# Patient Record
Sex: MALE | Race: BLACK OR AFRICAN AMERICAN | NOT HISPANIC OR LATINO | Employment: OTHER | URBAN - METROPOLITAN AREA
[De-identification: names, ages, dates, MRNs, and addresses within clinical notes are randomized per-mention and may not be internally consistent; named-entity substitution may affect disease eponyms.]

---

## 2017-01-07 ENCOUNTER — TELEPHONE (OUTPATIENT)
Dept: RADIOLOGY | Facility: HOSPITAL | Age: 42
End: 2017-01-07

## 2017-01-09 ENCOUNTER — HOSPITAL ENCOUNTER (OUTPATIENT)
Dept: RADIOLOGY | Facility: HOSPITAL | Age: 42
Discharge: HOME | End: 2017-01-09
Attending: INTERNAL MEDICINE
Payer: MEDICARE

## 2017-01-09 DIAGNOSIS — C34.11 MALIGNANT NEOPLASM OF UPPER LOBE OF RIGHT LUNG: ICD-10-CM

## 2017-01-09 DIAGNOSIS — C79.51 SECONDARY MALIGNANT NEOPLASM OF BONE: ICD-10-CM

## 2017-01-09 PROCEDURE — 25500020 PHARM REV CODE 255: Performed by: INTERNAL MEDICINE

## 2017-01-09 PROCEDURE — 71260 CT THORAX DX C+: CPT | Mod: TC

## 2017-01-09 PROCEDURE — 74177 CT ABD & PELVIS W/CONTRAST: CPT | Mod: 26,GC,, | Performed by: RADIOLOGY

## 2017-01-09 PROCEDURE — 74177 CT ABD & PELVIS W/CONTRAST: CPT | Mod: TC

## 2017-01-09 PROCEDURE — 71260 CT THORAX DX C+: CPT | Mod: 26,,, | Performed by: RADIOLOGY

## 2017-01-09 RX ADMIN — IOHEXOL 15 ML: 350 INJECTION, SOLUTION INTRAVENOUS at 01:01

## 2017-01-09 RX ADMIN — IOHEXOL 15 ML: 350 INJECTION, SOLUTION INTRAVENOUS at 12:01

## 2017-01-09 RX ADMIN — IOHEXOL 100 ML: 350 INJECTION, SOLUTION INTRAVENOUS at 03:01

## 2017-01-10 ENCOUNTER — OFFICE VISIT (OUTPATIENT)
Dept: HEMATOLOGY/ONCOLOGY | Facility: CLINIC | Age: 42
End: 2017-01-10
Payer: MEDICARE

## 2017-01-10 ENCOUNTER — LAB VISIT (OUTPATIENT)
Dept: LAB | Facility: HOSPITAL | Age: 42
End: 2017-01-10
Attending: INTERNAL MEDICINE
Payer: MEDICARE

## 2017-01-10 VITALS
WEIGHT: 183.19 LBS | TEMPERATURE: 98 F | HEART RATE: 92 BPM | OXYGEN SATURATION: 97 % | BODY MASS INDEX: 29.44 KG/M2 | HEIGHT: 66 IN | DIASTOLIC BLOOD PRESSURE: 102 MMHG | SYSTOLIC BLOOD PRESSURE: 140 MMHG | RESPIRATION RATE: 17 BRPM

## 2017-01-10 DIAGNOSIS — C79.51 SECONDARY MALIGNANT NEOPLASM OF BONE: ICD-10-CM

## 2017-01-10 DIAGNOSIS — Z51.11 ENCOUNTER FOR ANTINEOPLASTIC CHEMOTHERAPY: ICD-10-CM

## 2017-01-10 DIAGNOSIS — E03.9 HYPOTHYROIDISM, UNSPECIFIED TYPE: ICD-10-CM

## 2017-01-10 DIAGNOSIS — C34.11 MALIGNANT NEOPLASM OF UPPER LOBE OF RIGHT LUNG: ICD-10-CM

## 2017-01-10 DIAGNOSIS — G89.3 NEOPLASM RELATED PAIN: ICD-10-CM

## 2017-01-10 DIAGNOSIS — C34.91 LUNG CANCER, PRIMARY, WITH METASTASIS FROM LUNG TO OTHER SITE, RIGHT: Primary | ICD-10-CM

## 2017-01-10 DIAGNOSIS — I82.402 DEEP VEIN THROMBOSIS (DVT) OF LEFT LOWER EXTREMITY, UNSPECIFIED CHRONICITY, UNSPECIFIED VEIN: ICD-10-CM

## 2017-01-10 DIAGNOSIS — R19.7 DIARRHEA, UNSPECIFIED TYPE: ICD-10-CM

## 2017-01-10 DIAGNOSIS — I10 ESSENTIAL HYPERTENSION: ICD-10-CM

## 2017-01-10 LAB
ALBUMIN SERPL BCP-MCNC: 4.1 G/DL
ALP SERPL-CCNC: 80 U/L
ALT SERPL W/O P-5'-P-CCNC: 20 U/L
ANION GAP SERPL CALC-SCNC: 8 MMOL/L
AST SERPL-CCNC: 20 U/L
BILIRUB SERPL-MCNC: 0.3 MG/DL
BUN SERPL-MCNC: 15 MG/DL
CALCIUM SERPL-MCNC: 9.6 MG/DL
CHLORIDE SERPL-SCNC: 103 MMOL/L
CO2 SERPL-SCNC: 26 MMOL/L
CREAT SERPL-MCNC: 1.5 MG/DL
ERYTHROCYTE [DISTWIDTH] IN BLOOD BY AUTOMATED COUNT: 13.9 %
EST. GFR  (AFRICAN AMERICAN): >60 ML/MIN/1.73 M^2
EST. GFR  (NON AFRICAN AMERICAN): 57 ML/MIN/1.73 M^2
GLUCOSE SERPL-MCNC: 147 MG/DL
HCT VFR BLD AUTO: 46 %
HGB BLD-MCNC: 15.5 G/DL
MCH RBC QN AUTO: 27.6 PG
MCHC RBC AUTO-ENTMCNC: 33.7 %
MCV RBC AUTO: 82 FL
NEUTROPHILS # BLD AUTO: 4.1 K/UL
PLATELET # BLD AUTO: 248 K/UL
PMV BLD AUTO: 10.4 FL
POTASSIUM SERPL-SCNC: 3.9 MMOL/L
PROT SERPL-MCNC: 7.8 G/DL
RBC # BLD AUTO: 5.61 M/UL
SODIUM SERPL-SCNC: 137 MMOL/L
WBC # BLD AUTO: 8.15 K/UL

## 2017-01-10 PROCEDURE — 36415 COLL VENOUS BLD VENIPUNCTURE: CPT

## 2017-01-10 PROCEDURE — 85027 COMPLETE CBC AUTOMATED: CPT

## 2017-01-10 PROCEDURE — 99999 PR PBB SHADOW E&M-EST. PATIENT-LVL IV: CPT | Mod: PBBFAC,,, | Performed by: INTERNAL MEDICINE

## 2017-01-10 PROCEDURE — 99215 OFFICE O/P EST HI 40 MIN: CPT | Mod: S$PBB,,, | Performed by: INTERNAL MEDICINE

## 2017-01-10 PROCEDURE — 80053 COMPREHEN METABOLIC PANEL: CPT

## 2017-01-10 RX ORDER — HEPARIN 100 UNIT/ML
500 SYRINGE INTRAVENOUS
Status: CANCELLED | OUTPATIENT
Start: 2017-01-11

## 2017-01-10 RX ORDER — OXYCODONE AND ACETAMINOPHEN 10; 325 MG/1; MG/1
1 TABLET ORAL EVERY 6 HOURS PRN
Qty: 60 TABLET | Refills: 0 | Status: SHIPPED | OUTPATIENT
Start: 2017-01-10 | End: 2017-01-24 | Stop reason: SDUPTHER

## 2017-01-10 RX ORDER — SODIUM CHLORIDE 0.9 % (FLUSH) 0.9 %
10 SYRINGE (ML) INJECTION
Status: CANCELLED | OUTPATIENT
Start: 2017-01-11

## 2017-01-10 NOTE — Clinical Note
RTC to see Dr. Garay with CBC, CMP, TSH, T4  in 2 Weeks for next cycle Opdivo and Xgeva.   Thank you, PJ

## 2017-01-10 NOTE — PROGRESS NOTES
"PATIENT: Yao Alan  MRN: [id number]  DATE: 1/10/2017    Subjective:     Chief complaint:  Chief Complaint   Patient presents with    Malignant neoplasm of upper lobe of right lung    lower back pain 5/10       Oncologic History:  Mr. Yao Alan is a 41-year-old male who has a long history of smoking and was seen in the Pulmonary Clinic by Dr. Valle on 03/05/2015 with abnormal CT scan.    Apparently, he went to the [facility] in February with coughing as well as chest pain. A chest x-ray was done, which revealed a left upper lobe lung mass. Followup CT scan was done on 02/12/2015 and that revealed posterior superior mediastinal mass adjacent and prominent enlarged upper left mediastinal lymph nodes, abutting the aortic arch as well as left subclavian artery. A  CT scan of the abdomen was done on 03/03/2015 without contrast and that revealed nonobstructive nephrolithiasis, but a 2.1 cm lytic lesion involving the left iliac wing concerning for metastatic disease given the presence of emphysema and a mediastinal soft tissue mass on the CT chest from 02/12/2015. He saw Dr. Valle after that on 03/05/2015 and underwent an IR guided biopsy of the bone lesion on 03/17/2015. 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    His EGFR/EML/ALK is negative.    His PET scan on 3/25/15 revealed Left upper lobe lung lesion with an adjacent para-aortic lymph node, right anterior rib metastasis, right iliac metastasis, and pancreatic metastasis. A pancreatic cancer primary neoplasm is less likely.  MRI brain was negative for mets.  He completed 6 cycles of Carboplatin and Alimta and was on maintenance Alimta until end of March 2016.  His bone scan from 3/16 revealed stable disease. His CT scans also from end of March 2016 revealed "Interval enlargement of left upper lobe lung mass measuring 5.9 x 3.9 cm (previously 3.3 x 2.5 cm). This mass appears to invade the mediastinum and abutting the " "aortic arch and left subclavian artery"  He is now on Opdivo.  CT CAP from 6/22/16 s/p 6 cycles of Opdivo reveals "In this patient with a history of metastatic lung cancer, the previously identified paramediastinal left upper lobe lung mass has significantly decreased in size with only a trace amount of residual soft tissue opacities seen measuring 3.2 x 1.4 cm (axial series 2, image 16).Stable lytic lesion in the right iliac wing, unchanged.The right anterior sixth rib lesion and pancreatic head abnormality are not definitely appreciated on current examination."  Bone scan from 6/23/16 reveals "Stable activity in the right sixth rib anteriorly and the right iliac bone.No new metastatic lesions visualized."  10/3/16- CT scans reveal "In this patient with a history of metastatic lung cancer, the previously identified paramediastinal left upper lobe lung mass has decreased in size with only a trace amount of residual soft tissue opacity as above.  Stable lytic lesion in the right iliac wing, unchanged"  Bone scan reveals "Right superior anterior iliac bone lesion is stable and the right sixth rib has normalized. Recent dental procedure versus periodontal disease and possible right mastoiditis".      1/9/17 CT chest/abdomen/pelvis reveals "1. In this patient with history of metastatic lung cancer, the previously identified left upper paramediastinal lesion demonstrates near complete resolution with 1.0 x 0.7 cm residual disease (2.7 x 0.8 cm previously). Additionally, there is improving sclerotic lesion in the right iliac wing consistent with treated metastatic disease.  No evidence of new lesions in the chest, abdomen or pelvis. 2. Apical predominant paraseptal emphysematous changes and bullae. 3. Additional findings as above."     Interval History: Mr. Alan returns for follow up, results of scans above, and for Opdivo. He  back pain which is no worse than usual. . He takes 3 percocet a day for pain. He complains of " "neuropathy but is not taking gabapentin because it makes him sweat. Percocet helps with neuropathy pain as well. He had 2 small episodes of diarrhea yesterday and 1 today. No diarrhea after last treatment. He stopped taking his BP medication because he feels it was increasing his BP. He denies any nausea, vomiting, constipation, abdominal pain, weight loss or loss of appetite, chest pain, shortness of breath, leg swelling, fatigue, pain, headache, dizziness, or mood changes. He is alone.    ECOG Performance Status:   ECOG SCORE    0 - Fully active-able to carry on all pre-disease performance without restriction          PMFSH: all information reviewed and updated as relevant to today's visit    Review of Systems:   Review of Systems   Constitutional: Negative for activity change, appetite change, fatigue and fever.   HENT: Negative for mouth sores, nosebleeds and sore throat.    Eyes: Negative for visual disturbance.   Respiratory: Negative for cough and shortness of breath.    Cardiovascular: Negative for chest pain, palpitations and leg swelling.   Gastrointestinal: Positive for diarrhea (rare episode). Negative for abdominal pain, constipation, nausea and vomiting.   Genitourinary: Negative for difficulty urinating and frequency.   Musculoskeletal: Positive for back pain. Negative for arthralgias.   Skin: Negative for rash.   Neurological: Negative for dizziness, numbness and headaches.   Hematological: Negative for adenopathy. Does not bruise/bleed easily.   Psychiatric/Behavioral: Negative for confusion and sleep disturbance. The patient is not nervous/anxious.    All other systems reviewed and are negative.        Objective:      Vitals:   Vitals:    01/10/17 0757   BP: (!) 140/102   Pulse: 92   Resp: 17   Temp: 98.1 °F (36.7 °C)   TempSrc: Oral   SpO2: 97%   Weight: 83.1 kg (183 lb 3.2 oz)   Height: 5' 6" (1.676 m)     BMI: Body mass index is 29.57 kg/(m^2).      Physical Exam:   Physical Exam "   Constitutional: He is oriented to person, place, and time. He appears well-developed and well-nourished. No distress.   HENT:   Right Ear: External ear normal.   Left Ear: External ear normal.   Mouth/Throat: No oropharyngeal exudate.   Eyes: Conjunctivae and lids are normal. Pupils are equal, round, and reactive to light. No scleral icterus.   Neck: Trachea normal and normal range of motion. Neck supple. No thyromegaly present.   Cardiovascular: Normal rate, regular rhythm, normal heart sounds and normal pulses.    Pulmonary/Chest: Effort normal and breath sounds normal.   Abdominal: Soft. Normal appearance and bowel sounds are normal. He exhibits no distension and no mass. There is no hepatosplenomegaly or splenomegaly. There is no tenderness.   Musculoskeletal: Normal range of motion.   Lymphadenopathy:        Head (right side): No submental and no submandibular adenopathy present.        Head (left side): No submental and no submandibular adenopathy present.     He has no cervical adenopathy.     He has no axillary adenopathy.        Right: No supraclavicular adenopathy present.        Left: No supraclavicular adenopathy present.   Neurological: He is alert and oriented to person, place, and time. He has normal reflexes. No sensory deficit.   Skin: Skin is warm, dry and intact. No bruising and no rash noted. Nails show no clubbing.   Psychiatric: He has a normal mood and affect. His speech is normal and behavior is normal. Cognition and memory are normal.   Vitals reviewed.        Laboratory Data:  WBC   Date Value Ref Range Status   01/10/2017 8.15 3.90 - 12.70 K/uL Final     Hemoglobin   Date Value Ref Range Status   01/10/2017 15.5 14.0 - 18.0 g/dL Final     Hematocrit   Date Value Ref Range Status   01/10/2017 46.0 40.0 - 54.0 % Final     Platelets   Date Value Ref Range Status   01/10/2017 248 150 - 350 K/uL Final     Gran #   Date Value Ref Range Status   01/10/2017 4.1 1.8 - 7.7 K/uL Final     Comment:      The ANC is based on a white cell differential from an   automated cell counter. It has not been microscopically   reviewed for the presence of abnormal cells. Clinical   correlation is required.       Gran%   Date Value Ref Range Status   07/25/2015 60.9 38.0 - 73.0 % Final       Chemistry        Component Value Date/Time     01/10/2017 0720    K 3.9 01/10/2017 0720     01/10/2017 0720    CO2 26 01/10/2017 0720    BUN 15 01/10/2017 0720    CREATININE 1.5 (H) 01/10/2017 0720     (H) 01/10/2017 0720        Component Value Date/Time    CALCIUM 9.6 01/10/2017 0720    ALKPHOS 80 01/10/2017 0720    AST 20 01/10/2017 0720    ALT 20 01/10/2017 0720    BILITOT 0.3 01/10/2017 0720              Assessment/Plan:     1. Lung cancer, primary, with metastasis from lung to other site, right    2. Secondary malignant neoplasm of bone    3. Encounter for antineoplastic chemotherapy    4. Neoplasm related pain    5. Hypothyroidism, unspecified type    6. Deep vein thrombosis (DVT) of left lower extremity, unspecified chronicity, unspecified vein    7. Essential hypertension    8. Diarrhea, unspecified type        Plan:    1,2,3. He is doing well overall and scans have improved. Proceed with Opdivo immunotherapy tomorrow as scheduled.   RTC to see Dr. Garay with CBC, CMP, TSH, T4  in 2 Weeks for next cycle Opdivo and Xgeva.    4. Stable, Refill  percocet.  5. Stable,  Continue Synthroid. Recheck in 2 weeks.   6. Stable, Continue Xarelto   7. BP elevated today. Encouraged compliance with medication. Patient will resume Norvasc as prescribed.  8. Patient had 3 small episode of diarrhea in the past 2 days. Continue to monitor. Patient understands he is to call for increase in frequency or severity.     Med and Orders:  Orders Placed This Encounter    CBC Oncology    Comprehensive metabolic panel    TSH    T4, free    oxycodone-acetaminophen (PERCOCET)  mg per tablet       Follow Up:  Return in about 2  weeks (around 1/24/2017).    Patient instructions:   There are no Patient Instructions on file for this visit.      More than 25 mins were spent during this encounter, greater than 50% was spent in direct counseling and/or coordination of care.   Patient was also seen and examined by Dr. Garay. Patient is in agreement with the proposed treatment plan. All questions were answered to the patient's satisfaction. Pt knows to call clinic if anything is needed before the next clinic visit.    JULIOCESAR Jordan  Hematology and Medical Oncology      STAFF NOTE:  I have reviewed the notes, assessments, and/or procedures performed by the nurse practitioner, as above.  I have personally interviewed the patient at the beside, and rounded with the nurse practitioner. I formulated the plan of care.  I concur with her documentation of Yao Alan.

## 2017-01-11 ENCOUNTER — INFUSION (OUTPATIENT)
Dept: INFUSION THERAPY | Facility: HOSPITAL | Age: 42
End: 2017-01-11
Attending: INTERNAL MEDICINE
Payer: MEDICARE

## 2017-01-11 VITALS
SYSTOLIC BLOOD PRESSURE: 134 MMHG | DIASTOLIC BLOOD PRESSURE: 88 MMHG | TEMPERATURE: 98 F | HEART RATE: 89 BPM | RESPIRATION RATE: 18 BRPM

## 2017-01-11 DIAGNOSIS — D50.0 IRON DEFICIENCY ANEMIA DUE TO CHRONIC BLOOD LOSS: Primary | ICD-10-CM

## 2017-01-11 DIAGNOSIS — C79.51 SECONDARY MALIGNANT NEOPLASM OF BONE: ICD-10-CM

## 2017-01-11 DIAGNOSIS — C34.91 LUNG CANCER, PRIMARY, WITH METASTASIS FROM LUNG TO OTHER SITE, RIGHT: ICD-10-CM

## 2017-01-11 DIAGNOSIS — C34.11 MALIGNANT NEOPLASM OF UPPER LOBE OF RIGHT LUNG: ICD-10-CM

## 2017-01-11 PROCEDURE — 63600175 PHARM REV CODE 636 W HCPCS: Performed by: INTERNAL MEDICINE

## 2017-01-11 PROCEDURE — 96413 CHEMO IV INFUSION 1 HR: CPT

## 2017-01-11 PROCEDURE — 25000003 PHARM REV CODE 250: Performed by: INTERNAL MEDICINE

## 2017-01-11 RX ORDER — HEPARIN 100 UNIT/ML
500 SYRINGE INTRAVENOUS
Status: DISCONTINUED | OUTPATIENT
Start: 2017-01-11 | End: 2017-01-11 | Stop reason: HOSPADM

## 2017-01-11 RX ORDER — SODIUM CHLORIDE 0.9 % (FLUSH) 0.9 %
10 SYRINGE (ML) INJECTION
Status: DISCONTINUED | OUTPATIENT
Start: 2017-01-11 | End: 2017-01-11 | Stop reason: HOSPADM

## 2017-01-11 RX ADMIN — HEPARIN 500 UNITS: 100 SYRINGE at 10:01

## 2017-01-11 RX ADMIN — SODIUM CHLORIDE, PRESERVATIVE FREE 10 ML: 5 INJECTION INTRAVENOUS at 10:01

## 2017-01-11 RX ADMIN — SODIUM CHLORIDE 240 MG: 9 INJECTION, SOLUTION INTRAVENOUS at 08:01

## 2017-01-11 RX ADMIN — SODIUM CHLORIDE: 9 INJECTION, SOLUTION INTRAVENOUS at 08:01

## 2017-01-11 NOTE — MR AVS SNAPSHOT
Patient Information     Patient Name Sex Yao Templeton Male 1975      Visit Information        Provider Department Dept Phone Center    2017 7:30 AM [facility], [facility] [phone] Pedro Reeves      Patient Instructions     None      Your Current Medications Are     albuterol 90 mcg/actuation inhaler    amlodipine (NORVASC) 10 MG tablet    docusate sodium (COLACE) 100 MG capsule    ferrous sulfate 325 (65 FE) MG EC tablet    lactulose (CHRONULAC) 10 gram/15 mL solution    levocetirizine (XYZAL) 5 MG tablet    levothyroxine (SYNTHROID) 25 MCG tablet    metoclopramide HCl (REGLAN) 10 MG tablet    naloxegol 25 mg Tab    omeprazole (PRILOSEC) 20 MG capsule    oxycodone-acetaminophen (PERCOCET)  mg per tablet    polyethylene glycol (GLYCOLAX) 17 gram/dose powder    PROAIR HFA 90 mcg/actuation inhaler    rivaroxaban (XARELTO) 20 mg Tab      Facility-Administered Medications     heparin, porcine (PF) 100 unit/mL injection flush 500 Units    nivolumab 240 mg in sodium chloride 0.9% 100 mL chemo infusion    sodium chloride 0.9% 100 mL flush bag    sodium chloride 0.9% flush 10 mL      Appointments for Next Year     None         Default Flowsheet Data (last 24 hours)      Amb Complex Vitals Tato        17 0800                Measurements    /88        Temp 98.2 °F (36.8 °C)        Pulse 89        Resp 18        Pain Assessment    Pain Score Zero                Allergies     Nsaids (Non-steroidal Anti-inflammatory Drug)     Patient says since been diagnosed with lung mass on 2-12-15, if take any NSAIDS he spikes a fever.    Tylenol [Acetaminophen] Other (See Comments)    Sweating +      Medications You Received from 01/10/2017 0905 to 2017 0905        Date/Time Order Dose Route Action     2017 0856 nivolumab 240 mg in sodium chloride 0.9% 100 mL chemo infusion 240 mg Intravenous New Bag     2017 0856 sodium chloride 0.9% 100 mL flush bag   Intravenous  New Bag      Current Discharge Medication List     Cannot display discharge medications since this is not an admission.

## 2017-01-11 NOTE — PLAN OF CARE
Problem: Patient Care Overview (Adult)  Goal: Plan of Care Review  Outcome: Ongoing (interventions implemented as appropriate)  Patient tolerated treatment without complaints. Port flushed, heparin instilled and de-accessed. Encouraged fluid intake. AVS provided to patient, future appointments reviewed. Discharged without s/s of adverse reaction. Instructed to call provider with any questions or concerns.

## 2017-01-24 ENCOUNTER — OFFICE VISIT (OUTPATIENT)
Dept: HEMATOLOGY/ONCOLOGY | Facility: CLINIC | Age: 42
End: 2017-01-24
Payer: MEDICARE

## 2017-01-24 ENCOUNTER — LAB VISIT (OUTPATIENT)
Dept: LAB | Facility: HOSPITAL | Age: 42
End: 2017-01-24
Attending: INTERNAL MEDICINE
Payer: MEDICARE

## 2017-01-24 VITALS
SYSTOLIC BLOOD PRESSURE: 142 MMHG | WEIGHT: 185.19 LBS | HEIGHT: 66 IN | BODY MASS INDEX: 29.76 KG/M2 | RESPIRATION RATE: 17 BRPM | TEMPERATURE: 98 F | HEART RATE: 83 BPM | OXYGEN SATURATION: 99 % | DIASTOLIC BLOOD PRESSURE: 97 MMHG

## 2017-01-24 DIAGNOSIS — G89.3 NEOPLASM RELATED PAIN: ICD-10-CM

## 2017-01-24 DIAGNOSIS — E03.9 HYPOTHYROIDISM, UNSPECIFIED TYPE: ICD-10-CM

## 2017-01-24 DIAGNOSIS — C34.91 LUNG CANCER, PRIMARY, WITH METASTASIS FROM LUNG TO OTHER SITE, RIGHT: ICD-10-CM

## 2017-01-24 DIAGNOSIS — Z51.11 ENCOUNTER FOR ANTINEOPLASTIC CHEMOTHERAPY: ICD-10-CM

## 2017-01-24 DIAGNOSIS — I82.402 DEEP VEIN THROMBOSIS (DVT) OF LEFT LOWER EXTREMITY, UNSPECIFIED CHRONICITY, UNSPECIFIED VEIN: ICD-10-CM

## 2017-01-24 DIAGNOSIS — C34.91 LUNG CANCER, PRIMARY, WITH METASTASIS FROM LUNG TO OTHER SITE, RIGHT: Primary | ICD-10-CM

## 2017-01-24 DIAGNOSIS — C79.51 SECONDARY MALIGNANT NEOPLASM OF BONE: ICD-10-CM

## 2017-01-24 DIAGNOSIS — I10 ESSENTIAL HYPERTENSION: ICD-10-CM

## 2017-01-24 LAB
ALBUMIN SERPL BCP-MCNC: 3.9 G/DL
ALP SERPL-CCNC: 77 U/L
ALT SERPL W/O P-5'-P-CCNC: 13 U/L
ANION GAP SERPL CALC-SCNC: 7 MMOL/L
AST SERPL-CCNC: 17 U/L
BILIRUB SERPL-MCNC: 0.3 MG/DL
BUN SERPL-MCNC: 12 MG/DL
CALCIUM SERPL-MCNC: 9.4 MG/DL
CHLORIDE SERPL-SCNC: 105 MMOL/L
CO2 SERPL-SCNC: 28 MMOL/L
CREAT SERPL-MCNC: 1.4 MG/DL
ERYTHROCYTE [DISTWIDTH] IN BLOOD BY AUTOMATED COUNT: 14.1 %
EST. GFR  (AFRICAN AMERICAN): >60 ML/MIN/1.73 M^2
EST. GFR  (NON AFRICAN AMERICAN): >60 ML/MIN/1.73 M^2
GLUCOSE SERPL-MCNC: 108 MG/DL
HCT VFR BLD AUTO: 46.1 %
HGB BLD-MCNC: 15.5 G/DL
MCH RBC QN AUTO: 27.6 PG
MCHC RBC AUTO-ENTMCNC: 33.6 %
MCV RBC AUTO: 82 FL
NEUTROPHILS # BLD AUTO: 4.1 K/UL
PLATELET # BLD AUTO: 243 K/UL
PMV BLD AUTO: 10.5 FL
POTASSIUM SERPL-SCNC: 3.6 MMOL/L
PROT SERPL-MCNC: 7.9 G/DL
RBC # BLD AUTO: 5.61 M/UL
SODIUM SERPL-SCNC: 140 MMOL/L
T4 FREE SERPL-MCNC: 0.83 NG/DL
TSH SERPL DL<=0.005 MIU/L-ACNC: 12.41 UIU/ML
WBC # BLD AUTO: 7.86 K/UL

## 2017-01-24 PROCEDURE — 85027 COMPLETE CBC AUTOMATED: CPT

## 2017-01-24 PROCEDURE — 80053 COMPREHEN METABOLIC PANEL: CPT

## 2017-01-24 PROCEDURE — 99215 OFFICE O/P EST HI 40 MIN: CPT | Mod: S$PBB,,, | Performed by: INTERNAL MEDICINE

## 2017-01-24 PROCEDURE — 99999 PR PBB SHADOW E&M-EST. PATIENT-LVL III: CPT | Mod: PBBFAC,,, | Performed by: INTERNAL MEDICINE

## 2017-01-24 PROCEDURE — 36415 COLL VENOUS BLD VENIPUNCTURE: CPT

## 2017-01-24 PROCEDURE — 84439 ASSAY OF FREE THYROXINE: CPT

## 2017-01-24 PROCEDURE — 84443 ASSAY THYROID STIM HORMONE: CPT

## 2017-01-24 RX ORDER — OXYCODONE AND ACETAMINOPHEN 10; 325 MG/1; MG/1
1 TABLET ORAL EVERY 6 HOURS PRN
Qty: 120 TABLET | Refills: 0 | Status: SHIPPED | OUTPATIENT
Start: 2017-01-24 | End: 2017-02-21 | Stop reason: SDUPTHER

## 2017-01-24 RX ORDER — SODIUM CHLORIDE 0.9 % (FLUSH) 0.9 %
10 SYRINGE (ML) INJECTION
Status: CANCELLED | OUTPATIENT
Start: 2017-01-25

## 2017-01-24 RX ORDER — LEVOTHYROXINE SODIUM 25 UG/1
25 TABLET ORAL DAILY
Qty: 30 TABLET | Refills: 0 | Status: CANCELLED | OUTPATIENT
Start: 2017-01-24 | End: 2018-01-24

## 2017-01-24 RX ORDER — HEPARIN 100 UNIT/ML
500 SYRINGE INTRAVENOUS
Status: CANCELLED | OUTPATIENT
Start: 2017-01-25

## 2017-01-24 RX ORDER — LEVOTHYROXINE SODIUM 50 UG/1
50 TABLET ORAL DAILY
Qty: 30 TABLET | Refills: 11 | Status: SHIPPED | OUTPATIENT
Start: 2017-01-24 | End: 2017-02-21 | Stop reason: SDUPTHER

## 2017-01-24 NOTE — PROGRESS NOTES
"PATIENT: Yao Alan  MRN: [id number]  DATE: 1/24/2017    Subjective:     Chief complaint:  Chief Complaint   Patient presents with    Lung cancer, primary, with metastasis from lung to other sit       Oncologic History:  Mr. Yao Alan is a 41-year-old male who has a long history of smoking and was seen in the Pulmonary Clinic by Dr. Valle on 03/05/2015 with abnormal CT scan.    Apparently, he went to the [facility] in February with coughing as well as chest pain. A chest x-ray was done, which revealed a left upper lobe lung mass. Followup CT scan was done on 02/12/2015 and that revealed posterior superior mediastinal mass adjacent and prominent enlarged upper left mediastinal lymph nodes, abutting the aortic arch as well as left subclavian artery. A  CT scan of the abdomen was done on 03/03/2015 without contrast and that revealed nonobstructive nephrolithiasis, but a 2.1 cm lytic lesion involving the left iliac wing concerning for metastatic disease given the presence of emphysema and a mediastinal soft tissue mass on the CT chest from 02/12/2015. He saw Dr. Valle after that on 03/05/2015 and underwent an IR guided biopsy of the bone lesion on 03/17/2015. 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    His EGFR/EML/ALK is negative.    His PET scan on 3/25/15 revealed Left upper lobe lung lesion with an adjacent para-aortic lymph node, right anterior rib metastasis, right iliac metastasis, and pancreatic metastasis. A pancreatic cancer primary neoplasm is less likely.  MRI brain was negative for mets.  He completed 6 cycles of Carboplatin and Alimta and was on maintenance Alimta until end of March 2016.  His bone scan from 3/16 revealed stable disease. His CT scans also from end of March 2016 revealed "Interval enlargement of left upper lobe lung mass measuring 5.9 x 3.9 cm (previously 3.3 x 2.5 cm). This mass appears to invade the mediastinum and abutting the aortic arch and " "left subclavian artery"  He is now on Opdivo.  CT CAP from 6/22/16 s/p 6 cycles of Opdivo reveals "In this patient with a history of metastatic lung cancer, the previously identified paramediastinal left upper lobe lung mass has significantly decreased in size with only a trace amount of residual soft tissue opacities seen measuring 3.2 x 1.4 cm (axial series 2, image 16).Stable lytic lesion in the right iliac wing, unchanged.The right anterior sixth rib lesion and pancreatic head abnormality are not definitely appreciated on current examination."  Bone scan from 6/23/16 reveals "Stable activity in the right sixth rib anteriorly and the right iliac bone.No new metastatic lesions visualized."  10/3/16- CT scans reveal "In this patient with a history of metastatic lung cancer, the previously identified paramediastinal left upper lobe lung mass has decreased in size with only a trace amount of residual soft tissue opacity as above.  Stable lytic lesion in the right iliac wing, unchanged"  Bone scan reveals "Right superior anterior iliac bone lesion is stable and the right sixth rib has normalized. Recent dental procedure versus periodontal disease and possible right mastoiditis".      1/9/17 CT chest/abdomen/pelvis reveals "1. In this patient with history of metastatic lung cancer, the previously identified left upper paramediastinal lesion demonstrates near complete resolution with 1.0 x 0.7 cm residual disease (2.7 x 0.8 cm previously). Additionally, there is improving sclerotic lesion in the right iliac wing consistent with treated metastatic disease.  No evidence of new lesions in the chest, abdomen or pelvis. 2. Apical predominant paraseptal emphysematous changes and bullae. 3. Additional findings as above."      Interval History: Mr. Alan returns for follow up, results of scans above, and for Opdivo. He back pain which is no worse than usual. . He takes 3 percocet a day for pain. He feels well and denies any new " "complaints  ECOG Performance Status: zero.  ECOG SCORE           PMFSH: all information reviewed and updated as relevant to today's visit    Review of Systems:   Review of Systems   Constitutional: Negative for appetite change, fatigue and unexpected weight change.   HENT: Negative for mouth sores.    Eyes: Negative for visual disturbance.   Respiratory: Negative for cough and shortness of breath.    Cardiovascular: Negative for chest pain.   Gastrointestinal: Negative for abdominal pain and diarrhea.   Genitourinary: Negative for frequency.   Musculoskeletal: Negative for back pain.   Skin: Negative for rash.   Neurological: Negative for headaches.   Hematological: Negative for adenopathy.   Psychiatric/Behavioral: The patient is not nervous/anxious.    All other systems reviewed and are negative.        Objective:      Vitals:   Vitals:    01/24/17 0858   Weight: 84 kg (185 lb 3 oz)   Height: 5' 6" (1.676 m)     BMI: Body mass index is 29.89 kg/(m^2).      Physical Exam:   Physical Exam   Constitutional: He is oriented to person, place, and time. He appears well-developed and well-nourished.   HENT:   Mouth/Throat: No oropharyngeal exudate.   Cardiovascular: Normal rate and normal heart sounds.    Pulmonary/Chest: Effort normal and breath sounds normal. He has no wheezes.   Abdominal: Soft. Bowel sounds are normal. There is no tenderness.   Musculoskeletal: He exhibits no edema or tenderness.   Lymphadenopathy:     He has no cervical adenopathy.   Neurological: He is alert and oriented to person, place, and time. Coordination normal.   Skin: Skin is warm and dry. No rash noted.   Psychiatric: He has a normal mood and affect. Judgment and thought content normal.   Vitals reviewed.        Laboratory Data:  WBC   Date Value Ref Range Status   01/24/2017 7.86 3.90 - 12.70 K/uL Final     Hemoglobin   Date Value Ref Range Status   01/24/2017 15.5 14.0 - 18.0 g/dL Final     Hematocrit   Date Value Ref Range Status "   01/24/2017 46.1 40.0 - 54.0 % Final     Platelets   Date Value Ref Range Status   01/24/2017 243 150 - 350 K/uL Final     Gran #   Date Value Ref Range Status   01/24/2017 4.1 1.8 - 7.7 K/uL Final     Comment:     The ANC is based on a white cell differential from an   automated cell counter. It has not been microscopically   reviewed for the presence of abnormal cells. Clinical   correlation is required.       Gran%   Date Value Ref Range Status   07/25/2015 60.9 38.0 - 73.0 % Final       Chemistry        Component Value Date/Time     01/24/2017 0736    K 3.6 01/24/2017 0736     01/24/2017 0736    CO2 28 01/24/2017 0736    BUN 12 01/24/2017 0736    CREATININE 1.4 01/24/2017 0736     01/24/2017 0736        Component Value Date/Time    CALCIUM 9.4 01/24/2017 0736    ALKPHOS 77 01/24/2017 0736    AST 17 01/24/2017 0736    ALT 13 01/24/2017 0736    BILITOT 0.3 01/24/2017 0736              Assessment/Plan:     1. Lung cancer, primary, with metastasis from lung to other site, right    2. Secondary malignant neoplasm of bone    3. Encounter for antineoplastic chemotherapy    4. Neoplasm related pain    5. Deep vein thrombosis (DVT) of left lower extremity, unspecified chronicity, unspecified vein    6. Essential hypertension    7. Hypothyroidism, unspecified type        Plan:   1,2,3. He will proceed with Opdivo and Xgeva and will return in 2 weeks for next cycle.  4,5,6,7. Refilled meds. Increase synthroid to 50 mcg. REcheck TSH, T4 in 4 weeks        Med and Orders:  Orders Placed This Encounter    CBC Oncology    Comprehensive metabolic panel    oxycodone-acetaminophen (PERCOCET)  mg per tablet    rivaroxaban (XARELTO) 20 mg Tab    levothyroxine (SYNTHROID) 50 MCG tablet       Follow Up:  No Follow-up on file.    Patient instructions:   There are no Patient Instructions on file for this visit.      More than 25 mins were spent during this encounter, greater than 50% was spent in direct  counseling and/or coordination of care.   Patient was also seen and examined by Dr. Garay who formulated above plan.  Patient is in agreement with the proposed treatment plan. All questions were answered to the patient's satisfaction. Pt knows to call clinic if anything is needed before the next clinic visit.    JULIOCESAR Jordan  Hematology and Medical Oncology      STAFF NOTE:  I have reviewed the notes, assessments, and/or procedures performed by the nurse practitioner, as above.  I have personally interviewed the patient at the beside, and rounded with the nurse practitioner. I formulated the plan of care.  I concur with her documentation of Yao Alan.

## 2017-01-25 ENCOUNTER — INFUSION (OUTPATIENT)
Dept: INFUSION THERAPY | Facility: HOSPITAL | Age: 42
End: 2017-01-25
Attending: INTERNAL MEDICINE
Payer: MEDICARE

## 2017-01-25 VITALS
TEMPERATURE: 98 F | RESPIRATION RATE: 20 BRPM | HEART RATE: 101 BPM | SYSTOLIC BLOOD PRESSURE: 146 MMHG | DIASTOLIC BLOOD PRESSURE: 86 MMHG

## 2017-01-25 DIAGNOSIS — D50.0 IRON DEFICIENCY ANEMIA DUE TO CHRONIC BLOOD LOSS: Primary | ICD-10-CM

## 2017-01-25 DIAGNOSIS — C34.91 LUNG CANCER, PRIMARY, WITH METASTASIS FROM LUNG TO OTHER SITE, RIGHT: ICD-10-CM

## 2017-01-25 DIAGNOSIS — C79.51 SECONDARY MALIGNANT NEOPLASM OF BONE: ICD-10-CM

## 2017-01-25 DIAGNOSIS — C34.11 MALIGNANT NEOPLASM OF UPPER LOBE OF RIGHT LUNG: ICD-10-CM

## 2017-01-25 PROCEDURE — 25000003 PHARM REV CODE 250: Performed by: INTERNAL MEDICINE

## 2017-01-25 PROCEDURE — 96413 CHEMO IV INFUSION 1 HR: CPT

## 2017-01-25 PROCEDURE — 63600175 PHARM REV CODE 636 W HCPCS: Performed by: INTERNAL MEDICINE

## 2017-01-25 PROCEDURE — 96401 CHEMO ANTI-NEOPL SQ/IM: CPT

## 2017-01-25 RX ORDER — SODIUM CHLORIDE 0.9 % (FLUSH) 0.9 %
10 SYRINGE (ML) INJECTION
Status: DISCONTINUED | OUTPATIENT
Start: 2017-01-25 | End: 2017-01-25 | Stop reason: HOSPADM

## 2017-01-25 RX ORDER — HEPARIN 100 UNIT/ML
500 SYRINGE INTRAVENOUS
Status: DISCONTINUED | OUTPATIENT
Start: 2017-01-25 | End: 2017-01-25 | Stop reason: HOSPADM

## 2017-01-25 RX ADMIN — SODIUM CHLORIDE 240 MG: 9 INJECTION, SOLUTION INTRAVENOUS at 08:01

## 2017-01-25 RX ADMIN — HEPARIN 500 UNITS: 100 SYRINGE at 09:01

## 2017-01-25 RX ADMIN — DENOSUMAB 120 MG: 120 INJECTION SUBCUTANEOUS at 09:01

## 2017-01-25 RX ADMIN — SODIUM CHLORIDE: 9 INJECTION, SOLUTION INTRAVENOUS at 07:01

## 2017-01-25 NOTE — MR AVS SNAPSHOT
"Patient Information     Patient Name Sex Yao Templeton Male 1975      Visit Information        [facility]    2017 7:30 AM [facility] [phone] Pedro Reeves      Patient Instructions     None      Your Current Medications Are     albuterol 90 mcg/actuation inhaler    amlodipine (NORVASC) 10 MG tablet    docusate sodium (COLACE) 100 MG capsule    ferrous sulfate 325 (65 FE) MG EC tablet    lactulose (CHRONULAC) 10 gram/15 mL solution    levocetirizine (XYZAL) 5 MG tablet    levothyroxine (SYNTHROID) 25 MCG tablet    levothyroxine (SYNTHROID) 50 MCG tablet    metoclopramide HCl (REGLAN) 10 MG tablet    naloxegol 25 mg Tab    omeprazole (PRILOSEC) 20 MG capsule    oxycodone-acetaminophen (PERCOCET)  mg per tablet    polyethylene glycol (GLYCOLAX) 17 gram/dose powder    PROAIR HFA 90 mcg/actuation inhaler    rivaroxaban (XARELTO) 20 mg Tab      Facility-Administered Medications     alteplase injection 2 mg    denosumab (XGEVA) solution 120 mg    heparin, porcine (PF) 100 unit/mL injection flush 500 Units    nivolumab 240 mg in sodium chloride 0.9% 100 mL chemo infusion    sodium chloride 0.9% 100 mL flush bag    sodium chloride 0.9% flush 10 mL      Appointments for Next Year     None         Default Flowsheet Data (last 24 hours)      Amb Complex Vitals Tato        17 0731 17 0858             Measurements    Weight  84 kg (185 lb 3 oz)       Height  5' 6" (1.676 m)       BSA (Calculated - sq m)  1.98 sq meters       BMI (Calculated)  30       BP (!)  146/86 (!)  142/97       Temp 98.1 °F (36.7 °C) 98 °F (36.7 °C)       Pulse 101 83       Resp 20 17       SpO2  99 %       Pain Assessment    Pain Score Zero Four       Pain Loc  BACK               Allergies     Nsaids (Non-steroidal Anti-inflammatory Drug)     Patient says since been diagnosed with lung mass on 2-12-15, if take any NSAIDS he spikes a fever.    Tylenol " [Acetaminophen] Other (See Comments)    Sweating +      Medications You Received from 01/24/2017 0853 to 01/25/2017 0853        Date/Time Order Dose Route Action     01/25/2017 0811 nivolumab 240 mg in sodium chloride 0.9% 100 mL chemo infusion 240 mg Intravenous New Bag     01/25/2017 0745 sodium chloride 0.9% 100 mL flush bag   Intravenous New Bag      Current Discharge Medication List     Cannot display discharge medications since this is not an admission.

## 2017-01-25 NOTE — PLAN OF CARE
Problem: Patient Care Overview (Adult)  Goal: Discharge Needs Assessment  Outcome: Ongoing (interventions implemented as appropriate)  Pt tolerated treatment without adverse effects. VSS. Provided AVS & verbalized understanding of RTC date. DC  ambulating independently.

## 2017-02-07 ENCOUNTER — OFFICE VISIT (OUTPATIENT)
Dept: HEMATOLOGY/ONCOLOGY | Facility: CLINIC | Age: 42
End: 2017-02-07
Payer: MEDICARE

## 2017-02-07 VITALS
RESPIRATION RATE: 17 BRPM | SYSTOLIC BLOOD PRESSURE: 120 MMHG | OXYGEN SATURATION: 97 % | DIASTOLIC BLOOD PRESSURE: 82 MMHG | HEIGHT: 66 IN | HEART RATE: 81 BPM | BODY MASS INDEX: 29.51 KG/M2 | WEIGHT: 183.63 LBS | TEMPERATURE: 98 F

## 2017-02-07 DIAGNOSIS — E03.9 HYPOTHYROIDISM, UNSPECIFIED TYPE: ICD-10-CM

## 2017-02-07 DIAGNOSIS — I10 ESSENTIAL HYPERTENSION: ICD-10-CM

## 2017-02-07 DIAGNOSIS — I82.402 DEEP VEIN THROMBOSIS (DVT) OF LEFT LOWER EXTREMITY, UNSPECIFIED CHRONICITY, UNSPECIFIED VEIN: ICD-10-CM

## 2017-02-07 DIAGNOSIS — Z51.11 ENCOUNTER FOR ANTINEOPLASTIC CHEMOTHERAPY: ICD-10-CM

## 2017-02-07 DIAGNOSIS — G89.3 NEOPLASM RELATED PAIN: ICD-10-CM

## 2017-02-07 DIAGNOSIS — C34.11 MALIGNANT NEOPLASM OF UPPER LOBE OF RIGHT LUNG: Primary | ICD-10-CM

## 2017-02-07 DIAGNOSIS — C79.51 SECONDARY MALIGNANT NEOPLASM OF BONE: ICD-10-CM

## 2017-02-07 PROCEDURE — 99999 PR PBB SHADOW E&M-EST. PATIENT-LVL IV: CPT | Mod: PBBFAC,,, | Performed by: INTERNAL MEDICINE

## 2017-02-07 PROCEDURE — 99215 OFFICE O/P EST HI 40 MIN: CPT | Mod: S$PBB,,, | Performed by: INTERNAL MEDICINE

## 2017-02-07 RX ORDER — SODIUM CHLORIDE 0.9 % (FLUSH) 0.9 %
10 SYRINGE (ML) INJECTION
Status: CANCELLED | OUTPATIENT
Start: 2017-02-08

## 2017-02-07 RX ORDER — HEPARIN 100 UNIT/ML
500 SYRINGE INTRAVENOUS
Status: CANCELLED | OUTPATIENT
Start: 2017-02-08

## 2017-02-07 NOTE — PROGRESS NOTES
"PATIENT: Yao Alan  MRN: [id number]  DATE: 2/7/2017    Subjective:     Chief complaint:  Chief Complaint   Patient presents with    Lung Cancer       Oncologic History:  Mr. Yao Alan is a 41-year-old male who has a long history of smoking and was seen in the Pulmonary Clinic by Dr. Valle on 03/05/2015 with abnormal CT scan.    Apparently, he went to the [facility] in February with coughing as well as chest pain. A chest x-ray was done, which revealed a left upper lobe lung mass. Followup CT scan was done on 02/12/2015 and that revealed posterior superior mediastinal mass adjacent and prominent enlarged upper left mediastinal lymph nodes, abutting the aortic arch as well as left subclavian artery. A  CT scan of the abdomen was done on 03/03/2015 without contrast and that revealed nonobstructive nephrolithiasis, but a 2.1 cm lytic lesion involving the left iliac wing concerning for metastatic disease given the presence of emphysema and a mediastinal soft tissue mass on the CT chest from 02/12/2015. He saw Dr. Valle after that on 03/05/2015 and underwent an IR guided biopsy of the bone lesion on 03/17/2015. 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    His EGFR/EML/ALK is negative.    His PET scan on 3/25/15 revealed Left upper lobe lung lesion with an adjacent para-aortic lymph node, right anterior rib metastasis, right iliac metastasis, and pancreatic metastasis. A pancreatic cancer primary neoplasm is less likely.  MRI brain was negative for mets.  He completed 6 cycles of Carboplatin and Alimta and was on maintenance Alimta until end of March 2016.  His bone scan from 3/16 revealed stable disease. His CT scans also from end of March 2016 revealed "Interval enlargement of left upper lobe lung mass measuring 5.9 x 3.9 cm (previously 3.3 x 2.5 cm). This mass appears to invade the mediastinum and abutting the aortic arch and left subclavian artery"  He is now on Opdivo.  CT " "CAP from 6/22/16 s/p 6 cycles of Opdivo reveals "In this patient with a history of metastatic lung cancer, the previously identified paramediastinal left upper lobe lung mass has significantly decreased in size with only a trace amount of residual soft tissue opacities seen measuring 3.2 x 1.4 cm (axial series 2, image 16).Stable lytic lesion in the right iliac wing, unchanged.The right anterior sixth rib lesion and pancreatic head abnormality are not definitely appreciated on current examination."  Bone scan from 6/23/16 reveals "Stable activity in the right sixth rib anteriorly and the right iliac bone.No new metastatic lesions visualized."  10/3/16- CT scans reveal "In this patient with a history of metastatic lung cancer, the previously identified paramediastinal left upper lobe lung mass has decreased in size with only a trace amount of residual soft tissue opacity as above.  Stable lytic lesion in the right iliac wing, unchanged"  Bone scan reveals "Right superior anterior iliac bone lesion is stable and the right sixth rib has normalized. Recent dental procedure versus periodontal disease and possible right mastoiditis".      1/9/17 CT chest/abdomen/pelvis reveals "1. In this patient with history of metastatic lung cancer, the previously identified left upper paramediastinal lesion demonstrates near complete resolution with 1.0 x 0.7 cm residual disease (2.7 x 0.8 cm previously). Additionally, there is improving sclerotic lesion in the right iliac wing consistent with treated metastatic disease.  No evidence of new lesions in the chest, abdomen or pelvis. 2. Apical predominant paraseptal emphysematous changes and bullae. 3. Additional findings as above."       Interval History: Mr. Alan returns for follow up and for Opdivo. He continues to have back pain which is no worse than usual.  He takes 3 percocet a day for pain. He feels well and denies any new complaints. He denies any nausea, vomiting, diarrhea, " "constipation, abdominal pain, weight loss or loss of appetite, chest pain, shortness of breath, leg swelling, fatigue,  headache, dizziness, or mood changes. He is alone.    ECOG Performance Status:   ECOG SCORE    0 - Fully active-able to carry on all pre-disease performance without restriction          PMFSH: all information reviewed and updated as relevant to today's visit    Review of Systems:   Review of Systems   Constitutional: Negative for activity change, appetite change, fatigue and fever.   HENT: Negative for mouth sores, nosebleeds and sore throat.    Eyes: Negative for visual disturbance.   Respiratory: Negative for cough and shortness of breath.    Cardiovascular: Negative for chest pain, palpitations and leg swelling.   Gastrointestinal: Negative for abdominal pain, constipation, diarrhea, nausea and vomiting.   Genitourinary: Negative for difficulty urinating and frequency.   Musculoskeletal: Positive for back pain. Negative for arthralgias.   Skin: Negative for rash.   Neurological: Negative for dizziness, numbness and headaches.   Hematological: Negative for adenopathy. Does not bruise/bleed easily.   Psychiatric/Behavioral: Negative for confusion and sleep disturbance. The patient is not nervous/anxious.    All other systems reviewed and are negative.        Objective:      Vitals:   Vitals:    02/07/17 0850 02/07/17 0900   BP: (!) 135/102 120/82   Pulse: 81    Resp: 17    Temp: 98.2 °F (36.8 °C)    TempSrc: Oral    SpO2: 97%    Weight: 83.3 kg (183 lb 10.3 oz)    Height: 5' 6" (1.676 m)      BMI: Body mass index is 29.64 kg/(m^2).      Physical Exam:   Physical Exam   Constitutional: He is oriented to person, place, and time. He appears well-developed and well-nourished. No distress.   HENT:   Right Ear: External ear normal.   Left Ear: External ear normal.   Mouth/Throat: No oropharyngeal exudate.   Eyes: Conjunctivae and lids are normal. Pupils are equal, round, and reactive to light. No " scleral icterus.   Neck: Trachea normal and normal range of motion. Neck supple. No thyromegaly present.   Cardiovascular: Normal rate, regular rhythm, normal heart sounds and normal pulses.    Pulmonary/Chest: Effort normal and breath sounds normal.   Abdominal: Soft. Normal appearance and bowel sounds are normal. He exhibits no distension and no mass. There is no hepatosplenomegaly or splenomegaly. There is no tenderness.   Musculoskeletal: Normal range of motion.   Lymphadenopathy:        Head (right side): No submental and no submandibular adenopathy present.        Head (left side): No submental and no submandibular adenopathy present.     He has no cervical adenopathy.     He has no axillary adenopathy.        Right: No supraclavicular adenopathy present.        Left: No supraclavicular adenopathy present.   Neurological: He is alert and oriented to person, place, and time. He has normal reflexes. No sensory deficit.   Skin: Skin is warm, dry and intact. No bruising and no rash noted. Nails show no clubbing.   Psychiatric: He has a normal mood and affect. His speech is normal and behavior is normal. Cognition and memory are normal.   Vitals reviewed.        Laboratory Data:  WBC   Date Value Ref Range Status   02/07/2017 7.17 3.90 - 12.70 K/uL Final     Hemoglobin   Date Value Ref Range Status   02/07/2017 15.3 14.0 - 18.0 g/dL Final     Hematocrit   Date Value Ref Range Status   02/07/2017 46.1 40.0 - 54.0 % Final     Platelets   Date Value Ref Range Status   02/07/2017 241 150 - 350 K/uL Final     Gran #   Date Value Ref Range Status   02/07/2017 3.4 1.8 - 7.7 K/uL Final     Comment:     The ANC is based on a white cell differential from an   automated cell counter. It has not been microscopically   reviewed for the presence of abnormal cells. Clinical   correlation is required.       Gran%   Date Value Ref Range Status   07/25/2015 60.9 38.0 - 73.0 % Final       Chemistry        Component Value Date/Time      02/07/2017 0837    K 4.0 02/07/2017 0837     02/07/2017 0837    CO2 28 02/07/2017 0837    BUN 17 02/07/2017 0837    CREATININE 1.4 02/07/2017 0837    GLU 81 02/07/2017 0837        Component Value Date/Time    CALCIUM 9.8 02/07/2017 0837    ALKPHOS 76 02/07/2017 0837    AST 18 02/07/2017 0837    ALT 15 02/07/2017 0837    BILITOT 0.3 02/07/2017 0837              Assessment/Plan:     1. Malignant neoplasm of upper lobe of right lung    2. Secondary malignant neoplasm of bone    3. Encounter for antineoplastic chemotherapy    4. Neoplasm related pain    5. Hypothyroidism, unspecified type    6. Deep vein thrombosis (DVT) of left lower extremity, unspecified chronicity, unspecified vein    7. Essential hypertension        Plan:    1,2,3. Patient is clinically stable.  Proceed with Opdivo immunotherapy tomorrow as scheduled.   RTC to see Dr. Garay with CBC, CMP, TSH, T4  in 2 Weeks for next cycle Opdivo and Xgeva.  Plan 2 more cycles and rescan.   4. Stable, Continue percocet.  5. Stable, Continue Synthroid. Recheck in 2 weeks.   6. Stable, Continue Xarelto   7. BP stable. Encouraged to continue compliance with medication.    Med and Orders:  Orders Placed This Encounter    CBC Oncology    Comprehensive metabolic panel    TSH    T4, free       Follow Up:  Return in about 2 weeks (around 2/21/2017).        More than 25 mins were spent during this encounter, greater than 50% was spent in direct counseling and/or coordination of care.   Patient was also seen and examined by Dr. Garay who agrees with above plan. Patient is in agreement with the proposed treatment plan. All questions were answered to the patient's satisfaction. Pt knows to call clinic if anything is needed before the next clinic visit.    JULIOCESAR Jordan  Hematology and Medical Oncology    STAFF NOTE:  I have reviewed the notes, assessments, and/or procedures performed by the nurse practitioner, as above.  I have personally interviewed  the patient at the beside, and rounded with the nurse practitioner. I formulated the plan of care.  I concur with her documentation of Yao Alan.

## 2017-02-08 ENCOUNTER — INFUSION (OUTPATIENT)
Dept: INFUSION THERAPY | Facility: HOSPITAL | Age: 42
End: 2017-02-08
Attending: INTERNAL MEDICINE
Payer: MEDICARE

## 2017-02-08 VITALS — TEMPERATURE: 98 F | HEART RATE: 65 BPM | DIASTOLIC BLOOD PRESSURE: 103 MMHG | SYSTOLIC BLOOD PRESSURE: 163 MMHG

## 2017-02-08 DIAGNOSIS — C34.11 MALIGNANT NEOPLASM OF UPPER LOBE OF RIGHT LUNG: ICD-10-CM

## 2017-02-08 DIAGNOSIS — C34.91 LUNG CANCER, PRIMARY, WITH METASTASIS FROM LUNG TO OTHER SITE, RIGHT: ICD-10-CM

## 2017-02-08 DIAGNOSIS — C79.51 SECONDARY MALIGNANT NEOPLASM OF BONE: ICD-10-CM

## 2017-02-08 DIAGNOSIS — D50.0 IRON DEFICIENCY ANEMIA DUE TO CHRONIC BLOOD LOSS: Primary | ICD-10-CM

## 2017-02-08 PROCEDURE — 96413 CHEMO IV INFUSION 1 HR: CPT

## 2017-02-08 PROCEDURE — 25000003 PHARM REV CODE 250: Performed by: INTERNAL MEDICINE

## 2017-02-08 PROCEDURE — 63600175 PHARM REV CODE 636 W HCPCS: Performed by: INTERNAL MEDICINE

## 2017-02-08 RX ORDER — HEPARIN 100 UNIT/ML
500 SYRINGE INTRAVENOUS
Status: DISCONTINUED | OUTPATIENT
Start: 2017-02-08 | End: 2017-02-08 | Stop reason: HOSPADM

## 2017-02-08 RX ORDER — SODIUM CHLORIDE 0.9 % (FLUSH) 0.9 %
10 SYRINGE (ML) INJECTION
Status: DISCONTINUED | OUTPATIENT
Start: 2017-02-08 | End: 2017-02-08 | Stop reason: HOSPADM

## 2017-02-08 RX ADMIN — SODIUM CHLORIDE: 9 INJECTION, SOLUTION INTRAVENOUS at 07:02

## 2017-02-08 RX ADMIN — HEPARIN 500 UNITS: 100 SYRINGE at 10:02

## 2017-02-08 RX ADMIN — SODIUM CHLORIDE, PRESERVATIVE FREE 10 ML: 5 INJECTION INTRAVENOUS at 10:02

## 2017-02-08 RX ADMIN — SODIUM CHLORIDE 240 MG: 9 INJECTION, SOLUTION INTRAVENOUS at 09:02

## 2017-02-08 NOTE — MR AVS SNAPSHOT
Patient Information     Patient Name Sex Yao Templeton Male 1975      Visit Information        Provider Department Dept Phone Center    2017 7:30 AM [facility] [phone] Pedro Reeves      Patient Instructions     None      Your Current Medications Are     albuterol 90 mcg/actuation inhaler    amlodipine (NORVASC) 10 MG tablet    docusate sodium (COLACE) 100 MG capsule    ferrous sulfate 325 (65 FE) MG EC tablet    lactulose (CHRONULAC) 10 gram/15 mL solution    levocetirizine (XYZAL) 5 MG tablet    levothyroxine (SYNTHROID) 25 MCG tablet    levothyroxine (SYNTHROID) 50 MCG tablet    metoclopramide HCl (REGLAN) 10 MG tablet    naloxegol 25 mg Tab    omeprazole (PRILOSEC) 20 MG capsule    oxycodone-acetaminophen (PERCOCET)  mg per tablet    polyethylene glycol (GLYCOLAX) 17 gram/dose powder    PROAIR HFA 90 mcg/actuation inhaler    rivaroxaban (XARELTO) 20 mg Tab      Facility-Administered Medications     heparin, porcine (PF) 100 unit/mL injection flush 500 Units    nivolumab 240 mg in sodium chloride 0.9% 100 mL chemo infusion    sodium chloride 0.9% 100 mL flush bag    sodium chloride 0.9% flush 10 mL      Appointments for Next Year     None         Default Flowsheet Data (last 24 hours)      Amb Complex Vitals Tato        17 0747                Measurements    BP (!)  128/91        Temp 98.3 °F (36.8 °C)        Pulse 75        Pain Assessment    Pain Score Zero                Allergies     Nsaids (Non-steroidal Anti-inflammatory Drug)     Patient says since been diagnosed with lung mass on 2-12-15, if take any NSAIDS he spikes a fever.    Tylenol [Acetaminophen] Other (See Comments)    Sweating +      Medications You Received from 2017 1011 to 2017 1011        Date/Time Order Dose Route Action     2017 0925 nivolumab 240 mg in sodium chloride 0.9% 100 mL chemo infusion 240 mg Intravenous New Bag     2017 0745 sodium chloride 0.9%  100 mL flush bag   Intravenous New Bag      Current Discharge Medication List     Cannot display discharge medications since this is not an admission.

## 2017-02-08 NOTE — PLAN OF CARE
Problem: Patient Care Overview (Adult)  Goal: Discharge Needs Assessment  Outcome: Ongoing (interventions implemented as appropriate)  1026-Patient tolerated treatment well. Discharged without complaints or S/S of adverse event. AVS given.  Instructed to call provider for any questions or concerns.

## 2017-02-08 NOTE — PLAN OF CARE
Problem: Patient Care Overview (Adult)  Goal: Adult Individualization and Mutuality  1. PAC  2. Likes warm blankets   Outcome: Ongoing (interventions implemented as appropriate)  0750-Labs , hx, and medications reviewed. Assessment completed. Discussed plan of care with patient. Patient in agreement. Chair reclined and warm blanket and snack offered.

## 2017-02-21 ENCOUNTER — OFFICE VISIT (OUTPATIENT)
Dept: HEMATOLOGY/ONCOLOGY | Facility: CLINIC | Age: 42
End: 2017-02-21
Payer: MEDICARE

## 2017-02-21 VITALS
BODY MASS INDEX: 29.83 KG/M2 | DIASTOLIC BLOOD PRESSURE: 90 MMHG | WEIGHT: 185.63 LBS | OXYGEN SATURATION: 98 % | HEART RATE: 92 BPM | TEMPERATURE: 98 F | HEIGHT: 66 IN | SYSTOLIC BLOOD PRESSURE: 130 MMHG | RESPIRATION RATE: 17 BRPM

## 2017-02-21 DIAGNOSIS — Z51.11 ENCOUNTER FOR ANTINEOPLASTIC CHEMOTHERAPY: ICD-10-CM

## 2017-02-21 DIAGNOSIS — G89.3 NEOPLASM RELATED PAIN: ICD-10-CM

## 2017-02-21 DIAGNOSIS — I10 ESSENTIAL HYPERTENSION: ICD-10-CM

## 2017-02-21 DIAGNOSIS — C79.51 SECONDARY MALIGNANT NEOPLASM OF BONE: ICD-10-CM

## 2017-02-21 DIAGNOSIS — E03.9 HYPOTHYROIDISM, UNSPECIFIED TYPE: ICD-10-CM

## 2017-02-21 DIAGNOSIS — C34.11 MALIGNANT NEOPLASM OF UPPER LOBE OF RIGHT LUNG: Primary | ICD-10-CM

## 2017-02-21 DIAGNOSIS — I82.402 DEEP VEIN THROMBOSIS (DVT) OF LEFT LOWER EXTREMITY, UNSPECIFIED CHRONICITY, UNSPECIFIED VEIN: ICD-10-CM

## 2017-02-21 PROCEDURE — 99999 PR PBB SHADOW E&M-EST. PATIENT-LVL IV: CPT | Mod: PBBFAC,,, | Performed by: INTERNAL MEDICINE

## 2017-02-21 PROCEDURE — 99215 OFFICE O/P EST HI 40 MIN: CPT | Mod: S$GLB,,, | Performed by: INTERNAL MEDICINE

## 2017-02-21 PROCEDURE — 99214 OFFICE O/P EST MOD 30 MIN: CPT | Mod: PBBFAC | Performed by: INTERNAL MEDICINE

## 2017-02-21 RX ORDER — HEPARIN 100 UNIT/ML
500 SYRINGE INTRAVENOUS
Status: CANCELLED | OUTPATIENT
Start: 2017-02-22

## 2017-02-21 RX ORDER — SODIUM CHLORIDE 0.9 % (FLUSH) 0.9 %
10 SYRINGE (ML) INJECTION
Status: CANCELLED | OUTPATIENT
Start: 2017-02-22

## 2017-02-21 RX ORDER — OXYCODONE AND ACETAMINOPHEN 10; 325 MG/1; MG/1
1 TABLET ORAL EVERY 6 HOURS PRN
Qty: 120 TABLET | Refills: 0 | Status: SHIPPED | OUTPATIENT
Start: 2017-02-21 | End: 2017-03-20 | Stop reason: SDUPTHER

## 2017-02-21 RX ORDER — LEVOTHYROXINE SODIUM 50 UG/1
50 TABLET ORAL DAILY
Qty: 30 TABLET | Refills: 11 | Status: SHIPPED | OUTPATIENT
Start: 2017-02-21 | End: 2017-03-21 | Stop reason: DRUGHIGH

## 2017-02-21 NOTE — PROGRESS NOTES
"PATIENT: Yao Alan  MRN: [id number]  DATE: 2/21/2017    Subjective:     Chief complaint:  Chief Complaint   Patient presents with    Malignant neoplasm of upper lobe of right lung    middle/lower back pain 2/10       Oncologic History:  Mr. Yao Alan is a 41-year-old male who has a long history of smoking and was seen in the Pulmonary Clinic by Dr. Valle on 03/05/2015 with abnormal CT scan.    Apparently, he went to the [facility] in February with coughing as well as chest pain. A chest x-ray was done, which revealed a left upper lobe lung mass. Followup CT scan was done on 02/12/2015 and that revealed posterior superior mediastinal mass adjacent and prominent enlarged upper left mediastinal lymph nodes, abutting the aortic arch as well as left subclavian artery. A  CT scan of the abdomen was done on 03/03/2015 without contrast and that revealed nonobstructive nephrolithiasis, but a 2.1 cm lytic lesion involving the left iliac wing concerning for metastatic disease given the presence of emphysema and a mediastinal soft tissue mass on the CT chest from 02/12/2015. He saw Dr. Valle after that on 03/05/2015 and underwent an IR guided biopsy of the bone lesion on 03/17/2015. 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    His EGFR/EML/ALK is negative.    His PET scan on 3/25/15 revealed Left upper lobe lung lesion with an adjacent para-aortic lymph node, right anterior rib metastasis, right iliac metastasis, and pancreatic metastasis. A pancreatic cancer primary neoplasm is less likely.  MRI brain was negative for mets.  He completed 6 cycles of Carboplatin and Alimta and was on maintenance Alimta until end of March 2016.  His bone scan from 3/16 revealed stable disease. His CT scans also from end of March 2016 revealed "Interval enlargement of left upper lobe lung mass measuring 5.9 x 3.9 cm (previously 3.3 x 2.5 cm). This mass appears to invade the mediastinum and abutting " "the aortic arch and left subclavian artery"  He is now on Opdivo.  CT CAP from 6/22/16 s/p 6 cycles of Opdivo reveals "In this patient with a history of metastatic lung cancer, the previously identified paramediastinal left upper lobe lung mass has significantly decreased in size with only a trace amount of residual soft tissue opacities seen measuring 3.2 x 1.4 cm (axial series 2, image 16).Stable lytic lesion in the right iliac wing, unchanged.The right anterior sixth rib lesion and pancreatic head abnormality are not definitely appreciated on current examination."  Bone scan from 6/23/16 reveals "Stable activity in the right sixth rib anteriorly and the right iliac bone.No new metastatic lesions visualized."  10/3/16- CT scans reveal "In this patient with a history of metastatic lung cancer, the previously identified paramediastinal left upper lobe lung mass has decreased in size with only a trace amount of residual soft tissue opacity as above.  Stable lytic lesion in the right iliac wing, unchanged"  Bone scan reveals "Right superior anterior iliac bone lesion is stable and the right sixth rib has normalized. Recent dental procedure versus periodontal disease and possible right mastoiditis".      1/9/17 CT chest/abdomen/pelvis reveals "1. In this patient with history of metastatic lung cancer, the previously identified left upper paramediastinal lesion demonstrates near complete resolution with 1.0 x 0.7 cm residual disease (2.7 x 0.8 cm previously). Additionally, there is improving sclerotic lesion in the right iliac wing consistent with treated metastatic disease.  No evidence of new lesions in the chest, abdomen or pelvis. 2. Apical predominant paraseptal emphysematous changes and bullae. 3. Additional findings as above."       Interval History: Mr. Alan returns for follow up and for Opdivo. He continues to have back pain which is no worse than usual. He takes 3-4 percocet a day for pain. He feels well and " "denies any new complaints. He denies any nausea, vomiting, diarrhea, constipation, abdominal pain, weight loss or loss of appetite, chest pain, shortness of breath, leg swelling, fatigue, headache, dizziness, or mood changes. He is alone. He ran out of Synthroid and took a lower dose for the past 2 days.      ECOG Performance Status:   ECOG SCORE    0 - Fully active-able to carry on all pre-disease performance without restriction          PMFSH: all information reviewed and updated as relevant to today's visit    Review of Systems:   Review of Systems   Constitutional: Negative for activity change, appetite change, fatigue and fever.   HENT: Negative for mouth sores, nosebleeds and sore throat.    Eyes: Negative for visual disturbance.   Respiratory: Negative for cough and shortness of breath.    Cardiovascular: Negative for chest pain, palpitations and leg swelling.   Gastrointestinal: Negative for abdominal pain, constipation, diarrhea, nausea and vomiting.   Genitourinary: Negative for difficulty urinating and frequency.   Musculoskeletal: Positive for back pain. Negative for arthralgias.   Skin: Negative for rash.   Neurological: Negative for dizziness, numbness and headaches.   Hematological: Negative for adenopathy. Does not bruise/bleed easily.   Psychiatric/Behavioral: Negative for confusion and sleep disturbance. The patient is not nervous/anxious.    All other systems reviewed and are negative.        Objective:      Vitals:   Vitals:    02/21/17 0802   BP: (!) 130/90   Pulse: 92   Resp: 17   Temp: 97.5 °F (36.4 °C)   TempSrc: Oral   SpO2: 98%   Weight: 84.2 kg (185 lb 10 oz)   Height: 5' 6" (1.676 m)     BMI: Body mass index is 29.96 kg/(m^2).      Physical Exam:   Physical Exam   Constitutional: He is oriented to person, place, and time. He appears well-developed and well-nourished. No distress.   HENT:   Right Ear: External ear normal.   Left Ear: External ear normal.   Mouth/Throat: No oropharyngeal " exudate.   Eyes: Conjunctivae and lids are normal. Pupils are equal, round, and reactive to light. No scleral icterus.   Neck: Trachea normal and normal range of motion. Neck supple. No thyromegaly present.   Cardiovascular: Normal rate, regular rhythm, normal heart sounds and normal pulses.    Pulmonary/Chest: Effort normal and breath sounds normal.   Abdominal: Soft. Normal appearance and bowel sounds are normal. He exhibits no distension and no mass. There is no hepatosplenomegaly or splenomegaly. There is no tenderness.   Musculoskeletal: Normal range of motion.   Lymphadenopathy:        Head (right side): No submental and no submandibular adenopathy present.        Head (left side): No submental and no submandibular adenopathy present.     He has no cervical adenopathy.     He has no axillary adenopathy.        Right: No supraclavicular adenopathy present.        Left: No supraclavicular adenopathy present.   Neurological: He is alert and oriented to person, place, and time. He has normal reflexes. No sensory deficit.   Skin: Skin is warm, dry and intact. No bruising and no rash noted. Nails show no clubbing.   Psychiatric: He has a normal mood and affect. His speech is normal and behavior is normal. Cognition and memory are normal.   Vitals reviewed.        Laboratory Data:  WBC   Date Value Ref Range Status   02/21/2017 7.55 3.90 - 12.70 K/uL Final     Hemoglobin   Date Value Ref Range Status   02/21/2017 14.6 14.0 - 18.0 g/dL Final     Hematocrit   Date Value Ref Range Status   02/21/2017 46.0 40.0 - 54.0 % Final     Platelets   Date Value Ref Range Status   02/21/2017 233 150 - 350 K/uL Final     Gran #   Date Value Ref Range Status   02/21/2017 3.6 1.8 - 7.7 K/uL Final     Comment:     The ANC is based on a white cell differential from an   automated cell counter. It has not been microscopically   reviewed for the presence of abnormal cells. Clinical   correlation is required.       Gran%   Date Value Ref  Range Status   07/25/2015 60.9 38.0 - 73.0 % Final       Chemistry        Component Value Date/Time     02/21/2017 0737    K 3.7 02/21/2017 0737     02/21/2017 0737    CO2 24 02/21/2017 0737    BUN 15 02/21/2017 0737    CREATININE 1.4 02/21/2017 0737     02/21/2017 0737        Component Value Date/Time    CALCIUM 8.3 (L) 02/21/2017 0737    ALKPHOS 71 02/21/2017 0737    AST 20 02/21/2017 0737    ALT 19 02/21/2017 0737    BILITOT 0.3 02/21/2017 0737              Assessment/Plan:     1. Malignant neoplasm of upper lobe of right lung    2. Secondary malignant neoplasm of bone    3. Encounter for antineoplastic chemotherapy    4. Neoplasm related pain    5. Hypothyroidism, unspecified type    6. Deep vein thrombosis (DVT) of left lower extremity, unspecified chronicity, unspecified vein    7. Essential hypertension        Plan:    1,2,3. Patient is clinically stable.  Proceed with Opdivo immunotherapy tomorrow as scheduled. XGEVA due tomorrow as well.   RTC to see Dr. Garay with CBC, CMP, TSH, T4, CT chest/abdomen/pelvis and MRI brain in 2 Weeks and for next cycle Opdivo (XGEVA not due) .   4. Stable, Refill percocet.  5. Stable, Refill Synthroid. Recheck in 2 weeks.   6. Stable, Continue Xarelto   7. BP stable. Continue  Medication    Med and Orders:  Orders Placed This Encounter    CT Chest With Contrast    CT Abdomen Pelvis With Contrast    MRI Brain With Contrast    CBC Oncology    Comprehensive metabolic panel    TSH    T4, free    levothyroxine (SYNTHROID) 50 MCG tablet    oxycodone-acetaminophen (PERCOCET)  mg per tablet       Follow Up:  Return in about 2 weeks (around 3/7/2017).        More than 25 mins were spent during this encounter, greater than 50% was spent in direct counseling and/or coordination of care.   Patient was also seen and examined by Dr. Garay who agrees with above plan. Patient is in agreement with the proposed treatment plan. All questions were answered to  the patient's satisfaction. Pt knows to call clinic if anything is needed before the next clinic visit.    FRANCINE Jordan-ZELDA  Hematology and Medical Oncology      STAFF NOTE:  I have reviewed the notes, assessments, and/or procedures performed by the nurse practitioner, as above.  I have personally interviewed the patient at the beside, and rounded with the nurse practitioner. I formulated the plan of care.  I concur with her documentation of Yao Alan.

## 2017-02-21 NOTE — Clinical Note
RTC to see Dr. Garay with CBC, CMP, TSH, T4, CT chest/abdomen/pelvis and MRI brain in 2 Weeks and for next cycle Opdivo (XGEVA not due) .

## 2017-02-22 ENCOUNTER — INFUSION (OUTPATIENT)
Dept: INFUSION THERAPY | Facility: HOSPITAL | Age: 42
End: 2017-02-22
Attending: INTERNAL MEDICINE
Payer: MEDICARE

## 2017-02-22 VITALS
HEART RATE: 77 BPM | RESPIRATION RATE: 16 BRPM | SYSTOLIC BLOOD PRESSURE: 137 MMHG | DIASTOLIC BLOOD PRESSURE: 90 MMHG | TEMPERATURE: 99 F

## 2017-02-22 DIAGNOSIS — C79.51 SECONDARY MALIGNANT NEOPLASM OF BONE: ICD-10-CM

## 2017-02-22 DIAGNOSIS — C34.91 LUNG CANCER, PRIMARY, WITH METASTASIS FROM LUNG TO OTHER SITE, RIGHT: ICD-10-CM

## 2017-02-22 DIAGNOSIS — D50.0 IRON DEFICIENCY ANEMIA DUE TO CHRONIC BLOOD LOSS: Primary | ICD-10-CM

## 2017-02-22 DIAGNOSIS — C34.11 MALIGNANT NEOPLASM OF UPPER LOBE OF RIGHT LUNG: ICD-10-CM

## 2017-02-22 PROCEDURE — 63600175 PHARM REV CODE 636 W HCPCS: Performed by: INTERNAL MEDICINE

## 2017-02-22 PROCEDURE — 96413 CHEMO IV INFUSION 1 HR: CPT

## 2017-02-22 PROCEDURE — 25000003 PHARM REV CODE 250: Performed by: INTERNAL MEDICINE

## 2017-02-22 PROCEDURE — 96372 THER/PROPH/DIAG INJ SC/IM: CPT

## 2017-02-22 RX ORDER — SODIUM CHLORIDE 0.9 % (FLUSH) 0.9 %
10 SYRINGE (ML) INJECTION
Status: DISCONTINUED | OUTPATIENT
Start: 2017-02-22 | End: 2017-02-22 | Stop reason: HOSPADM

## 2017-02-22 RX ORDER — HEPARIN 100 UNIT/ML
500 SYRINGE INTRAVENOUS
Status: DISCONTINUED | OUTPATIENT
Start: 2017-02-22 | End: 2017-02-22 | Stop reason: HOSPADM

## 2017-02-22 RX ADMIN — DENOSUMAB 120 MG: 120 INJECTION SUBCUTANEOUS at 09:02

## 2017-02-22 RX ADMIN — HEPARIN 500 UNITS: 100 SYRINGE at 09:02

## 2017-02-22 RX ADMIN — SODIUM CHLORIDE: 9 INJECTION, SOLUTION INTRAVENOUS at 07:02

## 2017-02-22 RX ADMIN — SODIUM CHLORIDE 240 MG: 9 INJECTION, SOLUTION INTRAVENOUS at 08:02

## 2017-02-22 RX ADMIN — SODIUM CHLORIDE, PRESERVATIVE FREE 10 ML: 5 INJECTION INTRAVENOUS at 09:02

## 2017-02-22 NOTE — MR AVS SNAPSHOT
Patient Information     Patient Name Sex Yao Templeton Male 1975      Visit Information        Provider Department Dept Phone Center    2017 7:30 AM [facility], CHEMO [facility] Chemotherapy Infusion [phone] Pedro Reeves      Patient Instructions     None      Your Current Medications Are     albuterol 90 mcg/actuation inhaler    amlodipine (NORVASC) 10 MG tablet    docusate sodium (COLACE) 100 MG capsule    ferrous sulfate 325 (65 FE) MG EC tablet    lactulose (CHRONULAC) 10 gram/15 mL solution    levocetirizine (XYZAL) 5 MG tablet    levothyroxine (SYNTHROID) 50 MCG tablet    metoclopramide HCl (REGLAN) 10 MG tablet    naloxegol 25 mg Tab    omeprazole (PRILOSEC) 20 MG capsule    oxycodone-acetaminophen (PERCOCET)  mg per tablet    polyethylene glycol (GLYCOLAX) 17 gram/dose powder    PROAIR HFA 90 mcg/actuation inhaler    rivaroxaban (XARELTO) 20 mg Tab      Facility-Administered Medications     denosumab (XGEVA) solution 120 mg    heparin, porcine (PF) 100 unit/mL injection flush 500 Units    nivolumab 240 mg in sodium chloride 0.9% 100 mL chemo infusion    sodium chloride 0.9% 100 mL flush bag    sodium chloride 0.9% flush 10 mL      Appointments for Next Year     3/3/2017  9:45 AM MRI BRAIN CONT (45 min.) [facility] MRI WIDE BORE    Magnetic Resonance Imaging- You will not be allowed to have the MRI if you have a cardiac pace-maker, implantable defibrillator, neurostimulator, biostimulator, or if you have anuerysm clips in your brain (from many years ago).  WHAT YOUR DOCTOR NEEDS TO KNOW: You should tell your doctor if you have any metal in your body either from surgery or an injury. This includes metal pins, clips, plates, screws, shrapnel, ear implants, or permanent eyeliner. If female, you should tell your doctor if there is a chance you might be pregnant. Please bring with you any papers your doctor has given you to sign. Please bring with you a list of  medications you are currently taking!  PLEASE REPORT TO THE MAGNETIC RESONANCE CENTER 30 MINUTES PRIOR TO YOUR SCHEDULED APPOINTMENT TIME.  WHAT IS THE PURPOSE OF THIS TEST: An MRI is a painless test that takes pictures of the inside of your body. The pictures are taken in slices which show only a few layers of body tissue at a time. Pictures taken this way can help your doctor find and see problems in the body more easily. The MRI uses a magnetic field and radio waves instead of X-RAYS.  WHERE WILL YOUR TEST BE DONE AND BY WHOM? The test will be done in the MRI building. It will be done under the supervision of a radiologist and a radiologic technologist. The person giving you these instructions will tell you where to report for this test. It usually takes 15 minutes to one hour.  HOW TO PREPARE FOR YOUR TEST: Wear comfortable clothing with no metal buttons or zippers. Do not wear jewelry including rings, earrings, necklaces, bracelets, or watches. Take all metal objects out of your hair. You will be asked to answer yes or no on a questionaire form regarding the possibility of any metal in your body. Please bring someone with you if you are unable to answer the questions. A parent must accompany any child having an MRI. Tell your doctor if you are afraid of being in a closed or cramped space. Your doctor will decide if you need medicine to help you relax.  A small needle may be placed in a vein in your arm or hand so that you may receive a contrast solution. THIS IS NOT A DYE AND DOES NOT CONTAIN IODINE. NO special preparation for any MRI exam EXCEPT for a Magnetic Resonance Cholangiopancreatogram which the patient should fast 4 hours prior to the scheduled  appointment time. You may take your usual medication with a small sip of water.  WHAT TO EXPECT DURING YOUR TEST: The radiologic technologist will check to make sure that you have removed all metal objects. You will be asked to lie down on the table of the MRI  machine. To allow for the best quality exam, it is VERY IMPORTANT that you LIE VERY STILL during your exam. When the test starts, the table will move into the open tunnel of the machine. Once the machine starts taking pictures, you will hear loud hammering or grinding noises. You may be able to wear headphones and hear music to block out the loud noise of the machine. If your test requires the use of contrast material, a small needle will be placed in a vein of your arm or hand. The contrast material will be pushed through the IV tube that has been placed in your arm or hand. The skin around your IV may feel warm or cold. You should not have any changes in the way you feel. If you do, please tell the technologist.  WHAT TO EXPECT AFTER THE EXAM: If you were given medication to relax you, someone else will need to drive you home. DO NOT DRIVE OR USE HEAVY EQUIPMENT IF YOU TAKE MEDICATION THAT MAKES YOU DROWSY. You may resume normal daily activity if you did not receive sedation.  WHAT YOU NEED TO KNOW ABOUT YOUR APPOINTMENT: If you are unable to follow the above instructions or have questions or if you are delayed or unable to keep your scheduled appointment, please notify the following: In [place], call the Magnetic Resonance Center at [phone] In [place], call the Radiology Department at [phone]. On the [facility], call the Radiology Department at [phone]. On the [facility], call the Radiology Department at [phone].    MRI Building    3/3/2017  1:45 PM CT ABD PEL W CONTRAST (15 min.) [facility] CT1 64- LIMIT 450 LBS    You must  fast four (4) hours prior to your appointment. Arrive 2 hours early for your appointment to drink the exam prep.    2nd floor    3/7/2017  7:20 AM NON FASTING LAB (10 min.) [facility] LAB, [facility] CANCER DG    Arrive at check-in approximately 15 minutes before your scheduled appointment time. Bring all outside  medical records and imaging, along with a list of your current medications and insurance card.    [facility] 3rd Floor    3/7/2017  8:15 AM ESTABLISHED PATIENT SHORT (15 min.) [facility] - Hematology Oncology Bel Garay MD    Arrive at check-in approximately 15 minutes before your scheduled appointment time. Bring all outside medical records and imaging, along with a list of your current medications and insurance card.    [facility] - 3rd Floor    3/8/2017  7:30 AM INFUSION 120 MIN (120 min.) [facility], CHEMO    Arrive at check-in approximately 15 minutes before your scheduled appointment time. Bring all outside medical records and imaging, along with a list of your current medications and insurance card.    [facility], 5th Floor         Default Flowsheet Data (last 24 hours)      Amb Complex Vitals Tato        02/22/17 0839 02/22/17 0739             Measurements    BP -- (!)  137/90       Temp -- 98.5 °F (36.9 °C)       Pulse -- 77       Resp -- 16       Pain Assessment    Pain Score Zero Zero               Allergies     Nsaids (Non-steroidal Anti-inflammatory Drug)     Patient says since been diagnosed with lung mass on 2-12-15, if take any NSAIDS he spikes a fever.    Tylenol [Acetaminophen] Other (See Comments)    Sweating +      Medications You Received from 02/21/2017 0942 to 02/22/2017 0942        Date/Time Order Dose Route Action     02/22/2017 0939 denosumab (XGEVA) solution 120 mg 120 mg Subcutaneous Given     02/22/2017 0858 nivolumab 240 mg in sodium chloride 0.9% 100 mL chemo infusion 240 mg Intravenous New Bag     02/22/2017 0749 sodium chloride 0.9% 100 mL flush bag   Intravenous New Bag      Current Discharge Medication List     Cannot display discharge medications since this is not an admission.

## 2017-02-22 NOTE — PLAN OF CARE
Problem: Chemotherapy Effects (Adult)  Goal: Signs and Symptoms of Listed Potential Problems Will be Absent or Manageable (Chemotherapy Effects)  Signs and symptoms of listed potential problems will be absent or manageable by discharge/transition of care (reference Chemotherapy Effects (Adult) CPG).   Outcome: Ongoing (interventions implemented as appropriate)  Labs , hx, and medications reviewed. Assessment completed. Discussed plan of care with patient (xgeva & opdivo). Patient in agreement. VSS.  Chair reclined and warm blanket and snack offered. Will cont to monitor

## 2017-02-22 NOTE — PLAN OF CARE
Problem: Patient Care Overview (Adult)  Goal: Plan of Care Review  Outcome: Ongoing (interventions implemented as appropriate)  Pt tolerated opdivo & xgeva  without adverse effects. Reviewed ca supplements & Vit D supplements. States takes daily & denies any dental procedures past or future.  VSS. Provided AVS & verbalized understanding of RTC date. DC with family ambulating independently.

## 2017-03-03 ENCOUNTER — HOSPITAL ENCOUNTER (OUTPATIENT)
Dept: RADIOLOGY | Facility: HOSPITAL | Age: 42
Discharge: HOME | End: 2017-03-03
Attending: NURSE PRACTITIONER
Payer: MEDICARE

## 2017-03-03 ENCOUNTER — TELEPHONE (OUTPATIENT)
Dept: HEMATOLOGY/ONCOLOGY | Facility: CLINIC | Age: 42
End: 2017-03-03

## 2017-03-03 DIAGNOSIS — C34.11 MALIGNANT NEOPLASM OF UPPER LOBE OF RIGHT LUNG: ICD-10-CM

## 2017-03-03 PROCEDURE — 70553 MRI BRAIN STEM W/O & W/DYE: CPT | Mod: TC

## 2017-03-03 PROCEDURE — 70553 MRI BRAIN STEM W/O & W/DYE: CPT | Mod: 26,GC,, | Performed by: RADIOLOGY

## 2017-03-03 PROCEDURE — A9585 GADOBUTROL INJECTION: HCPCS | Performed by: NURSE PRACTITIONER

## 2017-03-03 PROCEDURE — 25500020 PHARM REV CODE 255: Performed by: NURSE PRACTITIONER

## 2017-03-03 RX ORDER — GADOBUTROL 604.72 MG/ML
9 INJECTION INTRAVENOUS
Status: COMPLETED | OUTPATIENT
Start: 2017-03-03 | End: 2017-03-03

## 2017-03-03 RX ADMIN — GADOBUTROL 9 ML: 604.72 INJECTION INTRAVENOUS at 09:03

## 2017-03-03 NOTE — TELEPHONE ENCOUNTER
"Returned call to patient, who is calling to speak with dr carl about "some personal things," which he doesn't want to speak with nurse about. Nurse informed patient dr carl is out of the clinic until Monday.  Patient is scheduled for appointment Tuesday.  Patient states he will talk to dr carl then.      "

## 2017-03-03 NOTE — TELEPHONE ENCOUNTER
----- Message from Willian Denson sent at 3/3/2017 11:21 AM CST -----  Contact: PT  Would like a call, regarding his condition. He did not state specifics.     Call: [phone]

## 2017-03-07 ENCOUNTER — LAB VISIT (OUTPATIENT)
Dept: LAB | Facility: HOSPITAL | Age: 42
End: 2017-03-07
Payer: COMMERCIAL

## 2017-03-07 ENCOUNTER — OFFICE VISIT (OUTPATIENT)
Dept: HEMATOLOGY/ONCOLOGY | Facility: CLINIC | Age: 42
End: 2017-03-07
Payer: COMMERCIAL

## 2017-03-07 VITALS
HEIGHT: 66 IN | RESPIRATION RATE: 16 BRPM | BODY MASS INDEX: 29.72 KG/M2 | OXYGEN SATURATION: 99 % | DIASTOLIC BLOOD PRESSURE: 100 MMHG | TEMPERATURE: 98 F | WEIGHT: 184.94 LBS | HEART RATE: 77 BPM | SYSTOLIC BLOOD PRESSURE: 133 MMHG

## 2017-03-07 DIAGNOSIS — G89.3 NEOPLASM RELATED PAIN: ICD-10-CM

## 2017-03-07 DIAGNOSIS — I82.402 DEEP VEIN THROMBOSIS (DVT) OF LEFT LOWER EXTREMITY, UNSPECIFIED CHRONICITY, UNSPECIFIED VEIN: ICD-10-CM

## 2017-03-07 DIAGNOSIS — Z51.11 ENCOUNTER FOR ANTINEOPLASTIC CHEMOTHERAPY: ICD-10-CM

## 2017-03-07 DIAGNOSIS — I10 ESSENTIAL HYPERTENSION: ICD-10-CM

## 2017-03-07 DIAGNOSIS — C79.51 SECONDARY MALIGNANT NEOPLASM OF BONE: ICD-10-CM

## 2017-03-07 DIAGNOSIS — E03.9 HYPOTHYROIDISM, UNSPECIFIED TYPE: ICD-10-CM

## 2017-03-07 DIAGNOSIS — C34.11 MALIGNANT NEOPLASM OF UPPER LOBE OF RIGHT LUNG: Primary | ICD-10-CM

## 2017-03-07 DIAGNOSIS — C34.11 MALIGNANT NEOPLASM OF UPPER LOBE OF RIGHT LUNG: ICD-10-CM

## 2017-03-07 LAB
ALBUMIN SERPL BCP-MCNC: 3.9 G/DL
ALP SERPL-CCNC: 63 U/L
ALT SERPL W/O P-5'-P-CCNC: 15 U/L
ANION GAP SERPL CALC-SCNC: 9 MMOL/L
AST SERPL-CCNC: 18 U/L
BILIRUB SERPL-MCNC: 0.4 MG/DL
BUN SERPL-MCNC: 13 MG/DL
CALCIUM SERPL-MCNC: 9.1 MG/DL
CHLORIDE SERPL-SCNC: 104 MMOL/L
CO2 SERPL-SCNC: 24 MMOL/L
CREAT SERPL-MCNC: 1.4 MG/DL
ERYTHROCYTE [DISTWIDTH] IN BLOOD BY AUTOMATED COUNT: 14.4 %
EST. GFR  (AFRICAN AMERICAN): >60 ML/MIN/1.73 M^2
EST. GFR  (NON AFRICAN AMERICAN): >60 ML/MIN/1.73 M^2
GLUCOSE SERPL-MCNC: 127 MG/DL
HCT VFR BLD AUTO: 43.7 %
HGB BLD-MCNC: 14.4 G/DL
MCH RBC QN AUTO: 26.9 PG
MCHC RBC AUTO-ENTMCNC: 33 %
MCV RBC AUTO: 82 FL
NEUTROPHILS # BLD AUTO: 3.6 K/UL
PLATELET # BLD AUTO: 238 K/UL
PMV BLD AUTO: 10.8 FL
POTASSIUM SERPL-SCNC: 3.7 MMOL/L
PROT SERPL-MCNC: 7.4 G/DL
RBC # BLD AUTO: 5.36 M/UL
SODIUM SERPL-SCNC: 137 MMOL/L
T4 FREE SERPL-MCNC: 0.8 NG/DL
TSH SERPL DL<=0.005 MIU/L-ACNC: 8.54 UIU/ML
WBC # BLD AUTO: 7.38 K/UL

## 2017-03-07 PROCEDURE — 85027 COMPLETE CBC AUTOMATED: CPT

## 2017-03-07 PROCEDURE — 99215 OFFICE O/P EST HI 40 MIN: CPT | Mod: S$GLB,,, | Performed by: INTERNAL MEDICINE

## 2017-03-07 PROCEDURE — 1160F RVW MEDS BY RX/DR IN RCRD: CPT | Mod: S$GLB,,, | Performed by: INTERNAL MEDICINE

## 2017-03-07 PROCEDURE — 99999 PR PBB SHADOW E&M-EST. PATIENT-LVL IV: CPT | Mod: PBBFAC,,, | Performed by: INTERNAL MEDICINE

## 2017-03-07 PROCEDURE — 84443 ASSAY THYROID STIM HORMONE: CPT

## 2017-03-07 PROCEDURE — 3075F SYST BP GE 130 - 139MM HG: CPT | Mod: S$GLB,,, | Performed by: INTERNAL MEDICINE

## 2017-03-07 PROCEDURE — 36415 COLL VENOUS BLD VENIPUNCTURE: CPT

## 2017-03-07 PROCEDURE — 84439 ASSAY OF FREE THYROXINE: CPT

## 2017-03-07 PROCEDURE — 80053 COMPREHEN METABOLIC PANEL: CPT

## 2017-03-07 PROCEDURE — 3080F DIAST BP >= 90 MM HG: CPT | Mod: S$GLB,,, | Performed by: INTERNAL MEDICINE

## 2017-03-07 RX ORDER — HEPARIN 100 UNIT/ML
500 SYRINGE INTRAVENOUS
Status: CANCELLED | OUTPATIENT
Start: 2017-03-08

## 2017-03-07 RX ORDER — SODIUM CHLORIDE 0.9 % (FLUSH) 0.9 %
10 SYRINGE (ML) INJECTION
Status: CANCELLED | OUTPATIENT
Start: 2017-03-08

## 2017-03-07 NOTE — Clinical Note
RTC to see Dr. Garay with CBC, CMP, TSH, T4, CT chest/abdomen/pelvis in 2 Weeks and for next cycle Opdivo and XGEVA.

## 2017-03-07 NOTE — Clinical Note
Zaida, Mr. Alan is in need of financial assistance and states that he has been trying to reach you. Can you please make sure you talk to him today

## 2017-03-07 NOTE — PROGRESS NOTES
"PATIENT: Yao Alan  MRN: [id number]  DATE: 3/7/2017    Subjective:     Chief complaint:  Chief Complaint   Patient presents with    Malignant neoplasm of upper lobe of right lung    Fatigue       Oncologic History:  Mr. Yao Alan is a 41-year-old male who has a long history of smoking and was seen in the Pulmonary Clinic by Dr. Valle on 03/05/2015 with abnormal CT scan.    Apparently, he went to the [facility] in February with coughing as well as chest pain. A chest x-ray was done, which revealed a left upper lobe lung mass. Followup CT scan was done on 02/12/2015 and that revealed posterior superior mediastinal mass adjacent and prominent enlarged upper left mediastinal lymph nodes, abutting the aortic arch as well as left subclavian artery. A  CT scan of the abdomen was done on 03/03/2015 without contrast and that revealed nonobstructive nephrolithiasis, but a 2.1 cm lytic lesion involving the left iliac wing concerning for metastatic disease given the presence of emphysema and a mediastinal soft tissue mass on the CT chest from 02/12/2015. He saw Dr. Valle after that on 03/05/2015 and underwent an IR guided biopsy of the bone lesion on 03/17/2015. 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    His EGFR/EML/ALK is negative.    His PET scan on 3/25/15 revealed Left upper lobe lung lesion with an adjacent para-aortic lymph node, right anterior rib metastasis, right iliac metastasis, and pancreatic metastasis. A pancreatic cancer primary neoplasm is less likely.  MRI brain was negative for mets.  He completed 6 cycles of Carboplatin and Alimta and was on maintenance Alimta until end of March 2016.  His bone scan from 3/16 revealed stable disease. His CT scans also from end of March 2016 revealed "Interval enlargement of left upper lobe lung mass measuring 5.9 x 3.9 cm (previously 3.3 x 2.5 cm). This mass appears to invade the mediastinum and abutting the aortic arch and " "left subclavian artery"  He is now on Opdivo.  CT CAP from 6/22/16 s/p 6 cycles of Opdivo reveals "In this patient with a history of metastatic lung cancer, the previously identified paramediastinal left upper lobe lung mass has significantly decreased in size with only a trace amount of residual soft tissue opacities seen measuring 3.2 x 1.4 cm (axial series 2, image 16).Stable lytic lesion in the right iliac wing, unchanged.The right anterior sixth rib lesion and pancreatic head abnormality are not definitely appreciated on current examination."  Bone scan from 6/23/16 reveals "Stable activity in the right sixth rib anteriorly and the right iliac bone.No new metastatic lesions visualized."  10/3/16- CT scans reveal "In this patient with a history of metastatic lung cancer, the previously identified paramediastinal left upper lobe lung mass has decreased in size with only a trace amount of residual soft tissue opacity as above.  Stable lytic lesion in the right iliac wing, unchanged"  Bone scan reveals "Right superior anterior iliac bone lesion is stable and the right sixth rib has normalized. Recent dental procedure versus periodontal disease and possible right mastoiditis".      1/9/17 CT chest/abdomen/pelvis reveals "1. In this patient with history of metastatic lung cancer, the previously identified left upper paramediastinal lesion demonstrates near complete resolution with 1.0 x 0.7 cm residual disease (2.7 x 0.8 cm previously). Additionally, there is improving sclerotic lesion in the right iliac wing consistent with treated metastatic disease.  No evidence of new lesions in the chest, abdomen or pelvis. 2. Apical predominant paraseptal emphysematous changes and bullae. 3. Additional findings as above."    3/3/17 MRI brain reveals "Stable exam. No new enhancing mass lesion to suggest intracranial metastatic disease."       Interval History: Mr. Alan returns for follow up, scan results and for Opdivo. He did " "not get CT scans done as ordered because he didn't see it scheduled on his paper. He did not take BP medications this morning because he was in a rush.  He continues to have back pain which is no worse than usual. He takes 3-4 percocet a day for pain. He feels well and denies any new complaints. He continues to have fatigue. He denies any nausea, vomiting, diarrhea, constipation, abdominal pain, weight loss or loss of appetite, chest pain, shortness of breath, leg swelling,  headache, dizziness, or mood changes. He is alone.         ECOG Performance Status:   ECOG SCORE    0 - Fully active-able to carry on all pre-disease performance without restriction          PMFSH: all information reviewed and updated as relevant to today's visit    Review of Systems:   Review of Systems   Constitutional: Positive for fatigue. Negative for activity change, appetite change and fever.   HENT: Negative for mouth sores, nosebleeds and sore throat.    Eyes: Negative for visual disturbance.   Respiratory: Negative for cough and shortness of breath.    Cardiovascular: Negative for chest pain, palpitations and leg swelling.   Gastrointestinal: Negative for abdominal pain, constipation, diarrhea, nausea and vomiting.   Genitourinary: Negative for difficulty urinating and frequency.   Musculoskeletal: Positive for back pain. Negative for arthralgias.   Skin: Negative for rash.   Neurological: Negative for dizziness, numbness and headaches.   Hematological: Negative for adenopathy. Does not bruise/bleed easily.   Psychiatric/Behavioral: Negative for confusion and sleep disturbance. The patient is not nervous/anxious.    All other systems reviewed and are negative.        Objective:      Vitals:   Vitals:    03/07/17 0820   BP: (!) 133/100   Pulse: 77   Resp: 16   Temp: 98.1 °F (36.7 °C)   TempSrc: Oral   SpO2: 99%   Weight: 83.9 kg (184 lb 15.5 oz)   Height: 5' 6" (1.676 m)     BMI: Body mass index is 29.85 kg/(m^2).      Physical Exam: "   Physical Exam   Constitutional: He is oriented to person, place, and time. He appears well-developed and well-nourished. No distress.   HENT:   Right Ear: External ear normal.   Left Ear: External ear normal.   Mouth/Throat: No oropharyngeal exudate.   Eyes: Conjunctivae and lids are normal. Pupils are equal, round, and reactive to light. No scleral icterus.   Neck: Trachea normal and normal range of motion. Neck supple. No thyromegaly present.   Cardiovascular: Normal rate, regular rhythm, normal heart sounds and normal pulses.    Pulmonary/Chest: Effort normal and breath sounds normal.   Abdominal: Soft. Normal appearance and bowel sounds are normal. He exhibits no distension and no mass. There is no hepatosplenomegaly or splenomegaly. There is no tenderness.   Musculoskeletal: Normal range of motion.   Lymphadenopathy:        Head (right side): No submental and no submandibular adenopathy present.        Head (left side): No submental and no submandibular adenopathy present.     He has no cervical adenopathy.     He has no axillary adenopathy.        Right: No supraclavicular adenopathy present.        Left: No supraclavicular adenopathy present.   Neurological: He is alert and oriented to person, place, and time. He has normal reflexes. No sensory deficit.   Skin: Skin is warm, dry and intact. No bruising and no rash noted. Nails show no clubbing.   Psychiatric: He has a normal mood and affect. His speech is normal and behavior is normal. Cognition and memory are normal.   Vitals reviewed.        Laboratory Data:  WBC   Date Value Ref Range Status   03/07/2017 7.38 3.90 - 12.70 K/uL Final     Hemoglobin   Date Value Ref Range Status   03/07/2017 14.4 14.0 - 18.0 g/dL Final     Hematocrit   Date Value Ref Range Status   03/07/2017 43.7 40.0 - 54.0 % Final     Platelets   Date Value Ref Range Status   03/07/2017 238 150 - 350 K/uL Final     Gran #   Date Value Ref Range Status   03/07/2017 3.6 1.8 - 7.7 K/uL  Final     Comment:     The ANC is based on a white cell differential from an   automated cell counter. It has not been microscopically   reviewed for the presence of abnormal cells. Clinical   correlation is required.         Chemistry        Component Value Date/Time     03/07/2017 0729    K 3.7 03/07/2017 0729     03/07/2017 0729    CO2 24 03/07/2017 0729    BUN 13 03/07/2017 0729    CREATININE 1.4 03/07/2017 0729     (H) 03/07/2017 0729        Component Value Date/Time    CALCIUM 9.1 03/07/2017 0729    ALKPHOS 63 03/07/2017 0729    AST 18 03/07/2017 0729    ALT 15 03/07/2017 0729    BILITOT 0.4 03/07/2017 0729              Assessment/Plan:     1. Malignant neoplasm of upper lobe of right lung    2. Secondary malignant neoplasm of bone    3. Encounter for antineoplastic chemotherapy    4. Neoplasm related pain    5. Hypothyroidism, unspecified type    6. Deep vein thrombosis (DVT) of left lower extremity, unspecified chronicity, unspecified vein    7. Essential hypertension        Plan:    1,2,3. Patient is clinically stable. MRI brain normal. Will reschedule CT chest/abd/pelvis. Proceed with Opdivo immunotherapy tomorrow as scheduled. XGEVA not due tomorrow.   RTC to see Dr. Garay with CBC, CMP, TSH, T4, CT chest/abdomen/pelvis in 2 Weeks and for next cycle Opdivo (XGEVA  due) .   4. Stable, continue percocet.  5. Elevated but patient just recently filled the synthroid 50mcg 2 weeks ago. Will recheck in 2 weeks and adjust if necessary.   6. Stable, Continue Xarelto   7. BP stable. Continue  Medication    Med and Orders:  Orders Placed This Encounter    CBC Oncology    Comprehensive metabolic panel    TSH    T4, free       Follow Up:  Return in about 2 weeks (around 3/21/2017).    More than 25 mins were spent during this encounter, greater than 50% was spent in direct counseling and/or coordination of care.   Patient was also seen and examined by Dr. Garay who agrees with above plan. Patient  is in agreement with the proposed treatment plan. All questions were answered to the patient's satisfaction. Pt knows to call clinic if anything is needed before the next clinic visit.    JULIOCESAR Jordan  Hematology and Medical Oncology      STAFF NOTE:  I have reviewed the notes, assessments, and/or procedures performed by the nurse practitioner, as above.  I have personally interviewed the patient at the beside, and rounded with the nurse practitioner. I formulated the plan of care.  I concur with her documentation of Yao Alan.

## 2017-03-08 ENCOUNTER — INFUSION (OUTPATIENT)
Dept: INFUSION THERAPY | Facility: HOSPITAL | Age: 42
End: 2017-03-08
Attending: INTERNAL MEDICINE
Payer: MEDICARE

## 2017-03-08 VITALS — DIASTOLIC BLOOD PRESSURE: 88 MMHG | SYSTOLIC BLOOD PRESSURE: 137 MMHG | RESPIRATION RATE: 18 BRPM | HEART RATE: 79 BPM

## 2017-03-08 DIAGNOSIS — C79.51 SECONDARY MALIGNANT NEOPLASM OF BONE: ICD-10-CM

## 2017-03-08 DIAGNOSIS — C34.91 LUNG CANCER, PRIMARY, WITH METASTASIS FROM LUNG TO OTHER SITE, RIGHT: ICD-10-CM

## 2017-03-08 DIAGNOSIS — D50.0 IRON DEFICIENCY ANEMIA DUE TO CHRONIC BLOOD LOSS: Primary | ICD-10-CM

## 2017-03-08 DIAGNOSIS — C34.11 MALIGNANT NEOPLASM OF UPPER LOBE OF RIGHT LUNG: ICD-10-CM

## 2017-03-08 PROCEDURE — 96413 CHEMO IV INFUSION 1 HR: CPT

## 2017-03-08 PROCEDURE — 25000003 PHARM REV CODE 250: Performed by: INTERNAL MEDICINE

## 2017-03-08 PROCEDURE — 63600175 PHARM REV CODE 636 W HCPCS: Performed by: INTERNAL MEDICINE

## 2017-03-08 RX ORDER — HEPARIN 100 UNIT/ML
500 SYRINGE INTRAVENOUS
Status: DISCONTINUED | OUTPATIENT
Start: 2017-03-08 | End: 2017-03-08 | Stop reason: HOSPADM

## 2017-03-08 RX ORDER — SODIUM CHLORIDE 0.9 % (FLUSH) 0.9 %
10 SYRINGE (ML) INJECTION
Status: DISCONTINUED | OUTPATIENT
Start: 2017-03-08 | End: 2017-03-08 | Stop reason: HOSPADM

## 2017-03-08 RX ADMIN — HEPARIN 500 UNITS: 100 SYRINGE at 09:03

## 2017-03-08 RX ADMIN — SODIUM CHLORIDE 240 MG: 9 INJECTION, SOLUTION INTRAVENOUS at 08:03

## 2017-03-20 ENCOUNTER — HOSPITAL ENCOUNTER (OUTPATIENT)
Dept: RADIOLOGY | Facility: HOSPITAL | Age: 42
Discharge: HOME | End: 2017-03-20
Attending: NURSE PRACTITIONER
Payer: MEDICARE

## 2017-03-20 DIAGNOSIS — G89.3 NEOPLASM RELATED PAIN: ICD-10-CM

## 2017-03-20 DIAGNOSIS — C34.11 MALIGNANT NEOPLASM OF UPPER LOBE OF RIGHT LUNG: ICD-10-CM

## 2017-03-20 DIAGNOSIS — C34.91 LUNG CANCER, PRIMARY, WITH METASTASIS FROM LUNG TO OTHER SITE, RIGHT: ICD-10-CM

## 2017-03-20 DIAGNOSIS — I82.402 DEEP VEIN THROMBOSIS (DVT) OF LEFT LOWER EXTREMITY, UNSPECIFIED CHRONICITY, UNSPECIFIED VEIN: ICD-10-CM

## 2017-03-20 PROCEDURE — 74177 CT ABD & PELVIS W/CONTRAST: CPT | Mod: 26,,, | Performed by: INTERNAL MEDICINE

## 2017-03-20 PROCEDURE — 74177 CT ABD & PELVIS W/CONTRAST: CPT | Mod: TC

## 2017-03-20 PROCEDURE — 71260 CT THORAX DX C+: CPT | Mod: TC

## 2017-03-20 PROCEDURE — 25500020 PHARM REV CODE 255: Performed by: NURSE PRACTITIONER

## 2017-03-20 PROCEDURE — 71260 CT THORAX DX C+: CPT | Mod: 26,GC,, | Performed by: INTERNAL MEDICINE

## 2017-03-20 RX ORDER — OXYCODONE AND ACETAMINOPHEN 10; 325 MG/1; MG/1
1 TABLET ORAL EVERY 6 HOURS PRN
Qty: 120 TABLET | Refills: 0 | Status: SHIPPED | OUTPATIENT
Start: 2017-03-20 | End: 2017-03-21 | Stop reason: SDUPTHER

## 2017-03-20 RX ADMIN — IOHEXOL 15 ML: 350 INJECTION, SOLUTION INTRAVENOUS at 10:03

## 2017-03-20 RX ADMIN — IOHEXOL 75 ML: 350 INJECTION, SOLUTION INTRAVENOUS at 10:03

## 2017-03-21 ENCOUNTER — OFFICE VISIT (OUTPATIENT)
Dept: HEMATOLOGY/ONCOLOGY | Facility: CLINIC | Age: 42
End: 2017-03-21
Payer: MEDICARE

## 2017-03-21 ENCOUNTER — INFUSION (OUTPATIENT)
Dept: INFUSION THERAPY | Facility: HOSPITAL | Age: 42
End: 2017-03-21
Attending: INTERNAL MEDICINE
Payer: MEDICARE

## 2017-03-21 VITALS
RESPIRATION RATE: 16 BRPM | SYSTOLIC BLOOD PRESSURE: 122 MMHG | BODY MASS INDEX: 29.3 KG/M2 | HEART RATE: 107 BPM | HEIGHT: 66 IN | TEMPERATURE: 98 F | OXYGEN SATURATION: 100 % | DIASTOLIC BLOOD PRESSURE: 101 MMHG | HEART RATE: 91 BPM | SYSTOLIC BLOOD PRESSURE: 117 MMHG | DIASTOLIC BLOOD PRESSURE: 82 MMHG | RESPIRATION RATE: 16 BRPM | TEMPERATURE: 98 F | WEIGHT: 182.31 LBS

## 2017-03-21 DIAGNOSIS — C34.91 LUNG CANCER, PRIMARY, WITH METASTASIS FROM LUNG TO OTHER SITE, RIGHT: ICD-10-CM

## 2017-03-21 DIAGNOSIS — G89.3 NEOPLASM RELATED PAIN: ICD-10-CM

## 2017-03-21 DIAGNOSIS — E03.9 HYPOTHYROIDISM, UNSPECIFIED TYPE: ICD-10-CM

## 2017-03-21 DIAGNOSIS — C79.51 SECONDARY MALIGNANT NEOPLASM OF BONE: ICD-10-CM

## 2017-03-21 DIAGNOSIS — I82.402 DEEP VEIN THROMBOSIS (DVT) OF LEFT LOWER EXTREMITY, UNSPECIFIED CHRONICITY, UNSPECIFIED VEIN: ICD-10-CM

## 2017-03-21 DIAGNOSIS — C34.11 MALIGNANT NEOPLASM OF UPPER LOBE OF RIGHT LUNG: Primary | ICD-10-CM

## 2017-03-21 DIAGNOSIS — C34.11 MALIGNANT NEOPLASM OF UPPER LOBE OF RIGHT LUNG: ICD-10-CM

## 2017-03-21 DIAGNOSIS — Z51.11 ENCOUNTER FOR ANTINEOPLASTIC CHEMOTHERAPY: ICD-10-CM

## 2017-03-21 DIAGNOSIS — D50.0 IRON DEFICIENCY ANEMIA DUE TO CHRONIC BLOOD LOSS: Primary | ICD-10-CM

## 2017-03-21 PROCEDURE — 96372 THER/PROPH/DIAG INJ SC/IM: CPT

## 2017-03-21 PROCEDURE — 63600175 PHARM REV CODE 636 W HCPCS: Performed by: INTERNAL MEDICINE

## 2017-03-21 PROCEDURE — 25000003 PHARM REV CODE 250: Performed by: INTERNAL MEDICINE

## 2017-03-21 PROCEDURE — 96413 CHEMO IV INFUSION 1 HR: CPT

## 2017-03-21 PROCEDURE — 99215 OFFICE O/P EST HI 40 MIN: CPT | Mod: S$PBB,,, | Performed by: INTERNAL MEDICINE

## 2017-03-21 PROCEDURE — 99999 PR PBB SHADOW E&M-EST. PATIENT-LVL III: CPT | Mod: PBBFAC,,, | Performed by: INTERNAL MEDICINE

## 2017-03-21 RX ORDER — HEPARIN 100 UNIT/ML
500 SYRINGE INTRAVENOUS
Status: DISCONTINUED | OUTPATIENT
Start: 2017-03-21 | End: 2017-03-21 | Stop reason: HOSPADM

## 2017-03-21 RX ORDER — HEPARIN 100 UNIT/ML
500 SYRINGE INTRAVENOUS
Status: CANCELLED | OUTPATIENT
Start: 2017-03-21

## 2017-03-21 RX ORDER — OXYCODONE AND ACETAMINOPHEN 10; 325 MG/1; MG/1
1 TABLET ORAL EVERY 6 HOURS PRN
Qty: 120 TABLET | Refills: 0 | Status: SHIPPED | OUTPATIENT
Start: 2017-03-21 | End: 2017-04-18 | Stop reason: SDUPTHER

## 2017-03-21 RX ORDER — LEVOTHYROXINE SODIUM 75 UG/1
75 TABLET ORAL DAILY
Qty: 30 TABLET | Refills: 11 | Status: SHIPPED | OUTPATIENT
Start: 2017-03-21 | End: 2017-04-04 | Stop reason: DRUGHIGH

## 2017-03-21 RX ORDER — SODIUM CHLORIDE 0.9 % (FLUSH) 0.9 %
10 SYRINGE (ML) INJECTION
Status: DISCONTINUED | OUTPATIENT
Start: 2017-03-21 | End: 2017-03-21 | Stop reason: HOSPADM

## 2017-03-21 RX ORDER — SODIUM CHLORIDE 0.9 % (FLUSH) 0.9 %
10 SYRINGE (ML) INJECTION
Status: CANCELLED | OUTPATIENT
Start: 2017-03-21

## 2017-03-21 RX ADMIN — SODIUM CHLORIDE 240 MG: 9 INJECTION, SOLUTION INTRAVENOUS at 08:03

## 2017-03-21 RX ADMIN — SODIUM CHLORIDE: 9 INJECTION, SOLUTION INTRAVENOUS at 08:03

## 2017-03-21 RX ADMIN — HEPARIN 500 UNITS: 100 SYRINGE at 09:03

## 2017-03-21 RX ADMIN — DENOSUMAB 120 MG: 120 INJECTION SUBCUTANEOUS at 09:03

## 2017-03-21 NOTE — PLAN OF CARE
Problem: Chemotherapy Effects (Adult)  Goal: Signs and Symptoms of Listed Potential Problems Will be Absent or Manageable (Chemotherapy Effects)  Signs and symptoms of listed potential problems will be absent or manageable by discharge/transition of care (reference Chemotherapy Effects (Adult) CPG).   Outcome: Ongoing (interventions implemented as appropriate)  Patient here for Opdivo/Xgeva.  Assessment complete and labs reviewed.  Patient states he is currently taking vitamin D/calcium supplement at home.  Denies jaw pain or recent dental work.  VSS.  No needs expressed at this time.  Will continue to monitor.

## 2017-03-21 NOTE — MR AVS SNAPSHOT
"Patient Information     Patient Name Sex Yao Templeton Male 1975      Visit Information        Provider Department [facility]    3/21/2017 8:00 AM [facility] [phone] Pedro Reeves      Patient Instructions     None      Your Current Medications Are     albuterol 90 mcg/actuation inhaler    amlodipine (NORVASC) 10 MG tablet    docusate sodium (COLACE) 100 MG capsule    ferrous sulfate 325 (65 FE) MG EC tablet    lactulose (CHRONULAC) 10 gram/15 mL solution    levocetirizine (XYZAL) 5 MG tablet    metoclopramide HCl (REGLAN) 10 MG tablet    naloxegol 25 mg Tab    oxycodone-acetaminophen (PERCOCET)  mg per tablet    polyethylene glycol (GLYCOLAX) 17 gram/dose powder    PROAIR HFA 90 mcg/actuation inhaler    rivaroxaban (XARELTO) 20 mg Tab    levothyroxine (SYNTHROID) 50 MCG tablet (Discontinued)    oxycodone-acetaminophen (PERCOCET)  mg per tablet (Discontinued)    rivaroxaban (XARELTO) 20 mg Tab (Discontinued)    levothyroxine (SYNTHROID) 75 MCG tablet    omeprazole (PRILOSEC) 20 MG capsule      Facility-Administered Medications     denosumab (XGEVA) solution 120 mg    heparin, porcine (PF) 100 unit/mL injection flush 500 Units    nivolumab 240 mg in sodium chloride 0.9% 100 mL chemo infusion    omnipaque 350 iohexol 15 mL    omnipaque 350 iohexol 15 mL    omnipaque 350 iohexol 75 mL    sodium chloride 0.9% 100 mL flush bag    sodium chloride 0.9% flush 10 mL      Appointments for Next Year     None         Default Flowsheet Data (last 24 hours)      Amb Complex Vitals Tato        17 0756 17 0716             Measurements    Weight  82.7 kg (182 lb 5.1 oz)       Height  5' 6" (1.676 m)       BSA (Calculated - sq m)  1.96 sq meters       BMI (Calculated)  29.5       /82 (!)  117/101       Temp 98 °F (36.7 °C) 98.2 °F (36.8 °C)       Pulse 91 107       Resp 16 16       SpO2  100 %       Pain Assessment    Pain Score Zero Zero             "   Allergies     Nsaids (Non-steroidal Anti-inflammatory Drug)     Patient says since been diagnosed with lung mass on 2-12-15, if take any NSAIDS he spikes a fever.    Tylenol [Acetaminophen] Other (See Comments)    Sweating +      Medications You Received from 03/20/2017 0946 to 03/21/2017 0946        Date/Time Order Dose Route Action     03/21/2017 0907 denosumab (XGEVA) solution 120 mg 120 mg Subcutaneous Given     03/21/2017 0949 heparin, porcine (PF) 100 unit/mL injection flush 500 Units 500 Units Intravenous Given     03/21/2017 0848 nivolumab 240 mg in sodium chloride 0.9% 100 mL chemo infusion 240 mg Intravenous New Bag     03/21/2017 0847 sodium chloride 0.9% 100 mL flush bag   Intravenous New Bag      Current Discharge Medication List     Cannot display discharge medications since this is not an admission.

## 2017-03-21 NOTE — Clinical Note
Zaida has not called him back. He is very frustrated. Can you make sure Zaida calls him today  Also call him back tomorrow to make sure Zaida has called him.

## 2017-03-21 NOTE — PROGRESS NOTES
"Subjective:       Patient ID: Yao Alan is a 42 y.o. male.    Chief Complaint: Lung Cancer  Oncologic History:  Mr. Yao Alan is a 41-year-old male who has a long history of smoking and was seen in the Pulmonary Clinic by Dr. Valle on 03/05/2015 with abnormal CT scan.    Apparently, he went to the [facility] in February with coughing as well as chest pain. A chest x-ray was done, which revealed a left upper lobe lung mass. Followup CT scan was done on 02/12/2015 and that revealed posterior superior mediastinal mass adjacent and prominent enlarged upper left mediastinal lymph nodes, abutting the aortic arch as well as left subclavian artery. A  CT scan of the abdomen was done on 03/03/2015 without contrast and that revealed nonobstructive nephrolithiasis, but a 2.1 cm lytic lesion involving the left iliac wing concerning for metastatic disease given the presence of emphysema and a mediastinal soft tissue mass on the CT chest from 02/12/2015. He saw Dr. Valle after that on 03/05/2015 and underwent an IR guided biopsy of the bone lesion on 03/17/2015. 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    His EGFR/EML/ALK is negative.    His PET scan on 3/25/15 revealed Left upper lobe lung lesion with an adjacent para-aortic lymph node, right anterior rib metastasis, right iliac metastasis, and pancreatic metastasis. A pancreatic cancer primary neoplasm is less likely.  MRI brain was negative for mets.  He completed 6 cycles of Carboplatin and Alimta and was on maintenance Alimta until end of March 2016.  His bone scan from 3/16 revealed stable disease. His CT scans also from end of March 2016 revealed "Interval enlargement of left upper lobe lung mass measuring 5.9 x 3.9 cm (previously 3.3 x 2.5 cm). This mass appears to invade the mediastinum and abutting the aortic arch and left subclavian artery"  He is now on Opdivo.  CT CAP from 6/22/16 s/p 6 cycles of Opdivo reveals "In " "this patient with a history of metastatic lung cancer, the previously identified paramediastinal left upper lobe lung mass has significantly decreased in size with only a trace amount of residual soft tissue opacities seen measuring 3.2 x 1.4 cm (axial series 2, image 16).Stable lytic lesion in the right iliac wing, unchanged.The right anterior sixth rib lesion and pancreatic head abnormality are not definitely appreciated on current examination."  Bone scan from 6/23/16 reveals "Stable activity in the right sixth rib anteriorly and the right iliac bone.No new metastatic lesions visualized."  10/3/16- CT scans reveal "In this patient with a history of metastatic lung cancer, the previously identified paramediastinal left upper lobe lung mass has decreased in size with only a trace amount of residual soft tissue opacity as above.  Stable lytic lesion in the right iliac wing, unchanged"  Bone scan reveals "Right superior anterior iliac bone lesion is stable and the right sixth rib has normalized. Recent dental procedure versus periodontal disease and possible right mastoiditis".      1/9/17 CT chest/abdomen/pelvis reveals "1. In this patient with history of metastatic lung cancer, the previously identified left upper paramediastinal lesion demonstrates near complete resolution with 1.0 x 0.7 cm residual disease (2.7 x 0.8 cm previously). Additionally, there is improving sclerotic lesion in the right iliac wing consistent with treated metastatic disease.  No evidence of new lesions in the chest, abdomen or pelvis. 2. Apical predominant paraseptal emphysematous changes and bullae. 3. Additional findings as above."     3/3/17 MRI brain revealed "Stable exam. No new enhancing mass lesion to suggest intracranial metastatic disease."       Grupo Alan comes in today for Opdivo with restaging CT scans from 3/20/17 which reveal "No measurable lesion identified within the left upper paramediastinal region, the location of this " "patient's lung cancer, suggesting favorable response to treatment . No new lung lesions identified. Sclerotic lesion within the right iliac wing appears unchanged. Stable appearing centrilobular and paraseptal emphysema".  He feels well and denies any new complaints.    Review of Systems   Constitutional: Negative for appetite change, fatigue and unexpected weight change.   HENT: Negative for mouth sores.    Eyes: Negative for visual disturbance.   Respiratory: Negative for cough and shortness of breath.    Cardiovascular: Negative for chest pain.   Gastrointestinal: Negative for abdominal pain and diarrhea.   Genitourinary: Negative for frequency.   Musculoskeletal: Negative for back pain.   Skin: Negative for rash.   Neurological: Negative for headaches.   Hematological: Negative for adenopathy.   Psychiatric/Behavioral: The patient is not nervous/anxious.    All other systems reviewed and are negative.      Objective:      Physical Exam   Constitutional: He is oriented to person, place, and time. He appears well-developed and well-nourished.   HENT:   Mouth/Throat: No oropharyngeal exudate.   Cardiovascular: Normal rate and normal heart sounds.    Pulmonary/Chest: Effort normal and breath sounds normal. He has no wheezes.   Abdominal: Soft. Bowel sounds are normal. There is no tenderness.   Musculoskeletal: He exhibits no edema or tenderness.   Lymphadenopathy:     He has no cervical adenopathy.   Neurological: He is alert and oriented to person, place, and time. Coordination normal.   Skin: Skin is warm and dry. No rash noted.   Psychiatric: He has a normal mood and affect. Judgment and thought content normal.   Vitals reviewed.        LABS:  WBC   Date Value Ref Range Status   03/20/2017 8.42 3.90 - 12.70 K/uL Final     Hemoglobin   Date Value Ref Range Status   03/20/2017 14.8 14.0 - 18.0 g/dL Final     Hematocrit   Date Value Ref Range Status   03/20/2017 43.2 40.0 - 54.0 % Final     Platelets   Date Value " Ref Range Status   03/20/2017 250 150 - 350 K/uL Final     Gran #   Date Value Ref Range Status   03/20/2017 4.5 1.8 - 7.7 K/uL Final     Comment:     The ANC is based on a white cell differential from an   automated cell counter. It has not been microscopically   reviewed for the presence of abnormal cells. Clinical   correlation is required.         Chemistry        Component Value Date/Time     03/20/2017 0745    K 4.3 03/20/2017 0745     03/20/2017 0745    CO2 26 03/20/2017 0745    BUN 19 03/20/2017 0745    CREATININE 1.5 (H) 03/20/2017 0745    GLU 95 03/20/2017 0745        Component Value Date/Time    CALCIUM 9.6 03/20/2017 0745    ALKPHOS 67 03/20/2017 0745    AST 20 03/20/2017 0745    ALT 18 03/20/2017 0745    BILITOT 0.5 03/20/2017 0745        TSH   Date Value Ref Range Status   03/20/2017 12.319 (H) 0.400 - 4.000 uIU/mL Final     Free T4   Date Value Ref Range Status   03/20/2017 0.80 0.71 - 1.51 ng/dL Final       Assessment:       1. Malignant neoplasm of upper lobe of right lung    2. Secondary malignant neoplasm of bone    3. Encounter for antineoplastic chemotherapy    4. Neoplasm related pain    5. Deep vein thrombosis (DVT) of left lower extremity, unspecified chronicity, unspecified vein    6. Hypothyroidism, unspecified type    7. Lung cancer, primary, with metastasis from lung to other site, right        Plan:        1,2,3. He is doing well with very favorable response on CT scans. He will proceed with Xgeva and Opdivo today and will return in 2 weeks for next cycle  4. Refilled percocet.  5. Refilled Xarelto  6. Increase dose of Synthroid to 75 mcg    Above care plan was discussed with patient and all questions were addressed to his satisfaction

## 2017-03-21 NOTE — PLAN OF CARE
Problem: Patient Care Overview (Adult)  Goal: Plan of Care Review  Outcome: Ongoing (interventions implemented as appropriate)  Patient tolerated infusion well.  No reaction suspected.  AVS given to patient.  No questions or concerns.  Patient ambulated off unit unassisted.

## 2017-03-22 ENCOUNTER — TELEPHONE (OUTPATIENT)
Dept: HEMATOLOGY/ONCOLOGY | Facility: CLINIC | Age: 42
End: 2017-03-22

## 2017-03-22 NOTE — TELEPHONE ENCOUNTER
----- Message from Rinku Perrin sent at 3/22/2017  1:04 PM CDT -----  Contact: pt mother   Pt mother would like to discuss pt visit on yesterday, mother states pt has inform her he can go back to work but mother would like to confirm with .  Contact number [phone]

## 2017-03-22 NOTE — TELEPHONE ENCOUNTER
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 Mother voiced understanding.

## 2017-03-22 NOTE — TELEPHONE ENCOUNTER
"Spoke with patient's mother, palmira, who is calling to make sure patient can indeed return to work. 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  Patient voiced understanding.    Message routed to dr carl  "

## 2017-03-30 ENCOUNTER — HOSPITAL ENCOUNTER (EMERGENCY)
Facility: HOSPITAL | Age: 42
Discharge: HOME | End: 2017-03-30
Attending: FAMILY MEDICINE
Payer: MEDICARE

## 2017-03-30 VITALS
DIASTOLIC BLOOD PRESSURE: 88 MMHG | WEIGHT: 181 LBS | OXYGEN SATURATION: 98 % | HEIGHT: 66 IN | SYSTOLIC BLOOD PRESSURE: 133 MMHG | BODY MASS INDEX: 29.09 KG/M2 | TEMPERATURE: 99 F | RESPIRATION RATE: 18 BRPM | HEART RATE: 89 BPM

## 2017-03-30 DIAGNOSIS — R51.9 SINUS HEADACHE: Primary | ICD-10-CM

## 2017-03-30 DIAGNOSIS — J01.10 ACUTE NON-RECURRENT FRONTAL SINUSITIS: ICD-10-CM

## 2017-03-30 PROCEDURE — 96372 THER/PROPH/DIAG INJ SC/IM: CPT

## 2017-03-30 PROCEDURE — 99284 EMERGENCY DEPT VISIT MOD MDM: CPT | Mod: ,,, | Performed by: FAMILY MEDICINE

## 2017-03-30 PROCEDURE — 99283 EMERGENCY DEPT VISIT LOW MDM: CPT | Mod: 25

## 2017-03-30 PROCEDURE — 63600175 PHARM REV CODE 636 W HCPCS: Performed by: FAMILY MEDICINE

## 2017-03-30 RX ORDER — DEXAMETHASONE SODIUM PHOSPHATE 4 MG/ML
8 INJECTION, SOLUTION INTRA-ARTICULAR; INTRALESIONAL; INTRAMUSCULAR; INTRAVENOUS; SOFT TISSUE
Status: COMPLETED | OUTPATIENT
Start: 2017-03-30 | End: 2017-03-30

## 2017-03-30 RX ORDER — AMOXICILLIN AND CLAVULANATE POTASSIUM 875; 125 MG/1; MG/1
1 TABLET, FILM COATED ORAL 2 TIMES DAILY
Qty: 20 TABLET | Refills: 0 | Status: SHIPPED | OUTPATIENT
Start: 2017-03-30 | End: 2017-04-09

## 2017-03-30 RX ADMIN — DEXAMETHASONE SODIUM PHOSPHATE 8 MG: 4 INJECTION, SOLUTION INTRAMUSCULAR; INTRAVENOUS at 10:03

## 2017-03-30 NOTE — ED PROVIDER NOTES
"Encounter Date: 3/30/2017    SCRIBE #1 NOTE: I, Zakia Gomez, am scribing for, and in the presence of, Dr. Galo.       History     Chief Complaint   Patient presents with    Headache     Review of patient's allergies indicates:   Allergen Reactions    Nsaids (non-steroidal anti-inflammatory drug)      Patient says since been diagnosed with lung mass on 2-12-15, if take any NSAIDS he spikes a fever.    Tylenol [acetaminophen] Other (See Comments)     Sweating +     HPI Comments: Time seen by provider: 10:33 AM    This is a 42 y.o. male with a history of HTN, lung cancer, COPD, and thyroid disease who presents with complaint of a right-sided HA x 2 days. Pt claims that the right side of his head is "pouding," and he also endorses that the glands in the right side of his neck are swollen. He endorses a mild sore throat and a productive cough with green sputum.     Pt denies vomiting or head trauma.       The history is provided by the patient.     Past Medical History:   Diagnosis Date    Asthma     COPD (chronic obstructive pulmonary disease)     HTN (hypertension)     Hypertension     Lung cancer     Lung mass     Thyroid disease      Past Surgical History:   Procedure Laterality Date    FRACTURE SURGERY      finger    LUNG CANCER SURGERY Right March 2015    lung biopsy      Family History   Problem Relation Age of Onset    Hypertension Father     No Known Problems Mother     No Known Problems Sister     No Known Problems Brother     No Known Problems Maternal Aunt     No Known Problems Maternal Uncle     No Known Problems Paternal Aunt     No Known Problems Paternal Uncle     No Known Problems Maternal Grandmother     No Known Problems Maternal Grandfather     No Known Problems Paternal Grandmother     No Known Problems Paternal Grandfather     Amblyopia Neg Hx     Blindness Neg Hx     Cancer Neg Hx     Cataracts Neg Hx     Diabetes Neg Hx     Glaucoma Neg Hx     Macular " degeneration Neg Hx     Retinal detachment Neg Hx     Strabismus Neg Hx     Stroke Neg Hx     Thyroid disease Neg Hx      Social History   Substance Use Topics    Smoking status: Former Smoker     Packs/day: 0.75     Years: 25.00     Types: Cigarettes     Quit date: 12/2/2014    Smokeless tobacco: Never Used      Comment: Patient is no longer smoking    Alcohol use No     Review of Systems   Constitutional: Negative for fever.   HENT: Positive for sore throat.         Positive for swollen right glands in neck. Negative head trauma.   Eyes: Negative for visual disturbance.   Respiratory: Positive for cough (productive with green sputum).    Cardiovascular: Negative for leg swelling.   Gastrointestinal: Negative for vomiting.   Genitourinary: Negative for flank pain.   Musculoskeletal: Negative for back pain.   Skin: Negative for rash.   Neurological: Positive for headaches (right-sided).       Physical Exam   Initial Vitals   BP Pulse Resp Temp SpO2   03/30/17 0947 03/30/17 0947 03/30/17 0947 03/30/17 0947 03/30/17 0947   133/88 89 18 98.5 °F (36.9 °C) 98 %     Physical Exam    Nursing note and vitals reviewed.  Constitutional: He appears well-developed and well-nourished. He is not diaphoretic. No distress.   HENT:   Head: Normocephalic and atraumatic.   TM clear. Positive for mild tenderness in the left frontal sinus to percussion. Pt has some postnasal drip without erythema.    Eyes:   Pt's fundi are benign.   Neck: Normal range of motion. Neck supple.   Positive for mild tenderness ot the anterior cervical nodes.    Cardiovascular: Normal rate, regular rhythm and normal heart sounds.   No murmur heard.  Pulmonary/Chest: Breath sounds normal. No respiratory distress. He has no wheezes. He has no rhonchi. He has no rales.   Abdominal: Soft. He exhibits no distension. There is no tenderness. There is no rebound and no guarding.   Neurological: He is alert and oriented to person, place, and time. He has normal  strength. No cranial nerve deficit or sensory deficit.   Skin: Skin is warm and dry.         ED Course   Procedures  Labs Reviewed - No data to display          Medical Decision Making:   History:   Old Medical Records: I decided to obtain old medical records.  ED Management:  History and exam consistent with maxillary on frontal sinusitis.  No evidence of malignant source of the patient's headache.  We'll treat symptomatically with Decadron and Augmentin for infection management            Scribe Attestation:   Scribe #1: I performed the above scribed service and the documentation accurately describes the services I performed. I attest to the accuracy of the note.    Attending Attestation:           Physician Attestation for Scribe:  Physician Attestation Statement for Scribe #1: I, Dr. Galo, reviewed documentation, as scribed by Zakia Gomez in my presence, and it is both accurate and complete.                 ED Course     Clinical Impression:   The primary encounter diagnosis was Sinus headache. A diagnosis of Acute non-recurrent frontal sinusitis was also pertinent to this visit.          William Galo MD  03/30/17 7360

## 2017-03-30 NOTE — DISCHARGE INSTRUCTIONS
Since you are ALLERGIC to aspirin and Advil and Tylenol, there are not too many pain medicine options.  Therefore, we will give you a steroid injection that hopefully reduce the congestion and inflammation in your head and help with your headache while the antibiotics are working     Sinus Headaches     When using nasal spray, keep your chin down and angle the spray away from center.     Sinus headaches can cause a gnawing pain behind the nose and eyes. The pain most often gets worse in the afternoon and evening. You may also run a fever. Sinus headaches are caused by colds or allergies that make the nasal passages inflamed or infected.  To help prevent sinus headaches:  · Treat colds promptly to keep mucus from backing up.  · Avoid things that trigger sinus problems, such as pollens, dust, smoke, fumes, and strong odors.  · Take allergy medicines as directed by your healthcare provider.  To relieve the pain:  · Keep your sinuses open and free of mucus. Try over-the-counter sinus rinse products.  · Use a nasal decongestant as directed to reduce the inflammation.  · Drink fluids to keep the mucus thinner. This helps it drain more easily. You can also use a humidifier.  · Apply hot packs to the area around your sinuses. Use a hot water bottle.  · See your healthcare provider if your sinus headache lasts more than 2 weeks. You may need medicine for a sinus infection or an exam to check for other headache conditions, like migraines.  Date Last Reviewed: 10/1/2016  © 2726-3640 [employer]. [address] [place] [address], [place] [address]. All rights reserved. This information is not intended as a substitute for professional medical care. Always follow your healthcare professional's instructions.

## 2017-03-30 NOTE — ED NOTES
LOC: The patient is awake, alert, and aware of environment. The patient is oriented x 3 and speaking appropriately.   APPEARANCE: No acute distress noted.   PSYCHOSOCIAL: Patient is calm and cooperative. Patients insight and judgement are appropriate to situation.   SKIN: The skin is warm, dry.  RESPIRATORY: Airway is open and patent. Bilateral chest rise and fall. Respirations are spontaneous, even and unlabored. Normal effort and rate noted. No accessory muscle use noted. +cough green sputum  H/o lung CA  Last chemo 3/21/17  CARDIAC: Patient has a normal rate.  URINARY: Patient reports routine urination without pain, frequency, or urgency. Voids independently. Reports urine appears guy/yellow in color.  EXTREMITIES: MAEW.   NEUROLOGIC: Eyes open spontaneously. Speech clear. Tolerating saliva secretions well. Able to follow commands, demonstrating ability to actively and appropriately communicate within context of current conversation. Symmetrical facial muscles. Moving all extremities well with no noted weakness. Movement is purposeful.  MUSCULOSKELETAL: No obvious deformities noted    TWO IDENTIFIERS HAVE BEEN VERIFIED FOR THIS PATIENT

## 2017-03-30 NOTE — ED AVS SNAPSHOT
[facility]  [address]  [facility] [address]               Yao Alan   3/30/2017 10:18 AM   ED    Description:  Male : 1975   Department:  [facility]           Your Care was Coordinated By:     Provider Role From To    William Galo MD Attending Provider 17 1029 --      Reason for Visit     Headache           Diagnoses this Visit        Comments    Sinus headache    -  Primary     Acute non-recurrent frontal sinusitis           ED Disposition     None           To Do List           Follow-up Information     Follow up with Miguel Browning MD In 1 week.    Specialty:  Family Medicine    Why:  As needed, If symptoms worsen    Contact information:    [address]  [place] [address]  [phone]         These Medications        Disp Refills Start End    amoxicillin-clavulanate 875-125mg (AUGMENTIN) 875-125 mg per tablet 20 tablet 0 3/30/2017 2017    Take 1 tablet by mouth 2 (two) times daily. - Oral    Pharmacy: [address] Ph #: [phone]         [facility]     [facility] On Call Nurse Care Line -  Assistance  Unless otherwise directed by your provider, please contact [facility], our nurse care line that is available for  assistance.     Registered nurses in the [facility] provide: appointment scheduling, clinical advisement, health education, and other advisory services.  Call: [phone] (toll free)               Medications           START taking these NEW medications        Refills    amoxicillin-clavulanate 875-125mg (AUGMENTIN) 875-125 mg per tablet 0    Sig: Take 1 tablet by mouth 2 (two) times daily.    Class: Print    Route: Oral      These medications were administered today        Dose Freq    dexamethasone injection 8 mg 8 mg ED 1 Time    Sig: Inject 2 mLs (8 mg total) into the muscle ED 1 Time.    Class: Normal     Route: Intramuscular           Verify that the below list of medications is an accurate representation of the medications you are currently taking.  If none reported, the list may be blank. If incorrect, please contact your healthcare provider. Carry this list with you in case of emergency.           Current Medications     amlodipine (NORVASC) 10 MG tablet Take 1 tablet (10 mg total) by mouth once daily.    levothyroxine (SYNTHROID) 75 MCG tablet Take 1 tablet (75 mcg total) by mouth once daily.    oxycodone-acetaminophen (PERCOCET)  mg per tablet Take 1 tablet by mouth every 6 (six) hours as needed for Pain.    rivaroxaban (XARELTO) 20 mg Tab Take 1 tablet (20 mg total) by mouth once daily.    albuterol 90 mcg/actuation inhaler Inhale 2 puffs into the lungs every 6 (six) hours as needed for Wheezing.    amoxicillin-clavulanate 875-125mg (AUGMENTIN) 875-125 mg per tablet Take 1 tablet by mouth 2 (two) times daily.    dexamethasone injection 8 mg Inject 2 mLs (8 mg total) into the muscle ED 1 Time.    docusate sodium (COLACE) 100 MG capsule Take 1 capsule (100 mg total) by mouth 2 (two) times daily.    ferrous sulfate 325 (65 FE) MG EC tablet Take 1 tablet (325 mg total) by mouth 3 (three) times daily with meals.    lactulose (CHRONULAC) 10 gram/15 mL solution Take 30 mLs (20 g total) by mouth 3 (three) times daily.    levocetirizine (XYZAL) 5 MG tablet Take 1 tablet (5 mg total) by mouth every evening.    metoclopramide HCl (REGLAN) 10 MG tablet Take 1 tablet (10 mg total) by mouth every 6 (six) hours as needed (nausea/vomiting and/or abdominal cramps).    naloxegol 25 mg Tab Take 25 mg by mouth once daily.    omeprazole (PRILOSEC) 20 MG capsule Take 1 capsule (20 mg total) by mouth once daily.    polyethylene glycol (GLYCOLAX) 17 gram/dose powder Take 17 g by mouth once daily.    PROAIR HFA 90 mcg/actuation inhaler INHALE TWO PUFFS BY MOUTH EVERY 6 HOURS AS NEEDED FOR WHEEZING           Clinical Reference  "Information           Your Vitals Were     BP Pulse Temp Resp Height Weight    133/88 (BP Location: Right arm, Patient Position: Sitting) 89 98.5 °F (36.9 °C) (Oral) 18 5' 6" (1.676 m) 82.1 kg (181 lb)    SpO2 BMI             98% 29.21 kg/m2         Allergies as of 3/30/2017        Reactions    Nsaids (Non-steroidal Anti-inflammatory Drug)     Patient says since been diagnosed with lung mass on 2-12-15, if take any NSAIDS he spikes a fever.    Tylenol [Acetaminophen] Other (See Comments)    Sweating +      Immunizations Administered on Date of Encounter - 3/30/2017     None      ED Micro, Lab, POCT     None      ED Imaging Orders     None        Discharge Instructions         Since you are ALLERGIC to aspirin and Advil and Tylenol, there are not too many pain medicine options.  Therefore, we will give you a steroid injection that hopefully reduce the congestion and inflammation in your head and help with your headache while the antibiotics are working     Sinus Headaches     When using nasal spray, keep your chin down and angle the spray away from center.     Sinus headaches can cause a gnawing pain behind the nose and eyes. The pain most often gets worse in the afternoon and evening. You may also run a fever. Sinus headaches are caused by colds or allergies that make the nasal passages inflamed or infected.  To help prevent sinus headaches:  · Treat colds promptly to keep mucus from backing up.  · Avoid things that trigger sinus problems, such as pollens, dust, smoke, fumes, and strong odors.  · Take allergy medicines as directed by your healthcare provider.  To relieve the pain:  · Keep your sinuses open and free of mucus. Try over-the-counter sinus rinse products.  · Use a nasal decongestant as directed to reduce the inflammation.  · Drink fluids to keep the mucus thinner. This helps it drain more easily. You can also use a humidifier.  · Apply hot packs to the area around your sinuses. Use a hot water " bottle.  · See your healthcare provider if your sinus headache lasts more than 2 weeks. You may need medicine for a sinus infection or an exam to check for other headache conditions, like migraines.  Date Last Reviewed: 10/1/2016  © 8846-2797 [employer]. [address] [place] [address]. All rights reserved. This information is not intended as a substitute for professional medical care. Always follow your healthcare professional's instructions.          Your Scheduled Appointments     Apr 04, 2017  7:30 AM CDT   Non-Fasting Lab with LAB, HEMONC CANCER BLDG   [facility] ([facility])    [address]  [place] [address]   [phone]            Apr 04, 2017  8:30 AM CDT   Established Patient Visit with MD Pedro Torres - Hematology Oncology ([facility])    [address]  [place] [address]   [phone]            Apr 05, 2017  7:30 AM CDT   Infusion 120 Min with [facility], CHEMO   [facility] ([facility])    [address] [facility] [address]   [phone]              [facility] Sign-Up     Activating your [facility] account is as easy as 1-2-3!     1) Visit [email or web address], select Sign Up Now, enter this activation code and your date of birth, then select Next.  [email or web address]  Expires: 5/14/2017 10:39 AM      2) Create a username and password to use when you visit [facility] in the future and select a security question in case you lose your password and select Next.    3) Enter your e-mail address and click Sign Up!    Additional Information  If you have questions, please e-mail [email or web address] or call [phone] to talk to our [facility] staff. Remember, [facility] is NOT to be used for urgent needs. For medical emergencies, dial 911.         Smoking Cessation     If you would like to quit smoking:   You may be eligible for free services if you are a  [place] resident and started smoking cigarettes before September 1, 1988.  Call the Smoking Cessation Trust (SCT) toll free at (619) 468-5183 or (070) 716-3016.   Call 1-800-QUIT-NOW if you do not meet the above criteria.   Contact us via email: [email or web address]   View our website for more information: [email or web address]         [facility] complies with applicable Federal civil rights laws and does not discriminate on the basis of race, color, national origin, age, disability, or sex.        Language Assistance Services     ATTENTION: Language assistance services are available, free of charge. Please call [phone].      ATENCIÓN: Si habla suzy, tiene a jerome disposición servicios gratuitos de asistencia lingüística. Llame al [phone].     CHÚ Ý: N?u b?n nói Ti?ng Vi?t, có các d?ch v? h? tr? ngôn ng? mi?n phí dành cho b?n. G?i s? [phone].

## 2017-04-04 ENCOUNTER — OFFICE VISIT (OUTPATIENT)
Dept: HEMATOLOGY/ONCOLOGY | Facility: CLINIC | Age: 42
End: 2017-04-04
Payer: MEDICARE

## 2017-04-04 ENCOUNTER — LAB VISIT (OUTPATIENT)
Dept: LAB | Facility: HOSPITAL | Age: 42
End: 2017-04-04
Attending: INTERNAL MEDICINE
Payer: MEDICARE

## 2017-04-04 VITALS
HEIGHT: 66 IN | OXYGEN SATURATION: 96 % | WEIGHT: 185.19 LBS | DIASTOLIC BLOOD PRESSURE: 81 MMHG | BODY MASS INDEX: 29.76 KG/M2 | HEART RATE: 76 BPM | SYSTOLIC BLOOD PRESSURE: 124 MMHG | TEMPERATURE: 98 F | RESPIRATION RATE: 16 BRPM

## 2017-04-04 DIAGNOSIS — C34.11 MALIGNANT NEOPLASM OF UPPER LOBE OF RIGHT LUNG: Primary | ICD-10-CM

## 2017-04-04 DIAGNOSIS — C79.51 SECONDARY MALIGNANT NEOPLASM OF BONE: ICD-10-CM

## 2017-04-04 DIAGNOSIS — I82.402 DEEP VEIN THROMBOSIS (DVT) OF LEFT LOWER EXTREMITY, UNSPECIFIED CHRONICITY, UNSPECIFIED VEIN: ICD-10-CM

## 2017-04-04 DIAGNOSIS — G89.3 NEOPLASM RELATED PAIN: ICD-10-CM

## 2017-04-04 DIAGNOSIS — E03.9 HYPOTHYROIDISM, UNSPECIFIED TYPE: ICD-10-CM

## 2017-04-04 DIAGNOSIS — Z51.11 ENCOUNTER FOR ANTINEOPLASTIC CHEMOTHERAPY AND IMMUNOTHERAPY: ICD-10-CM

## 2017-04-04 DIAGNOSIS — Z51.12 ENCOUNTER FOR ANTINEOPLASTIC CHEMOTHERAPY AND IMMUNOTHERAPY: ICD-10-CM

## 2017-04-04 DIAGNOSIS — C34.11 MALIGNANT NEOPLASM OF UPPER LOBE OF RIGHT LUNG: ICD-10-CM

## 2017-04-04 DIAGNOSIS — J32.9 SINUSITIS, UNSPECIFIED CHRONICITY, UNSPECIFIED LOCATION: ICD-10-CM

## 2017-04-04 DIAGNOSIS — R51.9 HEADACHE, UNSPECIFIED HEADACHE TYPE: ICD-10-CM

## 2017-04-04 DIAGNOSIS — B37.0 ORAL CANDIDIASIS: ICD-10-CM

## 2017-04-04 PROBLEM — R51 HEADACHE(784.0): Status: ACTIVE | Noted: 2017-04-04

## 2017-04-04 LAB
ALBUMIN SERPL BCP-MCNC: 3.7 G/DL
ALP SERPL-CCNC: 70 U/L
ALT SERPL W/O P-5'-P-CCNC: 38 U/L
ANION GAP SERPL CALC-SCNC: 12 MMOL/L
AST SERPL-CCNC: 27 U/L
BILIRUB SERPL-MCNC: 0.2 MG/DL
BUN SERPL-MCNC: 19 MG/DL
CALCIUM SERPL-MCNC: 9.6 MG/DL
CHLORIDE SERPL-SCNC: 105 MMOL/L
CO2 SERPL-SCNC: 24 MMOL/L
CREAT SERPL-MCNC: 1.4 MG/DL
ERYTHROCYTE [DISTWIDTH] IN BLOOD BY AUTOMATED COUNT: 14.5 %
EST. GFR  (AFRICAN AMERICAN): >60 ML/MIN/1.73 M^2
EST. GFR  (NON AFRICAN AMERICAN): >60 ML/MIN/1.73 M^2
GLUCOSE SERPL-MCNC: 93 MG/DL
HCT VFR BLD AUTO: 43.2 %
HGB BLD-MCNC: 14.4 G/DL
MCH RBC QN AUTO: 26.8 PG
MCHC RBC AUTO-ENTMCNC: 33.3 %
MCV RBC AUTO: 80 FL
NEUTROPHILS # BLD AUTO: 3.6 K/UL
PLATELET # BLD AUTO: 326 K/UL
PMV BLD AUTO: 9.7 FL
POTASSIUM SERPL-SCNC: 3.9 MMOL/L
PROT SERPL-MCNC: 7.9 G/DL
RBC # BLD AUTO: 5.37 M/UL
SODIUM SERPL-SCNC: 141 MMOL/L
WBC # BLD AUTO: 7.72 K/UL

## 2017-04-04 PROCEDURE — 99999 PR PBB SHADOW E&M-EST. PATIENT-LVL IV: CPT | Mod: PBBFAC,,, | Performed by: NURSE PRACTITIONER

## 2017-04-04 PROCEDURE — 99215 OFFICE O/P EST HI 40 MIN: CPT | Mod: S$PBB,,, | Performed by: NURSE PRACTITIONER

## 2017-04-04 PROCEDURE — 80053 COMPREHEN METABOLIC PANEL: CPT

## 2017-04-04 PROCEDURE — 85027 COMPLETE CBC AUTOMATED: CPT

## 2017-04-04 PROCEDURE — 36415 COLL VENOUS BLD VENIPUNCTURE: CPT

## 2017-04-04 RX ORDER — LEVOTHYROXINE SODIUM 50 UG/1
50 TABLET ORAL
COMMUNITY
End: 2017-04-18

## 2017-04-04 RX ORDER — SODIUM CHLORIDE 0.9 % (FLUSH) 0.9 %
10 SYRINGE (ML) INJECTION
Status: CANCELLED | OUTPATIENT
Start: 2017-04-04

## 2017-04-04 RX ORDER — FLUCONAZOLE 100 MG/1
TABLET ORAL
Qty: 11 TABLET | Refills: 0 | Status: SHIPPED | OUTPATIENT
Start: 2017-04-04 | End: 2017-04-18

## 2017-04-04 RX ORDER — HEPARIN 100 UNIT/ML
500 SYRINGE INTRAVENOUS
Status: CANCELLED | OUTPATIENT
Start: 2017-04-04

## 2017-04-04 NOTE — PROGRESS NOTES
"PATIENT: Yao Alan  MRN: [id number]  DATE: 4/4/2017    Subjective:     Chief complaint:  Chief Complaint   Patient presents with    Lung Cancer    HA 3/10    Sinusitis       Oncologic History:  Mr. Yao Alan is a 41-year-old male who has a long history of smoking and was seen in the Pulmonary Clinic by Dr. Valle on 03/05/2015 with abnormal CT scan.    Apparently, he went to the [facility] in February with coughing as well as chest pain. A chest x-ray was done, which revealed a left upper lobe lung mass. Followup CT scan was done on 02/12/2015 and that revealed posterior superior mediastinal mass adjacent and prominent enlarged upper left mediastinal lymph nodes, abutting the aortic arch as well as left subclavian artery. A  CT scan of the abdomen was done on 03/03/2015 without contrast and that revealed nonobstructive nephrolithiasis, but a 2.1 cm lytic lesion involving the left iliac wing concerning for metastatic disease given the presence of emphysema and a mediastinal soft tissue mass on the CT chest from 02/12/2015. He saw Dr. Valle after that on 03/05/2015 and underwent an IR guided biopsy of the bone lesion on 03/17/2015. 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    His EGFR/EML/ALK is negative.    His PET scan on 3/25/15 revealed Left upper lobe lung lesion with an adjacent para-aortic lymph node, right anterior rib metastasis, right iliac metastasis, and pancreatic metastasis. A pancreatic cancer primary neoplasm is less likely.  MRI brain was negative for mets.  He completed 6 cycles of Carboplatin and Alimta and was on maintenance Alimta until end of March 2016.  His bone scan from 3/16 revealed stable disease. His CT scans also from end of March 2016 revealed "Interval enlargement of left upper lobe lung mass measuring 5.9 x 3.9 cm (previously 3.3 x 2.5 cm). This mass appears to invade the mediastinum and abutting the aortic arch and left subclavian " "artery"  He is now on Opdivo.  CT CAP from 6/22/16 s/p 6 cycles of Opdivo reveals "In this patient with a history of metastatic lung cancer, the previously identified paramediastinal left upper lobe lung mass has significantly decreased in size with only a trace amount of residual soft tissue opacities seen measuring 3.2 x 1.4 cm (axial series 2, image 16).Stable lytic lesion in the right iliac wing, unchanged.The right anterior sixth rib lesion and pancreatic head abnormality are not definitely appreciated on current examination."  Bone scan from 6/23/16 reveals "Stable activity in the right sixth rib anteriorly and the right iliac bone.No new metastatic lesions visualized."  10/3/16- CT scans reveal "In this patient with a history of metastatic lung cancer, the previously identified paramediastinal left upper lobe lung mass has decreased in size with only a trace amount of residual soft tissue opacity as above.  Stable lytic lesion in the right iliac wing, unchanged"  Bone scan reveals "Right superior anterior iliac bone lesion is stable and the right sixth rib has normalized. Recent dental procedure versus periodontal disease and possible right mastoiditis".      1/9/17 CT chest/abdomen/pelvis reveals "1. In this patient with history of metastatic lung cancer, the previously identified left upper paramediastinal lesion demonstrates near complete resolution with 1.0 x 0.7 cm residual disease (2.7 x 0.8 cm previously). Additionally, there is improving sclerotic lesion in the right iliac wing consistent with treated metastatic disease.  No evidence of new lesions in the chest, abdomen or pelvis. 2. Apical predominant paraseptal emphysematous changes and bullae. 3. Additional findings as above."      3/3/17 MRI brain revealed "Stable exam. No new enhancing mass lesion to suggest intracranial metastatic disease."      CT scans from 3/20/17 which reveal "No measurable lesion identified within the left upper " "paramediastinal region, the location of this patient's lung cancer, suggesting favorable response to treatment . No new lung lesions identified. Sclerotic lesion within the right iliac wing appears unchanged. Stable appearing centrilobular and paraseptal emphysema".    Interval History: Mr. Alan returns for follow up and for Opdivo. We increased his synthroid on 3/21/17. Patient reports intense headaches that started with the increased dose.  He continued to have headaches and went to ED on 3/30/17. He was diagnosed with Sinusitis and is taking Augmentin currently. He started taking the 50 mcg tablet of Synthroid today to see if his headaches improve. He continues to have a slight headache this morning. He denies any nausea, vomiting, diarrhea, constipation, abdominal pain, weight loss or loss of appetite, chest pain, shortness of breath, leg swelling, fatigue, pain, dizziness, or mood changes. He is alone.     ECOG Performance Status:   ECOG SCORE    0 - Fully active-able to carry on all pre-disease performance without restriction          PMFSH: all information reviewed and updated as relevant to today's visit    Review of Systems:   Review of Systems   Constitutional: Negative for activity change, appetite change, fatigue and fever.   HENT: Negative for mouth sores, nosebleeds and sore throat.    Eyes: Negative for visual disturbance.   Respiratory: Negative for cough and shortness of breath.    Cardiovascular: Negative for chest pain, palpitations and leg swelling.   Gastrointestinal: Negative for abdominal pain, constipation, diarrhea, nausea and vomiting.   Genitourinary: Negative for difficulty urinating and frequency.   Musculoskeletal: Negative for arthralgias and back pain.   Skin: Negative for rash.   Neurological: Positive for headaches. Negative for dizziness and numbness.   Hematological: Negative for adenopathy. Does not bruise/bleed easily.   Psychiatric/Behavioral: Negative for confusion and sleep " "disturbance. The patient is not nervous/anxious.    All other systems reviewed and are negative.        Objective:      Vitals:   Vitals:    04/04/17 0814   BP: 124/81   Pulse: 76   Resp: 16   Temp: 98.3 °F (36.8 °C)   TempSrc: Oral   SpO2: 96%   Weight: 84 kg (185 lb 3 oz)   Height: 5' 6" (1.676 m)     BMI: Body mass index is 29.89 kg/(m^2).      Physical Exam:   Physical Exam   Constitutional: He is oriented to person, place, and time. He appears well-developed and well-nourished. No distress.   HENT:   Right Ear: External ear normal.   Left Ear: External ear normal.   Mouth/Throat: No oropharyngeal exudate.   White patches to tongue   Eyes: Conjunctivae and lids are normal. Pupils are equal, round, and reactive to light. No scleral icterus.   Neck: Trachea normal and normal range of motion. Neck supple. No thyromegaly present.   Cardiovascular: Normal rate, regular rhythm, normal heart sounds and normal pulses.    Pulmonary/Chest: Effort normal and breath sounds normal.   Abdominal: Soft. Normal appearance and bowel sounds are normal. He exhibits no distension and no mass. There is no hepatosplenomegaly or splenomegaly. There is no tenderness.   Musculoskeletal: Normal range of motion.   Lymphadenopathy:        Head (right side): No submental and no submandibular adenopathy present.        Head (left side): No submental and no submandibular adenopathy present.     He has no cervical adenopathy.     He has no axillary adenopathy.        Right: No supraclavicular adenopathy present.        Left: No supraclavicular adenopathy present.   Neurological: He is alert and oriented to person, place, and time. He has normal reflexes. No sensory deficit.   Skin: Skin is warm, dry and intact. No bruising and no rash noted. Nails show no clubbing.   Psychiatric: He has a normal mood and affect. His speech is normal and behavior is normal. Cognition and memory are normal.   Vitals reviewed.        Laboratory Data:  WBC   Date " Value Ref Range Status   04/04/2017 7.72 3.90 - 12.70 K/uL Final     Hemoglobin   Date Value Ref Range Status   04/04/2017 14.4 14.0 - 18.0 g/dL Final     Hematocrit   Date Value Ref Range Status   04/04/2017 43.2 40.0 - 54.0 % Final     Platelets   Date Value Ref Range Status   04/04/2017 326 150 - 350 K/uL Final     Gran #   Date Value Ref Range Status   04/04/2017 3.6 1.8 - 7.7 K/uL Final     Comment:     The ANC is based on a white cell differential from an   automated cell counter. It has not been microscopically   reviewed for the presence of abnormal cells. Clinical   correlation is required.         Chemistry        Component Value Date/Time     04/04/2017 0740    K 3.9 04/04/2017 0740     04/04/2017 0740    CO2 24 04/04/2017 0740    BUN 19 04/04/2017 0740    CREATININE 1.4 04/04/2017 0740    GLU 93 04/04/2017 0740        Component Value Date/Time    CALCIUM 9.6 04/04/2017 0740    ALKPHOS 70 04/04/2017 0740    AST 27 04/04/2017 0740    ALT 38 04/04/2017 0740    BILITOT 0.2 04/04/2017 0740              Assessment/Plan:     1. Malignant neoplasm of upper lobe of right lung    2. Secondary malignant neoplasm of bone    3. Encounter for antineoplastic chemotherapy and immunotherapy    4. Hypothyroidism, unspecified type    5. Headache, unspecified headache type    6. Neoplasm related pain    7. Deep vein thrombosis (DVT) of left lower extremity, unspecified chronicity, unspecified vein    8. Sinusitis, unspecified chronicity, unspecified location    9. Oral candidiasis        Plan:  1,2,3. He is doing well and is clinically stable.  He will proceed with Opdivo today. XGEVA not due- last dose was 3/21/17. . RTC 2 weeks to see Dr. Garay with CBC, CMP, TSH, T4 and for Opdivo and XGEVA.   4. Patient resumed Synthroid 50 mcg today to see if headaches improve. OK to Continue to take the 50 mcg tab daily. Plan to recheck in 2 weeks. Discussed that we may need to increase the synthroid at that time if TSH  remains elevated.   5. Improving. Recent MRI brain on 3/3/17 with no evidence of metastatic disease. Plan to Monitor Headaches. Headaches likely due to recent diagnosis of Sinusitis.   6. Stable, continue Percocet.  7. Stable, continue  Xarelto  8. Continue Augmentin as prescribed.   9. RX Diflucan sent to pharmacy.      Med and Orders:  Orders Placed This Encounter    CBC Oncology    Comprehensive metabolic panel    TSH    T4, free    fluconazole (DIFLUCAN) 100 MG tablet       Follow Up:  Return in about 2 weeks (around 4/18/2017).        More than 25 mins were spent during this encounter, greater than 50% was spent in direct counseling and/or coordination of care.   Discussed case with Dr. Garay who agrees with above plan. Patient is in agreement with the proposed treatment plan. All questions were answered to the patient's satisfaction. Pt knows to call clinic if anything is needed before the next clinic visit.      FRANCINE Calderon-ZELDA  Hematology and Medical Oncology

## 2017-04-05 ENCOUNTER — INFUSION (OUTPATIENT)
Dept: INFUSION THERAPY | Facility: HOSPITAL | Age: 42
End: 2017-04-05
Attending: INTERNAL MEDICINE
Payer: MEDICARE

## 2017-04-05 VITALS
HEART RATE: 81 BPM | DIASTOLIC BLOOD PRESSURE: 81 MMHG | RESPIRATION RATE: 20 BRPM | SYSTOLIC BLOOD PRESSURE: 119 MMHG | TEMPERATURE: 99 F

## 2017-04-05 DIAGNOSIS — C34.11 MALIGNANT NEOPLASM OF UPPER LOBE OF RIGHT LUNG: ICD-10-CM

## 2017-04-05 DIAGNOSIS — C34.91 LUNG CANCER, PRIMARY, WITH METASTASIS FROM LUNG TO OTHER SITE, RIGHT: ICD-10-CM

## 2017-04-05 DIAGNOSIS — C79.51 SECONDARY MALIGNANT NEOPLASM OF BONE: ICD-10-CM

## 2017-04-05 DIAGNOSIS — D50.0 IRON DEFICIENCY ANEMIA DUE TO CHRONIC BLOOD LOSS: Primary | ICD-10-CM

## 2017-04-05 PROCEDURE — 96413 CHEMO IV INFUSION 1 HR: CPT

## 2017-04-05 PROCEDURE — 63600175 PHARM REV CODE 636 W HCPCS: Performed by: INTERNAL MEDICINE

## 2017-04-05 PROCEDURE — 25000003 PHARM REV CODE 250: Performed by: INTERNAL MEDICINE

## 2017-04-05 RX ORDER — SODIUM CHLORIDE 0.9 % (FLUSH) 0.9 %
10 SYRINGE (ML) INJECTION
Status: DISCONTINUED | OUTPATIENT
Start: 2017-04-05 | End: 2017-04-05 | Stop reason: HOSPADM

## 2017-04-05 RX ORDER — HEPARIN 100 UNIT/ML
500 SYRINGE INTRAVENOUS
Status: DISCONTINUED | OUTPATIENT
Start: 2017-04-05 | End: 2017-04-05 | Stop reason: HOSPADM

## 2017-04-05 RX ADMIN — SODIUM CHLORIDE, PRESERVATIVE FREE 500 UNITS: 5 INJECTION INTRAVENOUS at 09:04

## 2017-04-05 RX ADMIN — SODIUM CHLORIDE, PRESERVATIVE FREE 10 ML: 5 INJECTION INTRAVENOUS at 09:04

## 2017-04-05 RX ADMIN — SODIUM CHLORIDE 240 MG: 9 INJECTION, SOLUTION INTRAVENOUS at 08:04

## 2017-04-05 RX ADMIN — SODIUM CHLORIDE: 9 INJECTION, SOLUTION INTRAVENOUS at 07:04

## 2017-04-05 NOTE — MR AVS SNAPSHOT
Patient Information     Patient Name Sex Yao Templeton Male 1975      Visit Information        Provider Department Dep Phone Center    2017 7:30 AM [facility], [facility] [phone] Pedro Reeves      Patient Instructions      Discharge Instructions for Chemotherapy  Your healthcare provider prescribed a type of medicine therapy for you called chemotherapy. Healthcare providers prescribe chemotherapy for many different types of illnesses, including cancer. There are many types of chemotherapy. This sheet provides general guidelines on how you can take care of yourself after your chemotherapy.  Mouth care  Dont be discouraged if you get mouth sores, even if you are following all your healthcare providers instructions. Many people get mouth sores as a side effect of chemotherapy. Heres what you can do to prevent mouth sores:  · Keep your mouth clean. Brush your teeth with a soft-bristle toothbrush after every meal.  · Ask if you should use a toothpaste with fluoride, or a mixture of 1 teaspoon of salt in 8-ounces of water to brush your teeth.   · Use an oral swab or special soft toothbrush if your gums bleed during regular brushing.  · Don't use dental floss if it causes your gums to bleed.  · Use any mouthwashes given to you as directed.  · If you cant tolerate regular methods, use salt and baking soda to clean your mouth. Mix 1 teaspoon of salt and 1 teaspoon of baking soda into an 8-ounce glass of warm water. Swish and spit.  · If you wear dentures, you may be told to wear them only when you eat, ask your healthcare provider. Clean dentures twice a day and soak in antimicrobial solution when you aren't wearing them. Rinse your mouth after each meal.   · Watch your mouth and tongue for white patches. This may be a sign of a type of yeast infection (thrush), a common side effect of chemotherapy. Be sure to tell your healthcare provider about these patches. Medicine can be  prescribed to treat it.  Other home care  Here's what else you can do:  · Try to exercise. Exercise keeps you strong and keeps your heart and lungs active. Walking and yoga are good types of exercise.   · Keep clean. During chemotherapy, your body cant fight infection very well. Take short baths or showers.  ¨ Wash your hands before you eat and after going to the bathroom.  ¨ Use moisturizing soap. Chemotherapy can make your skin dry.  ¨ Apply moisturizing lotion several times a day to help relieve dry skin.  ¨ Dont take very hot or very cold showers or baths.  · Dont be surprised if your chemotherapy causes slight burns to your skin--usually on the hands and feet. Some medicines used in high doses cause this to happen. Ask for a special cream to help relieve the burn and protect your skin.  · Avoid people who are sick with illnesses and diseases you could catch, such as colds, flu, measles, or chicken pox as well as people who have recently had vaccinations for these illnesses.   · Let your healthcare provider know if your throat is sore. You may have an infection that needs treatment.  · Remember, many patients feel sick and lose their appetites during treatment. Eat small meals several times a day to keep your strength up:  ¨ Choose bland foods with little taste or smell if you are reacting strongly to food.  ¨ Be sure to cook all food thoroughly. This kills bacteria and helps you avoid infection.  ¨ Eat foods that are soft. Soft foods are less likely to cause stomach irritation.  ¨ Try to eat a variety of foods for a well-balanced diet. Drink plenty of fluids and eat foods with fiber to avoid constipation.      When to call your healthcare provider  Call your healthcare provider right away if you have any of the following:  · Unexplained bleeding  · Trouble concentrating  · Ongoing fatigue  · Shortness of breath, wheezing, trouble breathing, or bad cough  · Rapid, irregular heartbeat, or chest  pain  · Dizziness, lightheadedness  · Constant feeling of being cold  · Hives or a cut or rash that swells, turns red, feels hot or painful, or begins to ooze  · Burning when you urinate  · Fever of 100.4°F (38°C) or higher, or chills   Date Last Reviewed: 5/1/2016 © 2000-2016 [employer]. [address] [place] [address], [place] [address]. All rights reserved. This information is not intended as a substitute for professional medical care. Always follow your healthcare professional's instructions.             Your Current Medications Are     albuterol 90 mcg/actuation inhaler    amlodipine (NORVASC) 10 MG tablet    amoxicillin-clavulanate 875-125mg (AUGMENTIN) 875-125 mg per tablet    docusate sodium (COLACE) 100 MG capsule    ferrous sulfate 325 (65 FE) MG EC tablet    fluconazole (DIFLUCAN) 100 MG tablet    lactulose (CHRONULAC) 10 gram/15 mL solution    levocetirizine (XYZAL) 5 MG tablet    levothyroxine (SYNTHROID) 50 MCG tablet    metoclopramide HCl (REGLAN) 10 MG tablet    naloxegol 25 mg Tab    omeprazole (PRILOSEC) 20 MG capsule    oxycodone-acetaminophen (PERCOCET)  mg per tablet    polyethylene glycol (GLYCOLAX) 17 gram/dose powder    PROAIR HFA 90 mcg/actuation inhaler    rivaroxaban (XARELTO) 20 mg Tab      Facility-Administered Medications     heparin, porcine (PF) 100 unit/mL injection flush 500 Units    nivolumab 240 mg in sodium chloride 0.9% 100 mL chemo infusion    sodium chloride 0.9% 100 mL flush bag    sodium chloride 0.9% flush 10 mL      Appointments for Next Year     4/18/2017  7:30 AM NON FASTING LAB (10 min.) [facility], HEMON CANCER BLD[facility]    Arrive at check-in approximately 15 minutes before your scheduled appointment time. Bring all outside medical records and imaging, along with a list of your current medications and insurance card.    [facility] 3rd Floor    4/18/2017  8:30 AM ESTABLISHED PATIENT (30 min.) [place] - Hematology Oncology [facility]  DAE Garay MD    Arrive at check-in approximately 15 minutes before your scheduled appointment time. Bring all outside medical records and imaging, along with a list of your current medications and insurance card.    [facility] - 3rd Floor    4/19/2017  7:30 AM INFUSION 120 MIN (120 min.) [facility], CHEMO    Arrive at check-in approximately 15 minutes before your scheduled appointment time. Bring all outside medical records and imaging, along with a list of your current medications and insurance card.    [facility], 5th Floor         Default Flowsheet Data (last 24 hours)      Amb Complex Vitals Tato        04/05/17 0757                Measurements    /81        Temp 98.5 °F (36.9 °C)        Pulse 81        Resp 20        Pain Assessment    Pain Score Three        Pain Frequency 2 Continuous        Pain Loc HEAD                Allergies     Nsaids (Non-steroidal Anti-inflammatory Drug)     Patient says since been diagnosed with lung mass on 2-12-15, if take any NSAIDS he spikes a fever.    Tylenol [Acetaminophen] Other (See Comments)    Sweating +      Medications You Received from 04/04/2017 0913 to 04/05/2017 0913        Date/Time Order Dose Route Action     04/05/2017 0912 heparin, porcine (PF) 100 unit/mL injection flush 500 Units 500 Units Intravenous Given     04/05/2017 0811 nivolumab 240 mg in sodium chloride 0.9% 100 mL chemo infusion 240 mg Intravenous New Bag     04/05/2017 0745 sodium chloride 0.9% 100 mL flush bag   Intravenous New Bag     04/05/2017 0912 sodium chloride 0.9% flush 10 mL 10 mL Intravenous Given      Current Discharge Medication List     Cannot display discharge medications since this is not an admission.

## 2017-04-05 NOTE — PATIENT INSTRUCTIONS
Discharge Instructions for Chemotherapy  Your healthcare provider prescribed a type of medicine therapy for you called chemotherapy. Healthcare providers prescribe chemotherapy for many different types of illnesses, including cancer. There are many types of chemotherapy. This sheet provides general guidelines on how you can take care of yourself after your chemotherapy.  Mouth care  Dont be discouraged if you get mouth sores, even if you are following all your healthcare providers instructions. Many people get mouth sores as a side effect of chemotherapy. Heres what you can do to prevent mouth sores:  · Keep your mouth clean. Brush your teeth with a soft-bristle toothbrush after every meal.  · Ask if you should use a toothpaste with fluoride, or a mixture of 1 teaspoon of salt in 8-ounces of water to brush your teeth.   · Use an oral swab or special soft toothbrush if your gums bleed during regular brushing.  · Don't use dental floss if it causes your gums to bleed.  · Use any mouthwashes given to you as directed.  · If you cant tolerate regular methods, use salt and baking soda to clean your mouth. Mix 1 teaspoon of salt and 1 teaspoon of baking soda into an 8-ounce glass of warm water. Swish and spit.  · If you wear dentures, you may be told to wear them only when you eat, ask your healthcare provider. Clean dentures twice a day and soak in antimicrobial solution when you aren't wearing them. Rinse your mouth after each meal.   · Watch your mouth and tongue for white patches. This may be a sign of a type of yeast infection (thrush), a common side effect of chemotherapy. Be sure to tell your healthcare provider about these patches. Medicine can be prescribed to treat it.  Other home care  Here's what else you can do:  · Try to exercise. Exercise keeps you strong and keeps your heart and lungs active. Walking and yoga are good types of exercise.   · Keep clean. During chemotherapy, your body cant fight  infection very well. Take short baths or showers.  ¨ Wash your hands before you eat and after going to the bathroom.  ¨ Use moisturizing soap. Chemotherapy can make your skin dry.  ¨ Apply moisturizing lotion several times a day to help relieve dry skin.  ¨ Dont take very hot or very cold showers or baths.  · Dont be surprised if your chemotherapy causes slight burns to your skin--usually on the hands and feet. Some medicines used in high doses cause this to happen. Ask for a special cream to help relieve the burn and protect your skin.  · Avoid people who are sick with illnesses and diseases you could catch, such as colds, flu, measles, or chicken pox as well as people who have recently had vaccinations for these illnesses.   · Let your healthcare provider know if your throat is sore. You may have an infection that needs treatment.  · Remember, many patients feel sick and lose their appetites during treatment. Eat small meals several times a day to keep your strength up:  ¨ Choose bland foods with little taste or smell if you are reacting strongly to food.  ¨ Be sure to cook all food thoroughly. This kills bacteria and helps you avoid infection.  ¨ Eat foods that are soft. Soft foods are less likely to cause stomach irritation.  ¨ Try to eat a variety of foods for a well-balanced diet. Drink plenty of fluids and eat foods with fiber to avoid constipation.      When to call your healthcare provider  Call your healthcare provider right away if you have any of the following:  · Unexplained bleeding  · Trouble concentrating  · Ongoing fatigue  · Shortness of breath, wheezing, trouble breathing, or bad cough  · Rapid, irregular heartbeat, or chest pain  · Dizziness, lightheadedness  · Constant feeling of being cold  · Hives or a cut or rash that swells, turns red, feels hot or painful, or begins to ooze  · Burning when you urinate  · Fever of 100.4°F (38°C) or higher, or chills   Date Last Reviewed: 5/1/2016  ©  8097-2854 [employer]. [address] [place] [address], [place] [address]. All rights reserved. This information is not intended as a substitute for professional medical care. Always follow your healthcare professional's instructions.

## 2017-04-05 NOTE — PLAN OF CARE
Problem: Patient Care Overview (Adult)  Goal: Plan of Care Review  Outcome: Ongoing (interventions implemented as appropriate)  Pt. Tolerated treatment well. Discharged without complaints or signs of adverse effects. .Future appointments reviewed with patient.

## 2017-04-18 ENCOUNTER — OFFICE VISIT (OUTPATIENT)
Dept: HEMATOLOGY/ONCOLOGY | Facility: CLINIC | Age: 42
End: 2017-04-18
Payer: MEDICARE

## 2017-04-18 VITALS
SYSTOLIC BLOOD PRESSURE: 128 MMHG | OXYGEN SATURATION: 97 % | RESPIRATION RATE: 20 BRPM | HEART RATE: 83 BPM | HEIGHT: 66 IN | DIASTOLIC BLOOD PRESSURE: 93 MMHG | BODY MASS INDEX: 29.37 KG/M2 | TEMPERATURE: 98 F | WEIGHT: 182.75 LBS

## 2017-04-18 DIAGNOSIS — I10 ESSENTIAL HYPERTENSION: ICD-10-CM

## 2017-04-18 DIAGNOSIS — Z51.11 ENCOUNTER FOR ANTINEOPLASTIC CHEMOTHERAPY: ICD-10-CM

## 2017-04-18 DIAGNOSIS — I82.402 DEEP VEIN THROMBOSIS (DVT) OF LEFT LOWER EXTREMITY, UNSPECIFIED CHRONICITY, UNSPECIFIED VEIN: ICD-10-CM

## 2017-04-18 DIAGNOSIS — E03.9 HYPOTHYROIDISM, UNSPECIFIED TYPE: ICD-10-CM

## 2017-04-18 DIAGNOSIS — C34.91 LUNG CANCER, PRIMARY, WITH METASTASIS FROM LUNG TO OTHER SITE, RIGHT: Primary | ICD-10-CM

## 2017-04-18 DIAGNOSIS — C79.51 SECONDARY MALIGNANT NEOPLASM OF BONE: ICD-10-CM

## 2017-04-18 DIAGNOSIS — G89.3 NEOPLASM RELATED PAIN: ICD-10-CM

## 2017-04-18 PROCEDURE — 99999 PR PBB SHADOW E&M-EST. PATIENT-LVL IV: CPT | Mod: PBBFAC,,, | Performed by: INTERNAL MEDICINE

## 2017-04-18 PROCEDURE — 99215 OFFICE O/P EST HI 40 MIN: CPT | Mod: S$PBB,,, | Performed by: INTERNAL MEDICINE

## 2017-04-18 RX ORDER — HEPARIN 100 UNIT/ML
500 SYRINGE INTRAVENOUS
Status: CANCELLED | OUTPATIENT
Start: 2017-04-18

## 2017-04-18 RX ORDER — LEVOTHYROXINE SODIUM 75 UG/1
75 TABLET ORAL DAILY
Qty: 30 TABLET | Refills: 11 | Status: SHIPPED | OUTPATIENT
Start: 2017-04-18 | End: 2017-05-02 | Stop reason: SDUPTHER

## 2017-04-18 RX ORDER — AMLODIPINE BESYLATE 10 MG/1
10 TABLET ORAL DAILY
Qty: 30 TABLET | Refills: 3 | Status: SHIPPED | OUTPATIENT
Start: 2017-04-18 | End: 2019-06-12

## 2017-04-18 RX ORDER — SODIUM CHLORIDE 0.9 % (FLUSH) 0.9 %
10 SYRINGE (ML) INJECTION
Status: CANCELLED | OUTPATIENT
Start: 2017-04-18

## 2017-04-18 RX ORDER — OXYCODONE AND ACETAMINOPHEN 10; 325 MG/1; MG/1
1 TABLET ORAL EVERY 6 HOURS PRN
Qty: 120 TABLET | Refills: 0 | Status: SHIPPED | OUTPATIENT
Start: 2017-04-18 | End: 2017-05-02 | Stop reason: SDUPTHER

## 2017-04-18 NOTE — PROGRESS NOTES
"PATIENT: Yao Alan  MRN: [id number]  DATE: 4/18/2017    Subjective:     Chief complaint:  Chief Complaint   Patient presents with    Lung Cancer       Oncologic History:  Mr. Yao Alan is a 41-year-old male who has a long history of smoking and was seen in the Pulmonary Clinic by Dr. Valle on 03/05/2015 with abnormal CT scan.    Apparently, he went to the [facility] in February with coughing as well as chest pain. A chest x-ray was done, which revealed a left upper lobe lung mass. Followup CT scan was done on 02/12/2015 and that revealed posterior superior mediastinal mass adjacent and prominent enlarged upper left mediastinal lymph nodes, abutting the aortic arch as well as left subclavian artery. A  CT scan of the abdomen was done on 03/03/2015 without contrast and that revealed nonobstructive nephrolithiasis, but a 2.1 cm lytic lesion involving the left iliac wing concerning for metastatic disease given the presence of emphysema and a mediastinal soft tissue mass on the CT chest from 02/12/2015. He saw Dr. Valle after that on 03/05/2015 and underwent an IR guided biopsy of the bone lesion on 03/17/2015. 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    His EGFR/EML/ALK is negative.    His PET scan on 3/25/15 revealed Left upper lobe lung lesion with an adjacent para-aortic lymph node, right anterior rib metastasis, right iliac metastasis, and pancreatic metastasis. A pancreatic cancer primary neoplasm is less likely.  MRI brain was negative for mets.  He completed 6 cycles of Carboplatin and Alimta and was on maintenance Alimta until end of March 2016.  His bone scan from 3/16 revealed stable disease. His CT scans also from end of March 2016 revealed "Interval enlargement of left upper lobe lung mass measuring 5.9 x 3.9 cm (previously 3.3 x 2.5 cm). This mass appears to invade the mediastinum and abutting the aortic arch and left subclavian artery"  He is now on " "Opdivo.  CT CAP from 6/22/16 s/p 6 cycles of Opdivo reveals "In this patient with a history of metastatic lung cancer, the previously identified paramediastinal left upper lobe lung mass has significantly decreased in size with only a trace amount of residual soft tissue opacities seen measuring 3.2 x 1.4 cm (axial series 2, image 16).Stable lytic lesion in the right iliac wing, unchanged.The right anterior sixth rib lesion and pancreatic head abnormality are not definitely appreciated on current examination."  Bone scan from 6/23/16 reveals "Stable activity in the right sixth rib anteriorly and the right iliac bone.No new metastatic lesions visualized."  10/3/16- CT scans reveal "In this patient with a history of metastatic lung cancer, the previously identified paramediastinal left upper lobe lung mass has decreased in size with only a trace amount of residual soft tissue opacity as above.  Stable lytic lesion in the right iliac wing, unchanged"  Bone scan reveals "Right superior anterior iliac bone lesion is stable and the right sixth rib has normalized. Recent dental procedure versus periodontal disease and possible right mastoiditis".      1/9/17 CT chest/abdomen/pelvis reveals "1. In this patient with history of metastatic lung cancer, the previously identified left upper paramediastinal lesion demonstrates near complete resolution with 1.0 x 0.7 cm residual disease (2.7 x 0.8 cm previously). Additionally, there is improving sclerotic lesion in the right iliac wing consistent with treated metastatic disease.  No evidence of new lesions in the chest, abdomen or pelvis. 2. Apical predominant paraseptal emphysematous changes and bullae. 3. Additional findings as above."      3/3/17 MRI brain revealed "Stable exam. No new enhancing mass lesion to suggest intracranial metastatic disease."      CT scans from 3/20/17 which reveal "No measurable lesion identified within the left upper paramediastinal region, the " "location of this patient's lung cancer, suggesting favorable response to treatment . No new lung lesions identified. Sclerotic lesion within the right iliac wing appears unchanged. Stable appearing centrilobular and paraseptal emphysema".     Interval History: Mr. Alan returns for follow up and for Opdivo. Patient ran out of Synthroid 50 mcg last week and is now taking 75mcg tablet daily.  Patient reports that headaches are less frequent.  Today, He denies any nausea, vomiting, diarrhea, constipation, abdominal pain, weight loss or loss of appetite, chest pain, shortness of breath, leg swelling, fatigue, pain, dizziness, headaches or mood changes. He is alone.        ECOG Performance Status:   ECOG SCORE    0 - Fully active-able to carry on all pre-disease performance without restriction          PMFSH: all information reviewed and updated as relevant to today's visit    Review of Systems:   Review of Systems   Constitutional: Negative for activity change, appetite change, fatigue and fever.   HENT: Negative for mouth sores, nosebleeds and sore throat.    Eyes: Negative for visual disturbance.   Respiratory: Negative for cough and shortness of breath.    Cardiovascular: Negative for chest pain, palpitations and leg swelling.   Gastrointestinal: Negative for abdominal pain, constipation, diarrhea, nausea and vomiting.   Genitourinary: Negative for difficulty urinating and frequency.   Musculoskeletal: Negative for arthralgias and back pain.   Skin: Negative for rash.   Neurological: Negative for dizziness, numbness and headaches.   Hematological: Negative for adenopathy. Does not bruise/bleed easily.   Psychiatric/Behavioral: Negative for confusion and sleep disturbance. The patient is not nervous/anxious.    All other systems reviewed and are negative.        Objective:      Vitals:   Vitals:    04/18/17 0809   BP: (!) 128/93   Pulse: 83   Resp: 20   Temp: 98.2 °F (36.8 °C)   TempSrc: Oral   SpO2: 97%   Weight: 82.9 " "kg (182 lb 12.2 oz)   Height: 5' 6" (1.676 m)     BMI: Body mass index is 29.5 kg/(m^2).      Physical Exam:   Physical Exam   Constitutional: He is oriented to person, place, and time. He appears well-developed and well-nourished. No distress.   HENT:   Right Ear: External ear normal.   Left Ear: External ear normal.   Mouth/Throat: No oropharyngeal exudate.   Eyes: Conjunctivae and lids are normal. Pupils are equal, round, and reactive to light. No scleral icterus.   Neck: Trachea normal and normal range of motion. Neck supple. No thyromegaly present.   Cardiovascular: Normal rate, regular rhythm, normal heart sounds and normal pulses.    Pulmonary/Chest: Effort normal and breath sounds normal.   Abdominal: Soft. Normal appearance and bowel sounds are normal. He exhibits no distension and no mass. There is no hepatosplenomegaly or splenomegaly. There is no tenderness.   Musculoskeletal: Normal range of motion.   Lymphadenopathy:        Head (right side): No submental and no submandibular adenopathy present.        Head (left side): No submental and no submandibular adenopathy present.     He has no cervical adenopathy.     He has no axillary adenopathy.        Right: No supraclavicular adenopathy present.        Left: No supraclavicular adenopathy present.   Neurological: He is alert and oriented to person, place, and time. He has normal reflexes. No sensory deficit.   Skin: Skin is warm, dry and intact. No bruising and no rash noted. Nails show no clubbing.   Psychiatric: He has a normal mood and affect. His speech is normal and behavior is normal. Cognition and memory are normal.   Vitals reviewed.        Laboratory Data:  WBC   Date Value Ref Range Status   04/18/2017 8.51 3.90 - 12.70 K/uL Final     Hemoglobin   Date Value Ref Range Status   04/18/2017 14.6 14.0 - 18.0 g/dL Final     Hematocrit   Date Value Ref Range Status   04/18/2017 42.8 40.0 - 54.0 % Final     Platelets   Date Value Ref Range Status "   04/18/2017 269 150 - 350 K/uL Final     Gran #   Date Value Ref Range Status   04/18/2017 4.4 1.8 - 7.7 K/uL Final     Comment:     The ANC is based on a white cell differential from an   automated cell counter. It has not been microscopically   reviewed for the presence of abnormal cells. Clinical   correlation is required.         Chemistry        Component Value Date/Time     04/18/2017 0715    K 4.0 04/18/2017 0715     04/18/2017 0715    CO2 26 04/18/2017 0715    BUN 16 04/18/2017 0715    CREATININE 1.4 04/18/2017 0715     (H) 04/18/2017 0715        Component Value Date/Time    CALCIUM 9.6 04/18/2017 0715    ALKPHOS 74 04/18/2017 0715    AST 21 04/18/2017 0715    ALT 23 04/18/2017 0715    BILITOT 0.3 04/18/2017 0715              Assessment/Plan:     1. Lung cancer, primary, with metastasis from lung to other site, right    2. Secondary malignant neoplasm of bone    3. Encounter for antineoplastic chemotherapy    4. Hypothyroidism, unspecified type    5. Neoplasm related pain    6. Deep vein thrombosis (DVT) of left lower extremity, unspecified chronicity, unspecified vein    7. Essential hypertension        Plan:    1,2,3. He is doing well and is clinically stable.  He will proceed with Opdivo today. XGEVA is due today as well.  RTC 2 weeks to see Dr. Garay with CBC, CMP, TSH, T4 and for Opdivo.    4. TSH results noted. Continue Synthroid 75 mcg daily. Patient has been on this dose for 1 week.  Plan to recheck in 2 weeks. Rx sent to pharmacy  5. Stable, refilled  Percocet.  6. Stable, refilled Xarelto  7. Controlled, refilled Norvasc    Med and Orders:  Orders Placed This Encounter    CBC Oncology    Comprehensive metabolic panel    TSH    T4, free    amlodipine (NORVASC) 10 MG tablet    rivaroxaban (XARELTO) 20 mg Tab    oxycodone-acetaminophen (PERCOCET)  mg per tablet    levothyroxine (SYNTHROID) 75 MCG tablet       Follow Up:  Return in about 2 weeks (around  5/2/2017).      More than 30 mins were spent during this encounter, greater than 50% was spent in direct counseling and/or coordination of care.   Patient was also seen and examined by Dr. Garay who agrees with above plan. Patient is in agreement with the proposed treatment plan. All questions were answered to the patient's satisfaction. Pt knows to call clinic if anything is needed before the next clinic visit.    CARYL CalderonP-C  Hematology and Medical Oncology      STAFF NOTE:  I have reviewed the notes, assessments, and/or procedures performed by the nurse practitioner, as above.  I have personally interviewed the patient at the beside, and rounded with the nurse practitioner. I formulated the plan of care.  I concur with her documentation of Yao Alan.

## 2017-04-19 ENCOUNTER — INFUSION (OUTPATIENT)
Dept: INFUSION THERAPY | Facility: HOSPITAL | Age: 42
End: 2017-04-19
Attending: INTERNAL MEDICINE
Payer: MEDICARE

## 2017-04-19 ENCOUNTER — TELEPHONE (OUTPATIENT)
Dept: HEMATOLOGY/ONCOLOGY | Facility: CLINIC | Age: 42
End: 2017-04-19

## 2017-04-19 VITALS
TEMPERATURE: 98 F | SYSTOLIC BLOOD PRESSURE: 108 MMHG | RESPIRATION RATE: 16 BRPM | DIASTOLIC BLOOD PRESSURE: 78 MMHG | HEART RATE: 71 BPM

## 2017-04-19 DIAGNOSIS — C79.51 SECONDARY MALIGNANT NEOPLASM OF BONE: ICD-10-CM

## 2017-04-19 DIAGNOSIS — C34.11 MALIGNANT NEOPLASM OF UPPER LOBE OF RIGHT LUNG: ICD-10-CM

## 2017-04-19 DIAGNOSIS — C34.91 LUNG CANCER, PRIMARY, WITH METASTASIS FROM LUNG TO OTHER SITE, RIGHT: ICD-10-CM

## 2017-04-19 DIAGNOSIS — D50.0 IRON DEFICIENCY ANEMIA DUE TO CHRONIC BLOOD LOSS: Primary | ICD-10-CM

## 2017-04-19 PROCEDURE — 63600175 PHARM REV CODE 636 W HCPCS: Performed by: INTERNAL MEDICINE

## 2017-04-19 PROCEDURE — 25000003 PHARM REV CODE 250: Performed by: INTERNAL MEDICINE

## 2017-04-19 PROCEDURE — 96413 CHEMO IV INFUSION 1 HR: CPT

## 2017-04-19 PROCEDURE — 96401 CHEMO ANTI-NEOPL SQ/IM: CPT

## 2017-04-19 RX ORDER — SODIUM CHLORIDE 0.9 % (FLUSH) 0.9 %
10 SYRINGE (ML) INJECTION
Status: DISCONTINUED | OUTPATIENT
Start: 2017-04-19 | End: 2017-04-19 | Stop reason: HOSPADM

## 2017-04-19 RX ORDER — HEPARIN 100 UNIT/ML
500 SYRINGE INTRAVENOUS
Status: DISCONTINUED | OUTPATIENT
Start: 2017-04-19 | End: 2017-04-19 | Stop reason: HOSPADM

## 2017-04-19 RX ADMIN — DENOSUMAB 120 MG: 120 INJECTION SUBCUTANEOUS at 08:04

## 2017-04-19 RX ADMIN — HEPARIN 500 UNITS: 100 SYRINGE at 09:04

## 2017-04-19 RX ADMIN — SODIUM CHLORIDE: 9 INJECTION, SOLUTION INTRAVENOUS at 08:04

## 2017-04-19 RX ADMIN — SODIUM CHLORIDE 240 MG: 9 INJECTION, SOLUTION INTRAVENOUS at 08:04

## 2017-04-19 NOTE — PLAN OF CARE
Problem: Chemotherapy Effects (Adult)  Goal: Signs and Symptoms of Listed Potential Problems Will be Absent or Manageable (Chemotherapy Effects)  Signs and symptoms of listed potential problems will be absent or manageable by discharge/transition of care (reference Chemotherapy Effects (Adult) CPG).   Outcome: Ongoing (interventions implemented as appropriate)  Patient here for Opdivo and Xgeva.  Assessment complete and labs reviewed.  Patient denies jaw pain or recent dental work.  States he is taking calcium and vitamin D.  VSS.  No needs expressed at this time.  Will continue to monitor.

## 2017-04-19 NOTE — MR AVS SNAPSHOT
Patient Information     Patient Name Sex Yao Templeton Male 1975      Visit Information        Provider Department [facility]    2017 7:30 AM [facility], CHEMO [facility] [phone] Pedro Reeves      Patient Instructions     None      Your Current Medications Are     albuterol 90 mcg/actuation inhaler    amlodipine (NORVASC) 10 MG tablet    docusate sodium (COLACE) 100 MG capsule    ferrous sulfate 325 (65 FE) MG EC tablet    lactulose (CHRONULAC) 10 gram/15 mL solution    levocetirizine (XYZAL) 5 MG tablet    levothyroxine (SYNTHROID) 75 MCG tablet    metoclopramide HCl (REGLAN) 10 MG tablet    naloxegol 25 mg Tab    omeprazole (PRILOSEC) 20 MG capsule    oxycodone-acetaminophen (PERCOCET)  mg per tablet    polyethylene glycol (GLYCOLAX) 17 gram/dose powder    PROAIR HFA 90 mcg/actuation inhaler    rivaroxaban (XARELTO) 20 mg Tab      Facility-Administered Medications     denosumab (XGEVA) solution 120 mg    heparin, porcine (PF) 100 unit/mL injection flush 500 Units    nivolumab 240 mg in sodium chloride 0.9% 100 mL chemo infusion    sodium chloride 0.9% 100 mL flush bag    sodium chloride 0.9% flush 10 mL      Appointments for Next Year     2017  7:30 AM NON FASTING LAB (10 min.) [facility]    Arrive at check-in approximately 15 minutes before your scheduled appointment time. Bring all outside medical records and imaging, along with a list of your current medications and insurance card.    [facility] 3rd Floor    2017  8:30 AM ESTABLISHED PATIENT (30 min.) [facility] Hematology Oncology Bel Garay MD    Arrive at check-in approximately 15 minutes before your scheduled appointment time. Bring all outside medical records and imaging, along with a list of your current medications and insurance card.    [facility] - 3rd Floor    5/3/2017  7:30 AM INFUSION 120 MIN (120 min.) [facility], CHEMO    Arrive at check-in approximately 15 minutes before your scheduled appointment time. Bring all outside medical records and imaging, along with a list of your current medications and insurance card.    [facility], 5th Floor         Default Flowsheet Data (last 24 hours)      Amb Complex Vitals Tato        04/19/17 0747                Measurements    /78        Temp 98.1 °F (36.7 °C)        Pulse 71        Resp 16        Pain Assessment    Pain Score Zero                Allergies     Nsaids (Non-steroidal Anti-inflammatory Drug)     Patient says since been diagnosed with lung mass on 2-12-15, if take any NSAIDS he spikes a fever.    Tylenol [Acetaminophen] Other (See Comments)    Sweating +      Medications You Received from 04/18/2017 0907 to 04/19/2017 0907        Date/Time Order Dose Route Action     04/19/2017 0806 denosumab (XGEVA) solution 120 mg 120 mg Subcutaneous Given     04/19/2017 0806 nivolumab 240 mg in sodium chloride 0.9% 100 mL chemo infusion 240 mg Intravenous New Bag     04/19/2017 0806 sodium chloride 0.9% 100 mL flush bag   Intravenous New Bag      Current Discharge Medication List     Cannot display discharge medications since this is not an admission.

## 2017-04-19 NOTE — TELEPHONE ENCOUNTER
Spoke with patient via phone today re: assistance in progress.      ----- Message from Sonali Cain RN sent at 4/18/2017  8:45 AM CDT -----  Patient is requesting you to contact him.  ~sonali

## 2017-04-19 NOTE — PLAN OF CARE
Problem: Patient Care Overview (Adult)  Goal: Plan of Care Review  Outcome: Ongoing (interventions implemented as appropriate)  Patient tolerated treatment well.  NO reaction suspected.  AVS given to patient.  No questions or concerns.  Patient ambulated off unit unassisted.

## 2017-05-02 ENCOUNTER — TELEPHONE (OUTPATIENT)
Dept: HEMATOLOGY/ONCOLOGY | Facility: CLINIC | Age: 42
End: 2017-05-02

## 2017-05-02 ENCOUNTER — OFFICE VISIT (OUTPATIENT)
Dept: HEMATOLOGY/ONCOLOGY | Facility: CLINIC | Age: 42
End: 2017-05-02
Payer: MEDICARE

## 2017-05-02 ENCOUNTER — LAB VISIT (OUTPATIENT)
Dept: LAB | Facility: HOSPITAL | Age: 42
End: 2017-05-02
Attending: INTERNAL MEDICINE
Payer: MEDICARE

## 2017-05-02 VITALS
HEART RATE: 75 BPM | SYSTOLIC BLOOD PRESSURE: 131 MMHG | DIASTOLIC BLOOD PRESSURE: 96 MMHG | HEIGHT: 66 IN | RESPIRATION RATE: 17 BRPM | BODY MASS INDEX: 29.65 KG/M2 | TEMPERATURE: 98 F | OXYGEN SATURATION: 100 % | WEIGHT: 184.5 LBS

## 2017-05-02 DIAGNOSIS — I82.402 DEEP VEIN THROMBOSIS (DVT) OF LEFT LOWER EXTREMITY, UNSPECIFIED CHRONICITY, UNSPECIFIED VEIN: ICD-10-CM

## 2017-05-02 DIAGNOSIS — E03.9 HYPOTHYROIDISM, UNSPECIFIED TYPE: ICD-10-CM

## 2017-05-02 DIAGNOSIS — Z51.11 ENCOUNTER FOR ANTINEOPLASTIC CHEMOTHERAPY: ICD-10-CM

## 2017-05-02 DIAGNOSIS — G89.3 NEOPLASM RELATED PAIN: ICD-10-CM

## 2017-05-02 DIAGNOSIS — C34.91 LUNG CANCER, PRIMARY, WITH METASTASIS FROM LUNG TO OTHER SITE, RIGHT: Primary | ICD-10-CM

## 2017-05-02 DIAGNOSIS — C34.91 LUNG CANCER, PRIMARY, WITH METASTASIS FROM LUNG TO OTHER SITE, RIGHT: ICD-10-CM

## 2017-05-02 LAB
ALBUMIN SERPL BCP-MCNC: 3.8 G/DL
ALP SERPL-CCNC: 64 U/L
ALT SERPL W/O P-5'-P-CCNC: 21 U/L
ANION GAP SERPL CALC-SCNC: 12 MMOL/L
AST SERPL-CCNC: 38 U/L
BILIRUB SERPL-MCNC: 0.3 MG/DL
BUN SERPL-MCNC: 17 MG/DL
CALCIUM SERPL-MCNC: 9.4 MG/DL
CHLORIDE SERPL-SCNC: 107 MMOL/L
CO2 SERPL-SCNC: 22 MMOL/L
CREAT SERPL-MCNC: 1.5 MG/DL
ERYTHROCYTE [DISTWIDTH] IN BLOOD BY AUTOMATED COUNT: 14.8 %
EST. GFR  (AFRICAN AMERICAN): >60 ML/MIN/1.73 M^2
EST. GFR  (NON AFRICAN AMERICAN): 56.6 ML/MIN/1.73 M^2
GLUCOSE SERPL-MCNC: 153 MG/DL
HCT VFR BLD AUTO: 41.9 %
HGB BLD-MCNC: 13.8 G/DL
MCH RBC QN AUTO: 27.1 PG
MCHC RBC AUTO-ENTMCNC: 32.9 %
MCV RBC AUTO: 82 FL
NEUTROPHILS # BLD AUTO: 4.1 K/UL
PLATELET # BLD AUTO: 228 K/UL
PMV BLD AUTO: 10.6 FL
POTASSIUM SERPL-SCNC: 3.7 MMOL/L
PROT SERPL-MCNC: 7.2 G/DL
RBC # BLD AUTO: 5.09 M/UL
SODIUM SERPL-SCNC: 141 MMOL/L
T4 FREE SERPL-MCNC: 0.62 NG/DL
TSH SERPL DL<=0.005 MIU/L-ACNC: 12.13 UIU/ML
WBC # BLD AUTO: 7.87 K/UL

## 2017-05-02 PROCEDURE — 84439 ASSAY OF FREE THYROXINE: CPT

## 2017-05-02 PROCEDURE — 84443 ASSAY THYROID STIM HORMONE: CPT

## 2017-05-02 PROCEDURE — 99215 OFFICE O/P EST HI 40 MIN: CPT | Mod: S$PBB,,, | Performed by: INTERNAL MEDICINE

## 2017-05-02 PROCEDURE — 36415 COLL VENOUS BLD VENIPUNCTURE: CPT

## 2017-05-02 PROCEDURE — 99999 PR PBB SHADOW E&M-EST. PATIENT-LVL IV: CPT | Mod: PBBFAC,,, | Performed by: INTERNAL MEDICINE

## 2017-05-02 PROCEDURE — 85027 COMPLETE CBC AUTOMATED: CPT

## 2017-05-02 PROCEDURE — 80053 COMPREHEN METABOLIC PANEL: CPT

## 2017-05-02 RX ORDER — LEVOTHYROXINE SODIUM 75 UG/1
75 TABLET ORAL DAILY
Qty: 30 TABLET | Refills: 11 | Status: SHIPPED | OUTPATIENT
Start: 2017-05-02 | End: 2017-05-30 | Stop reason: SDUPTHER

## 2017-05-02 RX ORDER — SODIUM CHLORIDE 0.9 % (FLUSH) 0.9 %
10 SYRINGE (ML) INJECTION
Status: CANCELLED | OUTPATIENT
Start: 2017-05-03

## 2017-05-02 RX ORDER — OXYCODONE AND ACETAMINOPHEN 10; 325 MG/1; MG/1
1 TABLET ORAL EVERY 6 HOURS PRN
Qty: 120 TABLET | Refills: 0 | Status: SHIPPED | OUTPATIENT
Start: 2017-05-02 | End: 2017-05-16 | Stop reason: SDUPTHER

## 2017-05-02 RX ORDER — HEPARIN 100 UNIT/ML
500 SYRINGE INTRAVENOUS
Status: CANCELLED | OUTPATIENT
Start: 2017-05-03

## 2017-05-02 NOTE — Clinical Note
Can you help with financial assistance. Zaida is supposed to help him but he is having a difficult time getting to her. Can you please help. He is very frustrated.

## 2017-05-02 NOTE — PROGRESS NOTES
"Subjective:       Patient ID: Yao Alna is a 42 y.o. male.    Chief Complaint: Lung Cancer; L hand slightly swollen; and 4/10 lower back pain  Oncologic History:  Mr. Yao Alan is a 41-year-old male who has a long history of smoking and was seen in the Pulmonary Clinic by Dr. Valle on 03/05/2015 with abnormal CT scan.    Apparently, he went to the [facility] in February with coughing as well as chest pain. A chest x-ray was done, which revealed a left upper lobe lung mass. Followup CT scan was done on 02/12/2015 and that revealed posterior superior mediastinal mass adjacent and prominent enlarged upper left mediastinal lymph nodes, abutting the aortic arch as well as left subclavian artery. A  CT scan of the abdomen was done on 03/03/2015 without contrast and that revealed nonobstructive nephrolithiasis, but a 2.1 cm lytic lesion involving the left iliac wing concerning for metastatic disease given the presence of emphysema and a mediastinal soft tissue mass on the CT chest from 02/12/2015. He saw Dr. Valle after that on 03/05/2015 and underwent an IR guided biopsy of the bone lesion on 03/17/2015. 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    His EGFR/EML/ALK is negative.    His PET scan on 3/25/15 revealed Left upper lobe lung lesion with an adjacent para-aortic lymph node, right anterior rib metastasis, right iliac metastasis, and pancreatic metastasis. A pancreatic cancer primary neoplasm is less likely.  MRI brain was negative for mets.  He completed 6 cycles of Carboplatin and Alimta and was on maintenance Alimta until end of March 2016.  His bone scan from 3/16 revealed stable disease. His CT scans also from end of March 2016 revealed "Interval enlargement of left upper lobe lung mass measuring 5.9 x 3.9 cm (previously 3.3 x 2.5 cm). This mass appears to invade the mediastinum and abutting the aortic arch and left subclavian artery"  He is now on Opdivo.  CT CAP " "from 6/22/16 s/p 6 cycles of Opdivo reveals "In this patient with a history of metastatic lung cancer, the previously identified paramediastinal left upper lobe lung mass has significantly decreased in size with only a trace amount of residual soft tissue opacities seen measuring 3.2 x 1.4 cm (axial series 2, image 16).Stable lytic lesion in the right iliac wing, unchanged.The right anterior sixth rib lesion and pancreatic head abnormality are not definitely appreciated on current examination."  Bone scan from 6/23/16 reveals "Stable activity in the right sixth rib anteriorly and the right iliac bone.No new metastatic lesions visualized."  10/3/16- CT scans reveal "In this patient with a history of metastatic lung cancer, the previously identified paramediastinal left upper lobe lung mass has decreased in size with only a trace amount of residual soft tissue opacity as above.  Stable lytic lesion in the right iliac wing, unchanged"  Bone scan reveals "Right superior anterior iliac bone lesion is stable and the right sixth rib has normalized. Recent dental procedure versus periodontal disease and possible right mastoiditis".      1/9/17 CT chest/abdomen/pelvis reveals "1. In this patient with history of metastatic lung cancer, the previously identified left upper paramediastinal lesion demonstrates near complete resolution with 1.0 x 0.7 cm residual disease (2.7 x 0.8 cm previously). Additionally, there is improving sclerotic lesion in the right iliac wing consistent with treated metastatic disease.  No evidence of new lesions in the chest, abdomen or pelvis. 2. Apical predominant paraseptal emphysematous changes and bullae. 3. Additional findings as above."      3/3/17 MRI brain revealed "Stable exam. No new enhancing mass lesion to suggest intracranial metastatic disease."      CT scans from 3/20/17 which reveal "No measurable lesion identified within the left upper paramediastinal region, the location of this " "patient's lung cancer, suggesting favorable response to treatment . No new lung lesions identified. Sclerotic lesion within the right iliac wing appears unchanged. Stable appearing centrilobular and paraseptal emphysema".       HPI Mr. Alan returns for follow up and for Opdivo.  He notes left wrist swelling, pain X 2 days. He has not used any anti inflammatories or heat packs.      He  denies any nausea, vomiting, diarrhea, constipation, abdominal pain, weight loss or loss of appetite, chest pain, shortness of breath, leg swelling, fatigue, pain, headache, dizziness, or mood changes. Her ECOG PS is zero. He is by himself.  He has also been off of synthroid X 2 weeks.            Review of Systems   Constitutional: Negative for appetite change, fatigue and unexpected weight change.   HENT: Negative for mouth sores.    Eyes: Negative for visual disturbance.   Respiratory: Negative for cough and shortness of breath.    Cardiovascular: Negative for chest pain.   Gastrointestinal: Negative for abdominal pain and diarrhea.   Genitourinary: Negative for frequency.   Musculoskeletal: Negative for back pain.   Skin: Negative for rash.   Neurological: Negative for headaches.   Hematological: Negative for adenopathy.   Psychiatric/Behavioral: The patient is not nervous/anxious.    All other systems reviewed and are negative.      PMFSH: all information reviewed and updated as relevant to today's visit  Objective:      Physical Exam   Constitutional: He is oriented to person, place, and time. He appears well-developed and well-nourished.   HENT:   Mouth/Throat: No oropharyngeal exudate.   Cardiovascular: Normal rate and normal heart sounds.    Pulmonary/Chest: Effort normal and breath sounds normal. He has no wheezes.   Abdominal: Soft. Bowel sounds are normal. There is no tenderness.   Musculoskeletal: He exhibits no edema or tenderness.   Lymphadenopathy:     He has no cervical adenopathy.   Neurological: He is alert and " oriented to person, place, and time. Coordination normal.   Skin: Skin is warm and dry. No rash noted.   Psychiatric: He has a normal mood and affect. Judgment and thought content normal.   Vitals reviewed.        LABS:  WBC   Date Value Ref Range Status   05/02/2017 7.87 3.90 - 12.70 K/uL Final     Hemoglobin   Date Value Ref Range Status   05/02/2017 13.8 (L) 14.0 - 18.0 g/dL Final     Hematocrit   Date Value Ref Range Status   05/02/2017 41.9 40.0 - 54.0 % Final     Platelets   Date Value Ref Range Status   05/02/2017 228 150 - 350 K/uL Final     Gran #   Date Value Ref Range Status   05/02/2017 4.1 1.8 - 7.7 K/uL Final     Comment:     The ANC is based on a white cell differential from an   automated cell counter. It has not been microscopically   reviewed for the presence of abnormal cells. Clinical   correlation is required.         Chemistry        Component Value Date/Time     05/02/2017 0730    K 3.7 05/02/2017 0730     05/02/2017 0730    CO2 22 (L) 05/02/2017 0730    BUN 17 05/02/2017 0730    CREATININE 1.5 (H) 05/02/2017 0730     (H) 05/02/2017 0730        Component Value Date/Time    CALCIUM 9.4 05/02/2017 0730    ALKPHOS 64 05/02/2017 0730    AST 38 05/02/2017 0730    ALT 21 05/02/2017 0730    BILITOT 0.3 05/02/2017 0730        TSH   Date Value Ref Range Status   05/02/2017 12.135 (H) 0.400 - 4.000 uIU/mL Final     Free T4   Date Value Ref Range Status   05/02/2017 0.62 (L) 0.71 - 1.51 ng/dL Final       Assessment:       1. Lung cancer, primary, with metastasis from lung to other site, right    2. Encounter for antineoplastic chemotherapy    3. Hypothyroidism, unspecified type    4. Neoplasm related pain    5. Deep vein thrombosis (DVT) of left lower extremity, unspecified chronicity, unspecified vein        Plan:        1,2. He is doing well clinically and will proceed with Opdivo and will return in 2 weeks for next dose and also for Xgeva.  3. He stopped synthroid and did not call  when he ran out. Restart Synthroid 75 mcg.  4. Stable on meds, refill sent.  5. Refilled Xarelto.    Above care plan was discussed with patient and all questions were addressed to his satisfaction

## 2017-05-02 NOTE — TELEPHONE ENCOUNTER
----- Message from Ann Granger sent at 5/2/2017  9:11 AM CDT -----  Contact: Self  Missing oxycodone-acetaminophen (PERCOCET)  mg per tablet and rivaroxaban (XARELTO) 20 mg Tab please send to pharmacy and call pt [phone]

## 2017-05-03 ENCOUNTER — INFUSION (OUTPATIENT)
Dept: INFUSION THERAPY | Facility: HOSPITAL | Age: 42
End: 2017-05-03
Attending: INTERNAL MEDICINE
Payer: MEDICARE

## 2017-05-03 VITALS
HEART RATE: 83 BPM | SYSTOLIC BLOOD PRESSURE: 119 MMHG | DIASTOLIC BLOOD PRESSURE: 86 MMHG | RESPIRATION RATE: 20 BRPM | TEMPERATURE: 98 F

## 2017-05-03 DIAGNOSIS — D50.0 IRON DEFICIENCY ANEMIA DUE TO CHRONIC BLOOD LOSS: Primary | ICD-10-CM

## 2017-05-03 DIAGNOSIS — C34.91 LUNG CANCER, PRIMARY, WITH METASTASIS FROM LUNG TO OTHER SITE, RIGHT: ICD-10-CM

## 2017-05-03 DIAGNOSIS — C79.51 SECONDARY MALIGNANT NEOPLASM OF BONE: ICD-10-CM

## 2017-05-03 DIAGNOSIS — C34.11 MALIGNANT NEOPLASM OF UPPER LOBE OF RIGHT LUNG: ICD-10-CM

## 2017-05-03 PROCEDURE — 63600175 PHARM REV CODE 636 W HCPCS: Performed by: INTERNAL MEDICINE

## 2017-05-03 PROCEDURE — 25000003 PHARM REV CODE 250: Performed by: INTERNAL MEDICINE

## 2017-05-03 PROCEDURE — 96413 CHEMO IV INFUSION 1 HR: CPT

## 2017-05-03 RX ORDER — HEPARIN 100 UNIT/ML
500 SYRINGE INTRAVENOUS
Status: DISCONTINUED | OUTPATIENT
Start: 2017-05-03 | End: 2017-05-03 | Stop reason: HOSPADM

## 2017-05-03 RX ORDER — SODIUM CHLORIDE 0.9 % (FLUSH) 0.9 %
10 SYRINGE (ML) INJECTION
Status: DISCONTINUED | OUTPATIENT
Start: 2017-05-03 | End: 2017-05-03 | Stop reason: HOSPADM

## 2017-05-03 RX ADMIN — SODIUM CHLORIDE 240 MG: 9 INJECTION, SOLUTION INTRAVENOUS at 08:05

## 2017-05-03 RX ADMIN — SODIUM CHLORIDE: 9 INJECTION, SOLUTION INTRAVENOUS at 07:05

## 2017-05-03 RX ADMIN — HEPARIN 500 UNITS: 100 SYRINGE at 09:05

## 2017-05-03 RX ADMIN — SODIUM CHLORIDE, PRESERVATIVE FREE 10 ML: 5 INJECTION INTRAVENOUS at 09:05

## 2017-05-03 NOTE — PLAN OF CARE
Problem: Patient Care Overview (Adult)  Goal: Plan of Care Review  Outcome: Ongoing (interventions implemented as appropriate)  Labs , hx, and medications reviewed. Assessment completed. Discussed plan of care with patient. Patient in agreement. VSS.  Chair reclined and warm blanket and snack offered. Will cont to monitor

## 2017-05-03 NOTE — PLAN OF CARE
Problem: Patient Care Overview (Adult)  Goal: Plan of Care Review  Outcome: Ongoing (interventions implemented as appropriate)  Pt tolerated treatment without adverse effects. VSS. Provided AVS & verbalized understanding of RTC date. DC with family ambulating independently.

## 2017-05-03 NOTE — MR AVS SNAPSHOT
Patient Information     Patient Name Sex Yao Templeton Male 1975      Visit Information        Provider Department [facility]    5/3/2017 7:30 AM [facility], CHEMO [facility] [phone] Pedro Reeves      Patient Instructions     None      Your Current Medications Are     albuterol 90 mcg/actuation inhaler    amlodipine (NORVASC) 10 MG tablet    docusate sodium (COLACE) 100 MG capsule    ferrous sulfate 325 (65 FE) MG EC tablet    lactulose (CHRONULAC) 10 gram/15 mL solution    levocetirizine (XYZAL) 5 MG tablet    levothyroxine (SYNTHROID) 75 MCG tablet    metoclopramide HCl (REGLAN) 10 MG tablet    naloxegol 25 mg Tab    omeprazole (PRILOSEC) 20 MG capsule    oxycodone-acetaminophen (PERCOCET)  mg per tablet    polyethylene glycol (GLYCOLAX) 17 gram/dose powder    PROAIR HFA 90 mcg/actuation inhaler    rivaroxaban (XARELTO) 20 mg Tab      Facility-Administered Medications     heparin, porcine (PF) 100 unit/mL injection flush 500 Units    nivolumab 240 mg in sodium chloride 0.9% 100 mL chemo infusion    sodium chloride 0.9% 100 mL flush bag    sodium chloride 0.9% flush 10 mL      Appointments for Next Year     2017  7:10 AM NON FASTING LAB (10 min.) [facility] LAB, HEMON CANCER BLDG    Arrive at check-in approximately 15 minutes before your scheduled appointment time. Bring all outside medical records and imaging, along with a list of your current medications and insurance card.    [facility] 3rd Floor    2017  8:15 AM ESTABLISHED PATIENT SHORT (15 min.) [facility] Hematology Oncology Bel Garay MD    Arrive at check-in approximately 15 minutes before your scheduled appointment time. Bring all outside medical records and imaging, along with a list of your current medications and insurance card.    [facility] - 3rd Floor    2017  7:30 AM INFUSION 120 MIN (120 min.) [facility], CHEMO    Arrive at  check-in approximately 15 minutes before your scheduled appointment time. Bring all outside medical records and imaging, along with a list of your current medications and insurance card.    [facility], 5th Floor         Default Flowsheet Data (last 24 hours)      Amb Complex Vitals Tato        05/03/17 0739                Measurements    /86        Temp 97.8 °F (36.6 °C)        Pulse 83        Resp 20        Pain Assessment    Pain Score Zero                Allergies     Nsaids (Non-steroidal Anti-inflammatory Drug)     Patient says since been diagnosed with lung mass on 2-12-15, if take any NSAIDS he spikes a fever.    Tylenol [Acetaminophen] Other (See Comments)    Sweating +      Medications You Received from 05/02/2017 0921 to 05/03/2017 0921        Date/Time Order Dose Route Action     05/03/2017 0923 heparin, porcine (PF) 100 unit/mL injection flush 500 Units 500 Units Intravenous Given     05/03/2017 0823 nivolumab 240 mg in sodium chloride 0.9% 100 mL chemo infusion 240 mg Intravenous New Bag     05/03/2017 0749 sodium chloride 0.9% 100 mL flush bag   Intravenous New Bag     05/03/2017 0923 sodium chloride 0.9% flush 10 mL 10 mL Intravenous Given      Current Discharge Medication List     Cannot display discharge medications since this is not an admission.

## 2017-05-16 ENCOUNTER — LAB VISIT (OUTPATIENT)
Dept: LAB | Facility: HOSPITAL | Age: 42
End: 2017-05-16
Attending: INTERNAL MEDICINE
Payer: MEDICARE

## 2017-05-16 ENCOUNTER — OFFICE VISIT (OUTPATIENT)
Dept: HEMATOLOGY/ONCOLOGY | Facility: CLINIC | Age: 42
End: 2017-05-16
Payer: MEDICARE

## 2017-05-16 VITALS
RESPIRATION RATE: 16 BRPM | DIASTOLIC BLOOD PRESSURE: 93 MMHG | TEMPERATURE: 98 F | HEIGHT: 66 IN | HEART RATE: 93 BPM | WEIGHT: 184.06 LBS | SYSTOLIC BLOOD PRESSURE: 142 MMHG | OXYGEN SATURATION: 97 % | BODY MASS INDEX: 29.58 KG/M2

## 2017-05-16 DIAGNOSIS — E03.9 HYPOTHYROIDISM, UNSPECIFIED TYPE: ICD-10-CM

## 2017-05-16 DIAGNOSIS — C79.51 SECONDARY MALIGNANT NEOPLASM OF BONE: ICD-10-CM

## 2017-05-16 DIAGNOSIS — C34.91 LUNG CANCER, PRIMARY, WITH METASTASIS FROM LUNG TO OTHER SITE, RIGHT: ICD-10-CM

## 2017-05-16 DIAGNOSIS — I82.402 DEEP VEIN THROMBOSIS (DVT) OF LEFT LOWER EXTREMITY, UNSPECIFIED CHRONICITY, UNSPECIFIED VEIN: ICD-10-CM

## 2017-05-16 DIAGNOSIS — G89.3 NEOPLASM RELATED PAIN: ICD-10-CM

## 2017-05-16 DIAGNOSIS — C34.91 LUNG CANCER, PRIMARY, WITH METASTASIS FROM LUNG TO OTHER SITE, RIGHT: Primary | ICD-10-CM

## 2017-05-16 DIAGNOSIS — Z51.11 ENCOUNTER FOR ANTINEOPLASTIC CHEMOTHERAPY: ICD-10-CM

## 2017-05-16 LAB
ALBUMIN SERPL BCP-MCNC: 3.9 G/DL
ALP SERPL-CCNC: 58 U/L
ALT SERPL W/O P-5'-P-CCNC: 11 U/L
ANION GAP SERPL CALC-SCNC: 13 MMOL/L
AST SERPL-CCNC: 17 U/L
BILIRUB SERPL-MCNC: 0.3 MG/DL
BUN SERPL-MCNC: 17 MG/DL
CALCIUM SERPL-MCNC: 9.4 MG/DL
CHLORIDE SERPL-SCNC: 105 MMOL/L
CO2 SERPL-SCNC: 24 MMOL/L
CREAT SERPL-MCNC: 1.5 MG/DL
ERYTHROCYTE [DISTWIDTH] IN BLOOD BY AUTOMATED COUNT: 15 %
EST. GFR  (AFRICAN AMERICAN): >60 ML/MIN/1.73 M^2
EST. GFR  (NON AFRICAN AMERICAN): 56.6 ML/MIN/1.73 M^2
GLUCOSE SERPL-MCNC: 151 MG/DL
HCT VFR BLD AUTO: 43.1 %
HGB BLD-MCNC: 14.1 G/DL
MCH RBC QN AUTO: 27.4 PG
MCHC RBC AUTO-ENTMCNC: 32.7 %
MCV RBC AUTO: 84 FL
NEUTROPHILS # BLD AUTO: 4.4 K/UL
PLATELET # BLD AUTO: 244 K/UL
PMV BLD AUTO: 10.2 FL
POTASSIUM SERPL-SCNC: 3.7 MMOL/L
PROT SERPL-MCNC: 7.5 G/DL
RBC # BLD AUTO: 5.15 M/UL
SODIUM SERPL-SCNC: 142 MMOL/L
T4 FREE SERPL-MCNC: 0.85 NG/DL
TSH SERPL DL<=0.005 MIU/L-ACNC: 7.42 UIU/ML
WBC # BLD AUTO: 8.5 K/UL

## 2017-05-16 PROCEDURE — 85027 COMPLETE CBC AUTOMATED: CPT

## 2017-05-16 PROCEDURE — 36415 COLL VENOUS BLD VENIPUNCTURE: CPT

## 2017-05-16 PROCEDURE — 84439 ASSAY OF FREE THYROXINE: CPT

## 2017-05-16 PROCEDURE — 99215 OFFICE O/P EST HI 40 MIN: CPT | Mod: S$PBB,,, | Performed by: INTERNAL MEDICINE

## 2017-05-16 PROCEDURE — 99999 PR PBB SHADOW E&M-EST. PATIENT-LVL IV: CPT | Mod: PBBFAC,,, | Performed by: INTERNAL MEDICINE

## 2017-05-16 PROCEDURE — 80053 COMPREHEN METABOLIC PANEL: CPT

## 2017-05-16 PROCEDURE — 84443 ASSAY THYROID STIM HORMONE: CPT

## 2017-05-16 RX ORDER — SODIUM CHLORIDE 0.9 % (FLUSH) 0.9 %
10 SYRINGE (ML) INJECTION
Status: CANCELLED | OUTPATIENT
Start: 2017-05-18

## 2017-05-16 RX ORDER — HEPARIN 100 UNIT/ML
500 SYRINGE INTRAVENOUS
Status: CANCELLED | OUTPATIENT
Start: 2017-05-18

## 2017-05-16 RX ORDER — OXYCODONE AND ACETAMINOPHEN 10; 325 MG/1; MG/1
1 TABLET ORAL EVERY 6 HOURS PRN
Qty: 120 TABLET | Refills: 0 | Status: SHIPPED | OUTPATIENT
Start: 2017-05-16 | End: 2017-05-30 | Stop reason: SDUPTHER

## 2017-05-16 NOTE — PROGRESS NOTES
"Subjective:       Patient ID: Yao Alan is a 42 y.o. male.    Chief Complaint: Lung Cancer and 3/10 lower back pain  Oncologic History:  Mr. Yao Alan is a 41-year-old male who has a long history of smoking and was seen in the Pulmonary Clinic by Dr. Valle on 03/05/2015 with abnormal CT scan.    Apparently, he went to the [facility] in February with coughing as well as chest pain. A chest x-ray was done, which revealed a left upper lobe lung mass. Followup CT scan was done on 02/12/2015 and that revealed posterior superior mediastinal mass adjacent and prominent enlarged upper left mediastinal lymph nodes, abutting the aortic arch as well as left subclavian artery. A  CT scan of the abdomen was done on 03/03/2015 without contrast and that revealed nonobstructive nephrolithiasis, but a 2.1 cm lytic lesion involving the left iliac wing concerning for metastatic disease given the presence of emphysema and a mediastinal soft tissue mass on the CT chest from 02/12/2015. He saw Dr. Valle after that on 03/05/2015 and underwent an IR guided biopsy of the bone lesion on 03/17/2015. 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    His EGFR/EML/ALK is negative.    His PET scan on 3/25/15 revealed Left upper lobe lung lesion with an adjacent para-aortic lymph node, right anterior rib metastasis, right iliac metastasis, and pancreatic metastasis. A pancreatic cancer primary neoplasm is less likely.  MRI brain was negative for mets.  He completed 6 cycles of Carboplatin and Alimta and was on maintenance Alimta until end of March 2016.  His bone scan from 3/16 revealed stable disease. His CT scans also from end of March 2016 revealed "Interval enlargement of left upper lobe lung mass measuring 5.9 x 3.9 cm (previously 3.3 x 2.5 cm). This mass appears to invade the mediastinum and abutting the aortic arch and left subclavian artery"  He is now on Opdivo.  CT CAP from 6/22/16 s/p 6 cycles " "of Opdivo reveals "In this patient with a history of metastatic lung cancer, the previously identified paramediastinal left upper lobe lung mass has significantly decreased in size with only a trace amount of residual soft tissue opacities seen measuring 3.2 x 1.4 cm (axial series 2, image 16).Stable lytic lesion in the right iliac wing, unchanged.The right anterior sixth rib lesion and pancreatic head abnormality are not definitely appreciated on current examination."  Bone scan from 6/23/16 reveals "Stable activity in the right sixth rib anteriorly and the right iliac bone.No new metastatic lesions visualized."  10/3/16- CT scans reveal "In this patient with a history of metastatic lung cancer, the previously identified paramediastinal left upper lobe lung mass has decreased in size with only a trace amount of residual soft tissue opacity as above.  Stable lytic lesion in the right iliac wing, unchanged"  Bone scan reveals "Right superior anterior iliac bone lesion is stable and the right sixth rib has normalized. Recent dental procedure versus periodontal disease and possible right mastoiditis".      1/9/17 CT chest/abdomen/pelvis reveals "1. In this patient with history of metastatic lung cancer, the previously identified left upper paramediastinal lesion demonstrates near complete resolution with 1.0 x 0.7 cm residual disease (2.7 x 0.8 cm previously). Additionally, there is improving sclerotic lesion in the right iliac wing consistent with treated metastatic disease.  No evidence of new lesions in the chest, abdomen or pelvis. 2. Apical predominant paraseptal emphysematous changes and bullae. 3. Additional findings as above."      3/3/17 MRI brain revealed "Stable exam. No new enhancing mass lesion to suggest intracranial metastatic disease."      CT scans from 3/20/17 which reveal "No measurable lesion identified within the left upper paramediastinal region, the location of this patient's lung cancer, " "suggesting favorable response to treatment . No new lung lesions identified. Sclerotic lesion within the right iliac wing appears unchanged. Stable appearing centrilobular and paraseptal emphysema".        HPI Mr. Alan returns for follow up and for Opdivo  He denies any nausea, vomiting, diarrhea, constipation, abdominal pain, weight loss or loss of appetite, chest pain, shortness of breath, leg swelling, fatigue, pain, headache, dizziness, or mood changes. His ECOG PS is zero. He is by himself today.          Review of Systems   Constitutional: Negative for appetite change, fatigue and unexpected weight change.   HENT: Negative for mouth sores.    Eyes: Negative for visual disturbance.   Respiratory: Negative for cough and shortness of breath.    Cardiovascular: Negative for chest pain.   Gastrointestinal: Negative for abdominal pain and diarrhea.   Genitourinary: Negative for frequency.   Musculoskeletal: Negative for back pain.   Skin: Negative for rash.   Neurological: Negative for headaches.   Hematological: Negative for adenopathy.   Psychiatric/Behavioral: The patient is not nervous/anxious.    All other systems reviewed and are negative.      PMFSH: all information reviewed and updated as relevant to today's visit  Objective:      Physical Exam   Constitutional: He is oriented to person, place, and time. He appears well-developed and well-nourished.   HENT:   Mouth/Throat: No oropharyngeal exudate.   Cardiovascular: Normal rate and normal heart sounds.    Pulmonary/Chest: Effort normal and breath sounds normal. He has no wheezes.   Abdominal: Soft. Bowel sounds are normal. There is no tenderness.   Musculoskeletal: He exhibits no edema or tenderness.   Lymphadenopathy:     He has no cervical adenopathy.   Neurological: He is alert and oriented to person, place, and time. Coordination normal.   Skin: Skin is warm and dry. No rash noted.   Psychiatric: He has a normal mood and affect. Judgment and thought " content normal.   Vitals reviewed.      LABS:  WBC   Date Value Ref Range Status   05/16/2017 8.50 3.90 - 12.70 K/uL Final     Hemoglobin   Date Value Ref Range Status   05/16/2017 14.1 14.0 - 18.0 g/dL Final     Hematocrit   Date Value Ref Range Status   05/16/2017 43.1 40.0 - 54.0 % Final     Platelets   Date Value Ref Range Status   05/16/2017 244 150 - 350 K/uL Final     Gran #   Date Value Ref Range Status   05/16/2017 4.4 1.8 - 7.7 K/uL Final     Comment:     The ANC is based on a white cell differential from an   automated cell counter. It has not been microscopically   reviewed for the presence of abnormal cells. Clinical   correlation is required.         Chemistry        Component Value Date/Time     05/16/2017 0712    K 3.7 05/16/2017 0712     05/16/2017 0712    CO2 24 05/16/2017 0712    BUN 17 05/16/2017 0712    CREATININE 1.5 (H) 05/16/2017 0712     (H) 05/16/2017 0712        Component Value Date/Time    CALCIUM 9.4 05/16/2017 0712    ALKPHOS 58 05/16/2017 0712    AST 17 05/16/2017 0712    ALT 11 05/16/2017 0712    BILITOT 0.3 05/16/2017 0712        TSH   Date Value Ref Range Status   05/16/2017 7.425 (H) 0.400 - 4.000 uIU/mL Final     Free T4   Date Value Ref Range Status   05/16/2017 0.85 0.71 - 1.51 ng/dL Final       Assessment:       1. Lung cancer, primary, with metastasis from lung to other site, right    2. Secondary malignant neoplasm of bone    3. Encounter for antineoplastic chemotherapy    4. Neoplasm related pain    5. Deep vein thrombosis (DVT) of left lower extremity, unspecified chronicity, unspecified vein    6. Hypothyroidism, unspecified type        Plan:        1,2,3. He is doing well clinically and will continue on Opdivo. He will also receive Xgeva tomorrow and will return in 2 weeks for next cycle with restaging CT scans and bone scan..  4. Stable on meds.  5. He will continue with Xarelto  6. He will continue with Synthroid.    Above care plan was discussed with  patient and all questions were addressed to her satisfaction

## 2017-05-17 ENCOUNTER — INFUSION (OUTPATIENT)
Dept: INFUSION THERAPY | Facility: HOSPITAL | Age: 42
End: 2017-05-17
Attending: INTERNAL MEDICINE
Payer: MEDICARE

## 2017-05-17 VITALS
RESPIRATION RATE: 16 BRPM | TEMPERATURE: 97 F | SYSTOLIC BLOOD PRESSURE: 123 MMHG | HEART RATE: 65 BPM | DIASTOLIC BLOOD PRESSURE: 82 MMHG

## 2017-05-17 DIAGNOSIS — C34.11 MALIGNANT NEOPLASM OF UPPER LOBE OF RIGHT LUNG: ICD-10-CM

## 2017-05-17 DIAGNOSIS — C79.51 SECONDARY MALIGNANT NEOPLASM OF BONE: ICD-10-CM

## 2017-05-17 DIAGNOSIS — D50.0 IRON DEFICIENCY ANEMIA DUE TO CHRONIC BLOOD LOSS: Primary | ICD-10-CM

## 2017-05-17 DIAGNOSIS — C34.91 LUNG CANCER, PRIMARY, WITH METASTASIS FROM LUNG TO OTHER SITE, RIGHT: ICD-10-CM

## 2017-05-17 PROCEDURE — 96372 THER/PROPH/DIAG INJ SC/IM: CPT

## 2017-05-17 PROCEDURE — 96411 CHEMO IV PUSH ADDL DRUG: CPT

## 2017-05-17 PROCEDURE — 63600175 PHARM REV CODE 636 W HCPCS: Performed by: INTERNAL MEDICINE

## 2017-05-17 PROCEDURE — 96413 CHEMO IV INFUSION 1 HR: CPT

## 2017-05-17 PROCEDURE — 25000003 PHARM REV CODE 250: Performed by: INTERNAL MEDICINE

## 2017-05-17 RX ORDER — SODIUM CHLORIDE 0.9 % (FLUSH) 0.9 %
10 SYRINGE (ML) INJECTION
Status: DISCONTINUED | OUTPATIENT
Start: 2017-05-17 | End: 2017-05-17 | Stop reason: HOSPADM

## 2017-05-17 RX ORDER — HEPARIN 100 UNIT/ML
500 SYRINGE INTRAVENOUS
Status: DISCONTINUED | OUTPATIENT
Start: 2017-05-17 | End: 2017-05-17 | Stop reason: HOSPADM

## 2017-05-17 RX ADMIN — SODIUM CHLORIDE, PRESERVATIVE FREE 10 ML: 5 INJECTION INTRAVENOUS at 09:05

## 2017-05-17 RX ADMIN — HEPARIN 500 UNITS: 100 SYRINGE at 09:05

## 2017-05-17 RX ADMIN — DENOSUMAB 120 MG: 120 INJECTION SUBCUTANEOUS at 09:05

## 2017-05-17 RX ADMIN — SODIUM CHLORIDE 240 MG: 9 INJECTION, SOLUTION INTRAVENOUS at 08:05

## 2017-05-17 NOTE — MR AVS SNAPSHOT
Patient Information     Patient Name Sex Yao Templeton Male 1975      Visit Information        Provider Department [facility]    2017 7:30 AM [facility], CHEMO [facility] Chemotherapy Infusion [phone] Pedro Reeves      Patient Instructions     None      Your Current Medications Are     albuterol 90 mcg/actuation inhaler    amlodipine (NORVASC) 10 MG tablet    docusate sodium (COLACE) 100 MG capsule    ferrous sulfate 325 (65 FE) MG EC tablet    lactulose (CHRONULAC) 10 gram/15 mL solution    levocetirizine (XYZAL) 5 MG tablet    levothyroxine (SYNTHROID) 75 MCG tablet    metoclopramide HCl (REGLAN) 10 MG tablet    naloxegol 25 mg Tab    omeprazole (PRILOSEC) 20 MG capsule    oxycodone-acetaminophen (PERCOCET)  mg per tablet    polyethylene glycol (GLYCOLAX) 17 gram/dose powder    PROAIR HFA 90 mcg/actuation inhaler    rivaroxaban (XARELTO) 20 mg Tab      Facility-Administered Medications     alteplase injection 2 mg    denosumab (XGEVA) solution 120 mg    heparin, porcine (PF) 100 unit/mL injection flush 500 Units    nivolumab 240 mg in sodium chloride 0.9% 100 mL chemo infusion    sodium chloride 0.9% 100 mL flush bag    sodium chloride 0.9% flush 10 mL      Appointments for Next Year     2017  7:30 AM NM BONE IMAGING INJECTION (15 min.) [facility] NM5 PREP ROOM    Arrive at check-in approximately 15 minutes before your scheduled appointment time. Bring all outside medical records and imaging, along with a list of your current medications and insurance card.    Report to Radiology/Laboratory  - 2nd Floor    2017 10:00 AM NM DELAYED BONE SCAN (45 min.) [facility] NM2 INFINIA 400LB LIMIT    Arrive at check-in approximately 15 minutes before your scheduled appointment time. Bring all outside medical records and imaging, along with a list of your current medications and insurance card.    Report to Radiology/Laboratory front  desk - 2nd Floor    5/29/2017  1:45 PM CT ABD PEL W CONTRAST (15 min.) [facility] CT2 64- LIMIT 600 LBS    You must  fast four (4) hours prior to your appointment. Arrive 2 hours early for your appointment to drink the exam prep.    2nd floor    5/30/2017  8:15 AM ESTABLISHED PATIENT SHORT (15 min.) [facility] Hematology Oncology Bel Garay MD    Arrive at check-in approximately 15 minutes before your scheduled appointment time. Bring all outside medical records and imaging, along with a list of your current medications and insurance card.    [facility] - 3rd Floor    5/31/2017  7:30 AM INFUSION 120 MIN (120 min.) [facility], CHEMO    Arrive at check-in approximately 15 minutes before your scheduled appointment time. Bring all outside medical records and imaging, along with a list of your current medications and insurance card.    [facility], 5th Floor         Default Flowsheet Data (last 24 hours)      Amb Complex Vitals Tato        05/17/17 0830 05/17/17 0806             Measurements    /77 117/73       Temp  97.4 °F (36.3 °C)       Pulse 65 68       Resp  16       Pain Assessment    Pain Score  Zero               Allergies     Nsaids (Non-steroidal Anti-inflammatory Drug)     Patient says since been diagnosed with lung mass on 2-12-15, if take any NSAIDS he spikes a fever.    Tylenol [Acetaminophen] Other (See Comments)    Sweating +      Medications You Received from 05/16/2017 0939 to 05/17/2017 0939        Date/Time Order Dose Route Action     05/17/2017 0933 denosumab (XGEVA) solution 120 mg 120 mg Subcutaneous Given     05/17/2017 0935 heparin, porcine (PF) 100 unit/mL injection flush 500 Units 500 Units Intravenous Given     05/17/2017 0827 nivolumab 240 mg in sodium chloride 0.9% 100 mL chemo infusion 240 mg Intravenous New Bag     05/17/2017 0935 sodium chloride 0.9% flush 10 mL 10 mL Intravenous Given      Current  Discharge Medication List     Cannot display discharge medications since this is not an admission.

## 2017-05-17 NOTE — PLAN OF CARE
Problem: Patient Care Overview (Adult)  Goal: Plan of Care Review  Outcome: Ongoing (interventions implemented as appropriate)  Patient received Opdivo and xgeva without any issues. Notified to call MD with any further concerns . Vss. No apparent distress noted.

## 2017-05-26 ENCOUNTER — TELEPHONE (OUTPATIENT)
Dept: RADIOLOGY | Facility: HOSPITAL | Age: 42
End: 2017-05-26

## 2017-05-29 ENCOUNTER — HOSPITAL ENCOUNTER (OUTPATIENT)
Dept: RADIOLOGY | Facility: HOSPITAL | Age: 42
Discharge: HOME | End: 2017-05-29
Attending: INTERNAL MEDICINE
Payer: MEDICARE

## 2017-05-29 DIAGNOSIS — C79.51 SECONDARY MALIGNANT NEOPLASM OF BONE: ICD-10-CM

## 2017-05-29 DIAGNOSIS — C34.91 LUNG CANCER, PRIMARY, WITH METASTASIS FROM LUNG TO OTHER SITE, RIGHT: ICD-10-CM

## 2017-05-29 PROCEDURE — 25500020 PHARM REV CODE 255: Performed by: INTERNAL MEDICINE

## 2017-05-29 PROCEDURE — 71260 CT THORAX DX C+: CPT | Mod: TC

## 2017-05-29 PROCEDURE — A9503 TC99M MEDRONATE: HCPCS

## 2017-05-29 PROCEDURE — 74177 CT ABD & PELVIS W/CONTRAST: CPT | Mod: TC

## 2017-05-29 PROCEDURE — 74177 CT ABD & PELVIS W/CONTRAST: CPT | Mod: 26,GC,, | Performed by: RADIOLOGY

## 2017-05-29 PROCEDURE — 71260 CT THORAX DX C+: CPT | Mod: 26,,, | Performed by: RADIOLOGY

## 2017-05-29 PROCEDURE — 78306 BONE IMAGING WHOLE BODY: CPT | Mod: 26,,, | Performed by: RADIOLOGY

## 2017-05-29 RX ADMIN — IOHEXOL 75 ML: 350 INJECTION, SOLUTION INTRAVENOUS at 12:05

## 2017-05-29 RX ADMIN — IOHEXOL 15 ML: 350 INJECTION, SOLUTION INTRAVENOUS at 11:05

## 2017-05-29 RX ADMIN — IOHEXOL 15 ML: 350 INJECTION, SOLUTION INTRAVENOUS at 12:05

## 2017-05-30 ENCOUNTER — OFFICE VISIT (OUTPATIENT)
Dept: HEMATOLOGY/ONCOLOGY | Facility: CLINIC | Age: 42
End: 2017-05-30
Payer: MEDICARE

## 2017-05-30 VITALS
SYSTOLIC BLOOD PRESSURE: 110 MMHG | HEART RATE: 77 BPM | RESPIRATION RATE: 16 BRPM | TEMPERATURE: 98 F | WEIGHT: 185.19 LBS | OXYGEN SATURATION: 99 % | BODY MASS INDEX: 29.76 KG/M2 | DIASTOLIC BLOOD PRESSURE: 80 MMHG | HEIGHT: 66 IN

## 2017-05-30 DIAGNOSIS — C79.51 SECONDARY MALIGNANT NEOPLASM OF BONE: ICD-10-CM

## 2017-05-30 DIAGNOSIS — I82.402 DEEP VEIN THROMBOSIS (DVT) OF LEFT LOWER EXTREMITY, UNSPECIFIED CHRONICITY, UNSPECIFIED VEIN: ICD-10-CM

## 2017-05-30 DIAGNOSIS — E03.9 HYPOTHYROIDISM, UNSPECIFIED TYPE: ICD-10-CM

## 2017-05-30 DIAGNOSIS — Z51.11 ENCOUNTER FOR ANTINEOPLASTIC CHEMOTHERAPY: ICD-10-CM

## 2017-05-30 DIAGNOSIS — G89.3 NEOPLASM RELATED PAIN: ICD-10-CM

## 2017-05-30 DIAGNOSIS — C34.91 LUNG CANCER, PRIMARY, WITH METASTASIS FROM LUNG TO OTHER SITE, RIGHT: Primary | ICD-10-CM

## 2017-05-30 PROCEDURE — 99215 OFFICE O/P EST HI 40 MIN: CPT | Mod: S$PBB,,, | Performed by: INTERNAL MEDICINE

## 2017-05-30 PROCEDURE — 99999 PR PBB SHADOW E&M-EST. PATIENT-LVL III: CPT | Mod: PBBFAC,,, | Performed by: INTERNAL MEDICINE

## 2017-05-30 RX ORDER — LEVOTHYROXINE SODIUM 75 UG/1
75 TABLET ORAL DAILY
Qty: 30 TABLET | Refills: 11 | Status: SHIPPED | OUTPATIENT
Start: 2017-05-30 | End: 2017-06-27 | Stop reason: SDUPTHER

## 2017-05-30 RX ORDER — SODIUM CHLORIDE 0.9 % (FLUSH) 0.9 %
10 SYRINGE (ML) INJECTION
Status: CANCELLED | OUTPATIENT
Start: 2017-06-01

## 2017-05-30 RX ORDER — OXYCODONE AND ACETAMINOPHEN 10; 325 MG/1; MG/1
1 TABLET ORAL EVERY 6 HOURS PRN
Qty: 120 TABLET | Refills: 0 | Status: SHIPPED | OUTPATIENT
Start: 2017-05-30 | End: 2017-06-13 | Stop reason: SDUPTHER

## 2017-05-30 RX ORDER — HEPARIN 100 UNIT/ML
500 SYRINGE INTRAVENOUS
Status: CANCELLED | OUTPATIENT
Start: 2017-06-01

## 2017-05-30 NOTE — Clinical Note
Schedule CBC,CMP, TSH, free T4 and see me in 2 weeks and for Opdivo Schedule labs and me the day before chemo

## 2017-05-30 NOTE — PROGRESS NOTES
"Subjective:       Patient ID: Yao Alan is a 42 y.o. male.    Chief Complaint: Lung Cancer and 3/10 lower back pain  Oncologic History:  Mr. Yao Alan is a 41-year-old male who has a long history of smoking and was seen in the Pulmonary Clinic by Dr. Valle on 03/05/2015 with abnormal CT scan.    Apparently, he went to the [facility] in February with coughing as well as chest pain. A chest x-ray was done, which revealed a left upper lobe lung mass. Followup CT scan was done on 02/12/2015 and that revealed posterior superior mediastinal mass adjacent and prominent enlarged upper left mediastinal lymph nodes, abutting the aortic arch as well as left subclavian artery. A  CT scan of the abdomen was done on 03/03/2015 without contrast and that revealed nonobstructive nephrolithiasis, but a 2.1 cm lytic lesion involving the left iliac wing concerning for metastatic disease given the presence of emphysema and a mediastinal soft tissue mass on the CT chest from 02/12/2015. He saw Dr. Valle after that on 03/05/2015 and underwent an IR guided biopsy of the bone lesion on 03/17/2015. 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    His EGFR/EML/ALK is negative.    His PET scan on 3/25/15 revealed Left upper lobe lung lesion with an adjacent para-aortic lymph node, right anterior rib metastasis, right iliac metastasis, and pancreatic metastasis. A pancreatic cancer primary neoplasm is less likely.  MRI brain was negative for mets.  He completed 6 cycles of Carboplatin and Alimta and was on maintenance Alimta until end of March 2016.  His bone scan from 3/16 revealed stable disease. His CT scans also from end of March 2016 revealed "Interval enlargement of left upper lobe lung mass measuring 5.9 x 3.9 cm (previously 3.3 x 2.5 cm). This mass appears to invade the mediastinum and abutting the aortic arch and left subclavian artery"  He is now on Opdivo.  CT CAP from 6/22/16 s/p 6 cycles " "of Opdivo reveals "In this patient with a history of metastatic lung cancer, the previously identified paramediastinal left upper lobe lung mass has significantly decreased in size with only a trace amount of residual soft tissue opacities seen measuring 3.2 x 1.4 cm (axial series 2, image 16).Stable lytic lesion in the right iliac wing, unchanged.The right anterior sixth rib lesion and pancreatic head abnormality are not definitely appreciated on current examination."  Bone scan from 6/23/16 reveals "Stable activity in the right sixth rib anteriorly and the right iliac bone.No new metastatic lesions visualized."  10/3/16- CT scans reveal "In this patient with a history of metastatic lung cancer, the previously identified paramediastinal left upper lobe lung mass has decreased in size with only a trace amount of residual soft tissue opacity as above.  Stable lytic lesion in the right iliac wing, unchanged"  Bone scan reveals "Right superior anterior iliac bone lesion is stable and the right sixth rib has normalized. Recent dental procedure versus periodontal disease and possible right mastoiditis".     1/9/17 CT chest/abdomen/pelvis reveals "1. In this patient with history of metastatic lung cancer, the previously identified left upper paramediastinal lesion demonstrates near complete resolution with 1.0 x 0.7 cm residual disease (2.7 x 0.8 cm previously). Additionally, there is improving sclerotic lesion in the right iliac wing consistent with treated metastatic disease.  No evidence of new lesions in the chest, abdomen or pelvis. 2. Apical predominant paraseptal emphysematous changes and bullae. 3. Additional findings as above."     3/3/17 MRI brain revealed "Stable exam. No new enhancing mass lesion to suggest intracranial metastatic disease."     CT scans from 3/20/17 which reveal "No measurable lesion identified within the left upper paramediastinal region, the location of this patient's lung cancer, suggesting " "favorable response to treatment . No new lung lesions identified. Sclerotic lesion within the right iliac wing appears unchanged. Stable appearing centrilobular and paraseptal emphysema".        HPI Mr. Alan returns for follow up and for Opdivo  His CT scans from 5/29/17 reveal "In this patient with history of lung cancer, there is no evidence of new focal masses or new metastases. Stable right iliac wing sclerotic lesion is unchanged. Stable centrilobular and paraseptal emphysema.  Bone scan from 5/29/17 reveal "Right anterior iliac region has normalized.  There is no evidence of active metastases"  He feels well and denies any new complaints/      Review of Systems   Constitutional: Negative for appetite change, fatigue and unexpected weight change.   HENT: Negative for mouth sores.    Eyes: Negative for visual disturbance.   Respiratory: Negative for cough and shortness of breath.    Cardiovascular: Negative for chest pain.   Gastrointestinal: Negative for abdominal pain and diarrhea.   Genitourinary: Negative for frequency.   Musculoskeletal: Negative for back pain.   Skin: Negative for rash.   Neurological: Negative for headaches.   Hematological: Negative for adenopathy.   Psychiatric/Behavioral: The patient is not nervous/anxious.    All other systems reviewed and are negative.      Objective:      Physical Exam   Constitutional: He is oriented to person, place, and time. He appears well-developed and well-nourished.   HENT:   Mouth/Throat: No oropharyngeal exudate.   Cardiovascular: Normal rate and normal heart sounds.    Pulmonary/Chest: Effort normal and breath sounds normal. He has no wheezes.   Abdominal: Soft. Bowel sounds are normal. There is no tenderness.   Musculoskeletal: He exhibits no edema or tenderness.   Lymphadenopathy:     He has no cervical adenopathy.   Neurological: He is alert and oriented to person, place, and time. Coordination normal.   Skin: Skin is warm and dry. No rash noted. "   Psychiatric: He has a normal mood and affect. Judgment and thought content normal.   Vitals reviewed.      LABS:  WBC   Date Value Ref Range Status   05/30/2017 7.19 3.90 - 12.70 K/uL Final     Hemoglobin   Date Value Ref Range Status   05/30/2017 14.2 14.0 - 18.0 g/dL Final     Hematocrit   Date Value Ref Range Status   05/30/2017 43.1 40.0 - 54.0 % Final     Platelets   Date Value Ref Range Status   05/30/2017 234 150 - 350 K/uL Final     Gran #   Date Value Ref Range Status   05/30/2017 3.5 1.8 - 7.7 K/uL Final     Comment:     The ANC is based on a white cell differential from an   automated cell counter. It has not been microscopically   reviewed for the presence of abnormal cells. Clinical   correlation is required.         Chemistry        Component Value Date/Time     05/30/2017 0811    K 4.1 05/30/2017 0811     05/30/2017 0811    CO2 24 05/30/2017 0811    BUN 22 (H) 05/30/2017 0811    CREATININE 1.4 05/30/2017 0811     (H) 05/30/2017 0811        Component Value Date/Time    CALCIUM 9.3 05/30/2017 0811    ALKPHOS 64 05/30/2017 0811    AST 21 05/30/2017 0811    ALT 18 05/30/2017 0811    BILITOT 0.3 05/30/2017 0811          Assessment:       1. Lung cancer, primary, with metastasis from lung to other site, right    2. Secondary malignant neoplasm of bone    3. Encounter for antineoplastic chemotherapy    4. Hypothyroidism, unspecified type    5. Neoplasm related pain    6. Deep vein thrombosis (DVT) of left lower extremity, unspecified chronicity, unspecified vein        Plan:        1,2,3. He is doing well with excellent response on scans. He will continue with Opdivo and will return in 2 weeks for next cycle and also Xgeva.   4. Schedule TSH, free T4.  5. Stable refilled meds.  6. Refilled Xarelto    Above care plan was discussed with patient and all questions were addressed to his satisfaction

## 2017-05-31 ENCOUNTER — INFUSION (OUTPATIENT)
Dept: INFUSION THERAPY | Facility: HOSPITAL | Age: 42
End: 2017-05-31
Attending: INTERNAL MEDICINE
Payer: MEDICARE

## 2017-05-31 VITALS
OXYGEN SATURATION: 98 % | DIASTOLIC BLOOD PRESSURE: 83 MMHG | RESPIRATION RATE: 18 BRPM | HEART RATE: 84 BPM | SYSTOLIC BLOOD PRESSURE: 126 MMHG | TEMPERATURE: 98 F

## 2017-05-31 DIAGNOSIS — D50.0 IRON DEFICIENCY ANEMIA DUE TO CHRONIC BLOOD LOSS: Primary | ICD-10-CM

## 2017-05-31 DIAGNOSIS — C34.91 LUNG CANCER, PRIMARY, WITH METASTASIS FROM LUNG TO OTHER SITE, RIGHT: ICD-10-CM

## 2017-05-31 DIAGNOSIS — C79.51 SECONDARY MALIGNANT NEOPLASM OF BONE: ICD-10-CM

## 2017-05-31 DIAGNOSIS — C34.11 MALIGNANT NEOPLASM OF UPPER LOBE OF RIGHT LUNG: ICD-10-CM

## 2017-05-31 PROCEDURE — 96413 CHEMO IV INFUSION 1 HR: CPT

## 2017-05-31 PROCEDURE — 25000003 PHARM REV CODE 250: Performed by: INTERNAL MEDICINE

## 2017-05-31 PROCEDURE — 63600175 PHARM REV CODE 636 W HCPCS: Performed by: INTERNAL MEDICINE

## 2017-05-31 RX ORDER — SODIUM CHLORIDE 0.9 % (FLUSH) 0.9 %
10 SYRINGE (ML) INJECTION
Status: DISCONTINUED | OUTPATIENT
Start: 2017-05-31 | End: 2017-05-31 | Stop reason: HOSPADM

## 2017-05-31 RX ORDER — HEPARIN 100 UNIT/ML
500 SYRINGE INTRAVENOUS
Status: DISCONTINUED | OUTPATIENT
Start: 2017-05-31 | End: 2017-05-31 | Stop reason: HOSPADM

## 2017-05-31 RX ADMIN — SODIUM CHLORIDE, PRESERVATIVE FREE 10 ML: 5 INJECTION INTRAVENOUS at 09:05

## 2017-05-31 RX ADMIN — SODIUM CHLORIDE 240 MG: 9 INJECTION, SOLUTION INTRAVENOUS at 08:05

## 2017-05-31 RX ADMIN — HEPARIN 500 UNITS: 100 SYRINGE at 09:05

## 2017-05-31 RX ADMIN — SODIUM CHLORIDE: 0.9 INJECTION, SOLUTION INTRAVENOUS at 08:05

## 2017-05-31 NOTE — PLAN OF CARE
Problem: Patient Care Overview (Adult)  Goal: Plan of Care Review  Outcome: Ongoing (interventions implemented as appropriate)  Patient tolerated Opdivo infusion well,no s/s reaction. Patient given AVS. Instructed to call MD with any problems or concerns.

## 2017-05-31 NOTE — PLAN OF CARE
Problem: Patient Care Overview (Adult)  Goal: Plan of Care Review  Outcome: Ongoing (interventions implemented as appropriate)  Patient tolerated Opdivo infusion well;patient given AVS;instructed to call MD with any problems.      Comments:n/a

## 2017-05-31 NOTE — PLAN OF CARE
Problem: Patient Care Overview (Adult)  Goal: Adult Individualization and Mutuality  1. Chest Port  2. Likes warm blankets      Outcome: Ongoing (interventions implemented as appropriate)  Patient present no s/s distress and no c/o pain. Pending Opdivo infusion. Will continue to monitor.

## 2017-06-13 ENCOUNTER — OFFICE VISIT (OUTPATIENT)
Dept: HEMATOLOGY/ONCOLOGY | Facility: CLINIC | Age: 42
End: 2017-06-13
Payer: MEDICARE

## 2017-06-13 ENCOUNTER — LAB VISIT (OUTPATIENT)
Dept: LAB | Facility: HOSPITAL | Age: 42
End: 2017-06-13
Attending: INTERNAL MEDICINE
Payer: MEDICARE

## 2017-06-13 VITALS
TEMPERATURE: 98 F | DIASTOLIC BLOOD PRESSURE: 85 MMHG | HEART RATE: 83 BPM | BODY MASS INDEX: 29.34 KG/M2 | SYSTOLIC BLOOD PRESSURE: 118 MMHG | HEIGHT: 66 IN | OXYGEN SATURATION: 98 % | WEIGHT: 182.56 LBS | RESPIRATION RATE: 16 BRPM

## 2017-06-13 DIAGNOSIS — Z51.11 ENCOUNTER FOR ANTINEOPLASTIC CHEMOTHERAPY: ICD-10-CM

## 2017-06-13 DIAGNOSIS — C34.91 LUNG CANCER, PRIMARY, WITH METASTASIS FROM LUNG TO OTHER SITE, RIGHT: ICD-10-CM

## 2017-06-13 DIAGNOSIS — G89.3 NEOPLASM RELATED PAIN: ICD-10-CM

## 2017-06-13 DIAGNOSIS — C34.91 LUNG CANCER, PRIMARY, WITH METASTASIS FROM LUNG TO OTHER SITE, RIGHT: Primary | ICD-10-CM

## 2017-06-13 DIAGNOSIS — E03.9 HYPOTHYROIDISM, UNSPECIFIED TYPE: ICD-10-CM

## 2017-06-13 DIAGNOSIS — C79.51 SECONDARY MALIGNANT NEOPLASM OF BONE: ICD-10-CM

## 2017-06-13 DIAGNOSIS — I82.402 DEEP VEIN THROMBOSIS (DVT) OF LEFT LOWER EXTREMITY, UNSPECIFIED CHRONICITY, UNSPECIFIED VEIN: ICD-10-CM

## 2017-06-13 LAB
ALBUMIN SERPL BCP-MCNC: 3.9 G/DL
ALP SERPL-CCNC: 65 U/L
ALT SERPL W/O P-5'-P-CCNC: 16 U/L
ANION GAP SERPL CALC-SCNC: 10 MMOL/L
AST SERPL-CCNC: 19 U/L
BILIRUB SERPL-MCNC: 0.3 MG/DL
BUN SERPL-MCNC: 14 MG/DL
CALCIUM SERPL-MCNC: 9.2 MG/DL
CHLORIDE SERPL-SCNC: 106 MMOL/L
CO2 SERPL-SCNC: 25 MMOL/L
CREAT SERPL-MCNC: 1.4 MG/DL
ERYTHROCYTE [DISTWIDTH] IN BLOOD BY AUTOMATED COUNT: 14.6 %
EST. GFR  (AFRICAN AMERICAN): >60 ML/MIN/1.73 M^2
EST. GFR  (NON AFRICAN AMERICAN): >60 ML/MIN/1.73 M^2
GLUCOSE SERPL-MCNC: 117 MG/DL
HCT VFR BLD AUTO: 45 %
HGB BLD-MCNC: 14.6 G/DL
MCH RBC QN AUTO: 26.9 PG
MCHC RBC AUTO-ENTMCNC: 32.4 %
MCV RBC AUTO: 83 FL
NEUTROPHILS # BLD AUTO: 4.5 K/UL
PLATELET # BLD AUTO: 240 K/UL
PMV BLD AUTO: 10.2 FL
POTASSIUM SERPL-SCNC: 3.9 MMOL/L
PROT SERPL-MCNC: 7.6 G/DL
RBC # BLD AUTO: 5.42 M/UL
SODIUM SERPL-SCNC: 141 MMOL/L
T4 FREE SERPL-MCNC: 0.92 NG/DL
TSH SERPL DL<=0.005 MIU/L-ACNC: 4.58 UIU/ML
WBC # BLD AUTO: 8.6 K/UL

## 2017-06-13 PROCEDURE — 36415 COLL VENOUS BLD VENIPUNCTURE: CPT

## 2017-06-13 PROCEDURE — 84439 ASSAY OF FREE THYROXINE: CPT

## 2017-06-13 PROCEDURE — 85027 COMPLETE CBC AUTOMATED: CPT

## 2017-06-13 PROCEDURE — 84443 ASSAY THYROID STIM HORMONE: CPT

## 2017-06-13 PROCEDURE — 80053 COMPREHEN METABOLIC PANEL: CPT

## 2017-06-13 PROCEDURE — 99999 PR PBB SHADOW E&M-EST. PATIENT-LVL III: CPT | Mod: PBBFAC,,, | Performed by: INTERNAL MEDICINE

## 2017-06-13 PROCEDURE — 99215 OFFICE O/P EST HI 40 MIN: CPT | Mod: S$PBB,,, | Performed by: INTERNAL MEDICINE

## 2017-06-13 RX ORDER — HEPARIN 100 UNIT/ML
500 SYRINGE INTRAVENOUS
Status: CANCELLED | OUTPATIENT
Start: 2017-06-15

## 2017-06-13 RX ORDER — OXYCODONE AND ACETAMINOPHEN 10; 325 MG/1; MG/1
1 TABLET ORAL EVERY 6 HOURS PRN
Qty: 120 TABLET | Refills: 0 | Status: SHIPPED | OUTPATIENT
Start: 2017-06-13 | End: 2017-07-11 | Stop reason: SDUPTHER

## 2017-06-13 RX ORDER — SODIUM CHLORIDE 0.9 % (FLUSH) 0.9 %
10 SYRINGE (ML) INJECTION
Status: CANCELLED | OUTPATIENT
Start: 2017-06-15

## 2017-06-13 NOTE — Clinical Note
Schedule CBC,CMp and see another MD in 2 weeks from now and for Opdivo  Schedule CBC,CMp, TSH, free T4 and see me in 4 weeks from now and for Opdivo

## 2017-06-13 NOTE — PROGRESS NOTES
"Subjective:       Patient ID: Yao Alan is a 42 y.o. male.    Chief Complaint: Lung Cancer; L wrist pain 3/10; and Nausea  Oncologic History:  Mr. Yao Alan is a 41-year-old male who has a long history of smoking and was seen in the Pulmonary Clinic by Dr. Valle on 03/05/2015 with abnormal CT scan.    Apparently, he went to the [facility] in February with coughing as well as chest pain. A chest x-ray was done, which revealed a left upper lobe lung mass. Followup CT scan was done on 02/12/2015 and that revealed posterior superior mediastinal mass adjacent and prominent enlarged upper left mediastinal lymph nodes, abutting the aortic arch as well as left subclavian artery. A  CT scan of the abdomen was done on 03/03/2015 without contrast and that revealed nonobstructive nephrolithiasis, but a 2.1 cm lytic lesion involving the left iliac wing concerning for metastatic disease given the presence of emphysema and a mediastinal soft tissue mass on the CT chest from 02/12/2015. He saw Dr. Valle after that on 03/05/2015 and underwent an IR guided biopsy of the bone lesion on 03/17/2015. 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    His EGFR/EML/ALK is negative.    His PET scan on 3/25/15 revealed Left upper lobe lung lesion with an adjacent para-aortic lymph node, right anterior rib metastasis, right iliac metastasis, and pancreatic metastasis. A pancreatic cancer primary neoplasm is less likely.  MRI brain was negative for mets.  He completed 6 cycles of Carboplatin and Alimta and was on maintenance Alimta until end of March 2016.  His bone scan from 3/16 revealed stable disease. His CT scans also from end of March 2016 revealed "Interval enlargement of left upper lobe lung mass measuring 5.9 x 3.9 cm (previously 3.3 x 2.5 cm). This mass appears to invade the mediastinum and abutting the aortic arch and left subclavian artery"  He is now on Opdivo.  CT CAP from 6/22/16 s/p 6 " "cycles of Opdivo reveals "In this patient with a history of metastatic lung cancer, the previously identified paramediastinal left upper lobe lung mass has significantly decreased in size with only a trace amount of residual soft tissue opacities seen measuring 3.2 x 1.4 cm (axial series 2, image 16).Stable lytic lesion in the right iliac wing, unchanged.The right anterior sixth rib lesion and pancreatic head abnormality are not definitely appreciated on current examination."  Bone scan from 6/23/16 reveals "Stable activity in the right sixth rib anteriorly and the right iliac bone.No new metastatic lesions visualized."  10/3/16- CT scans reveal "In this patient with a history of metastatic lung cancer, the previously identified paramediastinal left upper lobe lung mass has decreased in size with only a trace amount of residual soft tissue opacity as above.  Stable lytic lesion in the right iliac wing, unchanged"  Bone scan reveals "Right superior anterior iliac bone lesion is stable and the right sixth rib has normalized. Recent dental procedure versus periodontal disease and possible right mastoiditis".     1/9/17 CT chest/abdomen/pelvis reveals "1. In this patient with history of metastatic lung cancer, the previously identified left upper paramediastinal lesion demonstrates near complete resolution with 1.0 x 0.7 cm residual disease (2.7 x 0.8 cm previously). Additionally, there is improving sclerotic lesion in the right iliac wing consistent with treated metastatic disease.  No evidence of new lesions in the chest, abdomen or pelvis. 2. Apical predominant paraseptal emphysematous changes and bullae. 3. Additional findings as above."     3/3/17 MRI brain revealed "Stable exam. No new enhancing mass lesion to suggest intracranial metastatic disease."     CT scans from 3/20/17 which reveal "No measurable lesion identified within the left upper paramediastinal region, the location of this patient's lung cancer, " "suggesting favorable response to treatment . No new lung lesions identified. Sclerotic lesion within the right iliac wing appears unchanged. Stable appearing centrilobular and paraseptal emphysema".        HPI Mr. Alan returns for follow up and for Opdivo. He feels well and denies any new complaints.      Review of Systems   Constitutional: Negative for appetite change, fatigue and unexpected weight change.   HENT: Negative for mouth sores.    Eyes: Negative for visual disturbance.   Respiratory: Negative for cough and shortness of breath.    Cardiovascular: Negative for chest pain.   Gastrointestinal: Negative for abdominal pain and diarrhea.   Genitourinary: Negative for frequency.   Musculoskeletal: Negative for back pain.   Skin: Negative for rash.   Neurological: Negative for headaches.   Hematological: Negative for adenopathy.   Psychiatric/Behavioral: The patient is not nervous/anxious.    All other systems reviewed and are negative.      Objective:      Physical Exam   Constitutional: He is oriented to person, place, and time. He appears well-developed and well-nourished.   HENT:   Mouth/Throat: No oropharyngeal exudate.   Cardiovascular: Normal rate and normal heart sounds.    Pulmonary/Chest: Effort normal and breath sounds normal. He has no wheezes.   Abdominal: Soft. Bowel sounds are normal. There is no tenderness.   Musculoskeletal: He exhibits no edema or tenderness.   Lymphadenopathy:     He has no cervical adenopathy.   Neurological: He is alert and oriented to person, place, and time. Coordination normal.   Skin: Skin is warm and dry. No rash noted.   Psychiatric: He has a normal mood and affect. Judgment and thought content normal.   Vitals reviewed.        LABS:  WBC   Date Value Ref Range Status   06/13/2017 8.60 3.90 - 12.70 K/uL Final     Hemoglobin   Date Value Ref Range Status   06/13/2017 14.6 14.0 - 18.0 g/dL Final     Hematocrit   Date Value Ref Range Status   06/13/2017 45.0 40.0 - 54.0 % " Final     Platelets   Date Value Ref Range Status   06/13/2017 240 150 - 350 K/uL Final     Gran #   Date Value Ref Range Status   06/13/2017 4.5 1.8 - 7.7 K/uL Final     Comment:     The ANC is based on a white cell differential from an   automated cell counter. It has not been microscopically   reviewed for the presence of abnormal cells. Clinical   correlation is required.         Chemistry        Component Value Date/Time     05/30/2017 0811    K 4.1 05/30/2017 0811     05/30/2017 0811    CO2 24 05/30/2017 0811    BUN 22 (H) 05/30/2017 0811    CREATININE 1.4 05/30/2017 0811     (H) 05/30/2017 0811        Component Value Date/Time    CALCIUM 9.3 05/30/2017 0811    ALKPHOS 64 05/30/2017 0811    AST 21 05/30/2017 0811    ALT 18 05/30/2017 0811    BILITOT 0.3 05/30/2017 0811        TSH   Date Value Ref Range Status   06/13/2017 4.585 (H) 0.400 - 4.000 uIU/mL Final     Free T4   Date Value Ref Range Status   06/13/2017 0.92 0.71 - 1.51 ng/dL Final       Assessment:       1. Lung cancer, primary, with metastasis from lung to other site, right    2. Secondary malignant neoplasm of bone    3. Encounter for antineoplastic chemotherapy    4. Neoplasm related pain    5. Deep vein thrombosis (DVT) of left lower extremity, unspecified chronicity, unspecified vein        Plan:        1,2,3. He will proceed with Opdivo and he will return in 2 weeks for next cycle.  4. Stable on current dose.  5. Continue Xarelto.    Above care plan was discussed with patient and all questions were addressed to her satisfaction

## 2017-06-14 ENCOUNTER — INFUSION (OUTPATIENT)
Dept: INFUSION THERAPY | Facility: HOSPITAL | Age: 42
End: 2017-06-14
Attending: INTERNAL MEDICINE
Payer: MEDICARE

## 2017-06-14 VITALS
HEART RATE: 86 BPM | TEMPERATURE: 98 F | SYSTOLIC BLOOD PRESSURE: 121 MMHG | DIASTOLIC BLOOD PRESSURE: 84 MMHG | RESPIRATION RATE: 18 BRPM

## 2017-06-14 DIAGNOSIS — C79.51 SECONDARY MALIGNANT NEOPLASM OF BONE: ICD-10-CM

## 2017-06-14 DIAGNOSIS — C34.91 LUNG CANCER, PRIMARY, WITH METASTASIS FROM LUNG TO OTHER SITE, RIGHT: ICD-10-CM

## 2017-06-14 DIAGNOSIS — D50.0 IRON DEFICIENCY ANEMIA DUE TO CHRONIC BLOOD LOSS: Primary | ICD-10-CM

## 2017-06-14 DIAGNOSIS — C34.11 MALIGNANT NEOPLASM OF UPPER LOBE OF RIGHT LUNG: ICD-10-CM

## 2017-06-14 PROCEDURE — 63600175 PHARM REV CODE 636 W HCPCS: Performed by: INTERNAL MEDICINE

## 2017-06-14 PROCEDURE — 25000003 PHARM REV CODE 250: Performed by: INTERNAL MEDICINE

## 2017-06-14 PROCEDURE — 96413 CHEMO IV INFUSION 1 HR: CPT

## 2017-06-14 RX ORDER — HEPARIN 100 UNIT/ML
500 SYRINGE INTRAVENOUS
Status: DISCONTINUED | OUTPATIENT
Start: 2017-06-14 | End: 2017-06-14 | Stop reason: HOSPADM

## 2017-06-14 RX ORDER — SODIUM CHLORIDE 0.9 % (FLUSH) 0.9 %
10 SYRINGE (ML) INJECTION
Status: DISCONTINUED | OUTPATIENT
Start: 2017-06-14 | End: 2017-06-14 | Stop reason: HOSPADM

## 2017-06-14 RX ADMIN — SODIUM CHLORIDE 240 MG: 9 INJECTION, SOLUTION INTRAVENOUS at 08:06

## 2017-06-14 RX ADMIN — SODIUM CHLORIDE, PRESERVATIVE FREE 10 ML: 5 INJECTION INTRAVENOUS at 09:06

## 2017-06-14 RX ADMIN — SODIUM CHLORIDE: 9 INJECTION, SOLUTION INTRAVENOUS at 07:06

## 2017-06-14 RX ADMIN — HEPARIN 500 UNITS: 100 SYRINGE at 09:06

## 2017-06-26 RX ORDER — HEPARIN 100 UNIT/ML
500 SYRINGE INTRAVENOUS
Status: CANCELLED | OUTPATIENT
Start: 2017-06-30

## 2017-06-26 RX ORDER — SODIUM CHLORIDE 0.9 % (FLUSH) 0.9 %
10 SYRINGE (ML) INJECTION
Status: CANCELLED | OUTPATIENT
Start: 2017-06-30

## 2017-06-26 NOTE — PROGRESS NOTES
"Chief Complaint: Lung Cancer; L wrist pain 3/10; and nausea    Oncologic History:  Mr. Yao Alan is a 42-year-old male who has a long history of smoking and was seen in the Pulmonary Clinic by Dr. Valle on 03/05/2015 with abnormal CT scan.    Apparently, he went to the [facility] in February with coughing as well as chest pain. A chest x-ray was done, which revealed a left upper lobe lung mass. Followup CT scan was done on 02/12/2015 and that revealed posterior superior mediastinal mass adjacent and prominent enlarged upper left mediastinal lymph nodes, abutting the aortic arch as well as left subclavian artery. A  CT scan of the abdomen was done on 03/03/2015 without contrast and that revealed nonobstructive nephrolithiasis, but a 2.1 cm lytic lesion involving the left iliac wing concerning for metastatic disease given the presence of emphysema and a mediastinal soft tissue mass on the CT chest from 02/12/2015. He saw Dr. Valle after that on 03/05/2015 and underwent an IR guided biopsy of the bone lesion on 03/17/2015. 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    His EGFR/EML/ALK is negative.    His PET scan on 3/25/15 revealed Left upper lobe lung lesion with an adjacent para-aortic lymph node, right anterior rib metastasis, right iliac metastasis, and pancreatic metastasis. A pancreatic cancer primary neoplasm is less likely.  MRI brain was negative for mets.  He completed 6 cycles of Carboplatin and Alimta and was on maintenance Alimta until end of March 2016.  His bone scan from 3/16 revealed stable disease. His CT scans also from end of March 2016 revealed "Interval enlargement of left upper lobe lung mass measuring 5.9 x 3.9 cm (previously 3.3 x 2.5 cm). This mass appears to invade the mediastinum and abutting the aortic arch and left subclavian artery"  He is now on Opdivo.  CT CAP from 6/22/16 s/p 6 cycles of Opdivo reveals "In this patient with a history of " "metastatic lung cancer, the previously identified paramediastinal left upper lobe lung mass has significantly decreased in size with only a trace amount of residual soft tissue opacities seen measuring 3.2 x 1.4 cm (axial series 2, image 16).Stable lytic lesion in the right iliac wing, unchanged.The right anterior sixth rib lesion and pancreatic head abnormality are not definitely appreciated on current examination."  Bone scan from 6/23/16 reveals "Stable activity in the right sixth rib anteriorly and the right iliac bone.No new metastatic lesions visualized."  10/3/16- CT scans reveal "In this patient with a history of metastatic lung cancer, the previously identified paramediastinal left upper lobe lung mass has decreased in size with only a trace amount of residual soft tissue opacity as above.  Stable lytic lesion in the right iliac wing, unchanged"  Bone scan reveals "Right superior anterior iliac bone lesion is stable and the right sixth rib has normalized. Recent dental procedure versus periodontal disease and possible right mastoiditis".     1/9/17 CT chest/abdomen/pelvis reveals "1. In this patient with history of metastatic lung cancer, the previously identified left upper paramediastinal lesion demonstrates near complete resolution with 1.0 x 0.7 cm residual disease (2.7 x 0.8 cm previously). Additionally, there is improving sclerotic lesion in the right iliac wing consistent with treated metastatic disease.  No evidence of new lesions in the chest, abdomen or pelvis. 2. Apical predominant paraseptal emphysematous changes and bullae. 3. Additional findings as above."     3/3/17 MRI brain revealed "Stable exam. No new enhancing mass lesion to suggest intracranial metastatic disease."     CT scans from 3/20/17 which reveal "No measurable lesion identified within the left upper paramediastinal region, the location of this patient's lung cancer, suggesting favorable response to treatment . No new lung " "lesions identified. Sclerotic lesion within the right iliac wing appears unchanged. Stable appearing centrilobular and paraseptal emphysema".        HPI Mr. Alan returns for follow up and for Opdivo. He feels well .    Review of Systems   Constitutional: Positive for malaise/fatigue. Negative for chills, fever and weight loss.   HENT: Negative for congestion, hearing loss and nosebleeds.    Eyes: Negative for blurred vision and double vision.   Respiratory: Negative for cough, hemoptysis and sputum production.    Cardiovascular: Negative for chest pain, palpitations, orthopnea and leg swelling.   Gastrointestinal: Negative for abdominal pain, heartburn, nausea and vomiting.   Genitourinary: Negative for dysuria, hematuria and urgency.   Musculoskeletal: Negative for back pain, joint pain and neck pain.   Skin: Negative for rash.   Neurological: Negative for dizziness, tingling, tremors, focal weakness, seizures and headaches.   Endo/Heme/Allergies: Does not bruise/bleed easily.   Psychiatric/Behavioral: Negative for depression.   All other systems reviewed and are negative.      Review of patient's allergies indicates:   Allergen Reactions    Nsaids (non-steroidal anti-inflammatory drug)      Patient says since been diagnosed with lung mass on 2-12-15, if take any NSAIDS he spikes a fever.    Tylenol [acetaminophen] Other (See Comments)     Sweating +       Current Outpatient Prescriptions   Medication Sig    albuterol 90 mcg/actuation inhaler Inhale 2 puffs into the lungs every 6 (six) hours as needed for Wheezing.    amlodipine (NORVASC) 10 MG tablet Take 1 tablet (10 mg total) by mouth once daily.    docusate sodium (COLACE) 100 MG capsule Take 1 capsule (100 mg total) by mouth 2 (two) times daily.    ferrous sulfate 325 (65 FE) MG EC tablet Take 1 tablet (325 mg total) by mouth 3 (three) times daily with meals.    lactulose (CHRONULAC) 10 gram/15 mL solution Take 30 mLs (20 g total) by mouth 3 (three) " times daily.    levocetirizine (XYZAL) 5 MG tablet Take 1 tablet (5 mg total) by mouth every evening.    levothyroxine (SYNTHROID) 75 MCG tablet Take 1 tablet (75 mcg total) by mouth once daily.    metoclopramide HCl (REGLAN) 10 MG tablet Take 1 tablet (10 mg total) by mouth every 6 (six) hours as needed (nausea/vomiting and/or abdominal cramps).    naloxegol 25 mg Tab Take 25 mg by mouth once daily.    omeprazole (PRILOSEC) 20 MG capsule Take 1 capsule (20 mg total) by mouth once daily.    oxycodone-acetaminophen (PERCOCET)  mg per tablet Take 1 tablet by mouth every 6 (six) hours as needed for Pain.    polyethylene glycol (GLYCOLAX) 17 gram/dose powder Take 17 g by mouth once daily.    PROAIR HFA 90 mcg/actuation inhaler INHALE TWO PUFFS BY MOUTH EVERY 6 HOURS AS NEEDED FOR WHEEZING    rivaroxaban (XARELTO) 20 mg Tab Take 1 tablet (20 mg total) by mouth once daily.     No current facility-administered medications for this visit.          Vitals:    06/27/17 1347   BP: 101/77   Pulse: 77   Resp: 16   Temp: 97.6 °F (36.4 °C)     Physical Exam   Constitutional: He is oriented to person, place, and time. He appears well-developed and well-nourished.   HENT:   Head: Normocephalic and atraumatic.   Mouth/Throat: No oropharyngeal exudate.   Eyes: Pupils are equal, round, and reactive to light. No scleral icterus.   Neck: Normal range of motion.   Cardiovascular: Normal rate, regular rhythm and normal heart sounds.    No murmur heard.  Abdominal: Soft. He exhibits no distension. There is no tenderness. There is no guarding.   Musculoskeletal:   He has some pain in his right mid shin. Pain is constant, sharp. No trauma. No radiation.     Lymphadenopathy:     He has no cervical adenopathy.   Neurological: He is alert and oriented to person, place, and time. No cranial nerve deficit.   Skin: Skin is warm. No erythema.   Psychiatric: He has a normal mood and affect.       Component      Latest Ref Rng & Units  6/27/2017 6/13/2017   Sodium      136 - 145 mmol/L 141    Potassium      3.5 - 5.1 mmol/L 4.0    Chloride      95 - 110 mmol/L 108    CO2      23 - 29 mmol/L 27    Glucose      70 - 110 mg/dL 87    BUN, Bld      6 - 20 mg/dL 11    Creatinine      0.5 - 1.4 mg/dL 1.5 (H)    Calcium      8.7 - 10.5 mg/dL 9.1    Total Protein      6.0 - 8.4 g/dL 7.2    Albumin      3.5 - 5.2 g/dL 3.7    Total Bilirubin      0.1 - 1.0 mg/dL 0.3    Alkaline Phosphatase      55 - 135 U/L 66    AST      10 - 40 U/L 29    ALT      10 - 44 U/L 18    Anion Gap      8 - 16 mmol/L 6 (L)    eGFR if African American      >60 mL/min/1.73 m:2 >60.0    eGFR if non African American      >60 mL/min/1.73 m:2 56.6 (A)    WBC      3.90 - 12.70 K/uL 7.67    RBC      4.60 - 6.20 M/uL 5.08    Hemoglobin      14.0 - 18.0 g/dL 13.8 (L)    Hematocrit      40.0 - 54.0 % 42.1    MCV      82 - 98 fL 83    MCH      27.0 - 31.0 pg 27.2    MCHC      32.0 - 36.0 % 32.8    RDW      11.5 - 14.5 % 14.7 (H)    Platelets      150 - 350 K/uL 219    MPV      9.2 - 12.9 fL 10.6    Gran #      1.8 - 7.7 K/uL 3.2    TSH      0.400 - 4.000 uIU/mL  4.585 (H)   Free T4      0.71 - 1.51 ng/dL  0.92       Assessment:         1. Lung cancer, primary, with metastasis from lung to other site, right    2. Secondary malignant neoplasm of bone    3. Encounter for antineoplastic chemotherapy    4. Neoplasm related pain    5. Deep vein thrombosis (DVT) of left lower extremity, unspecified chronicity, unspecified vein          Plan:       1,2,3. He will proceed with Opdivo and he will return in 2 weeks for next cycle.  4. Stable on current dose.  5. Continue Xarelto.  6. Supportive care: TSH slightly elevated, T4 normal on 6/13/17.     Above care plan was discussed with patient and all questions were addressed to her satisfaction

## 2017-06-27 ENCOUNTER — OFFICE VISIT (OUTPATIENT)
Dept: HEMATOLOGY/ONCOLOGY | Facility: CLINIC | Age: 42
End: 2017-06-27
Payer: MEDICARE

## 2017-06-27 VITALS
HEART RATE: 77 BPM | SYSTOLIC BLOOD PRESSURE: 101 MMHG | DIASTOLIC BLOOD PRESSURE: 77 MMHG | TEMPERATURE: 98 F | HEIGHT: 66 IN | WEIGHT: 185.19 LBS | OXYGEN SATURATION: 96 % | RESPIRATION RATE: 16 BRPM | BODY MASS INDEX: 29.76 KG/M2

## 2017-06-27 DIAGNOSIS — E03.9 HYPOTHYROIDISM, UNSPECIFIED TYPE: ICD-10-CM

## 2017-06-27 DIAGNOSIS — C34.90 MALIGNANT NEOPLASM OF LUNG, UNSPECIFIED LATERALITY, UNSPECIFIED PART OF LUNG: Primary | ICD-10-CM

## 2017-06-27 PROCEDURE — 99213 OFFICE O/P EST LOW 20 MIN: CPT | Mod: S$PBB,,, | Performed by: INTERNAL MEDICINE

## 2017-06-27 PROCEDURE — 99999 PR PBB SHADOW E&M-EST. PATIENT-LVL III: CPT | Mod: PBBFAC,,, | Performed by: INTERNAL MEDICINE

## 2017-06-27 RX ORDER — LEVOTHYROXINE SODIUM 75 UG/1
75 TABLET ORAL DAILY
Qty: 30 TABLET | Refills: 11 | Status: SHIPPED | OUTPATIENT
Start: 2017-06-27 | End: 2017-08-03 | Stop reason: SDUPTHER

## 2017-06-27 NOTE — LETTER
June 27, 2017      Bel Garay MD  [address]  [facility] [address]           [facility]  [address]  [facility] [address]  Phone: [phone]          Patient: Yao Alan   MR Number: [id number]   YOB: 1975   Date of Visit: 6/27/2017       Dear Dr. Bel Garay:    Thank you for referring Yao Alan to me for evaluation. Attached you will find relevant portions of my assessment and plan of care.    If you have questions, please do not hesitate to call me. I look forward to following Yao Alan along with you.    Sincerely,    Thania Thomas MD    Enclosure  CC:  No Recipients    If you would like to receive this communication electronically, please contact [email or web address] or [phone] to request more information on [employer] access.    For providers and/or their staff who would like to refer a patient to [facility], please contact us through our one-stop-shop provider referral line, Aime Domínguez, at [phone].    If you feel you have received this communication in error or would no longer like to receive these types of communications, please e-mail [email or web address]

## 2017-06-28 ENCOUNTER — INFUSION (OUTPATIENT)
Dept: INFUSION THERAPY | Facility: HOSPITAL | Age: 42
End: 2017-06-28
Attending: INTERNAL MEDICINE
Payer: MEDICARE

## 2017-06-28 VITALS
DIASTOLIC BLOOD PRESSURE: 86 MMHG | SYSTOLIC BLOOD PRESSURE: 117 MMHG | TEMPERATURE: 98 F | HEART RATE: 63 BPM | RESPIRATION RATE: 16 BRPM

## 2017-06-28 DIAGNOSIS — C34.91 LUNG CANCER, PRIMARY, WITH METASTASIS FROM LUNG TO OTHER SITE, RIGHT: ICD-10-CM

## 2017-06-28 DIAGNOSIS — D50.0 IRON DEFICIENCY ANEMIA DUE TO CHRONIC BLOOD LOSS: Primary | ICD-10-CM

## 2017-06-28 DIAGNOSIS — C79.51 SECONDARY MALIGNANT NEOPLASM OF BONE: ICD-10-CM

## 2017-06-28 DIAGNOSIS — C34.11 MALIGNANT NEOPLASM OF UPPER LOBE OF RIGHT LUNG: ICD-10-CM

## 2017-06-28 PROCEDURE — 25000003 PHARM REV CODE 250: Performed by: INTERNAL MEDICINE

## 2017-06-28 PROCEDURE — 63600175 PHARM REV CODE 636 W HCPCS: Performed by: INTERNAL MEDICINE

## 2017-06-28 PROCEDURE — 96413 CHEMO IV INFUSION 1 HR: CPT

## 2017-06-28 RX ORDER — SODIUM CHLORIDE 0.9 % (FLUSH) 0.9 %
10 SYRINGE (ML) INJECTION
Status: DISCONTINUED | OUTPATIENT
Start: 2017-06-28 | End: 2017-06-28 | Stop reason: HOSPADM

## 2017-06-28 RX ORDER — HEPARIN 100 UNIT/ML
500 SYRINGE INTRAVENOUS
Status: DISCONTINUED | OUTPATIENT
Start: 2017-06-28 | End: 2017-06-28 | Stop reason: HOSPADM

## 2017-06-28 RX ADMIN — SODIUM CHLORIDE: 9 INJECTION, SOLUTION INTRAVENOUS at 08:06

## 2017-06-28 RX ADMIN — HEPARIN 500 UNITS: 100 SYRINGE at 09:06

## 2017-06-28 RX ADMIN — SODIUM CHLORIDE 240 MG: 9 INJECTION, SOLUTION INTRAVENOUS at 08:06

## 2017-06-28 NOTE — PLAN OF CARE
Problem: Chemotherapy Effects (Adult)  Goal: Signs and Symptoms of Listed Potential Problems Will be Absent or Manageable (Chemotherapy Effects)  Signs and symptoms of listed potential problems will be absent or manageable by discharge/transition of care (reference Chemotherapy Effects (Adult) CPG).   Outcome: Ongoing (interventions implemented as appropriate)  Patient here for Opdivo.  Assessment complete and labs reviewed.  VSS.  No needs expressed at this time.  Will continue to monitor.

## 2017-07-11 ENCOUNTER — LAB VISIT (OUTPATIENT)
Dept: LAB | Facility: HOSPITAL | Age: 42
End: 2017-07-11
Attending: INTERNAL MEDICINE
Payer: MEDICARE

## 2017-07-11 ENCOUNTER — OFFICE VISIT (OUTPATIENT)
Dept: HEMATOLOGY/ONCOLOGY | Facility: CLINIC | Age: 42
End: 2017-07-11
Payer: MEDICARE

## 2017-07-11 VITALS
WEIGHT: 184.31 LBS | DIASTOLIC BLOOD PRESSURE: 85 MMHG | BODY MASS INDEX: 29.62 KG/M2 | SYSTOLIC BLOOD PRESSURE: 137 MMHG | RESPIRATION RATE: 16 BRPM | OXYGEN SATURATION: 97 % | HEIGHT: 66 IN | TEMPERATURE: 98 F | HEART RATE: 76 BPM

## 2017-07-11 DIAGNOSIS — G89.3 NEOPLASM RELATED PAIN: ICD-10-CM

## 2017-07-11 DIAGNOSIS — C34.11 MALIGNANT NEOPLASM OF UPPER LOBE OF RIGHT LUNG: Primary | ICD-10-CM

## 2017-07-11 DIAGNOSIS — C34.11 MALIGNANT NEOPLASM OF UPPER LOBE OF RIGHT LUNG: ICD-10-CM

## 2017-07-11 DIAGNOSIS — C79.51 SECONDARY MALIGNANT NEOPLASM OF BONE: ICD-10-CM

## 2017-07-11 DIAGNOSIS — Z51.11 ENCOUNTER FOR ANTINEOPLASTIC CHEMOTHERAPY: ICD-10-CM

## 2017-07-11 DIAGNOSIS — E03.9 HYPOTHYROIDISM, UNSPECIFIED TYPE: ICD-10-CM

## 2017-07-11 LAB
ALBUMIN SERPL BCP-MCNC: 3.7 G/DL
ALP SERPL-CCNC: 63 U/L
ALT SERPL W/O P-5'-P-CCNC: 17 U/L
ANION GAP SERPL CALC-SCNC: 11 MMOL/L
AST SERPL-CCNC: 20 U/L
BILIRUB SERPL-MCNC: 0.2 MG/DL
BUN SERPL-MCNC: 15 MG/DL
CALCIUM SERPL-MCNC: 9.3 MG/DL
CHLORIDE SERPL-SCNC: 106 MMOL/L
CO2 SERPL-SCNC: 24 MMOL/L
CREAT SERPL-MCNC: 1.4 MG/DL
ERYTHROCYTE [DISTWIDTH] IN BLOOD BY AUTOMATED COUNT: 14.6 %
EST. GFR  (AFRICAN AMERICAN): >60 ML/MIN/1.73 M^2
EST. GFR  (NON AFRICAN AMERICAN): >60 ML/MIN/1.73 M^2
GLUCOSE SERPL-MCNC: 102 MG/DL
HCT VFR BLD AUTO: 43.5 %
HGB BLD-MCNC: 14.5 G/DL
MCH RBC QN AUTO: 27.5 PG
MCHC RBC AUTO-ENTMCNC: 33.3 %
MCV RBC AUTO: 83 FL
NEUTROPHILS # BLD AUTO: 4 K/UL
PLATELET # BLD AUTO: 237 K/UL
PMV BLD AUTO: 9.9 FL
POTASSIUM SERPL-SCNC: 3.6 MMOL/L
PROT SERPL-MCNC: 7.3 G/DL
RBC # BLD AUTO: 5.27 M/UL
SODIUM SERPL-SCNC: 141 MMOL/L
T4 FREE SERPL-MCNC: 0.9 NG/DL
TSH SERPL DL<=0.005 MIU/L-ACNC: 6.69 UIU/ML
WBC # BLD AUTO: 8.35 K/UL

## 2017-07-11 PROCEDURE — 99214 OFFICE O/P EST MOD 30 MIN: CPT | Mod: PBBFAC | Performed by: INTERNAL MEDICINE

## 2017-07-11 PROCEDURE — 84443 ASSAY THYROID STIM HORMONE: CPT

## 2017-07-11 PROCEDURE — 99215 OFFICE O/P EST HI 40 MIN: CPT | Mod: S$PBB,,, | Performed by: INTERNAL MEDICINE

## 2017-07-11 PROCEDURE — 84439 ASSAY OF FREE THYROXINE: CPT

## 2017-07-11 PROCEDURE — 85027 COMPLETE CBC AUTOMATED: CPT

## 2017-07-11 PROCEDURE — 80053 COMPREHEN METABOLIC PANEL: CPT

## 2017-07-11 PROCEDURE — 99999 PR PBB SHADOW E&M-EST. PATIENT-LVL IV: CPT | Mod: PBBFAC,,, | Performed by: INTERNAL MEDICINE

## 2017-07-11 PROCEDURE — 36415 COLL VENOUS BLD VENIPUNCTURE: CPT

## 2017-07-11 RX ORDER — SODIUM CHLORIDE 0.9 % (FLUSH) 0.9 %
10 SYRINGE (ML) INJECTION
Status: CANCELLED | OUTPATIENT
Start: 2017-07-12

## 2017-07-11 RX ORDER — HEPARIN 100 UNIT/ML
500 SYRINGE INTRAVENOUS
Status: CANCELLED | OUTPATIENT
Start: 2017-07-12

## 2017-07-11 RX ORDER — OXYCODONE AND ACETAMINOPHEN 10; 325 MG/1; MG/1
1 TABLET ORAL EVERY 6 HOURS PRN
Qty: 120 TABLET | Refills: 0 | Status: SHIPPED | OUTPATIENT
Start: 2017-07-11 | End: 2017-08-03 | Stop reason: SDUPTHER

## 2017-07-11 NOTE — PROGRESS NOTES
"Subjective:       Patient ID: Yao Alan is a 42 y.o. male.    Chief Complaint: Lung Cancer and L wrist pain/swelling and lower back pain 3/10  Oncologic History:  Mr. Yao Alan is a 42-year-old male who has a long history of smoking and was seen in the Pulmonary Clinic by Dr. Valle on 03/05/2015 with abnormal CT scan.    Apparently, he went to the [facility] in February with coughing as well as chest pain. A chest x-ray was done, which revealed a left upper lobe lung mass. Followup CT scan was done on 02/12/2015 and that revealed posterior superior mediastinal mass adjacent and prominent enlarged upper left mediastinal lymph nodes, abutting the aortic arch as well as left subclavian artery. A  CT scan of the abdomen was done on 03/03/2015 without contrast and that revealed nonobstructive nephrolithiasis, but a 2.1 cm lytic lesion involving the left iliac wing concerning for metastatic disease given the presence of emphysema and a mediastinal soft tissue mass on the CT chest from 02/12/2015. He saw Dr. Valle after that on 03/05/2015 and underwent an IR guided biopsy of the bone lesion on 03/17/2015. 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    His EGFR/EML/ALK is negative.    His PET scan on 3/25/15 revealed Left upper lobe lung lesion with an adjacent para-aortic lymph node, right anterior rib metastasis, right iliac metastasis, and pancreatic metastasis. A pancreatic cancer primary neoplasm is less likely.  MRI brain was negative for mets.  He completed 6 cycles of Carboplatin and Alimta and was on maintenance Alimta until end of March 2016.  His bone scan from 3/16 revealed stable disease. His CT scans also from end of March 2016 revealed "Interval enlargement of left upper lobe lung mass measuring 5.9 x 3.9 cm (previously 3.3 x 2.5 cm). This mass appears to invade the mediastinum and abutting the aortic arch and left subclavian artery"  He is now on Opdivo.  CT CAP " "from 6/22/16 s/p 6 cycles of Opdivo reveals "In this patient with a history of metastatic lung cancer, the previously identified paramediastinal left upper lobe lung mass has significantly decreased in size with only a trace amount of residual soft tissue opacities seen measuring 3.2 x 1.4 cm (axial series 2, image 16).Stable lytic lesion in the right iliac wing, unchanged.The right anterior sixth rib lesion and pancreatic head abnormality are not definitely appreciated on current examination."  Bone scan from 6/23/16 reveals "Stable activity in the right sixth rib anteriorly and the right iliac bone.No new metastatic lesions visualized."  10/3/16- CT scans reveal "In this patient with a history of metastatic lung cancer, the previously identified paramediastinal left upper lobe lung mass has decreased in size with only a trace amount of residual soft tissue opacity as above.  Stable lytic lesion in the right iliac wing, unchanged"  Bone scan reveals "Right superior anterior iliac bone lesion is stable and the right sixth rib has normalized. Recent dental procedure versus periodontal disease and possible right mastoiditis".     1/9/17 CT chest/abdomen/pelvis reveals "1. In this patient with history of metastatic lung cancer, the previously identified left upper paramediastinal lesion demonstrates near complete resolution with 1.0 x 0.7 cm residual disease (2.7 x 0.8 cm previously). Additionally, there is improving sclerotic lesion in the right iliac wing consistent with treated metastatic disease.  No evidence of new lesions in the chest, abdomen or pelvis. 2. Apical predominant paraseptal emphysematous changes and bullae. 3. Additional findings as above."     3/3/17 MRI brain revealed "Stable exam. No new enhancing mass lesion to suggest intracranial metastatic disease."     CT scans from 3/20/17 which reveal "No measurable lesion identified within the left upper paramediastinal region, the location of this " "patient's lung cancer, suggesting favorable response to treatment . No new lung lesions identified. Sclerotic lesion within the right iliac wing appears unchanged. Stable appearing centrilobular and paraseptal emphysema".        HPI Mr. Alan returns for follow up and for Opdivo. He feels well and denies any new complaints  He denies any nausea, vomiting, diarrhea, constipation, abdominal pain, weight loss or loss of appetite, chest pain, shortness of breath, leg swelling, fatigue, pain, headache, dizziness, or mood changes. His ECOG PS is zero. He is by himself today           Review of Systems   Constitutional: Negative for appetite change, fatigue and unexpected weight change.   HENT: Negative for mouth sores.    Eyes: Negative for visual disturbance.   Respiratory: Negative for cough and shortness of breath.    Cardiovascular: Negative for chest pain.   Gastrointestinal: Negative for abdominal pain and diarrhea.   Genitourinary: Negative for frequency.   Musculoskeletal: Negative for back pain.   Skin: Negative for rash.   Neurological: Negative for headaches.   Hematological: Negative for adenopathy.   Psychiatric/Behavioral: The patient is not nervous/anxious.    All other systems reviewed and are negative.      Objective:      Physical Exam   Constitutional: He is oriented to person, place, and time. He appears well-developed and well-nourished.   HENT:   Mouth/Throat: No oropharyngeal exudate.   Cardiovascular: Normal rate and normal heart sounds.    Pulmonary/Chest: Effort normal and breath sounds normal. He has no wheezes.   Abdominal: Soft. Bowel sounds are normal. There is no tenderness.   Musculoskeletal: He exhibits no edema or tenderness.   Lymphadenopathy:     He has no cervical adenopathy.   Neurological: He is alert and oriented to person, place, and time. Coordination normal.   Skin: Skin is warm and dry. No rash noted.   Psychiatric: He has a normal mood and affect. Judgment and thought content " normal.   Vitals reviewed.        LABS:  WBC   Date Value Ref Range Status   07/11/2017 8.35 3.90 - 12.70 K/uL Final     Hemoglobin   Date Value Ref Range Status   07/11/2017 14.5 14.0 - 18.0 g/dL Final     Hematocrit   Date Value Ref Range Status   07/11/2017 43.5 40.0 - 54.0 % Final     Platelets   Date Value Ref Range Status   07/11/2017 237 150 - 350 K/uL Final     Gran #   Date Value Ref Range Status   07/11/2017 4.0 1.8 - 7.7 K/uL Final     Comment:     The ANC is based on a white cell differential from an   automated cell counter. It has not been microscopically   reviewed for the presence of abnormal cells. Clinical   correlation is required.         Chemistry        Component Value Date/Time     07/11/2017 0719    K 3.6 07/11/2017 0719     07/11/2017 0719    CO2 24 07/11/2017 0719    BUN 15 07/11/2017 0719    CREATININE 1.4 07/11/2017 0719     07/11/2017 0719        Component Value Date/Time    CALCIUM 9.3 07/11/2017 0719    ALKPHOS 63 07/11/2017 0719    AST 20 07/11/2017 0719    ALT 17 07/11/2017 0719    BILITOT 0.2 07/11/2017 0719    ESTGFRAFRICA >60.0 07/11/2017 0719    EGFRNONAA >60.0 07/11/2017 0719        TSH   Date Value Ref Range Status   07/11/2017 6.687 (H) 0.400 - 4.000 uIU/mL Final     Free T4   Date Value Ref Range Status   07/11/2017 0.90 0.71 - 1.51 ng/dL Final     Assessment:       1. Malignant neoplasm of upper lobe of right lung    2. Secondary malignant neoplasm of bone    3. Encounter for antineoplastic chemotherapy    4. Neoplasm related pain        Plan:        1,2,3. She will proceed with Opdivo and Xgeva and will return in 2 weeks.  4. Stable on meds.    Above care plan was discussed with patient and all questions were addressed to his satisfaction

## 2017-07-12 ENCOUNTER — INFUSION (OUTPATIENT)
Dept: INFUSION THERAPY | Facility: HOSPITAL | Age: 42
End: 2017-07-12
Attending: INTERNAL MEDICINE
Payer: MEDICARE

## 2017-07-12 VITALS
HEART RATE: 68 BPM | DIASTOLIC BLOOD PRESSURE: 80 MMHG | TEMPERATURE: 98 F | RESPIRATION RATE: 16 BRPM | SYSTOLIC BLOOD PRESSURE: 114 MMHG

## 2017-07-12 DIAGNOSIS — C34.11 MALIGNANT NEOPLASM OF UPPER LOBE OF RIGHT LUNG: ICD-10-CM

## 2017-07-12 DIAGNOSIS — D50.0 IRON DEFICIENCY ANEMIA DUE TO CHRONIC BLOOD LOSS: Primary | ICD-10-CM

## 2017-07-12 DIAGNOSIS — C34.91 LUNG CANCER, PRIMARY, WITH METASTASIS FROM LUNG TO OTHER SITE, RIGHT: ICD-10-CM

## 2017-07-12 DIAGNOSIS — C79.51 SECONDARY MALIGNANT NEOPLASM OF BONE: ICD-10-CM

## 2017-07-12 PROCEDURE — 96372 THER/PROPH/DIAG INJ SC/IM: CPT

## 2017-07-12 PROCEDURE — 63600175 PHARM REV CODE 636 W HCPCS: Performed by: INTERNAL MEDICINE

## 2017-07-12 PROCEDURE — 25000003 PHARM REV CODE 250: Performed by: INTERNAL MEDICINE

## 2017-07-12 PROCEDURE — 96413 CHEMO IV INFUSION 1 HR: CPT

## 2017-07-12 RX ORDER — HEPARIN 100 UNIT/ML
500 SYRINGE INTRAVENOUS
Status: DISCONTINUED | OUTPATIENT
Start: 2017-07-12 | End: 2017-07-12 | Stop reason: HOSPADM

## 2017-07-12 RX ORDER — SODIUM CHLORIDE 0.9 % (FLUSH) 0.9 %
10 SYRINGE (ML) INJECTION
Status: DISCONTINUED | OUTPATIENT
Start: 2017-07-12 | End: 2017-07-12 | Stop reason: HOSPADM

## 2017-07-12 RX ADMIN — SODIUM CHLORIDE: 9 INJECTION, SOLUTION INTRAVENOUS at 08:07

## 2017-07-12 RX ADMIN — HEPARIN 500 UNITS: 100 SYRINGE at 09:07

## 2017-07-12 RX ADMIN — DENOSUMAB 120 MG: 120 INJECTION SUBCUTANEOUS at 08:07

## 2017-07-12 RX ADMIN — SODIUM CHLORIDE 240 MG: 9 INJECTION, SOLUTION INTRAVENOUS at 08:07

## 2017-07-12 NOTE — PLAN OF CARE
Problem: Chemotherapy Effects (Adult)  Goal: Signs and Symptoms of Listed Potential Problems Will be Absent or Manageable (Chemotherapy Effects)  Signs and symptoms of listed potential problems will be absent or manageable by discharge/transition of care (reference Chemotherapy Effects (Adult) CPG).   Outcome: Ongoing (interventions implemented as appropriate)  Patient here for Opdivo and Xgeva.  Assessment complete and labs reviewed.  VSS.  Patient endorses taking home vitamin D and calcium; denies jaw pain.  No needs expressed at this time.  Will continue to monitor.

## 2017-07-12 NOTE — PLAN OF CARE
Problem: Patient Care Overview (Adult)  Goal: Plan of Care Review  Outcome: Ongoing (interventions implemented as appropriate)  Patient tolerated infusion well.  No reaction suspected.  AVS given to patient.  Patient ambulated off unit unassisted.

## 2017-07-25 ENCOUNTER — OFFICE VISIT (OUTPATIENT)
Dept: HEMATOLOGY/ONCOLOGY | Facility: CLINIC | Age: 42
End: 2017-07-25
Payer: MEDICARE

## 2017-07-25 VITALS
HEART RATE: 74 BPM | WEIGHT: 184.94 LBS | DIASTOLIC BLOOD PRESSURE: 81 MMHG | OXYGEN SATURATION: 99 % | SYSTOLIC BLOOD PRESSURE: 121 MMHG | TEMPERATURE: 98 F | BODY MASS INDEX: 29.72 KG/M2 | HEIGHT: 66 IN | RESPIRATION RATE: 17 BRPM

## 2017-07-25 DIAGNOSIS — E03.9 HYPOTHYROIDISM, UNSPECIFIED TYPE: ICD-10-CM

## 2017-07-25 DIAGNOSIS — Z51.11 ENCOUNTER FOR ANTINEOPLASTIC CHEMOTHERAPY: ICD-10-CM

## 2017-07-25 DIAGNOSIS — C79.51 SECONDARY MALIGNANT NEOPLASM OF BONE: ICD-10-CM

## 2017-07-25 DIAGNOSIS — C34.11 MALIGNANT NEOPLASM OF UPPER LOBE OF RIGHT LUNG: Primary | ICD-10-CM

## 2017-07-25 PROCEDURE — 99999 PR PBB SHADOW E&M-EST. PATIENT-LVL IV: CPT | Mod: PBBFAC,,, | Performed by: INTERNAL MEDICINE

## 2017-07-25 PROCEDURE — 99215 OFFICE O/P EST HI 40 MIN: CPT | Mod: S$PBB,,, | Performed by: INTERNAL MEDICINE

## 2017-07-25 RX ORDER — HEPARIN 100 UNIT/ML
500 SYRINGE INTRAVENOUS
Status: CANCELLED | OUTPATIENT
Start: 2017-07-26

## 2017-07-25 RX ORDER — SODIUM CHLORIDE 0.9 % (FLUSH) 0.9 %
10 SYRINGE (ML) INJECTION
Status: CANCELLED | OUTPATIENT
Start: 2017-07-26

## 2017-07-25 NOTE — PROGRESS NOTES
"Subjective:       Patient ID: Yao Alan is a 42 y.o. male.    Chief Complaint: Lung Cancer  Oncologic History:  Mr. Yao Alan is a 42-year-old male who has a long history of smoking and was seen in the Pulmonary Clinic by Dr. Valle on 03/05/2015 with abnormal CT scan.    Apparently, he went to the [facility] in February with coughing as well as chest pain. A chest x-ray was done, which revealed a left upper lobe lung mass. Followup CT scan was done on 02/12/2015 and that revealed posterior superior mediastinal mass adjacent and prominent enlarged upper left mediastinal lymph nodes, abutting the aortic arch as well as left subclavian artery. A  CT scan of the abdomen was done on 03/03/2015 without contrast and that revealed nonobstructive nephrolithiasis, but a 2.1 cm lytic lesion involving the left iliac wing concerning for metastatic disease given the presence of emphysema and a mediastinal soft tissue mass on the CT chest from 02/12/2015. He saw Dr. Valle after that on 03/05/2015 and underwent an IR guided biopsy of the bone lesion on 03/17/2015. 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    His EGFR/EML/ALK is negative.    His PET scan on 3/25/15 revealed Left upper lobe lung lesion with an adjacent para-aortic lymph node, right anterior rib metastasis, right iliac metastasis, and pancreatic metastasis. A pancreatic cancer primary neoplasm is less likely.  MRI brain was negative for mets.  He completed 6 cycles of Carboplatin and Alimta and was on maintenance Alimta until end of March 2016.  His bone scan from 3/16 revealed stable disease. His CT scans also from end of March 2016 revealed "Interval enlargement of left upper lobe lung mass measuring 5.9 x 3.9 cm (previously 3.3 x 2.5 cm). This mass appears to invade the mediastinum and abutting the aortic arch and left subclavian artery"  He is now on Opdivo.  CT CAP from 6/22/16 s/p 6 cycles of Opdivo reveals "In " "this patient with a history of metastatic lung cancer, the previously identified paramediastinal left upper lobe lung mass has significantly decreased in size with only a trace amount of residual soft tissue opacities seen measuring 3.2 x 1.4 cm (axial series 2, image 16).Stable lytic lesion in the right iliac wing, unchanged.The right anterior sixth rib lesion and pancreatic head abnormality are not definitely appreciated on current examination."  Bone scan from 6/23/16 reveals "Stable activity in the right sixth rib anteriorly and the right iliac bone.No new metastatic lesions visualized."  10/3/16- CT scans reveal "In this patient with a history of metastatic lung cancer, the previously identified paramediastinal left upper lobe lung mass has decreased in size with only a trace amount of residual soft tissue opacity as above.  Stable lytic lesion in the right iliac wing, unchanged"  Bone scan reveals "Right superior anterior iliac bone lesion is stable and the right sixth rib has normalized. Recent dental procedure versus periodontal disease and possible right mastoiditis".     1/9/17 CT chest/abdomen/pelvis reveals "1. In this patient with history of metastatic lung cancer, the previously identified left upper paramediastinal lesion demonstrates near complete resolution with 1.0 x 0.7 cm residual disease (2.7 x 0.8 cm previously). Additionally, there is improving sclerotic lesion in the right iliac wing consistent with treated metastatic disease.  No evidence of new lesions in the chest, abdomen or pelvis. 2. Apical predominant paraseptal emphysematous changes and bullae. 3. Additional findings as above."     3/3/17 MRI brain revealed "Stable exam. No new enhancing mass lesion to suggest intracranial metastatic disease."     CT scans from 3/20/17 which reveal "No measurable lesion identified within the left upper paramediastinal region, the location of this patient's lung cancer, suggesting favorable response to " "treatment . No new lung lesions identified. Sclerotic lesion within the right iliac wing appears unchanged. Stable appearing centrilobular and paraseptal emphysema".         HPI Mr. Alan returns for follow up and for Opdivo. He feels well and denies any new issues.  He denies any nausea, vomiting, diarrhea, constipation, abdominal pain, weight loss or loss of appetite, chest pain, shortness of breath, leg swelling, fatigue, pain, headache, dizziness, or mood changes. His ECOG PS is zero. He is accompanied by his son.                Review of Systems   Constitutional: Negative for appetite change, fatigue and unexpected weight change.   HENT: Negative for mouth sores.    Eyes: Negative for visual disturbance.   Respiratory: Negative for cough and shortness of breath.    Cardiovascular: Negative for chest pain.   Gastrointestinal: Negative for abdominal pain and diarrhea.   Genitourinary: Negative for frequency.   Musculoskeletal: Negative for back pain.   Skin: Negative for rash.   Neurological: Negative for headaches.   Hematological: Negative for adenopathy.   Psychiatric/Behavioral: The patient is not nervous/anxious.    All other systems reviewed and are negative.      Objective:      Physical Exam   Constitutional: He is oriented to person, place, and time. He appears well-developed and well-nourished.   HENT:   Mouth/Throat: No oropharyngeal exudate.   Cardiovascular: Normal rate and normal heart sounds.    Pulmonary/Chest: Effort normal and breath sounds normal. He has no wheezes.   Abdominal: Soft. Bowel sounds are normal. There is no tenderness.   Musculoskeletal: He exhibits no edema or tenderness.   Lymphadenopathy:     He has no cervical adenopathy.   Neurological: He is alert and oriented to person, place, and time. Coordination normal.   Skin: Skin is warm and dry. No rash noted.   Psychiatric: He has a normal mood and affect. Judgment and thought content normal.   Vitals reviewed.        LABS:  WBC "   Date Value Ref Range Status   07/25/2017 7.59 3.90 - 12.70 K/uL Final     Hemoglobin   Date Value Ref Range Status   07/25/2017 14.3 14.0 - 18.0 g/dL Final     Hematocrit   Date Value Ref Range Status   07/25/2017 43.5 40.0 - 54.0 % Final     Platelets   Date Value Ref Range Status   07/25/2017 222 150 - 350 K/uL Final     Gran #   Date Value Ref Range Status   07/25/2017 3.7 1.8 - 7.7 K/uL Final     Comment:     The ANC is based on a white cell differential from an   automated cell counter. It has not been microscopically   reviewed for the presence of abnormal cells. Clinical   correlation is required.         Chemistry        Component Value Date/Time     07/25/2017 0715    K 3.9 07/25/2017 0715     07/25/2017 0715    CO2 25 07/25/2017 0715    BUN 15 07/25/2017 0715    CREATININE 1.5 (H) 07/25/2017 0715    GLU 85 07/25/2017 0715        Component Value Date/Time    CALCIUM 9.4 07/25/2017 0715    ALKPHOS 66 07/25/2017 0715    AST 24 07/25/2017 0715    ALT 28 07/25/2017 0715    BILITOT 0.3 07/25/2017 0715    ESTGFRAFRICA >60.0 07/25/2017 0715    EGFRNONAA 56.6 (A) 07/25/2017 0715        TSH   Date Value Ref Range Status   07/11/2017 6.687 (H) 0.400 - 4.000 uIU/mL Final     Free T4   Date Value Ref Range Status   07/11/2017 0.90 0.71 - 1.51 ng/dL Final     Assessment:       1. Malignant neoplasm of upper lobe of right lung    2. Secondary malignant neoplasm of bone    3. Encounter for antineoplastic chemotherapy    4. Hypothyroidism, unspecified type        Plan:        1,2,3. He is doing well clinically and will proceed with Opdivo and will return in 2 weeks for next cycle of Opdivo and Xgeva. Will plan on restaging CT scans at the end of August 2017  4. Recheck next time.    Above care plan was discussed with patient and accompanying son and all questions were addressed to their satisfaction

## 2017-07-26 ENCOUNTER — INFUSION (OUTPATIENT)
Dept: INFUSION THERAPY | Facility: HOSPITAL | Age: 42
End: 2017-07-26
Attending: INTERNAL MEDICINE
Payer: MEDICARE

## 2017-07-26 VITALS
HEART RATE: 65 BPM | BODY MASS INDEX: 29.72 KG/M2 | TEMPERATURE: 98 F | OXYGEN SATURATION: 99 % | HEIGHT: 66 IN | DIASTOLIC BLOOD PRESSURE: 82 MMHG | WEIGHT: 184.94 LBS | SYSTOLIC BLOOD PRESSURE: 120 MMHG | RESPIRATION RATE: 16 BRPM

## 2017-07-26 DIAGNOSIS — C34.11 MALIGNANT NEOPLASM OF UPPER LOBE OF RIGHT LUNG: ICD-10-CM

## 2017-07-26 DIAGNOSIS — D50.0 IRON DEFICIENCY ANEMIA DUE TO CHRONIC BLOOD LOSS: Primary | ICD-10-CM

## 2017-07-26 DIAGNOSIS — C79.51 SECONDARY MALIGNANT NEOPLASM OF BONE: ICD-10-CM

## 2017-07-26 DIAGNOSIS — C34.91 LUNG CANCER, PRIMARY, WITH METASTASIS FROM LUNG TO OTHER SITE, RIGHT: ICD-10-CM

## 2017-07-26 PROCEDURE — 25000003 PHARM REV CODE 250: Performed by: INTERNAL MEDICINE

## 2017-07-26 PROCEDURE — 96413 CHEMO IV INFUSION 1 HR: CPT

## 2017-07-26 PROCEDURE — A4216 STERILE WATER/SALINE, 10 ML: HCPCS | Performed by: INTERNAL MEDICINE

## 2017-07-26 PROCEDURE — 63600175 PHARM REV CODE 636 W HCPCS: Performed by: INTERNAL MEDICINE

## 2017-07-26 RX ORDER — HEPARIN 100 UNIT/ML
500 SYRINGE INTRAVENOUS
Status: DISCONTINUED | OUTPATIENT
Start: 2017-07-26 | End: 2017-07-26 | Stop reason: HOSPADM

## 2017-07-26 RX ORDER — SODIUM CHLORIDE 0.9 % (FLUSH) 0.9 %
10 SYRINGE (ML) INJECTION
Status: DISCONTINUED | OUTPATIENT
Start: 2017-07-26 | End: 2017-07-26 | Stop reason: HOSPADM

## 2017-07-26 RX ADMIN — SODIUM CHLORIDE, PRESERVATIVE FREE 10 ML: 5 INJECTION INTRAVENOUS at 09:07

## 2017-07-26 RX ADMIN — SODIUM CHLORIDE: 9 INJECTION, SOLUTION INTRAVENOUS at 07:07

## 2017-07-26 RX ADMIN — SODIUM CHLORIDE 240 MG: 9 INJECTION, SOLUTION INTRAVENOUS at 08:07

## 2017-07-26 RX ADMIN — HEPARIN 500 UNITS: 100 SYRINGE at 09:07

## 2017-07-26 NOTE — PLAN OF CARE
Problem: Patient Care Overview (Adult)  Goal: Plan of Care Review  Outcome: Outcome(s) achieved Date Met: 07/26/17  Pt arrived for Opdivo chemo infusion. PAC to RCW accessed X 1 attempt, + blood return noted, flushes easily. Pt tolerated treatment very well. PAC hep flushed & de accessed w/o difficulty. Pt D/C'd home with instructions

## 2017-08-03 DIAGNOSIS — E03.9 HYPOTHYROIDISM, UNSPECIFIED TYPE: ICD-10-CM

## 2017-08-03 DIAGNOSIS — G89.3 NEOPLASM RELATED PAIN: ICD-10-CM

## 2017-08-03 RX ORDER — OXYCODONE AND ACETAMINOPHEN 10; 325 MG/1; MG/1
1 TABLET ORAL EVERY 6 HOURS PRN
Qty: 120 TABLET | Refills: 0 | Status: SHIPPED | OUTPATIENT
Start: 2017-08-03 | End: 2017-08-18 | Stop reason: SDUPTHER

## 2017-08-03 RX ORDER — LEVOTHYROXINE SODIUM 75 UG/1
75 TABLET ORAL DAILY
Qty: 30 TABLET | Refills: 11 | Status: SHIPPED | OUTPATIENT
Start: 2017-08-03 | End: 2017-08-18 | Stop reason: SDUPTHER

## 2017-08-03 NOTE — TELEPHONE ENCOUNTER
----- Message from Wilfrid Garcia sent at 8/3/2017 10:45 AM CDT -----  Contact: Pt   Pt would like a call back from Dr. Garay    Pt did not state reason for call back    Can be reached at [phone]

## 2017-08-03 NOTE — TELEPHONE ENCOUNTER
Spoke with patient.   he is out of percocet and synthroid.  Requesting scripts to be sent to [facility].  Patient thanked nurse.    Message routed to sonal dejesus

## 2017-08-04 ENCOUNTER — HOSPITAL ENCOUNTER (EMERGENCY)
Facility: HOSPITAL | Age: 42
Discharge: HOME | End: 2017-08-04
Attending: EMERGENCY MEDICINE
Payer: MEDICARE

## 2017-08-04 VITALS
OXYGEN SATURATION: 100 % | RESPIRATION RATE: 15 BRPM | SYSTOLIC BLOOD PRESSURE: 123 MMHG | BODY MASS INDEX: 29.73 KG/M2 | WEIGHT: 185 LBS | HEIGHT: 66 IN | TEMPERATURE: 98 F | HEART RATE: 73 BPM | DIASTOLIC BLOOD PRESSURE: 76 MMHG

## 2017-08-04 DIAGNOSIS — R21 RASH: Primary | ICD-10-CM

## 2017-08-04 DIAGNOSIS — Z92.21 PERSONAL HISTORY OF CHEMOTHERAPY: ICD-10-CM

## 2017-08-04 PROCEDURE — 99283 EMERGENCY DEPT VISIT LOW MDM: CPT

## 2017-08-04 PROCEDURE — 25000003 PHARM REV CODE 250: Performed by: PHYSICIAN ASSISTANT

## 2017-08-04 RX ORDER — DIPHENHYDRAMINE HCL 25 MG
25 CAPSULE ORAL
Status: COMPLETED | OUTPATIENT
Start: 2017-08-04 | End: 2017-08-04

## 2017-08-04 RX ORDER — DIPHENHYDRAMINE HCL 25 MG
25 CAPSULE ORAL EVERY 6 HOURS PRN
Qty: 12 CAPSULE | Refills: 0 | Status: SHIPPED | OUTPATIENT
Start: 2017-08-04 | End: 2019-09-24

## 2017-08-04 RX ADMIN — DIPHENHYDRAMINE HYDROCHLORIDE 25 MG: 25 CAPSULE ORAL at 05:08

## 2017-08-04 NOTE — ED TRIAGE NOTES
C/o insite bite to upper right thigh since today pt denies fever and drainage pt reports taken PCN and appyling blue star cream pt reports wound gradually getting worst.

## 2017-08-05 NOTE — ED PROVIDER NOTES
"Encounter Date: 8/4/2017    SCRIBE #1 NOTE: I, Miguel Gamble PA-C, am scribing for, and in the presence of,  Monica Morel. I have scribed the following portions of the note - Other sections scribed: HPI and ROS.       History     Chief Complaint   Patient presents with    Abscess     abscess started today.  to right upper thigh area.     CC: "Bump"    HPI: This 42 y.o. male with medical history of asthma, hypertension, lung mass, lung cancer, hypertension, COPD and thyroid disease presents to the ED c/o a "bump" to the medial right thigh that began today. Pt reports associated redness to the area, but does experiencing pain. He notes that he is currently receiving chemotherapy treatment and has an appointment scheduled with his oncologist for Tuesday. He adds that he is also currently taking Xarelto. Pt denies drainage, fever, abdominal pain, emesis, chest pain, shortness of breath and recent use of antibiotics. No other associated symptoms.               Review of patient's allergies indicates:   Allergen Reactions    Nsaids (non-steroidal anti-inflammatory drug)      Patient says since been diagnosed with lung mass on 2-12-15, if take any NSAIDS he spikes a fever.    Tylenol [acetaminophen] Other (See Comments)     Sweating +     Past Medical History:   Diagnosis Date    Asthma     COPD (chronic obstructive pulmonary disease)     HTN (hypertension)     Hypertension     Lung cancer     Lung mass     Thyroid disease      Past Surgical History:   Procedure Laterality Date    FRACTURE SURGERY      finger    LUNG CANCER SURGERY Right March 2015    lung biopsy      Family History   Problem Relation Age of Onset    Hypertension Father     No Known Problems Mother     No Known Problems Sister     No Known Problems Brother     No Known Problems Maternal Aunt     No Known Problems Maternal Uncle     No Known Problems Paternal Aunt     No Known Problems Paternal Uncle     No Known Problems Maternal " "Grandmother     No Known Problems Maternal Grandfather     No Known Problems Paternal Grandmother     No Known Problems Paternal Grandfather     Amblyopia Neg Hx     Blindness Neg Hx     Cancer Neg Hx     Cataracts Neg Hx     Diabetes Neg Hx     Glaucoma Neg Hx     Macular degeneration Neg Hx     Retinal detachment Neg Hx     Strabismus Neg Hx     Stroke Neg Hx     Thyroid disease Neg Hx      Social History   Substance Use Topics    Smoking status: Former Smoker     Packs/day: 0.75     Years: 25.00     Types: Cigarettes     Quit date: 12/2/2014    Smokeless tobacco: Never Used      Comment: Patient is no longer smoking    Alcohol use No     Review of Systems   Constitutional: Negative for fever.   HENT: Negative for sore throat.    Respiratory: Negative for shortness of breath.    Cardiovascular: Negative for chest pain.   Gastrointestinal: Negative for nausea.   Genitourinary: Negative for dysuria.   Musculoskeletal: Negative for back pain.   Skin: Positive for wound ("bump" to the right medial thigh with redness). Negative for rash.   Neurological: Negative for weakness.       Physical Exam     Initial Vitals [08/04/17 1652]   BP Pulse Resp Temp SpO2   123/76 73 15 98.2 °F (36.8 °C) 100 %      MAP       91.67         Physical Exam    Nursing note and vitals reviewed.  Constitutional: He appears well-developed and well-nourished. He is not diaphoretic. No distress.   HENT:   Head: Normocephalic and atraumatic.   Nose: Nose normal.   Eyes: Conjunctivae and EOM are normal. Right eye exhibits no discharge. Left eye exhibits no discharge.   Neck: Normal range of motion. No tracheal deviation present. No JVD present.   Cardiovascular: Normal rate, regular rhythm and normal heart sounds. Exam reveals no friction rub.    No murmur heard.  Pulmonary/Chest: Breath sounds normal. No stridor. No respiratory distress. He has no wheezes. He has no rhonchi. He has no rales. He exhibits no tenderness. "   Musculoskeletal: Normal range of motion.   Neurological: He is alert and oriented to person, place, and time.   Skin: Skin is warm and dry. No pallor.        Very well demarcated 3cm x 3cm circular flat area of mild erythema to the L proximal anterior thigh. No TTP. No induration or fluctuance.          ED Course   Procedures  Labs Reviewed - No data to display          Medical Decision Making:   History:   Old Medical Records: I decided to obtain old medical records.    This is an emergent evaluation of a 42 y.o. male with a PMHx of metastatic lung cancer (actively in chemotherapy) presenting to the ED for rash associated with itching. Denies fever, pain, and SOB. Vitals WNL, afebrile. Patient is non-toxic appearing and in no acute distress. No abscess. Not cellulitic in nature. Etiology not entirely clear. Dr. Dowling evaluates patient at this time given medical Hx and unclear etiology; advises it is likely allergic in nature. No angioedema, SJS, or anaphylaxis.     Benadryl in ED. Discharged home with Benadryl. Instructed to follow up with oncologist for reevaluation and management of symptoms.     I discussed with the patient the diagnosis, treatment plan, indications for return to the emergency department, and for expected follow-up. The patient verbalized an understanding. The patient is asked if there are any questions or concerns. We discuss the case, until all issues are addressed to the patients satisfaction. Patient understands and is agreeable to the plan.     I discussed this patient with Dr. Dowling who also evaluated the patient and is in agreement with my assessment and plan.           Scribe Attestation:   Scribe #1: I performed the above scribed service and the documentation accurately describes the services I performed. I attest to the accuracy of the note.    Attending Attestation:           Physician Attestation for Scribe:  Physician Attestation Statement for Scribe #1: I, Miguel Gamble PA-C, reviewed  documentation, as scribed by Monica Morel in my presence, and it is both accurate and complete.                 ED Course     Clinical Impression:   The primary encounter diagnosis was Rash. A diagnosis of Personal history of chemotherapy was also pertinent to this visit.    Disposition:   Disposition: Discharged  Condition: Stable                        Navin Bourne PA-C  08/04/17 3246

## 2017-08-08 ENCOUNTER — LAB VISIT (OUTPATIENT)
Dept: LAB | Facility: HOSPITAL | Age: 42
End: 2017-08-08
Attending: INTERNAL MEDICINE
Payer: MEDICARE

## 2017-08-08 ENCOUNTER — OFFICE VISIT (OUTPATIENT)
Dept: HEMATOLOGY/ONCOLOGY | Facility: CLINIC | Age: 42
End: 2017-08-08
Payer: MEDICARE

## 2017-08-08 VITALS
HEART RATE: 70 BPM | DIASTOLIC BLOOD PRESSURE: 80 MMHG | BODY MASS INDEX: 29.3 KG/M2 | OXYGEN SATURATION: 98 % | HEIGHT: 66 IN | RESPIRATION RATE: 18 BRPM | TEMPERATURE: 98 F | SYSTOLIC BLOOD PRESSURE: 130 MMHG | WEIGHT: 182.31 LBS

## 2017-08-08 DIAGNOSIS — C34.11 MALIGNANT NEOPLASM OF UPPER LOBE OF RIGHT LUNG: ICD-10-CM

## 2017-08-08 DIAGNOSIS — C79.51 SECONDARY MALIGNANT NEOPLASM OF BONE: ICD-10-CM

## 2017-08-08 DIAGNOSIS — B35.4 TINEA CORPORIS: ICD-10-CM

## 2017-08-08 DIAGNOSIS — Z51.11 ENCOUNTER FOR ANTINEOPLASTIC CHEMOTHERAPY: ICD-10-CM

## 2017-08-08 DIAGNOSIS — E03.2 HYPOTHYROIDISM DUE TO MEDICATION: ICD-10-CM

## 2017-08-08 DIAGNOSIS — C34.11 MALIGNANT NEOPLASM OF UPPER LOBE OF RIGHT LUNG: Primary | ICD-10-CM

## 2017-08-08 DIAGNOSIS — E03.9 HYPOTHYROIDISM, UNSPECIFIED TYPE: ICD-10-CM

## 2017-08-08 DIAGNOSIS — G89.3 NEOPLASM RELATED PAIN: ICD-10-CM

## 2017-08-08 LAB
ALBUMIN SERPL BCP-MCNC: 3.9 G/DL
ALP SERPL-CCNC: 66 U/L
ALT SERPL W/O P-5'-P-CCNC: 22 U/L
ANION GAP SERPL CALC-SCNC: 13 MMOL/L
AST SERPL-CCNC: 24 U/L
BILIRUB SERPL-MCNC: 0.2 MG/DL
BUN SERPL-MCNC: 14 MG/DL
CALCIUM SERPL-MCNC: 9.1 MG/DL
CHLORIDE SERPL-SCNC: 108 MMOL/L
CO2 SERPL-SCNC: 21 MMOL/L
CREAT SERPL-MCNC: 1.5 MG/DL
ERYTHROCYTE [DISTWIDTH] IN BLOOD BY AUTOMATED COUNT: 14.7 %
EST. GFR  (AFRICAN AMERICAN): >60 ML/MIN/1.73 M^2
EST. GFR  (NON AFRICAN AMERICAN): 56.6 ML/MIN/1.73 M^2
GLUCOSE SERPL-MCNC: 99 MG/DL
HCT VFR BLD AUTO: 42.7 %
HGB BLD-MCNC: 14.4 G/DL
MCH RBC QN AUTO: 27 PG
MCHC RBC AUTO-ENTMCNC: 33.7 G/DL
MCV RBC AUTO: 80 FL
NEUTROPHILS # BLD AUTO: 3.5 K/UL
PLATELET # BLD AUTO: 237 K/UL
PMV BLD AUTO: 10.9 FL
POTASSIUM SERPL-SCNC: 3.7 MMOL/L
PROT SERPL-MCNC: 7.5 G/DL
RBC # BLD AUTO: 5.34 M/UL
SODIUM SERPL-SCNC: 142 MMOL/L
T4 FREE SERPL-MCNC: 0.87 NG/DL
TSH SERPL DL<=0.005 MIU/L-ACNC: 5.98 UIU/ML
WBC # BLD AUTO: 7.57 K/UL

## 2017-08-08 PROCEDURE — 36415 COLL VENOUS BLD VENIPUNCTURE: CPT

## 2017-08-08 PROCEDURE — 84439 ASSAY OF FREE THYROXINE: CPT

## 2017-08-08 PROCEDURE — 99999 PR PBB SHADOW E&M-EST. PATIENT-LVL IV: CPT | Mod: PBBFAC,,, | Performed by: INTERNAL MEDICINE

## 2017-08-08 PROCEDURE — 99214 OFFICE O/P EST MOD 30 MIN: CPT | Mod: PBBFAC | Performed by: INTERNAL MEDICINE

## 2017-08-08 PROCEDURE — 85027 COMPLETE CBC AUTOMATED: CPT

## 2017-08-08 PROCEDURE — 80053 COMPREHEN METABOLIC PANEL: CPT

## 2017-08-08 PROCEDURE — 3079F DIAST BP 80-89 MM HG: CPT | Mod: ,,, | Performed by: INTERNAL MEDICINE

## 2017-08-08 PROCEDURE — 99215 OFFICE O/P EST HI 40 MIN: CPT | Mod: S$PBB,,, | Performed by: INTERNAL MEDICINE

## 2017-08-08 PROCEDURE — 84443 ASSAY THYROID STIM HORMONE: CPT

## 2017-08-08 PROCEDURE — 3075F SYST BP GE 130 - 139MM HG: CPT | Mod: ,,, | Performed by: INTERNAL MEDICINE

## 2017-08-08 RX ORDER — HEPARIN 100 UNIT/ML
500 SYRINGE INTRAVENOUS
Status: CANCELLED | OUTPATIENT
Start: 2017-08-09

## 2017-08-08 RX ORDER — SODIUM CHLORIDE 0.9 % (FLUSH) 0.9 %
10 SYRINGE (ML) INJECTION
Status: CANCELLED | OUTPATIENT
Start: 2017-08-09

## 2017-08-08 RX ORDER — CLOTRIMAZOLE 1 %
CREAM (GRAM) TOPICAL
Qty: 30 G | Refills: 1 | Status: SHIPPED | OUTPATIENT
Start: 2017-08-08 | End: 2019-09-24

## 2017-08-08 NOTE — PROGRESS NOTES
"PATIENT: Yao Alan  MRN: [id number]  DATE: 8/8/2017    Subjective:     Chief complaint:  Chief Complaint   Patient presents with    Lung Cancer       Oncologic History:  Mr. Yao Alan is a 42-year-old male who has a long history of smoking and was seen in the Pulmonary Clinic by Dr. Valle on 03/05/2015 with abnormal CT scan.    Apparently, he went to the [facility] in February with coughing as well as chest pain. A chest x-ray was done, which revealed a left upper lobe lung mass. Followup CT scan was done on 02/12/2015 and that revealed posterior superior mediastinal mass adjacent and prominent enlarged upper left mediastinal lymph nodes, abutting the aortic arch as well as left subclavian artery. A  CT scan of the abdomen was done on 03/03/2015 without contrast and that revealed nonobstructive nephrolithiasis, but a 2.1 cm lytic lesion involving the left iliac wing concerning for metastatic disease given the presence of emphysema and a mediastinal soft tissue mass on the CT chest from 02/12/2015. He saw Dr. Valle after that on 03/05/2015 and underwent an IR guided biopsy of the bone lesion on 03/17/2015. 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    His EGFR/EML/ALK is negative.    His PET scan on 3/25/15 revealed Left upper lobe lung lesion with an adjacent para-aortic lymph node, right anterior rib metastasis, right iliac metastasis, and pancreatic metastasis. A pancreatic cancer primary neoplasm is less likely.  MRI brain was negative for mets.  He completed 6 cycles of Carboplatin and Alimta and was on maintenance Alimta until end of March 2016.  His bone scan from 3/16 revealed stable disease. His CT scans also from end of March 2016 revealed "Interval enlargement of left upper lobe lung mass measuring 5.9 x 3.9 cm (previously 3.3 x 2.5 cm). This mass appears to invade the mediastinum and abutting the aortic arch and left subclavian artery"  He is now on Opdivo.  CT " "CAP from 6/22/16 s/p 6 cycles of Opdivo reveals "In this patient with a history of metastatic lung cancer, the previously identified paramediastinal left upper lobe lung mass has significantly decreased in size with only a trace amount of residual soft tissue opacities seen measuring 3.2 x 1.4 cm (axial series 2, image 16).Stable lytic lesion in the right iliac wing, unchanged.The right anterior sixth rib lesion and pancreatic head abnormality are not definitely appreciated on current examination."  Bone scan from 6/23/16 reveals "Stable activity in the right sixth rib anteriorly and the right iliac bone.No new metastatic lesions visualized."  10/3/16- CT scans reveal "In this patient with a history of metastatic lung cancer, the previously identified paramediastinal left upper lobe lung mass has decreased in size with only a trace amount of residual soft tissue opacity as above.  Stable lytic lesion in the right iliac wing, unchanged"  Bone scan reveals "Right superior anterior iliac bone lesion is stable and the right sixth rib has normalized. Recent dental procedure versus periodontal disease and possible right mastoiditis".     1/9/17 CT chest/abdomen/pelvis reveals "1. In this patient with history of metastatic lung cancer, the previously identified left upper paramediastinal lesion demonstrates near complete resolution with 1.0 x 0.7 cm residual disease (2.7 x 0.8 cm previously). Additionally, there is improving sclerotic lesion in the right iliac wing consistent with treated metastatic disease.  No evidence of new lesions in the chest, abdomen or pelvis. 2. Apical predominant paraseptal emphysematous changes and bullae. 3. Additional findings as above."     3/3/17 MRI brain revealed "Stable exam. No new enhancing mass lesion to suggest intracranial metastatic disease."     CT scans from 3/20/17 which reveal "No measurable lesion identified within the left upper paramediastinal region, the location of this " "patient's lung cancer, suggesting favorable response to treatment . No new lung lesions identified. Sclerotic lesion within the right iliac wing appears unchanged. Stable appearing centrilobular and paraseptal emphysema".               Interval History: Mr. Alan returns for follow up  and for Opdivo and XGEVA. He developed a rash to right inner thigh a week ago. He went to ED and was told to apply benadryl cream. The area is smaller and seems to be resolving.  He feels well today. He denies any nausea, vomiting, diarrhea, constipation, abdominal pain, weight loss or loss of appetite, chest pain, shortness of breath, leg swelling, fatigue, pain, headache, dizziness, or mood changes. His ECOG PS is zero. He is alone.         ECOG Performance Status:   ECOG SCORE    0 - Fully active-able to carry on all pre-disease performance without restriction         PMFSH: all information reviewed and updated as relevant to today's visit    Review of Systems:   Review of Systems      Objective:      Vitals:   Vitals:    08/08/17 0834   BP: 130/80   BP Location: Right arm   Patient Position: Sitting   BP Method: Automatic   Pulse: 70   Resp: 18   Temp: 97.9 °F (36.6 °C)   TempSrc: Oral   SpO2: 98%   Weight: 82.7 kg (182 lb 5.1 oz)   Height: 5' 6" (1.676 m)     BMI: Body mass index is 29.43 kg/m².      Physical Exam:   Physical Exam   Skin:              Laboratory Data:  WBC   Date Value Ref Range Status   08/08/2017 7.57 3.90 - 12.70 K/uL Final     Hemoglobin   Date Value Ref Range Status   08/08/2017 14.4 14.0 - 18.0 g/dL Final     Hematocrit   Date Value Ref Range Status   08/08/2017 42.7 40.0 - 54.0 % Final     Platelets   Date Value Ref Range Status   08/08/2017 237 150 - 350 K/uL Final     Gran #   Date Value Ref Range Status   08/08/2017 3.5 1.8 - 7.7 K/uL Final     Comment:     The ANC is based on a white cell differential from an   automated cell counter. It has not been microscopically   reviewed for the presence of " abnormal cells. Clinical   correlation is required.         Chemistry        Component Value Date/Time     08/08/2017 0753    K 3.7 08/08/2017 0753     08/08/2017 0753    CO2 21 (L) 08/08/2017 0753    BUN 14 08/08/2017 0753    CREATININE 1.5 (H) 08/08/2017 0753    GLU 99 08/08/2017 0753        Component Value Date/Time    CALCIUM 9.1 08/08/2017 0753    ALKPHOS 66 08/08/2017 0753    AST 24 08/08/2017 0753    ALT 22 08/08/2017 0753    BILITOT 0.2 08/08/2017 0753    ESTGFRAFRICA >60.0 08/08/2017 0753    EGFRNONAA 56.6 (A) 08/08/2017 0753          TSH   Date Value Ref Range Status   08/08/2017 5.981 (H) 0.400 - 4.000 uIU/mL Final     Free T4   Date Value Ref Range Status   08/08/2017 0.87 0.71 - 1.51 ng/dL Final       Assessment/Plan:     1. Malignant neoplasm of upper lobe of right lung    2. Secondary malignant neoplasm of bone    3. Encounter for antineoplastic chemotherapy    4. Neoplasm related pain        Plan:  1,2,3. He is doing well clinically and will proceed with Opdivo immunotherapy and Xgeva tomorrow and will return in 2 weeks for next dose.  4. Stable on meds.  5. Stable on meds.    Above care plan was discussed with patient and accompanying wife and all questions were addressed to their satisfaction

## 2017-08-09 ENCOUNTER — INFUSION (OUTPATIENT)
Dept: INFUSION THERAPY | Facility: HOSPITAL | Age: 42
End: 2017-08-09
Attending: INTERNAL MEDICINE
Payer: MEDICARE

## 2017-08-09 VITALS
TEMPERATURE: 97 F | DIASTOLIC BLOOD PRESSURE: 71 MMHG | RESPIRATION RATE: 20 BRPM | HEART RATE: 80 BPM | SYSTOLIC BLOOD PRESSURE: 119 MMHG

## 2017-08-09 DIAGNOSIS — C34.91 LUNG CANCER, PRIMARY, WITH METASTASIS FROM LUNG TO OTHER SITE, RIGHT: ICD-10-CM

## 2017-08-09 DIAGNOSIS — D50.0 IRON DEFICIENCY ANEMIA DUE TO CHRONIC BLOOD LOSS: Primary | ICD-10-CM

## 2017-08-09 DIAGNOSIS — C34.11 MALIGNANT NEOPLASM OF UPPER LOBE OF RIGHT LUNG: ICD-10-CM

## 2017-08-09 DIAGNOSIS — C79.51 SECONDARY MALIGNANT NEOPLASM OF BONE: ICD-10-CM

## 2017-08-09 PROCEDURE — 96401 CHEMO ANTI-NEOPL SQ/IM: CPT

## 2017-08-09 PROCEDURE — 96413 CHEMO IV INFUSION 1 HR: CPT

## 2017-08-09 PROCEDURE — 25000003 PHARM REV CODE 250: Performed by: INTERNAL MEDICINE

## 2017-08-09 PROCEDURE — 63600175 PHARM REV CODE 636 W HCPCS: Performed by: INTERNAL MEDICINE

## 2017-08-09 PROCEDURE — A4216 STERILE WATER/SALINE, 10 ML: HCPCS | Performed by: INTERNAL MEDICINE

## 2017-08-09 RX ORDER — HEPARIN 100 UNIT/ML
500 SYRINGE INTRAVENOUS
Status: DISCONTINUED | OUTPATIENT
Start: 2017-08-09 | End: 2017-08-09 | Stop reason: HOSPADM

## 2017-08-09 RX ORDER — SODIUM CHLORIDE 0.9 % (FLUSH) 0.9 %
10 SYRINGE (ML) INJECTION
Status: DISCONTINUED | OUTPATIENT
Start: 2017-08-09 | End: 2017-08-09 | Stop reason: HOSPADM

## 2017-08-09 RX ADMIN — DENOSUMAB 120 MG: 120 INJECTION SUBCUTANEOUS at 08:08

## 2017-08-09 RX ADMIN — SODIUM CHLORIDE 240 MG: 9 INJECTION, SOLUTION INTRAVENOUS at 08:08

## 2017-08-09 RX ADMIN — HEPARIN 500 UNITS: 100 SYRINGE at 09:08

## 2017-08-09 RX ADMIN — SODIUM CHLORIDE, PRESERVATIVE FREE 10 ML: 5 INJECTION INTRAVENOUS at 09:08

## 2017-08-09 RX ADMIN — SODIUM CHLORIDE: 9 INJECTION, SOLUTION INTRAVENOUS at 08:08

## 2017-08-09 NOTE — PLAN OF CARE
Problem: Patient Care Overview (Adult)  Goal: Plan of Care Review  Outcome: Ongoing (interventions implemented as appropriate)  Pt received Xgeva and Opdivo with no complications. Pt states he has been taking his vitamin D and calcium supplements; pt denies jaw pain. Pt instructed to call MD with any problems. AVS given to patient and patient discharged home.

## 2017-08-18 ENCOUNTER — TELEPHONE (OUTPATIENT)
Dept: HEMATOLOGY/ONCOLOGY | Facility: CLINIC | Age: 42
End: 2017-08-18

## 2017-08-18 DIAGNOSIS — C34.91 LUNG CANCER, PRIMARY, WITH METASTASIS FROM LUNG TO OTHER SITE, RIGHT: ICD-10-CM

## 2017-08-18 DIAGNOSIS — G89.3 NEOPLASM RELATED PAIN: ICD-10-CM

## 2017-08-18 DIAGNOSIS — I82.402 DEEP VEIN THROMBOSIS (DVT) OF LEFT LOWER EXTREMITY, UNSPECIFIED CHRONICITY, UNSPECIFIED VEIN: ICD-10-CM

## 2017-08-18 DIAGNOSIS — E03.9 HYPOTHYROIDISM, UNSPECIFIED TYPE: ICD-10-CM

## 2017-08-18 RX ORDER — OXYCODONE AND ACETAMINOPHEN 10; 325 MG/1; MG/1
1 TABLET ORAL EVERY 6 HOURS PRN
Qty: 120 TABLET | Refills: 0 | Status: SHIPPED | OUTPATIENT
Start: 2017-08-18 | End: 2017-08-31 | Stop reason: SDUPTHER

## 2017-08-18 RX ORDER — LEVOTHYROXINE SODIUM 75 UG/1
75 TABLET ORAL DAILY
Qty: 30 TABLET | Refills: 11 | Status: SHIPPED | OUTPATIENT
Start: 2017-08-18 | End: 2017-08-31 | Stop reason: SDUPTHER

## 2017-08-18 NOTE — TELEPHONE ENCOUNTER
----- Message from Rosenda Jo sent at 8/18/2017  3:37 PM CDT -----  Contact: Pt Yao Alan [phone]  Pt is requesting a call back from the nurse to request refills on the following:    levothyroxine (SYNTHROID) 75 MCG tablet 30 tablet 11     oxycodone-acetaminophen (PERCOCET)  mg per tablet 120 tablet     rivaroxaban (XARELTO) 20 mg Tab 30 tablet 3 6/13/2017     [facility]    Telephone Fax  [phone]    Pt may be reached at [phone]    Thank you.    SARAHI

## 2017-08-18 NOTE — TELEPHONE ENCOUNTER
Received call this PM from patient who went to [facility] to  Xarelto refill. It was already after hours for [facility]'s staff, and patient didn't have money for the co-pay ($8 and some change). He stated that he was out of the medication. Explained to patient that since it is a small co-pay amount I would cover the co-pay amount through the [facility] Patient Assistance Fund, although patient has previously received the maximum assistance. Patient had already left but is returning to the pharmacy to get the medication this PM. Called [facility], ext. [phone], and spoke with Chasidy who confirmed the co-pay is $8.25 for the Xarelto. Prepared cost transfer form and faxed it to Chasidy's attention at ext. [phone]. Called back and spoke with Pharmacist Bina who confirmed that the fax was received. Will continue to follow and assist as needs identified.

## 2017-08-22 ENCOUNTER — OFFICE VISIT (OUTPATIENT)
Dept: HEMATOLOGY/ONCOLOGY | Facility: CLINIC | Age: 42
End: 2017-08-22
Payer: MEDICARE

## 2017-08-22 VITALS
OXYGEN SATURATION: 100 % | DIASTOLIC BLOOD PRESSURE: 90 MMHG | HEART RATE: 66 BPM | SYSTOLIC BLOOD PRESSURE: 129 MMHG | HEIGHT: 66 IN | BODY MASS INDEX: 29.48 KG/M2 | WEIGHT: 183.44 LBS | RESPIRATION RATE: 18 BRPM | TEMPERATURE: 98 F

## 2017-08-22 DIAGNOSIS — C34.11 MALIGNANT NEOPLASM OF UPPER LOBE OF RIGHT LUNG: Primary | ICD-10-CM

## 2017-08-22 DIAGNOSIS — E03.2 HYPOTHYROIDISM DUE TO MEDICATION: ICD-10-CM

## 2017-08-22 DIAGNOSIS — Z51.11 ENCOUNTER FOR ANTINEOPLASTIC CHEMOTHERAPY: ICD-10-CM

## 2017-08-22 DIAGNOSIS — C79.51 SECONDARY MALIGNANT NEOPLASM OF BONE: ICD-10-CM

## 2017-08-22 PROCEDURE — 36415 COLL VENOUS BLD VENIPUNCTURE: CPT

## 2017-08-22 PROCEDURE — 80053 COMPREHEN METABOLIC PANEL: CPT

## 2017-08-22 PROCEDURE — 3080F DIAST BP >= 90 MM HG: CPT | Mod: ,,, | Performed by: INTERNAL MEDICINE

## 2017-08-22 PROCEDURE — 85027 COMPLETE CBC AUTOMATED: CPT

## 2017-08-22 PROCEDURE — 99215 OFFICE O/P EST HI 40 MIN: CPT | Mod: S$PBB,,, | Performed by: INTERNAL MEDICINE

## 2017-08-22 PROCEDURE — 99214 OFFICE O/P EST MOD 30 MIN: CPT | Mod: PBBFAC | Performed by: INTERNAL MEDICINE

## 2017-08-22 PROCEDURE — 99999 PR PBB SHADOW E&M-EST. PATIENT-LVL IV: CPT | Mod: PBBFAC,,, | Performed by: INTERNAL MEDICINE

## 2017-08-22 PROCEDURE — 3074F SYST BP LT 130 MM HG: CPT | Mod: ,,, | Performed by: INTERNAL MEDICINE

## 2017-08-22 RX ORDER — HEPARIN 100 UNIT/ML
500 SYRINGE INTRAVENOUS
Status: CANCELLED | OUTPATIENT
Start: 2017-08-23

## 2017-08-22 RX ORDER — SODIUM CHLORIDE 0.9 % (FLUSH) 0.9 %
10 SYRINGE (ML) INJECTION
Status: CANCELLED | OUTPATIENT
Start: 2017-08-23

## 2017-08-22 NOTE — PROGRESS NOTES
"Subjective:       Patient ID: Yao Alan is a 42 y.o. male.    Chief Complaint: Malignant neoplasm of upper lobe of right lung and R inner thigh--rash, worsening  Oncologic History:  Mr. Yao Alan is a 42-year-old male who has a long history of smoking and was seen in the Pulmonary Clinic by Dr. Valle on 03/05/2015 with abnormal CT scan.    Apparently, he went to the [facility] in February with coughing as well as chest pain. A chest x-ray was done, which revealed a left upper lobe lung mass. Followup CT scan was done on 02/12/2015 and that revealed posterior superior mediastinal mass adjacent and prominent enlarged upper left mediastinal lymph nodes, abutting the aortic arch as well as left subclavian artery. A  CT scan of the abdomen was done on 03/03/2015 without contrast and that revealed nonobstructive nephrolithiasis, but a 2.1 cm lytic lesion involving the left iliac wing concerning for metastatic disease given the presence of emphysema and a mediastinal soft tissue mass on the CT chest from 02/12/2015. He saw Dr. Valle after that on 03/05/2015 and underwent an IR guided biopsy of the bone lesion on 03/17/2015. 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    His EGFR/EML/ALK is negative.    His PET scan on 3/25/15 revealed Left upper lobe lung lesion with an adjacent para-aortic lymph node, right anterior rib metastasis, right iliac metastasis, and pancreatic metastasis. A pancreatic cancer primary neoplasm is less likely.  MRI brain was negative for mets.  He completed 6 cycles of Carboplatin and Alimta and was on maintenance Alimta until end of March 2016.  His bone scan from 3/16 revealed stable disease. His CT scans also from end of March 2016 revealed "Interval enlargement of left upper lobe lung mass measuring 5.9 x 3.9 cm (previously 3.3 x 2.5 cm). This mass appears to invade the mediastinum and abutting the aortic arch and left subclavian artery"  He is now " "on Opdivo.  CT CAP from 6/22/16 s/p 6 cycles of Opdivo reveals "In this patient with a history of metastatic lung cancer, the previously identified paramediastinal left upper lobe lung mass has significantly decreased in size with only a trace amount of residual soft tissue opacities seen measuring 3.2 x 1.4 cm (axial series 2, image 16).Stable lytic lesion in the right iliac wing, unchanged.The right anterior sixth rib lesion and pancreatic head abnormality are not definitely appreciated on current examination."  Bone scan from 6/23/16 reveals "Stable activity in the right sixth rib anteriorly and the right iliac bone.No new metastatic lesions visualized."  10/3/16- CT scans reveal "In this patient with a history of metastatic lung cancer, the previously identified paramediastinal left upper lobe lung mass has decreased in size with only a trace amount of residual soft tissue opacity as above.  Stable lytic lesion in the right iliac wing, unchanged"  Bone scan reveals "Right superior anterior iliac bone lesion is stable and the right sixth rib has normalized. Recent dental procedure versus periodontal disease and possible right mastoiditis".     1/9/17 CT chest/abdomen/pelvis reveals "1. In this patient with history of metastatic lung cancer, the previously identified left upper paramediastinal lesion demonstrates near complete resolution with 1.0 x 0.7 cm residual disease (2.7 x 0.8 cm previously). Additionally, there is improving sclerotic lesion in the right iliac wing consistent with treated metastatic disease.  No evidence of new lesions in the chest, abdomen or pelvis. 2. Apical predominant paraseptal emphysematous changes and bullae. 3. Additional findings as above."     3/3/17 MRI brain revealed "Stable exam. No new enhancing mass lesion to suggest intracranial metastatic disease."     CT scans from 3/20/17 which reveal "No measurable lesion identified within the left upper paramediastinal region, the " "location of this patient's lung cancer, suggesting favorable response to treatment . No new lung lesions identified. Sclerotic lesion within the right iliac wing appears unchanged. Stable appearing centrilobular and paraseptal emphysema".                 HPI Mr. Alan returns for follow up  and for Opdivo.  He denies any nausea, vomiting, diarrhea, constipation, abdominal pain, weight loss or loss of appetite, chest pain, shortness of breath, leg swelling, fatigue, pain, headache, dizziness, or mood changes. His ECOG PS is zero. He is by himself.          Review of Systems   Constitutional: Negative for appetite change, fatigue and unexpected weight change.   HENT: Negative for mouth sores.    Eyes: Negative for visual disturbance.   Respiratory: Negative for cough and shortness of breath.    Cardiovascular: Negative for chest pain.   Gastrointestinal: Negative for abdominal pain and diarrhea.   Genitourinary: Negative for frequency.   Musculoskeletal: Negative for back pain.   Skin: Negative for rash.   Neurological: Negative for headaches.   Hematological: Negative for adenopathy.   Psychiatric/Behavioral: The patient is not nervous/anxious.    All other systems reviewed and are negative.      Objective:      Physical Exam   Constitutional: He is oriented to person, place, and time. He appears well-developed and well-nourished.   HENT:   Mouth/Throat: No oropharyngeal exudate.   Cardiovascular: Normal rate and normal heart sounds.    Pulmonary/Chest: Effort normal and breath sounds normal. He has no wheezes.   Abdominal: Soft. Bowel sounds are normal. There is no tenderness.   Musculoskeletal: He exhibits no edema or tenderness.   Lymphadenopathy:     He has no cervical adenopathy.   Neurological: He is alert and oriented to person, place, and time. Coordination normal.   Skin: Skin is warm and dry. No rash noted.   Psychiatric: He has a normal mood and affect. Judgment and thought content normal.   Vitals " reviewed.      LABS:  WBC   Date Value Ref Range Status   08/22/2017 7.87 3.90 - 12.70 K/uL Final     Hemoglobin   Date Value Ref Range Status   08/22/2017 14.6 14.0 - 18.0 g/dL Final     Hematocrit   Date Value Ref Range Status   08/22/2017 44.4 40.0 - 54.0 % Final     Platelets   Date Value Ref Range Status   08/22/2017 252 150 - 350 K/uL Final     Gran #   Date Value Ref Range Status   08/22/2017 3.6 1.8 - 7.7 K/uL Final     Comment:     The ANC is based on a white cell differential from an   automated cell counter. It has not been microscopically   reviewed for the presence of abnormal cells. Clinical   correlation is required.         Chemistry        Component Value Date/Time     08/22/2017 0750    K 3.9 08/22/2017 0750     08/22/2017 0750    CO2 26 08/22/2017 0750    BUN 14 08/22/2017 0750    CREATININE 1.6 (H) 08/22/2017 0750     (H) 08/22/2017 0750        Component Value Date/Time    CALCIUM 9.1 08/22/2017 0750    ALKPHOS 67 08/22/2017 0750    AST 22 08/22/2017 0750    ALT 24 08/22/2017 0750    BILITOT 0.3 08/22/2017 0750    ESTGFRAFRICA >60.0 08/22/2017 0750    EGFRNONAA 52.4 (A) 08/22/2017 0750        TSH   Date Value Ref Range Status   08/08/2017 5.981 (H) 0.400 - 4.000 uIU/mL Final     Free T4   Date Value Ref Range Status   08/08/2017 0.87 0.71 - 1.51 ng/dL Final     Assessment:       1. Malignant neoplasm of upper lobe of right lung    2. Secondary malignant neoplasm of bone    3. Encounter for antineoplastic chemotherapy    4. Hypothyroidism due to medication        Plan:        1,2,3. HE is doing well clinically and will receive Opdivo and will return in 2 weeks for next cycle and also for Xgeva  4. Check TSH and free T4 in 2 weeks.    Above care plan was discussed with patient and all questions were addressed to his satisfaction

## 2017-08-23 ENCOUNTER — INFUSION (OUTPATIENT)
Dept: INFUSION THERAPY | Facility: HOSPITAL | Age: 42
End: 2017-08-23
Attending: INTERNAL MEDICINE
Payer: MEDICARE

## 2017-08-23 VITALS
TEMPERATURE: 99 F | HEART RATE: 91 BPM | RESPIRATION RATE: 18 BRPM | SYSTOLIC BLOOD PRESSURE: 132 MMHG | OXYGEN SATURATION: 98 % | DIASTOLIC BLOOD PRESSURE: 90 MMHG

## 2017-08-23 DIAGNOSIS — D50.0 IRON DEFICIENCY ANEMIA DUE TO CHRONIC BLOOD LOSS: Primary | ICD-10-CM

## 2017-08-23 DIAGNOSIS — C34.91 LUNG CANCER, PRIMARY, WITH METASTASIS FROM LUNG TO OTHER SITE, RIGHT: ICD-10-CM

## 2017-08-23 DIAGNOSIS — C34.11 MALIGNANT NEOPLASM OF UPPER LOBE OF RIGHT LUNG: ICD-10-CM

## 2017-08-23 DIAGNOSIS — C79.51 SECONDARY MALIGNANT NEOPLASM OF BONE: ICD-10-CM

## 2017-08-23 PROCEDURE — A4216 STERILE WATER/SALINE, 10 ML: HCPCS | Performed by: INTERNAL MEDICINE

## 2017-08-23 PROCEDURE — 96413 CHEMO IV INFUSION 1 HR: CPT

## 2017-08-23 PROCEDURE — 25000003 PHARM REV CODE 250: Performed by: INTERNAL MEDICINE

## 2017-08-23 PROCEDURE — 63600175 PHARM REV CODE 636 W HCPCS: Performed by: INTERNAL MEDICINE

## 2017-08-23 RX ORDER — SODIUM CHLORIDE 0.9 % (FLUSH) 0.9 %
10 SYRINGE (ML) INJECTION
Status: DISCONTINUED | OUTPATIENT
Start: 2017-08-23 | End: 2017-08-23 | Stop reason: HOSPADM

## 2017-08-23 RX ORDER — HEPARIN 100 UNIT/ML
500 SYRINGE INTRAVENOUS
Status: DISCONTINUED | OUTPATIENT
Start: 2017-08-23 | End: 2017-08-23 | Stop reason: HOSPADM

## 2017-08-23 RX ADMIN — SODIUM CHLORIDE: 9 INJECTION, SOLUTION INTRAVENOUS at 08:08

## 2017-08-23 RX ADMIN — HEPARIN 500 UNITS: 100 SYRINGE at 09:08

## 2017-08-23 RX ADMIN — SODIUM CHLORIDE, PRESERVATIVE FREE 10 ML: 5 INJECTION INTRAVENOUS at 09:08

## 2017-08-23 RX ADMIN — SODIUM CHLORIDE 240 MG: 9 INJECTION, SOLUTION INTRAVENOUS at 08:08

## 2017-08-23 NOTE — PLAN OF CARE
Problem: Patient Care Overview (Adult)  Goal: Adult Individualization and Mutuality  1. Chest Port  2. Likes warm blankets  3. Listens to music during treatment         Outcome: Ongoing (interventions implemented as appropriate)  Patient present in clinic waiting area,no s/s distress and no c/o pain. Patient pending nivolomab infusion.

## 2017-08-23 NOTE — PLAN OF CARE
Problem: Patient Care Overview (Adult)  Goal: Plan of Care Review  Outcome: Ongoing (interventions implemented as appropriate)  Patient tolerated Opdivo infusion without any s/s reaction. Patient given AVS,instructed to call MD with any problems or concerns.

## 2017-08-31 ENCOUNTER — TELEPHONE (OUTPATIENT)
Dept: HEMATOLOGY/ONCOLOGY | Facility: CLINIC | Age: 42
End: 2017-08-31

## 2017-08-31 DIAGNOSIS — G89.3 NEOPLASM RELATED PAIN: ICD-10-CM

## 2017-08-31 DIAGNOSIS — E03.9 HYPOTHYROIDISM, UNSPECIFIED TYPE: ICD-10-CM

## 2017-08-31 RX ORDER — LEVOTHYROXINE SODIUM 75 UG/1
75 TABLET ORAL DAILY
Qty: 30 TABLET | Refills: 11 | Status: SHIPPED | OUTPATIENT
Start: 2017-08-31 | End: 2017-09-28 | Stop reason: SDUPTHER

## 2017-08-31 RX ORDER — OXYCODONE AND ACETAMINOPHEN 10; 325 MG/1; MG/1
1 TABLET ORAL EVERY 6 HOURS PRN
Qty: 120 TABLET | Refills: 0 | Status: SHIPPED | OUTPATIENT
Start: 2017-08-31 | End: 2017-10-13 | Stop reason: SDUPTHER

## 2017-08-31 NOTE — TELEPHONE ENCOUNTER
----- Message from Zaida Cevallos LCSW sent at 8/31/2017  1:36 PM CDT -----  Contact: Patient  Can you call patient at his cell phone number, [phone], about prescriptions that he needs refills ordered? He missed your call.

## 2017-08-31 NOTE — TELEPHONE ENCOUNTER
----- Message from Atilio Pop sent at 8/31/2017 12:45 PM CDT -----  Contact: Pt   Pt would like the nurse to give him a call back in regards to his medication Rx on his oxycodone-acetaminophen (PERCOCET)  mg per tablet and levothyroxine (SYNTHROID) 75 MCG tablet. Contact number [phone]..

## 2017-09-05 ENCOUNTER — LAB VISIT (OUTPATIENT)
Dept: LAB | Facility: HOSPITAL | Age: 42
End: 2017-09-05
Attending: INTERNAL MEDICINE
Payer: MEDICARE

## 2017-09-05 ENCOUNTER — OFFICE VISIT (OUTPATIENT)
Dept: HEMATOLOGY/ONCOLOGY | Facility: CLINIC | Age: 42
End: 2017-09-05
Payer: MEDICARE

## 2017-09-05 VITALS
DIASTOLIC BLOOD PRESSURE: 82 MMHG | WEIGHT: 184.31 LBS | RESPIRATION RATE: 17 BRPM | HEART RATE: 71 BPM | SYSTOLIC BLOOD PRESSURE: 118 MMHG | BODY MASS INDEX: 29.62 KG/M2 | HEIGHT: 66 IN | TEMPERATURE: 98 F | OXYGEN SATURATION: 97 %

## 2017-09-05 DIAGNOSIS — C34.11 MALIGNANT NEOPLASM OF UPPER LOBE OF RIGHT LUNG: ICD-10-CM

## 2017-09-05 DIAGNOSIS — C34.11 MALIGNANT NEOPLASM OF UPPER LOBE OF RIGHT LUNG: Primary | ICD-10-CM

## 2017-09-05 DIAGNOSIS — Z51.11 ENCOUNTER FOR ANTINEOPLASTIC CHEMOTHERAPY: ICD-10-CM

## 2017-09-05 DIAGNOSIS — E03.2 HYPOTHYROIDISM DUE TO MEDICATION: ICD-10-CM

## 2017-09-05 DIAGNOSIS — C79.51 SECONDARY MALIGNANT NEOPLASM OF BONE: ICD-10-CM

## 2017-09-05 LAB
ALBUMIN SERPL BCP-MCNC: 3.7 G/DL
ALP SERPL-CCNC: 61 U/L
ALT SERPL W/O P-5'-P-CCNC: 15 U/L
ANION GAP SERPL CALC-SCNC: 9 MMOL/L
AST SERPL-CCNC: 18 U/L
BILIRUB SERPL-MCNC: 0.2 MG/DL
BUN SERPL-MCNC: 14 MG/DL
CALCIUM SERPL-MCNC: 8.9 MG/DL
CHLORIDE SERPL-SCNC: 108 MMOL/L
CO2 SERPL-SCNC: 23 MMOL/L
CREAT SERPL-MCNC: 1.4 MG/DL
ERYTHROCYTE [DISTWIDTH] IN BLOOD BY AUTOMATED COUNT: 14.6 %
EST. GFR  (AFRICAN AMERICAN): >60 ML/MIN/1.73 M^2
EST. GFR  (NON AFRICAN AMERICAN): >60 ML/MIN/1.73 M^2
GLUCOSE SERPL-MCNC: 117 MG/DL
HCT VFR BLD AUTO: 42.9 %
HGB BLD-MCNC: 14.2 G/DL
MCH RBC QN AUTO: 26.9 PG
MCHC RBC AUTO-ENTMCNC: 33.1 G/DL
MCV RBC AUTO: 81 FL
NEUTROPHILS # BLD AUTO: 4.3 K/UL
PLATELET # BLD AUTO: 245 K/UL
PMV BLD AUTO: 10.2 FL
POTASSIUM SERPL-SCNC: 3.7 MMOL/L
PROT SERPL-MCNC: 7.2 G/DL
RBC # BLD AUTO: 5.27 M/UL
SODIUM SERPL-SCNC: 140 MMOL/L
T4 FREE SERPL-MCNC: 0.86 NG/DL
TSH SERPL DL<=0.005 MIU/L-ACNC: 6.35 UIU/ML
WBC # BLD AUTO: 8.37 K/UL

## 2017-09-05 PROCEDURE — 85027 COMPLETE CBC AUTOMATED: CPT

## 2017-09-05 PROCEDURE — 3079F DIAST BP 80-89 MM HG: CPT | Mod: S$GLB,,, | Performed by: INTERNAL MEDICINE

## 2017-09-05 PROCEDURE — 99215 OFFICE O/P EST HI 40 MIN: CPT | Mod: PBBFAC | Performed by: INTERNAL MEDICINE

## 2017-09-05 PROCEDURE — 84439 ASSAY OF FREE THYROXINE: CPT

## 2017-09-05 PROCEDURE — 80053 COMPREHEN METABOLIC PANEL: CPT

## 2017-09-05 PROCEDURE — 99999 PR PBB SHADOW E&M-EST. PATIENT-LVL V: CPT | Mod: PBBFAC,,, | Performed by: INTERNAL MEDICINE

## 2017-09-05 PROCEDURE — 99215 OFFICE O/P EST HI 40 MIN: CPT | Mod: S$GLB,,, | Performed by: INTERNAL MEDICINE

## 2017-09-05 PROCEDURE — 84443 ASSAY THYROID STIM HORMONE: CPT

## 2017-09-05 PROCEDURE — 36415 COLL VENOUS BLD VENIPUNCTURE: CPT

## 2017-09-05 PROCEDURE — 3074F SYST BP LT 130 MM HG: CPT | Mod: S$GLB,,, | Performed by: INTERNAL MEDICINE

## 2017-09-05 RX ORDER — SODIUM CHLORIDE 0.9 % (FLUSH) 0.9 %
10 SYRINGE (ML) INJECTION
Status: CANCELLED | OUTPATIENT
Start: 2017-09-06

## 2017-09-05 RX ORDER — HEPARIN 100 UNIT/ML
500 SYRINGE INTRAVENOUS
Status: CANCELLED | OUTPATIENT
Start: 2017-09-06

## 2017-09-05 NOTE — PROGRESS NOTES
"PATIENT: Yao Alan  MRN: [id number]  DATE: 9/5/2017    Subjective:     Chief complaint:  Chief Complaint   Patient presents with    Malignant neoplasm of upper lobe of right lung       Oncologic History:  Mr. Yao Alan is a 42-year-old male who has a long history of smoking and was seen in the Pulmonary [facility] by Dr. Valle on 03/05/2015 with abnormal CT scan.    Apparently, he went to the [facility] in February with coughing as well as chest pain. A chest x-ray was done, which revealed a left upper lobe lung mass. Followup CT scan was done on 02/12/2015 and that revealed posterior superior mediastinal mass adjacent and prominent enlarged upper left mediastinal lymph nodes, abutting the aortic arch as well as left subclavian artery. A  CT scan of the abdomen was done on 03/03/2015 without contrast and that revealed nonobstructive nephrolithiasis, but a 2.1 cm lytic lesion involving the left iliac wing concerning for metastatic disease given the presence of emphysema and a mediastinal soft tissue mass on the CT chest from 02/12/2015. He saw Dr. Valle after that on 03/05/2015 and underwent an IR guided biopsy of the bone lesion on 03/17/2015. 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    His EGFR/EML/ALK is negative.    His PET scan on 3/25/15 revealed Left upper lobe lung lesion with an adjacent para-aortic lymph node, right anterior rib metastasis, right iliac metastasis, and pancreatic metastasis. A pancreatic cancer primary neoplasm is less likely.  MRI brain was negative for mets.  He completed 6 cycles of Carboplatin and Alimta and was on maintenance Alimta until end of March 2016.  His bone scan from 3/16 revealed stable disease. His CT scans also from end of March 2016 revealed "Interval enlargement of left upper lobe lung mass measuring 5.9 x 3.9 cm (previously 3.3 x 2.5 cm). This mass appears to invade the mediastinum and abutting the aortic arch and left " "subclavian artery"  He is now on Opdivo.  CT CAP from 6/22/16 s/p 6 cycles of Opdivo reveals "In this patient with a history of metastatic lung cancer, the previously identified paramediastinal left upper lobe lung mass has significantly decreased in size with only a trace amount of residual soft tissue opacities seen measuring 3.2 x 1.4 cm (axial series 2, image 16).Stable lytic lesion in the right iliac wing, unchanged.The right anterior sixth rib lesion and pancreatic head abnormality are not definitely appreciated on current examination."  Bone scan from 6/23/16 reveals "Stable activity in the right sixth rib anteriorly and the right iliac bone.No new metastatic lesions visualized."  10/3/16- CT scans reveal "In this patient with a history of metastatic lung cancer, the previously identified paramediastinal left upper lobe lung mass has decreased in size with only a trace amount of residual soft tissue opacity as above.  Stable lytic lesion in the right iliac wing, unchanged"  Bone scan reveals "Right superior anterior iliac bone lesion is stable and the right sixth rib has normalized. Recent dental procedure versus periodontal disease and possible right mastoiditis".     1/9/17 CT chest/abdomen/pelvis reveals "1. In this patient with history of metastatic lung cancer, the previously identified left upper paramediastinal lesion demonstrates near complete resolution with 1.0 x 0.7 cm residual disease (2.7 x 0.8 cm previously). Additionally, there is improving sclerotic lesion in the right iliac wing consistent with treated metastatic disease.  No evidence of new lesions in the chest, abdomen or pelvis. 2. Apical predominant paraseptal emphysematous changes and bullae. 3. Additional findings as above."     3/3/17 MRI brain revealed "Stable exam. No new enhancing mass lesion to suggest intracranial metastatic disease."     CT scans from 3/20/17 which reveal "No measurable lesion identified within the left upper " "paramediastinal region, the location of this patient's lung cancer, suggesting favorable response to treatment . No new lung lesions identified. Sclerotic lesion within the right iliac wing appears unchanged. Stable appearing centrilobular and paraseptal emphysema".        CT scans from 5/29/17 reveals "1. In this patient with history of lung cancer, there is no evidence of new focal masses or new metastases. Stable right iliac wing sclerotic lesion is unchanged.  2. Stable centrilobular and paraseptal emphysema."     Interval History: Mr. Alan returns for follow up  and for Opdivo and xgeva. He feels good today.  He denies any nausea, vomiting, diarrhea, constipation, abdominal pain, weight loss or loss of appetite, chest pain, shortness of breath, leg swelling, fatigue, pain, headache, dizziness, or mood changes. His ECOG PS is zero. He is by himself.        ECOG Performance Status:   ECOG SCORE    0 - Fully active-able to carry on all pre-disease performance without restriction         PMFSH: all information reviewed and updated as relevant to today's visit    Review of Systems:   Review of Systems   Constitutional: Negative for activity change, appetite change, fatigue and fever.   HENT: Negative for mouth sores, nosebleeds and sore throat.    Eyes: Negative for visual disturbance.   Respiratory: Negative for cough and shortness of breath.    Cardiovascular: Negative for chest pain, palpitations and leg swelling.   Gastrointestinal: Negative for abdominal pain, constipation, diarrhea, nausea and vomiting.   Genitourinary: Negative for difficulty urinating and frequency.   Musculoskeletal: Negative for arthralgias and back pain.   Skin: Negative for rash.   Neurological: Negative for dizziness, numbness and headaches.   Hematological: Negative for adenopathy. Does not bruise/bleed easily.   Psychiatric/Behavioral: Negative for confusion and sleep disturbance. The patient is not nervous/anxious.    All other " "systems reviewed and are negative.        Objective:      Vitals:   Vitals:    09/05/17 0841   BP: 118/82   Pulse: 71   Resp: 17   Temp: 97.5 °F (36.4 °C)   TempSrc: Oral   SpO2: 97%   Weight: 83.6 kg (184 lb 4.9 oz)   Height: 5' 6" (1.676 m)     BMI: Body mass index is 29.75 kg/m².      Physical Exam:   Physical Exam   Constitutional: He is oriented to person, place, and time. He appears well-developed and well-nourished. No distress.   HENT:   Right Ear: External ear normal.   Left Ear: External ear normal.   Mouth/Throat: No oropharyngeal exudate.   Eyes: Conjunctivae and lids are normal. Pupils are equal, round, and reactive to light. No scleral icterus.   Neck: Trachea normal and normal range of motion. Neck supple. No thyromegaly present.   Cardiovascular: Normal rate, regular rhythm, normal heart sounds and normal pulses.    Pulmonary/Chest: Effort normal and breath sounds normal.   Abdominal: Soft. Normal appearance and bowel sounds are normal. He exhibits no distension and no mass. There is no hepatosplenomegaly or splenomegaly. There is no tenderness.   Musculoskeletal: Normal range of motion.   Lymphadenopathy:        Head (right side): No submental and no submandibular adenopathy present.        Head (left side): No submental and no submandibular adenopathy present.     He has no cervical adenopathy.     He has no axillary adenopathy.        Right: No supraclavicular adenopathy present.        Left: No supraclavicular adenopathy present.   Neurological: He is alert and oriented to person, place, and time. He has normal reflexes. No sensory deficit.   Skin: Skin is warm, dry and intact. No bruising and no rash noted. Nails show no clubbing.   Psychiatric: He has a normal mood and affect. His speech is normal and behavior is normal. Cognition and memory are normal.   Vitals reviewed.        Laboratory Data:  WBC   Date Value Ref Range Status   09/05/2017 8.37 3.90 - 12.70 K/uL Final     Hemoglobin   Date " Value Ref Range Status   09/05/2017 14.2 14.0 - 18.0 g/dL Final     Hematocrit   Date Value Ref Range Status   09/05/2017 42.9 40.0 - 54.0 % Final     Platelets   Date Value Ref Range Status   09/05/2017 245 150 - 350 K/uL Final     Gran #   Date Value Ref Range Status   09/05/2017 4.3 1.8 - 7.7 K/uL Final     Comment:     The ANC is based on a white cell differential from an   automated cell counter. It has not been microscopically   reviewed for the presence of abnormal cells. Clinical   correlation is required.         Chemistry        Component Value Date/Time     09/05/2017 0738    K 3.7 09/05/2017 0738     09/05/2017 0738    CO2 23 09/05/2017 0738    BUN 14 09/05/2017 0738    CREATININE 1.4 09/05/2017 0738     (H) 09/05/2017 0738        Component Value Date/Time    CALCIUM 8.9 09/05/2017 0738    ALKPHOS 61 09/05/2017 0738    AST 18 09/05/2017 0738    ALT 15 09/05/2017 0738    BILITOT 0.2 09/05/2017 0738    ESTGFRAFRICA >60.0 09/05/2017 0738    EGFRNONAA >60.0 09/05/2017 0738        TSH   Date Value Ref Range Status   09/05/2017 6.350 (H) 0.400 - 4.000 uIU/mL Final     Free T4   Date Value Ref Range Status   09/05/2017 0.86 0.71 - 1.51 ng/dL Final           Assessment/Plan:     1. Malignant neoplasm of upper lobe of right lung    2. Secondary malignant neoplasm of bone    3. Encounter for antineoplastic chemotherapy        Plan:    1,2,3. He is doing well clinically and will proceed with opdivo and will return in 2 weeks for next cycle with restaging CT scans.               More than 30 mins were spent during this encounter, greater than 50% was spent in direct counseling and/or coordination of care.   Patient was also seen and examined by Dr. Garya who agrees with above and formulated assessment/plan. Patient is in agreement with the proposed treatment plan. All questions were answered to the patient's satisfaction. Pt knows to call clinic if anything is needed before the next clinic  visit.    Jamar Soliman, FNP-C  Hematology and Medical Oncology    I have reviewed the notes, assessments, and/or procedures performed by the nurse practitioner, as above. I have personally interviewed the patient at the beside, and rounded with the nurse practitioner. I formulated the plan of care. I concur with her documentation of findings    Above care plan was discussed with patient and all questions were addressed to his satisfaction

## 2017-09-06 ENCOUNTER — INFUSION (OUTPATIENT)
Dept: INFUSION THERAPY | Facility: HOSPITAL | Age: 42
End: 2017-09-06
Attending: INTERNAL MEDICINE
Payer: MEDICARE

## 2017-09-06 VITALS
SYSTOLIC BLOOD PRESSURE: 115 MMHG | HEART RATE: 77 BPM | TEMPERATURE: 98 F | DIASTOLIC BLOOD PRESSURE: 79 MMHG | RESPIRATION RATE: 17 BRPM

## 2017-09-06 DIAGNOSIS — C34.11 MALIGNANT NEOPLASM OF UPPER LOBE OF RIGHT LUNG: ICD-10-CM

## 2017-09-06 DIAGNOSIS — C79.51 SECONDARY MALIGNANT NEOPLASM OF BONE: ICD-10-CM

## 2017-09-06 DIAGNOSIS — D50.0 IRON DEFICIENCY ANEMIA DUE TO CHRONIC BLOOD LOSS: Primary | ICD-10-CM

## 2017-09-06 DIAGNOSIS — C34.91 LUNG CANCER, PRIMARY, WITH METASTASIS FROM LUNG TO OTHER SITE, RIGHT: ICD-10-CM

## 2017-09-06 PROCEDURE — A4216 STERILE WATER/SALINE, 10 ML: HCPCS | Performed by: INTERNAL MEDICINE

## 2017-09-06 PROCEDURE — 96372 THER/PROPH/DIAG INJ SC/IM: CPT

## 2017-09-06 PROCEDURE — 63600175 PHARM REV CODE 636 W HCPCS: Performed by: INTERNAL MEDICINE

## 2017-09-06 PROCEDURE — 96413 CHEMO IV INFUSION 1 HR: CPT

## 2017-09-06 PROCEDURE — 25000003 PHARM REV CODE 250: Performed by: INTERNAL MEDICINE

## 2017-09-06 RX ORDER — SODIUM CHLORIDE 0.9 % (FLUSH) 0.9 %
10 SYRINGE (ML) INJECTION
Status: DISCONTINUED | OUTPATIENT
Start: 2017-09-06 | End: 2017-09-06 | Stop reason: HOSPADM

## 2017-09-06 RX ORDER — HEPARIN 100 UNIT/ML
500 SYRINGE INTRAVENOUS
Status: DISCONTINUED | OUTPATIENT
Start: 2017-09-06 | End: 2017-09-06 | Stop reason: HOSPADM

## 2017-09-06 RX ADMIN — HEPARIN 500 UNITS: 100 SYRINGE at 03:09

## 2017-09-06 RX ADMIN — DENOSUMAB 120 MG: 120 INJECTION SUBCUTANEOUS at 03:09

## 2017-09-06 RX ADMIN — SODIUM CHLORIDE 240 MG: 9 INJECTION, SOLUTION INTRAVENOUS at 02:09

## 2017-09-06 RX ADMIN — SODIUM CHLORIDE, PRESERVATIVE FREE 10 ML: 5 INJECTION INTRAVENOUS at 03:09

## 2017-09-06 RX ADMIN — SODIUM CHLORIDE: 9 INJECTION, SOLUTION INTRAVENOUS at 02:09

## 2017-09-06 NOTE — PLAN OF CARE
Problem: Patient Care Overview (Adult)  Goal: Adult Individualization and Mutuality  1. Chest Port  2. Likes warm blankets  3. Listens to music during treatment         Outcome: Ongoing (interventions implemented as appropriate)  Labs , hx, and medications reviewed. Assessment completed. Discussed plan of care with patient. Patient in agreement. Chair reclined and warm blanket and snack offered.

## 2017-09-12 ENCOUNTER — TELEPHONE (OUTPATIENT)
Dept: RADIOLOGY | Facility: HOSPITAL | Age: 42
End: 2017-09-12

## 2017-09-13 ENCOUNTER — HOSPITAL ENCOUNTER (OUTPATIENT)
Dept: RADIOLOGY | Facility: HOSPITAL | Age: 42
Discharge: HOME | End: 2017-09-13
Attending: INTERNAL MEDICINE
Payer: MEDICARE

## 2017-09-13 DIAGNOSIS — C34.11 MALIGNANT NEOPLASM OF UPPER LOBE OF RIGHT LUNG: ICD-10-CM

## 2017-09-13 PROCEDURE — A9503 TC99M MEDRONATE: HCPCS

## 2017-09-13 PROCEDURE — 71260 CT THORAX DX C+: CPT | Mod: 26,,, | Performed by: RADIOLOGY

## 2017-09-13 PROCEDURE — 74177 CT ABD & PELVIS W/CONTRAST: CPT | Mod: 26,GC,, | Performed by: RADIOLOGY

## 2017-09-13 PROCEDURE — 71260 CT THORAX DX C+: CPT | Mod: TC

## 2017-09-13 PROCEDURE — 78306 BONE IMAGING WHOLE BODY: CPT | Mod: 26,GC,, | Performed by: RADIOLOGY

## 2017-09-13 PROCEDURE — 25500020 PHARM REV CODE 255: Performed by: INTERNAL MEDICINE

## 2017-09-13 PROCEDURE — 74177 CT ABD & PELVIS W/CONTRAST: CPT | Mod: TC

## 2017-09-13 RX ADMIN — IOHEXOL 15 ML: 350 INJECTION, SOLUTION INTRAVENOUS at 02:09

## 2017-09-13 RX ADMIN — IOHEXOL 15 ML: 350 INJECTION, SOLUTION INTRAVENOUS at 01:09

## 2017-09-13 RX ADMIN — IOHEXOL 100 ML: 350 INJECTION, SOLUTION INTRAVENOUS at 03:09

## 2017-09-19 ENCOUNTER — OFFICE VISIT (OUTPATIENT)
Dept: HEMATOLOGY/ONCOLOGY | Facility: CLINIC | Age: 42
End: 2017-09-19
Payer: COMMERCIAL

## 2017-09-19 VITALS
HEART RATE: 70 BPM | RESPIRATION RATE: 17 BRPM | SYSTOLIC BLOOD PRESSURE: 131 MMHG | TEMPERATURE: 98 F | HEIGHT: 66 IN | WEIGHT: 183 LBS | DIASTOLIC BLOOD PRESSURE: 84 MMHG | BODY MASS INDEX: 29.41 KG/M2 | OXYGEN SATURATION: 98 %

## 2017-09-19 DIAGNOSIS — I82.402 DEEP VEIN THROMBOSIS (DVT) OF LEFT LOWER EXTREMITY, UNSPECIFIED CHRONICITY, UNSPECIFIED VEIN: ICD-10-CM

## 2017-09-19 DIAGNOSIS — C79.51 SECONDARY MALIGNANT NEOPLASM OF BONE: ICD-10-CM

## 2017-09-19 DIAGNOSIS — E03.2 HYPOTHYROIDISM DUE TO MEDICATION: ICD-10-CM

## 2017-09-19 DIAGNOSIS — C34.11 MALIGNANT NEOPLASM OF UPPER LOBE OF RIGHT LUNG: Primary | ICD-10-CM

## 2017-09-19 DIAGNOSIS — C34.91 LUNG CANCER, PRIMARY, WITH METASTASIS FROM LUNG TO OTHER SITE, RIGHT: ICD-10-CM

## 2017-09-19 PROCEDURE — 99999 PR PBB SHADOW E&M-EST. PATIENT-LVL IV: CPT | Mod: PBBFAC,,, | Performed by: INTERNAL MEDICINE

## 2017-09-19 PROCEDURE — 99214 OFFICE O/P EST MOD 30 MIN: CPT | Mod: PBBFAC | Performed by: INTERNAL MEDICINE

## 2017-09-19 PROCEDURE — 3075F SYST BP GE 130 - 139MM HG: CPT | Mod: S$GLB,,, | Performed by: INTERNAL MEDICINE

## 2017-09-19 PROCEDURE — 3079F DIAST BP 80-89 MM HG: CPT | Mod: S$GLB,,, | Performed by: INTERNAL MEDICINE

## 2017-09-19 PROCEDURE — 99215 OFFICE O/P EST HI 40 MIN: CPT | Mod: S$GLB,,, | Performed by: INTERNAL MEDICINE

## 2017-09-19 RX ORDER — SODIUM CHLORIDE 0.9 % (FLUSH) 0.9 %
10 SYRINGE (ML) INJECTION
Status: CANCELLED | OUTPATIENT
Start: 2017-09-20

## 2017-09-19 RX ORDER — HEPARIN 100 UNIT/ML
500 SYRINGE INTRAVENOUS
Status: CANCELLED | OUTPATIENT
Start: 2017-09-20

## 2017-09-19 NOTE — PROGRESS NOTES
"PATIENT: Yao Alan  MRN: [id number]  DATE: 9/19/2017    Subjective:     Chief complaint:  Chief Complaint   Patient presents with    Malignant neoplasm of upper lobe of right lung       Oncologic History:  Mr. Yao Alan is a 42-year-old male who has a long history of smoking and was seen in the Pulmonary Clinic by Dr. Valle on 03/05/2015 with abnormal CT scan.    Apparently, he went to the [facility] in February with coughing as well as chest pain. A chest x-ray was done, which revealed a left upper lobe lung mass. Followup CT scan was done on 02/12/2015 and that revealed posterior superior mediastinal mass adjacent and prominent enlarged upper left mediastinal lymph nodes, abutting the aortic arch as well as left subclavian artery. A  CT scan of the abdomen was done on 03/03/2015 without contrast and that revealed nonobstructive nephrolithiasis, but a 2.1 cm lytic lesion involving the left iliac wing concerning for metastatic disease given the presence of emphysema and a mediastinal soft tissue mass on the CT chest from 02/12/2015. He saw Dr. Valle after that on 03/05/2015 and underwent an IR guided biopsy of the bone lesion on 03/17/2015. 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    His EGFR/EML/ALK is negative.    His PET scan on 3/25/15 revealed Left upper lobe lung lesion with an adjacent para-aortic lymph node, right anterior rib metastasis, right iliac metastasis, and pancreatic metastasis. A pancreatic cancer primary neoplasm is less likely.  MRI brain was negative for mets.  He completed 6 cycles of Carboplatin and Alimta and was on maintenance Alimta until end of March 2016.  His bone scan from 3/16 revealed stable disease. His CT scans also from end of March 2016 revealed "Interval enlargement of left upper lobe lung mass measuring 5.9 x 3.9 cm (previously 3.3 x 2.5 cm). This mass appears to invade the mediastinum and abutting the aortic arch and left " "subclavian artery"  He is now on Opdivo.  CT CAP from 6/22/16 s/p 6 cycles of Opdivo reveals "In this patient with a history of metastatic lung cancer, the previously identified paramediastinal left upper lobe lung mass has significantly decreased in size with only a trace amount of residual soft tissue opacities seen measuring 3.2 x 1.4 cm (axial series 2, image 16).Stable lytic lesion in the right iliac wing, unchanged.The right anterior sixth rib lesion and pancreatic head abnormality are not definitely appreciated on current examination."  Bone scan from 6/23/16 reveals "Stable activity in the right sixth rib anteriorly and the right iliac bone.No new metastatic lesions visualized."  10/3/16- CT scans reveal "In this patient with a history of metastatic lung cancer, the previously identified paramediastinal left upper lobe lung mass has decreased in size with only a trace amount of residual soft tissue opacity as above.  Stable lytic lesion in the right iliac wing, unchanged"  Bone scan reveals "Right superior anterior iliac bone lesion is stable and the right sixth rib has normalized. Recent dental procedure versus periodontal disease and possible right mastoiditis".     1/9/17 CT chest/abdomen/pelvis reveals "1. In this patient with history of metastatic lung cancer, the previously identified left upper paramediastinal lesion demonstrates near complete resolution with 1.0 x 0.7 cm residual disease (2.7 x 0.8 cm previously). Additionally, there is improving sclerotic lesion in the right iliac wing consistent with treated metastatic disease.  No evidence of new lesions in the chest, abdomen or pelvis. 2. Apical predominant paraseptal emphysematous changes and bullae. 3. Additional findings as above."     3/3/17 MRI brain revealed "Stable exam. No new enhancing mass lesion to suggest intracranial metastatic disease."     CT scans from 3/20/17 which reveal "No measurable lesion identified within the left upper " "paramediastinal region, the location of this patient's lung cancer, suggesting favorable response to treatment . No new lung lesions identified. Sclerotic lesion within the right iliac wing appears unchanged. Stable appearing centrilobular and paraseptal emphysema".        CT scans from 5/29/17 reveals "1. In this patient with history of lung cancer, there is no evidence of new focal masses or new metastases. Stable right iliac wing sclerotic lesion is unchanged.  2. Stable centrilobular and paraseptal emphysema."     Interval History: Mr. Alan returns for follow up  and for Opdivo. Xgeva not due. CT scnas on 9/13/17 reveal "In this patient with history of lung cancer status post immunotherapy there is no evidence of local recurrence or new metastatic disease.Stable sclerotic bone lesion in the RIGHT iliac wing consistent with treated lesion. Stable emphysematous lung changes. RECIST SUMMARY: Date of prior examination for comparison 5/29/2017 There are no measurable lesions as per RECIST criteria."   Bone scan reveals ".  Unchanged mild uptake at the right anterior iliac spine. 2.  No new metastatic disease."  He feels good today. He denies any nausea, vomiting, diarrhea, constipation, abdominal pain, weight loss or loss of appetite, chest pain, shortness of breath, leg swelling, fatigue, pain, headache, dizziness, or mood changes. His ECOG PS is zero. He is by himself.          ECOG Performance Status:   ECOG SCORE    0 - Fully active-able to carry on all pre-disease performance without restriction         PMFSH: all information reviewed and updated as relevant to today's visit    Review of Systems:   Review of Systems   Constitutional: Negative for activity change, appetite change, fatigue and fever.   HENT: Negative for mouth sores, nosebleeds and sore throat.    Eyes: Negative for visual disturbance.   Respiratory: Negative for cough and shortness of breath.    Cardiovascular: Negative for chest pain, " "palpitations and leg swelling.   Gastrointestinal: Negative for abdominal pain, constipation, diarrhea, nausea and vomiting.   Genitourinary: Negative for difficulty urinating and frequency.   Musculoskeletal: Negative for arthralgias and back pain.   Skin: Negative for rash.   Neurological: Negative for dizziness, numbness and headaches.   Hematological: Negative for adenopathy. Does not bruise/bleed easily.   Psychiatric/Behavioral: Negative for confusion and sleep disturbance. The patient is not nervous/anxious.    All other systems reviewed and are negative.        Objective:      Vitals:   Vitals:    09/19/17 1025   BP: 131/84   Pulse: 70   Resp: 17   Temp: 97.6 °F (36.4 °C)   TempSrc: Oral   SpO2: 98%   Weight: 83 kg (182 lb 15.7 oz)   Height: 5' 6" (1.676 m)     BMI: Body mass index is 29.53 kg/m².      Physical Exam:   Physical Exam   Constitutional: He is oriented to person, place, and time. He appears well-developed and well-nourished. No distress.   HENT:   Right Ear: External ear normal.   Left Ear: External ear normal.   Mouth/Throat: No oropharyngeal exudate.   Eyes: Conjunctivae and lids are normal. Pupils are equal, round, and reactive to light. No scleral icterus.   Neck: Trachea normal and normal range of motion. Neck supple. No thyromegaly present.   Cardiovascular: Normal rate, regular rhythm, normal heart sounds and normal pulses.    Pulmonary/Chest: Effort normal and breath sounds normal.   Abdominal: Soft. Normal appearance and bowel sounds are normal. He exhibits no distension and no mass. There is no hepatosplenomegaly or splenomegaly. There is no tenderness.   Musculoskeletal: Normal range of motion.   Lymphadenopathy:        Head (right side): No submental and no submandibular adenopathy present.        Head (left side): No submental and no submandibular adenopathy present.     He has no cervical adenopathy.     He has no axillary adenopathy.        Right: No supraclavicular adenopathy " present.        Left: No supraclavicular adenopathy present.   Neurological: He is alert and oriented to person, place, and time. No sensory deficit.   Skin: Skin is warm, dry and intact. No bruising and no rash noted. Nails show no clubbing.   Psychiatric: He has a normal mood and affect. His speech is normal and behavior is normal. Cognition and memory are normal.   Vitals reviewed.        Laboratory Data:  WBC   Date Value Ref Range Status   09/19/2017 8.10 3.90 - 12.70 K/uL Final     Hemoglobin   Date Value Ref Range Status   09/19/2017 14.2 14.0 - 18.0 g/dL Final     Hematocrit   Date Value Ref Range Status   09/19/2017 43.3 40.0 - 54.0 % Final     Platelets   Date Value Ref Range Status   09/19/2017 243 150 - 350 K/uL Final     Gran #   Date Value Ref Range Status   09/19/2017 3.8 1.8 - 7.7 K/uL Final     Comment:     The ANC is based on a white cell differential from an   automated cell counter. It has not been microscopically   reviewed for the presence of abnormal cells. Clinical   correlation is required.         Chemistry        Component Value Date/Time     09/19/2017 0932    K 4.1 09/19/2017 0932     09/19/2017 0932    CO2 28 09/19/2017 0932    BUN 17 09/19/2017 0932    CREATININE 1.4 09/19/2017 0932    GLU 92 09/19/2017 0932        Component Value Date/Time    CALCIUM 9.9 09/19/2017 0932    ALKPHOS 63 09/19/2017 0932    AST 19 09/19/2017 0932    ALT 17 09/19/2017 0932    BILITOT 0.3 09/19/2017 0932    ESTGFRAFRICA >60.0 09/19/2017 0932    EGFRNONAA >60.0 09/19/2017 0932              Assessment/Plan:     1. Malignant neoplasm of upper lobe of right lung    2. Deep vein thrombosis (DVT) of left lower extremity, unspecified chronicity, unspecified vein    3. Lung cancer, primary, with metastasis from lung to other site, right    4. Secondary malignant neoplasm of bone    5. Hypothyroidism due to medication        Plan:    1,2,3,4. HE is doing well clinically and will receive Opdivo and will  return in 2 weeks for next cycle and also for Xgeva  5. Check TSH and free T4 in 2 weeks.      More than 30 mins were spent during this encounter, greater than 50% was spent in direct counseling and/or coordination of care.   Patient was also seen and examined by Dr. Garay who agrees with above plan. Patient is in agreement with the proposed treatment plan. All questions were answered to the patient's satisfaction. Pt knows to call clinic if anything is needed before the next clinic visit.    CARYL CalderonP-ZELDA  Hematology and Medical Oncology    Distress Screening Results: Psychosocial Distress screening score of Distress Score: 0 noted and reviewed. No intervention indicated.     STAFF NOTE:  I have reviewed the notes, assessments, and/or procedures performed by the nurse practitioner, as above.  I have personally interviewed the patient at the beside, and rounded with the nurse practitioner. I formulated the plan of care.  I concur with her documentation of Yao Alan.

## 2017-09-20 ENCOUNTER — INFUSION (OUTPATIENT)
Dept: INFUSION THERAPY | Facility: HOSPITAL | Age: 42
End: 2017-09-20
Attending: INTERNAL MEDICINE
Payer: MEDICARE

## 2017-09-20 VITALS
TEMPERATURE: 99 F | HEART RATE: 76 BPM | SYSTOLIC BLOOD PRESSURE: 120 MMHG | RESPIRATION RATE: 18 BRPM | DIASTOLIC BLOOD PRESSURE: 82 MMHG

## 2017-09-20 DIAGNOSIS — C34.91 LUNG CANCER, PRIMARY, WITH METASTASIS FROM LUNG TO OTHER SITE, RIGHT: ICD-10-CM

## 2017-09-20 DIAGNOSIS — C79.51 SECONDARY MALIGNANT NEOPLASM OF BONE: ICD-10-CM

## 2017-09-20 DIAGNOSIS — D50.0 IRON DEFICIENCY ANEMIA DUE TO CHRONIC BLOOD LOSS: Primary | ICD-10-CM

## 2017-09-20 DIAGNOSIS — C34.11 MALIGNANT NEOPLASM OF UPPER LOBE OF RIGHT LUNG: ICD-10-CM

## 2017-09-20 PROCEDURE — 96413 CHEMO IV INFUSION 1 HR: CPT

## 2017-09-20 PROCEDURE — A4216 STERILE WATER/SALINE, 10 ML: HCPCS | Performed by: INTERNAL MEDICINE

## 2017-09-20 PROCEDURE — 25000003 PHARM REV CODE 250: Performed by: INTERNAL MEDICINE

## 2017-09-20 PROCEDURE — 63600175 PHARM REV CODE 636 W HCPCS: Performed by: INTERNAL MEDICINE

## 2017-09-20 RX ORDER — SODIUM CHLORIDE 0.9 % (FLUSH) 0.9 %
10 SYRINGE (ML) INJECTION
Status: DISCONTINUED | OUTPATIENT
Start: 2017-09-20 | End: 2017-09-20 | Stop reason: HOSPADM

## 2017-09-20 RX ORDER — HEPARIN 100 UNIT/ML
500 SYRINGE INTRAVENOUS
Status: DISCONTINUED | OUTPATIENT
Start: 2017-09-20 | End: 2017-09-20 | Stop reason: HOSPADM

## 2017-09-20 RX ADMIN — SODIUM CHLORIDE 240 MG: 9 INJECTION, SOLUTION INTRAVENOUS at 03:09

## 2017-09-20 RX ADMIN — HEPARIN 500 UNITS: 100 SYRINGE at 04:09

## 2017-09-20 RX ADMIN — SODIUM CHLORIDE, PRESERVATIVE FREE 10 ML: 5 INJECTION INTRAVENOUS at 04:09

## 2017-09-20 NOTE — PLAN OF CARE
Problem: Chemotherapy Effects (Adult)  Goal: Signs and Symptoms of Listed Potential Problems Will be Absent or Manageable (Chemotherapy Effects)  Signs and symptoms of listed potential problems will be absent or manageable by discharge/transition of care (reference Chemotherapy Effects (Adult) CPG).   Outcome: Ongoing (interventions implemented as appropriate)  Pt here for opdivo infusion, hx, meds, allergies reviewed, pt with no complaints or concerns at thsi time, reclined in chair, continue to monitor

## 2017-09-20 NOTE — PLAN OF CARE
Problem: Patient Care Overview (Adult)  Goal: Plan of Care Review  Outcome: Ongoing (interventions implemented as appropriate)  Pt tolerated opdivo without issue, avs given, rtc 10/4/17, no distress noted upon d/c to home

## 2017-09-23 ENCOUNTER — HOSPITAL ENCOUNTER (EMERGENCY)
Facility: OTHER | Age: 42
Discharge: HOME | End: 2017-09-23
Attending: INTERNAL MEDICINE
Payer: MEDICARE

## 2017-09-23 VITALS
HEART RATE: 100 BPM | WEIGHT: 185 LBS | HEIGHT: 66 IN | BODY MASS INDEX: 29.73 KG/M2 | OXYGEN SATURATION: 100 % | SYSTOLIC BLOOD PRESSURE: 137 MMHG | RESPIRATION RATE: 18 BRPM | TEMPERATURE: 98 F | DIASTOLIC BLOOD PRESSURE: 97 MMHG

## 2017-09-23 DIAGNOSIS — L42 PITYRIASIS ROSEA: Primary | ICD-10-CM

## 2017-09-23 PROCEDURE — 99283 EMERGENCY DEPT VISIT LOW MDM: CPT

## 2017-09-23 RX ORDER — PREDNISONE 20 MG/1
40 TABLET ORAL DAILY
Qty: 10 TABLET | Refills: 0 | Status: SHIPPED | OUTPATIENT
Start: 2017-09-23 | End: 2017-09-28

## 2017-09-23 RX ORDER — HYDROXYZINE HYDROCHLORIDE 25 MG/1
50 TABLET, FILM COATED ORAL 3 TIMES DAILY PRN
Qty: 30 TABLET | Refills: 0 | Status: SHIPPED | OUTPATIENT
Start: 2017-09-23 | End: 2018-12-04

## 2017-09-23 NOTE — ED PROVIDER NOTES
Encounter Date: 9/23/2017       History     Chief Complaint   Patient presents with    Rash     bumps to body onset yesterday      42-year-old male presents to the emergency department complaining of rash ×2 days.      The history is provided by the patient. No  was used.   Rash    This is a new problem. The current episode started yesterday. The problem has been gradually worsening. The problem is associated with nothing. The rash is present on the torso (Bilateral upper and lower extremities). The pain is at a severity of 0/10. Associated symptoms include itching. Pertinent negatives include no blisters, no pain and no weeping. He has tried nothing for the symptoms.     Review of patient's allergies indicates:   Allergen Reactions    Nsaids (non-steroidal anti-inflammatory drug)      Patient says since been diagnosed with lung mass on 2-12-15, if take any NSAIDS he spikes a fever.    Tylenol [acetaminophen] Other (See Comments)     Sweating +     Past Medical History:   Diagnosis Date    Asthma     COPD (chronic obstructive pulmonary disease)     HTN (hypertension)     Hypertension     Lung cancer     Lung mass     Thyroid disease      Past Surgical History:   Procedure Laterality Date    FRACTURE SURGERY      finger    LUNG CANCER SURGERY Right March 2015    lung biopsy      Family History   Problem Relation Age of Onset    Hypertension Father     No Known Problems Mother     No Known Problems Sister     No Known Problems Brother     No Known Problems Maternal Aunt     No Known Problems Maternal Uncle     No Known Problems Paternal Aunt     No Known Problems Paternal Uncle     No Known Problems Maternal Grandmother     No Known Problems Maternal Grandfather     No Known Problems Paternal Grandmother     No Known Problems Paternal Grandfather     Amblyopia Neg Hx     Blindness Neg Hx     Cancer Neg Hx     Cataracts Neg Hx     Diabetes Neg Hx     Glaucoma Neg Hx      Macular degeneration Neg Hx     Retinal detachment Neg Hx     Strabismus Neg Hx     Stroke Neg Hx     Thyroid disease Neg Hx      Social History   Substance Use Topics    Smoking status: Former Smoker     Packs/day: 0.75     Years: 25.00     Types: Cigarettes     Quit date: 12/2/2014    Smokeless tobacco: Never Used      Comment: Patient is no longer smoking    Alcohol use No     Review of Systems   Constitutional: Negative.    Respiratory: Negative.    Cardiovascular: Negative.    Skin: Positive for itching and rash.   All other systems reviewed and are negative.      Physical Exam     Initial Vitals [09/23/17 1314]   BP Pulse Resp Temp SpO2   (!) 137/97 100 18 97.5 °F (36.4 °C) 100 %      MAP       110.33         Physical Exam    Nursing note and vitals reviewed.  Constitutional: He appears well-developed and well-nourished.   HENT:   Head: Normocephalic and atraumatic.   Eyes: Conjunctivae and EOM are normal.   Neck: Normal range of motion.   Cardiovascular: Normal rate, regular rhythm and normal heart sounds.   Pulmonary/Chest: Breath sounds normal. No respiratory distress. He has no wheezes.   Abdominal: Soft. Bowel sounds are normal. He exhibits no distension.   Musculoskeletal: Normal range of motion.   Neurological: He is alert.   Skin: Skin is warm and dry.   Bilateral upper and lower extremity, as well as chest and back oval-shaped lesions with herald patch to the left lateral chest wall   Psychiatric: He has a normal mood and affect.         ED Course   Procedures  Labs Reviewed - No data to display          Medical Decision Making:   Initial Assessment:   42-year-old male presents to the emergency department complaining of rash ×2 days.    Differential Diagnosis:   ALLERGIC reaction  Eczema  Pityriasis rosea  ED Management:  Patient was given instructions for pityriasis rosea and a prescription for a prednisone burst and hydroxyzine to use as needed.  He was advised to follow-up with his  primary care physician in 2 days for reevaluation.                   ED Course      Clinical Impression:   The encounter diagnosis was Pityriasis rosea.    Disposition:   Disposition: Discharged  Condition: Stable                        Sarath Xiong MD  09/23/17 1697

## 2017-09-28 DIAGNOSIS — E03.9 HYPOTHYROIDISM, UNSPECIFIED TYPE: ICD-10-CM

## 2017-09-28 RX ORDER — LEVOTHYROXINE SODIUM 75 UG/1
75 TABLET ORAL DAILY
Qty: 30 TABLET | Refills: 11 | Status: SHIPPED | OUTPATIENT
Start: 2017-09-28 | End: 2017-11-24 | Stop reason: SDUPTHER

## 2017-09-28 NOTE — TELEPHONE ENCOUNTER
----- Message from Willy Perrin sent at 9/28/2017  8:54 AM CDT -----  Contact: Pt   Refill levothyroxine (SYNTHROID) 75 MCG tablet and percocet     Contact::[phone]  Pharmacy::[phone]

## 2017-10-03 ENCOUNTER — OFFICE VISIT (OUTPATIENT)
Dept: HEMATOLOGY/ONCOLOGY | Facility: CLINIC | Age: 42
End: 2017-10-03
Payer: COMMERCIAL

## 2017-10-03 ENCOUNTER — TELEPHONE (OUTPATIENT)
Dept: HEMATOLOGY/ONCOLOGY | Facility: CLINIC | Age: 42
End: 2017-10-03

## 2017-10-03 ENCOUNTER — LAB VISIT (OUTPATIENT)
Dept: LAB | Facility: HOSPITAL | Age: 42
End: 2017-10-03
Attending: INTERNAL MEDICINE
Payer: MEDICARE

## 2017-10-03 VITALS
SYSTOLIC BLOOD PRESSURE: 115 MMHG | RESPIRATION RATE: 16 BRPM | OXYGEN SATURATION: 99 % | HEIGHT: 66 IN | HEART RATE: 77 BPM | TEMPERATURE: 98 F | BODY MASS INDEX: 29.44 KG/M2 | DIASTOLIC BLOOD PRESSURE: 82 MMHG | WEIGHT: 183.19 LBS

## 2017-10-03 DIAGNOSIS — C34.11 MALIGNANT NEOPLASM OF UPPER LOBE OF RIGHT LUNG: ICD-10-CM

## 2017-10-03 DIAGNOSIS — C79.51 SECONDARY MALIGNANT NEOPLASM OF BONE: ICD-10-CM

## 2017-10-03 DIAGNOSIS — E03.2 HYPOTHYROIDISM DUE TO MEDICATION: ICD-10-CM

## 2017-10-03 DIAGNOSIS — I82.402 DEEP VEIN THROMBOSIS (DVT) OF LEFT LOWER EXTREMITY, UNSPECIFIED CHRONICITY, UNSPECIFIED VEIN: ICD-10-CM

## 2017-10-03 DIAGNOSIS — C34.11 MALIGNANT NEOPLASM OF UPPER LOBE OF RIGHT LUNG: Primary | ICD-10-CM

## 2017-10-03 LAB
ALBUMIN SERPL BCP-MCNC: 3.8 G/DL
ALP SERPL-CCNC: 64 U/L
ALT SERPL W/O P-5'-P-CCNC: 20 U/L
ANION GAP SERPL CALC-SCNC: 8 MMOL/L
AST SERPL-CCNC: 18 U/L
BILIRUB SERPL-MCNC: 0.2 MG/DL
BUN SERPL-MCNC: 19 MG/DL
CALCIUM SERPL-MCNC: 9.4 MG/DL
CHLORIDE SERPL-SCNC: 106 MMOL/L
CO2 SERPL-SCNC: 27 MMOL/L
CREAT SERPL-MCNC: 1.3 MG/DL
ERYTHROCYTE [DISTWIDTH] IN BLOOD BY AUTOMATED COUNT: 14.9 %
EST. GFR  (AFRICAN AMERICAN): >60 ML/MIN/1.73 M^2
EST. GFR  (NON AFRICAN AMERICAN): >60 ML/MIN/1.73 M^2
GLUCOSE SERPL-MCNC: 102 MG/DL
HCT VFR BLD AUTO: 44.1 %
HGB BLD-MCNC: 14.6 G/DL
MCH RBC QN AUTO: 27 PG
MCHC RBC AUTO-ENTMCNC: 33.1 G/DL
MCV RBC AUTO: 82 FL
NEUTROPHILS # BLD AUTO: 4.7 K/UL
PLATELET # BLD AUTO: 241 K/UL
PMV BLD AUTO: 10 FL
POTASSIUM SERPL-SCNC: 4.1 MMOL/L
PROT SERPL-MCNC: 7.5 G/DL
RBC # BLD AUTO: 5.41 M/UL
SODIUM SERPL-SCNC: 141 MMOL/L
T4 FREE SERPL-MCNC: 0.84 NG/DL
TSH SERPL DL<=0.005 MIU/L-ACNC: 8.13 UIU/ML
WBC # BLD AUTO: 8.67 K/UL

## 2017-10-03 PROCEDURE — 84439 ASSAY OF FREE THYROXINE: CPT

## 2017-10-03 PROCEDURE — 84443 ASSAY THYROID STIM HORMONE: CPT

## 2017-10-03 PROCEDURE — 99215 OFFICE O/P EST HI 40 MIN: CPT | Mod: S$GLB,,, | Performed by: INTERNAL MEDICINE

## 2017-10-03 PROCEDURE — 99999 PR PBB SHADOW E&M-EST. PATIENT-LVL IV: CPT | Mod: PBBFAC,,, | Performed by: INTERNAL MEDICINE

## 2017-10-03 PROCEDURE — 85027 COMPLETE CBC AUTOMATED: CPT

## 2017-10-03 PROCEDURE — 80053 COMPREHEN METABOLIC PANEL: CPT

## 2017-10-03 PROCEDURE — 36415 COLL VENOUS BLD VENIPUNCTURE: CPT

## 2017-10-03 PROCEDURE — 99214 OFFICE O/P EST MOD 30 MIN: CPT | Mod: PBBFAC | Performed by: INTERNAL MEDICINE

## 2017-10-03 RX ORDER — SODIUM CHLORIDE 0.9 % (FLUSH) 0.9 %
10 SYRINGE (ML) INJECTION
Status: CANCELLED | OUTPATIENT
Start: 2017-10-04

## 2017-10-03 RX ORDER — HEPARIN 100 UNIT/ML
500 SYRINGE INTRAVENOUS
Status: CANCELLED | OUTPATIENT
Start: 2017-10-04

## 2017-10-03 NOTE — PROGRESS NOTES
"Subjective:       Patient ID: Yao Alan is a 42 y.o. male.    Chief Complaint: Lung Cancer; interm lower back pain 0/10 now; and 9/23 ED, pruritic rash x 2 days, on abx  Oncologic History:  Mr. Yao Alan is a 42-year-old male who has a long history of smoking and was seen in the Pulmonary Clinic by Dr. Valle on 03/05/2015 with abnormal CT scan.    Apparently, he went to the [facility] in February with coughing as well as chest pain. A chest x-ray was done, which revealed a left upper lobe lung mass. Followup CT scan was done on 02/12/2015 and that revealed posterior superior mediastinal mass adjacent and prominent enlarged upper left mediastinal lymph nodes, abutting the aortic arch as well as left subclavian artery. A  CT scan of the abdomen was done on 03/03/2015 without contrast and that revealed nonobstructive nephrolithiasis, but a 2.1 cm lytic lesion involving the left iliac wing concerning for metastatic disease given the presence of emphysema and a mediastinal soft tissue mass on the CT chest from 02/12/2015. He saw Dr. Valle after that on 03/05/2015 and underwent an IR guided biopsy of the bone lesion on 03/17/2015. 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    His EGFR/EML/ALK is negative.    His PET scan on 3/25/15 revealed Left upper lobe lung lesion with an adjacent para-aortic lymph node, right anterior rib metastasis, right iliac metastasis, and pancreatic metastasis. A pancreatic cancer primary neoplasm is less likely.  MRI brain was negative for mets.  He completed 6 cycles of Carboplatin and Alimta and was on maintenance Alimta until end of March 2016.  His bone scan from 3/16 revealed stable disease. His CT scans also from end of March 2016 revealed "Interval enlargement of left upper lobe lung mass measuring 5.9 x 3.9 cm (previously 3.3 x 2.5 cm). This mass appears to invade the mediastinum and abutting the aortic arch and left subclavian artery"  He " "is now on Opdivo.  CT CAP from 6/22/16 s/p 6 cycles of Opdivo reveals "In this patient with a history of metastatic lung cancer, the previously identified paramediastinal left upper lobe lung mass has significantly decreased in size with only a trace amount of residual soft tissue opacities seen measuring 3.2 x 1.4 cm (axial series 2, image 16).Stable lytic lesion in the right iliac wing, unchanged.The right anterior sixth rib lesion and pancreatic head abnormality are not definitely appreciated on current examination."  Bone scan from 6/23/16 reveals "Stable activity in the right sixth rib anteriorly and the right iliac bone.No new metastatic lesions visualized."  10/3/16- CT scans reveal "In this patient with a history of metastatic lung cancer, the previously identified paramediastinal left upper lobe lung mass has decreased in size with only a trace amount of residual soft tissue opacity as above.  Stable lytic lesion in the right iliac wing, unchanged"  Bone scan reveals "Right superior anterior iliac bone lesion is stable and the right sixth rib has normalized. Recent dental procedure versus periodontal disease and possible right mastoiditis".     1/9/17 CT chest/abdomen/pelvis reveals "1. In this patient with history of metastatic lung cancer, the previously identified left upper paramediastinal lesion demonstrates near complete resolution with 1.0 x 0.7 cm residual disease (2.7 x 0.8 cm previously). Additionally, there is improving sclerotic lesion in the right iliac wing consistent with treated metastatic disease.  No evidence of new lesions in the chest, abdomen or pelvis. 2. Apical predominant paraseptal emphysematous changes and bullae. 3. Additional findings as above."     3/3/17 MRI brain revealed "Stable exam. No new enhancing mass lesion to suggest intracranial metastatic disease."     CT scans from 3/20/17 which reveal "No measurable lesion identified within the left upper paramediastinal region, " "the location of this patient's lung cancer, suggesting favorable response to treatment . No new lung lesions identified. Sclerotic lesion within the right iliac wing appears unchanged. Stable appearing centrilobular and paraseptal emphysema".        CT scans from 5/29/17 reveals "1. In this patient with history of lung cancer, there is no evidence of new focal masses or new metastases. Stable right iliac wing sclerotic lesion is unchanged.  2. Stable centrilobular and paraseptal emphysema."       HPI Mr. Alan returns for follow up  and for Opdivo. He feels well and denies any new complaints.    Review of Systems   Constitutional: Negative for appetite change, fatigue and unexpected weight change.   HENT: Negative for mouth sores.    Eyes: Negative for visual disturbance.   Respiratory: Negative for cough and shortness of breath.    Cardiovascular: Negative for chest pain.   Gastrointestinal: Negative for abdominal pain and diarrhea.   Genitourinary: Negative for frequency.   Musculoskeletal: Negative for back pain.   Skin: Negative for rash.   Neurological: Negative for headaches.   Hematological: Negative for adenopathy.   Psychiatric/Behavioral: The patient is not nervous/anxious.    All other systems reviewed and are negative.      Objective:      Physical Exam   Constitutional: He is oriented to person, place, and time. He appears well-developed and well-nourished.   HENT:   Mouth/Throat: No oropharyngeal exudate.   Cardiovascular: Normal rate and normal heart sounds.    Pulmonary/Chest: Effort normal and breath sounds normal. He has no wheezes.   Abdominal: Soft. Bowel sounds are normal. There is no tenderness.   Musculoskeletal: He exhibits no edema or tenderness.   Lymphadenopathy:     He has no cervical adenopathy.   Neurological: He is alert and oriented to person, place, and time. Coordination normal.   Skin: Skin is warm and dry. No rash noted.   Psychiatric: He has a normal mood and affect. Judgment and " thought content normal.   Vitals reviewed.      LABS:  WBC   Date Value Ref Range Status   10/03/2017 8.67 3.90 - 12.70 K/uL Final     Hemoglobin   Date Value Ref Range Status   10/03/2017 14.6 14.0 - 18.0 g/dL Final     Hematocrit   Date Value Ref Range Status   10/03/2017 44.1 40.0 - 54.0 % Final     Platelets   Date Value Ref Range Status   10/03/2017 241 150 - 350 K/uL Final     Gran #   Date Value Ref Range Status   10/03/2017 4.7 1.8 - 7.7 K/uL Final     Comment:     The ANC is based on a white cell differential from an   automated cell counter. It has not been microscopically   reviewed for the presence of abnormal cells. Clinical   correlation is required.         Chemistry        Component Value Date/Time     10/03/2017 0900    K 4.1 10/03/2017 0900     10/03/2017 0900    CO2 27 10/03/2017 0900    BUN 19 10/03/2017 0900    CREATININE 1.3 10/03/2017 0900     10/03/2017 0900        Component Value Date/Time    CALCIUM 9.4 10/03/2017 0900    ALKPHOS 64 10/03/2017 0900    AST 18 10/03/2017 0900    ALT 20 10/03/2017 0900    BILITOT 0.2 10/03/2017 0900    ESTGFRAFRICA >60.0 10/03/2017 0900    EGFRNONAA >60.0 10/03/2017 0900        TSH   Date Value Ref Range Status   10/03/2017 8.133 (H) 0.400 - 4.000 uIU/mL Final     Free T4   Date Value Ref Range Status   10/03/2017 0.84 0.71 - 1.51 ng/dL Final       Assessment:       1. Malignant neoplasm of upper lobe of right lung    2. Secondary malignant neoplasm of bone    3. Deep vein thrombosis (DVT) of left lower extremity, unspecified chronicity, unspecified vein    4. Hypothyroidism due to medication        Plan:        1,2. He is doing well and will proceed with Xgeva and Opdivo and see me in 2 weeks for next cycle  3. He is compliant with Xarelto  4. Compliant with Synthroid 75 mcg started on 9/28. Continue same dose and recheck in 2 weeks    Above care plan was discussed with patient and all questions were addressed to his satisfaction

## 2017-10-03 NOTE — TELEPHONE ENCOUNTER
spoke with pt on today in regards to scheduled chemo on 10/4/17, pt has confirm evening appointment.

## 2017-10-04 ENCOUNTER — INFUSION (OUTPATIENT)
Dept: INFUSION THERAPY | Facility: HOSPITAL | Age: 42
End: 2017-10-04
Attending: INTERNAL MEDICINE
Payer: MEDICARE

## 2017-10-04 VITALS
RESPIRATION RATE: 17 BRPM | SYSTOLIC BLOOD PRESSURE: 118 MMHG | DIASTOLIC BLOOD PRESSURE: 76 MMHG | HEART RATE: 91 BPM | TEMPERATURE: 98 F

## 2017-10-04 DIAGNOSIS — C34.11 MALIGNANT NEOPLASM OF UPPER LOBE OF RIGHT LUNG: ICD-10-CM

## 2017-10-04 DIAGNOSIS — C34.91 LUNG CANCER, PRIMARY, WITH METASTASIS FROM LUNG TO OTHER SITE, RIGHT: ICD-10-CM

## 2017-10-04 DIAGNOSIS — D50.0 IRON DEFICIENCY ANEMIA DUE TO CHRONIC BLOOD LOSS: Primary | ICD-10-CM

## 2017-10-04 DIAGNOSIS — C79.51 SECONDARY MALIGNANT NEOPLASM OF BONE: ICD-10-CM

## 2017-10-04 PROCEDURE — 96401 CHEMO ANTI-NEOPL SQ/IM: CPT

## 2017-10-04 PROCEDURE — 96413 CHEMO IV INFUSION 1 HR: CPT

## 2017-10-04 PROCEDURE — 63600175 PHARM REV CODE 636 W HCPCS: Performed by: INTERNAL MEDICINE

## 2017-10-04 PROCEDURE — A4216 STERILE WATER/SALINE, 10 ML: HCPCS | Performed by: INTERNAL MEDICINE

## 2017-10-04 PROCEDURE — 25000003 PHARM REV CODE 250: Performed by: INTERNAL MEDICINE

## 2017-10-04 RX ORDER — HEPARIN 100 UNIT/ML
500 SYRINGE INTRAVENOUS
Status: DISCONTINUED | OUTPATIENT
Start: 2017-10-04 | End: 2017-10-04 | Stop reason: HOSPADM

## 2017-10-04 RX ORDER — SODIUM CHLORIDE 0.9 % (FLUSH) 0.9 %
10 SYRINGE (ML) INJECTION
Status: DISCONTINUED | OUTPATIENT
Start: 2017-10-04 | End: 2017-10-04 | Stop reason: HOSPADM

## 2017-10-04 RX ADMIN — HEPARIN 500 UNITS: 100 SYRINGE at 03:10

## 2017-10-04 RX ADMIN — DENOSUMAB 120 MG: 120 INJECTION SUBCUTANEOUS at 02:10

## 2017-10-04 RX ADMIN — Medication 10 ML: at 03:10

## 2017-10-04 RX ADMIN — SODIUM CHLORIDE 240 MG: 9 INJECTION, SOLUTION INTRAVENOUS at 02:10

## 2017-10-04 RX ADMIN — SODIUM CHLORIDE: 9 INJECTION, SOLUTION INTRAVENOUS at 02:10

## 2017-10-04 NOTE — PLAN OF CARE
Problem: Patient Care Overview (Adult)  Goal: Discharge Needs Assessment  Outcome: Ongoing (interventions implemented as appropriate)  No complaints. Tolerated well.

## 2017-10-09 ENCOUNTER — OFFICE VISIT (OUTPATIENT)
Dept: FAMILY MEDICINE | Facility: CLINIC | Age: 42
End: 2017-10-09
Payer: MEDICARE

## 2017-10-09 VITALS
DIASTOLIC BLOOD PRESSURE: 76 MMHG | WEIGHT: 181.69 LBS | SYSTOLIC BLOOD PRESSURE: 110 MMHG | HEIGHT: 66 IN | TEMPERATURE: 98 F | HEART RATE: 82 BPM | BODY MASS INDEX: 29.2 KG/M2 | OXYGEN SATURATION: 99 %

## 2017-10-09 DIAGNOSIS — I10 ESSENTIAL HYPERTENSION: ICD-10-CM

## 2017-10-09 DIAGNOSIS — R21 RASH: Primary | ICD-10-CM

## 2017-10-09 DIAGNOSIS — C34.91 PRIMARY MALIGNANT NEOPLASM OF RIGHT LUNG METASTATIC TO OTHER SITE: ICD-10-CM

## 2017-10-09 DIAGNOSIS — L42 PITYRIASIS ROSEA: Primary | ICD-10-CM

## 2017-10-09 DIAGNOSIS — L30.9 DERMATITIS: ICD-10-CM

## 2017-10-09 PROCEDURE — 99999 PR PBB SHADOW E&M-EST. PATIENT-LVL III: CPT | Mod: PBBFAC,,, | Performed by: FAMILY MEDICINE

## 2017-10-09 PROCEDURE — 99213 OFFICE O/P EST LOW 20 MIN: CPT | Mod: PBBFAC,PO | Performed by: FAMILY MEDICINE

## 2017-10-09 PROCEDURE — 99214 OFFICE O/P EST MOD 30 MIN: CPT | Mod: S$PBB,,, | Performed by: FAMILY MEDICINE

## 2017-10-09 RX ORDER — PREDNISONE 20 MG/1
TABLET ORAL
Refills: 0 | COMMUNITY
Start: 2017-09-23 | End: 2017-10-13 | Stop reason: SDUPTHER

## 2017-10-09 RX ORDER — TRIAMCINOLONE ACETONIDE 1 MG/G
OINTMENT TOPICAL 2 TIMES DAILY
Qty: 80 G | Refills: 1 | Status: SHIPPED | OUTPATIENT
Start: 2017-10-09 | End: 2017-10-13 | Stop reason: SDUPTHER

## 2017-10-09 NOTE — PROGRESS NOTES
Chief Complaint   Patient presents with    Rash     arms and legs x 1 week. Itchy.OTC Cortizone. not helping       HPI  Yao Jose Alan is a 42 y.o. male with multiple medical diagnoses as listed in the medical history and problem list that presents for rash.    Rash - dx with pityriasis rosea, 2 weeks ago, states steroid PO helped and has now returned.     Hx of metastastic lung CA - followed by Heme/Onc, overall tolerating therapy at this time.     Pt is known to me and was last seen by me on 4/8/2016.    PAST MEDICAL HISTORY:  Past Medical History:   Diagnosis Date    Asthma     COPD (chronic obstructive pulmonary disease)     HTN (hypertension)     Hypertension     Lung cancer     Lung mass     Thyroid disease        PAST SURGICAL HISTORY:  Past Surgical History:   Procedure Laterality Date    FRACTURE SURGERY      finger    LUNG CANCER SURGERY Right March 2015    lung biopsy        SOCIAL HISTORY:  Social History     Social History    Marital status:      Spouse name: N/A    Number of children: N/A    Years of education: N/A     Occupational History    Not on file.     Social History Main Topics    Smoking status: Former Smoker     Packs/day: 0.75     Years: 25.00     Types: Cigarettes     Quit date: 12/2/2014    Smokeless tobacco: Never Used      Comment: Patient is no longer smoking    Alcohol use No    Drug use: No    Sexual activity: Yes     Partners: Female     Other Topics Concern    Not on file     Social History Narrative    No narrative on file       FAMILY HISTORY:  Family History   Problem Relation Age of Onset    Hypertension Father     No Known Problems Mother     No Known Problems Sister     No Known Problems Brother     No Known Problems Maternal Aunt     No Known Problems Maternal Uncle     No Known Problems Paternal Aunt     No Known Problems Paternal Uncle     No Known Problems Maternal Grandmother     No Known Problems Maternal Grandfather     No Known  Problems Paternal Grandmother     No Known Problems Paternal Grandfather     Amblyopia Neg Hx     Blindness Neg Hx     Cancer Neg Hx     Cataracts Neg Hx     Diabetes Neg Hx     Glaucoma Neg Hx     Macular degeneration Neg Hx     Retinal detachment Neg Hx     Strabismus Neg Hx     Stroke Neg Hx     Thyroid disease Neg Hx        ALLERGIES AND MEDICATIONS: updated and reviewed.  Review of patient's allergies indicates:   Allergen Reactions    Nsaids (non-steroidal anti-inflammatory drug)      Patient says since been diagnosed with lung mass on 2-12-15, if take any NSAIDS he spikes a fever.    Tylenol [acetaminophen] Other (See Comments)     Sweating +     Current Outpatient Prescriptions   Medication Sig Dispense Refill    amlodipine (NORVASC) 10 MG tablet Take 1 tablet (10 mg total) by mouth once daily. 30 tablet 3    levothyroxine (SYNTHROID) 75 MCG tablet Take 1 tablet (75 mcg total) by mouth once daily. 30 tablet 11    metoclopramide HCl (REGLAN) 10 MG tablet Take 1 tablet (10 mg total) by mouth every 6 (six) hours as needed (nausea/vomiting and/or abdominal cramps). 15 tablet 0    omeprazole (PRILOSEC) 20 MG capsule Take 1 capsule (20 mg total) by mouth once daily. 30 capsule 11    albuterol 90 mcg/actuation inhaler Inhale 2 puffs into the lungs every 6 (six) hours as needed for Wheezing. 1 each 11    clotrimazole (LOTRIMIN) 1 % cream Apply to affected area 2 times daily 30 g 1    diphenhydrAMINE (BENADRYL) 25 mg capsule Take 1 each (25 mg total) by mouth every 6 (six) hours as needed for Itching or Allergies. 12 capsule 0    docusate sodium (COLACE) 100 MG capsule Take 1 capsule (100 mg total) by mouth 2 (two) times daily. 60 capsule 1    ferrous sulfate 325 (65 FE) MG EC tablet Take 1 tablet (325 mg total) by mouth 3 (three) times daily with meals.      hydrOXYzine HCl (ATARAX) 25 MG tablet Take 2 tablets (50 mg total) by mouth 3 (three) times daily as needed for Itching. 30 tablet 0     "lactulose (CHRONULAC) 10 gram/15 mL solution Take 30 mLs (20 g total) by mouth 3 (three) times daily. 946 mL 2    levocetirizine (XYZAL) 5 MG tablet Take 1 tablet (5 mg total) by mouth every evening. 30 tablet 11    naloxegol 25 mg Tab Take 25 mg by mouth once daily. 30 tablet 1    oxycodone-acetaminophen (PERCOCET)  mg per tablet Take 1 tablet by mouth every 6 (six) hours as needed for Pain. 120 tablet 0    polyethylene glycol (GLYCOLAX) 17 gram/dose powder Take 17 g by mouth once daily. 850 g 0    predniSONE (DELTASONE) 20 MG tablet Take 1 tablet (20 mg total) by mouth 2 (two) times daily. X 5 days then once daily x 5 days then every other day 18 tablet 0    PROAIR HFA 90 mcg/actuation inhaler INHALE TWO PUFFS BY MOUTH EVERY 6 HOURS AS NEEDED FOR WHEEZING 9 each 0    rivaroxaban (XARELTO) 20 mg Tab Take 1 tablet (20 mg total) by mouth once daily. 30 tablet 3    triamcinolone acetonide 0.1% (KENALOG) 0.1 % ointment Apply topically 2 (two) times daily. 80 g 1     No current facility-administered medications for this visit.        ROS  Review of Systems   Constitutional: Negative for activity change, fatigue and fever.   HENT: Negative for congestion, rhinorrhea and sore throat.    Eyes: Negative for visual disturbance.   Respiratory: Negative for cough, chest tightness and shortness of breath.    Cardiovascular: Negative for chest pain.   Gastrointestinal: Negative for abdominal pain, diarrhea, nausea and vomiting.   Genitourinary: Negative for dysuria, frequency and urgency.   Musculoskeletal: Negative for back pain and myalgias.   Skin: Positive for rash.   Neurological: Negative for dizziness, syncope and numbness.   Psychiatric/Behavioral: Negative for agitation.       Physical Exam  Vitals:    10/09/17 1624   BP: 110/76   Pulse: 82   Temp: 98 °F (36.7 °C)    Body mass index is 29.32 kg/m².  Weight: 82.4 kg (181 lb 10.5 oz)   Height: 5' 6" (167.6 cm)     Physical Exam   Constitutional: He is " oriented to person, place, and time. He appears well-developed and well-nourished.   HENT:   Head: Normocephalic.   Eyes: EOM are normal.   Neck: Normal range of motion.   Neurological: He is alert and oriented to person, place, and time.   Skin: Skin is warm and dry.   Abdominal dry rash, minimally pruritic   Psychiatric: He has a normal mood and affect. His behavior is normal. Judgment and thought content normal.   Nursing note and vitals reviewed.      Health Maintenance       Date Due Completion Date    Lipid Panel 1975 ---    Pneumococcal PCV13 (High Risk) (1 - PCV13 Required) 02/02/1976 ---    TETANUS VACCINE 02/02/1993 ---    Pneumococcal PPSV23 (High Risk) (1) 02/02/1993 ---    Influenza Vaccine 08/01/2017 ---          Assessment & Plan    Pityriasis rosea  -     triamcinolone acetonide 0.1% (KENALOG) 0.1 % ointment; Apply topically 2 (two) times daily.  Dispense: 80 g; Refill: 1  - Discussed UV exposure and timeline of symptoms  - Monitor symptoms closely    Dermatitis  -     triamcinolone acetonide 0.1% (KENALOG) 0.1 % ointment; Apply topically 2 (two) times daily.  Dispense: 80 g; Refill: 1    Essential hypertension  - Continue current medication regimen as prescribed  - Overall doing well    Primary malignant neoplasm of right lung metastatic to other site  - Continue current medication regimen as prescribed  - Cont follow up with Heme/Onc as scheduled        No Follow-up on file.

## 2017-10-13 DIAGNOSIS — L42 PITYRIASIS ROSEA: ICD-10-CM

## 2017-10-13 DIAGNOSIS — R21 RASH: ICD-10-CM

## 2017-10-13 DIAGNOSIS — I82.402 DEEP VEIN THROMBOSIS (DVT) OF LEFT LOWER EXTREMITY, UNSPECIFIED CHRONICITY, UNSPECIFIED VEIN: ICD-10-CM

## 2017-10-13 DIAGNOSIS — L30.9 DERMATITIS: ICD-10-CM

## 2017-10-13 DIAGNOSIS — G89.3 NEOPLASM RELATED PAIN: ICD-10-CM

## 2017-10-13 DIAGNOSIS — C34.91 LUNG CANCER, PRIMARY, WITH METASTASIS FROM LUNG TO OTHER SITE, RIGHT: ICD-10-CM

## 2017-10-13 RX ORDER — TRIAMCINOLONE ACETONIDE 1 MG/G
OINTMENT TOPICAL 2 TIMES DAILY
Qty: 80 G | Refills: 1 | Status: SHIPPED | OUTPATIENT
Start: 2017-10-13 | End: 2019-09-24

## 2017-10-13 RX ORDER — PREDNISONE 20 MG/1
20 TABLET ORAL 2 TIMES DAILY
Qty: 18 TABLET | Refills: 0 | Status: SHIPPED | OUTPATIENT
Start: 2017-10-13 | End: 2017-10-31

## 2017-10-13 RX ORDER — PREDNISONE 20 MG/1
20 TABLET ORAL 2 TIMES DAILY
Qty: 18 TABLET | Refills: 0 | Status: SHIPPED | OUTPATIENT
Start: 2017-10-13 | End: 2017-10-13 | Stop reason: SDUPTHER

## 2017-10-13 RX ORDER — OXYCODONE AND ACETAMINOPHEN 10; 325 MG/1; MG/1
1 TABLET ORAL EVERY 6 HOURS PRN
Qty: 120 TABLET | Refills: 0 | Status: SHIPPED | OUTPATIENT
Start: 2017-10-13 | End: 2017-10-25 | Stop reason: SDUPTHER

## 2017-10-13 NOTE — TELEPHONE ENCOUNTER
----- Message from Zaida Cevallso LCSW sent at 10/13/2017  3:46 PM CDT -----  Can you send the Kenalog cream prescription to [facility]?  I spoke with staff in the pharmacy - they have it in stock and patient is there at [facility].

## 2017-10-17 ENCOUNTER — OFFICE VISIT (OUTPATIENT)
Dept: HEMATOLOGY/ONCOLOGY | Facility: CLINIC | Age: 42
End: 2017-10-17
Payer: MEDICARE

## 2017-10-17 VITALS
BODY MASS INDEX: 29.44 KG/M2 | HEIGHT: 66 IN | TEMPERATURE: 99 F | HEART RATE: 74 BPM | DIASTOLIC BLOOD PRESSURE: 86 MMHG | WEIGHT: 183.19 LBS | RESPIRATION RATE: 16 BRPM | OXYGEN SATURATION: 100 % | SYSTOLIC BLOOD PRESSURE: 128 MMHG

## 2017-10-17 DIAGNOSIS — C34.11 MALIGNANT NEOPLASM OF UPPER LOBE OF RIGHT LUNG: Primary | ICD-10-CM

## 2017-10-17 DIAGNOSIS — C79.51 SECONDARY MALIGNANT NEOPLASM OF BONE: ICD-10-CM

## 2017-10-17 PROCEDURE — 99214 OFFICE O/P EST MOD 30 MIN: CPT | Mod: PBBFAC | Performed by: INTERNAL MEDICINE

## 2017-10-17 PROCEDURE — 99215 OFFICE O/P EST HI 40 MIN: CPT | Mod: S$PBB,,, | Performed by: INTERNAL MEDICINE

## 2017-10-17 PROCEDURE — 99999 PR PBB SHADOW E&M-EST. PATIENT-LVL IV: CPT | Mod: PBBFAC,,, | Performed by: INTERNAL MEDICINE

## 2017-10-17 RX ORDER — HEPARIN 100 UNIT/ML
500 SYRINGE INTRAVENOUS
Status: CANCELLED | OUTPATIENT
Start: 2017-10-18

## 2017-10-17 RX ORDER — SODIUM CHLORIDE 0.9 % (FLUSH) 0.9 %
10 SYRINGE (ML) INJECTION
Status: CANCELLED | OUTPATIENT
Start: 2017-10-18

## 2017-10-17 NOTE — PROGRESS NOTES
"Subjective:       Patient ID: Yao Alan is a 42 y.o. male.    Chief Complaint: Lung Cancer and Rash  Oncologic History:  Mr. Yao Alan is a 42-year-old male who has a long history of smoking and was seen in the Pulmonary Clinic by Dr. Valle on 03/05/2015 with abnormal CT scan.    Apparently, he went to the [facility] in February with coughing as well as chest pain. A chest x-ray was done, which revealed a left upper lobe lung mass. Followup CT scan was done on 02/12/2015 and that revealed posterior superior mediastinal mass adjacent and prominent enlarged upper left mediastinal lymph nodes, abutting the aortic arch as well as left subclavian artery. A  CT scan of the abdomen was done on 03/03/2015 without contrast and that revealed nonobstructive nephrolithiasis, but a 2.1 cm lytic lesion involving the left iliac wing concerning for metastatic disease given the presence of emphysema and a mediastinal soft tissue mass on the CT chest from 02/12/2015. He saw Dr. Valle after that on 03/05/2015 and underwent an IR guided biopsy of the bone lesion on 03/17/2015. 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    His EGFR/EML/ALK is negative.    His PET scan on 3/25/15 revealed Left upper lobe lung lesion with an adjacent para-aortic lymph node, right anterior rib metastasis, right iliac metastasis, and pancreatic metastasis. A pancreatic cancer primary neoplasm is less likely.  MRI brain was negative for mets.  He completed 6 cycles of Carboplatin and Alimta and was on maintenance Alimta until end of March 2016.  His bone scan from 3/16 revealed stable disease. His CT scans also from end of March 2016 revealed "Interval enlargement of left upper lobe lung mass measuring 5.9 x 3.9 cm (previously 3.3 x 2.5 cm). This mass appears to invade the mediastinum and abutting the aortic arch and left subclavian artery"  He is now on Opdivo.  CT CAP from 6/22/16 s/p 6 cycles of Opdivo " "reveals "In this patient with a history of metastatic lung cancer, the previously identified paramediastinal left upper lobe lung mass has significantly decreased in size with only a trace amount of residual soft tissue opacities seen measuring 3.2 x 1.4 cm (axial series 2, image 16).Stable lytic lesion in the right iliac wing, unchanged.The right anterior sixth rib lesion and pancreatic head abnormality are not definitely appreciated on current examination."  Bone scan from 6/23/16 reveals "Stable activity in the right sixth rib anteriorly and the right iliac bone.No new metastatic lesions visualized."  10/3/16- CT scans reveal "In this patient with a history of metastatic lung cancer, the previously identified paramediastinal left upper lobe lung mass has decreased in size with only a trace amount of residual soft tissue opacity as above.  Stable lytic lesion in the right iliac wing, unchanged"  Bone scan reveals "Right superior anterior iliac bone lesion is stable and the right sixth rib has normalized. Recent dental procedure versus periodontal disease and possible right mastoiditis".     1/9/17 CT chest/abdomen/pelvis reveals "1. In this patient with history of metastatic lung cancer, the previously identified left upper paramediastinal lesion demonstrates near complete resolution with 1.0 x 0.7 cm residual disease (2.7 x 0.8 cm previously). Additionally, there is improving sclerotic lesion in the right iliac wing consistent with treated metastatic disease.  No evidence of new lesions in the chest, abdomen or pelvis. 2. Apical predominant paraseptal emphysematous changes and bullae. 3. Additional findings as above."     3/3/17 MRI brain revealed "Stable exam. No new enhancing mass lesion to suggest intracranial metastatic disease."     CT scans from 3/20/17 which reveal "No measurable lesion identified within the left upper paramediastinal region, the location of this patient's lung cancer, suggesting favorable " "response to treatment . No new lung lesions identified. Sclerotic lesion within the right iliac wing appears unchanged. Stable appearing centrilobular and paraseptal emphysema".        CT scans from 5/29/17 reveals "1. In this patient with history of lung cancer, there is no evidence of new focal masses or new metastases. Stable right iliac wing sclerotic lesion is unchanged.  2. Stable centrilobular and paraseptal emphysema."          HPI Mr. Alan returns for follow up  and for Opdivo. He was noted to have a skin rash, saw his PCP and was diagnosed with pitryiasis rosea. He is better now.  He denies any nausea, vomiting, diarrhea, constipation, abdominal pain, weight loss or loss of appetite, chest pain, shortness of breath, leg swelling, fatigue, pain, headache, dizziness, or mood changes. His ECOG PS is zero.            Review of Systems   Constitutional: Negative for appetite change, fatigue and unexpected weight change.   HENT: Negative for mouth sores.    Eyes: Negative for visual disturbance.   Respiratory: Negative for cough and shortness of breath.    Cardiovascular: Negative for chest pain.   Gastrointestinal: Negative for abdominal pain and diarrhea.   Genitourinary: Negative for frequency.   Musculoskeletal: Negative for back pain.   Skin: Negative for rash.   Neurological: Negative for headaches.   Hematological: Negative for adenopathy.   Psychiatric/Behavioral: The patient is not nervous/anxious.    All other systems reviewed and are negative.      Objective:      Physical Exam   Constitutional: He is oriented to person, place, and time. He appears well-developed and well-nourished.   HENT:   Mouth/Throat: No oropharyngeal exudate.   Cardiovascular: Normal rate and normal heart sounds.    Pulmonary/Chest: Effort normal and breath sounds normal. He has no wheezes.   Abdominal: Soft. Bowel sounds are normal. There is no tenderness.   Musculoskeletal: He exhibits no edema or tenderness.   Lymphadenopathy: "     He has no cervical adenopathy.   Neurological: He is alert and oriented to person, place, and time. Coordination normal.   Skin: Skin is warm and dry. No rash noted.   Psychiatric: He has a normal mood and affect. Judgment and thought content normal.   Vitals reviewed.      LABS:  WBC   Date Value Ref Range Status   10/17/2017 8.72 3.90 - 12.70 K/uL Final     Hemoglobin   Date Value Ref Range Status   10/17/2017 14.2 14.0 - 18.0 g/dL Final     Hematocrit   Date Value Ref Range Status   10/17/2017 43.9 40.0 - 54.0 % Final     Platelets   Date Value Ref Range Status   10/17/2017 263 150 - 350 K/uL Final     Gran #   Date Value Ref Range Status   10/17/2017 4.3 1.8 - 7.7 K/uL Final     Comment:     The ANC is based on a white cell differential from an   automated cell counter. It has not been microscopically   reviewed for the presence of abnormal cells. Clinical   correlation is required.         Chemistry        Component Value Date/Time     10/17/2017 0803    K 3.8 10/17/2017 0803     10/17/2017 0803    CO2 23 10/17/2017 0803    BUN 18 10/17/2017 0803    CREATININE 1.4 10/17/2017 0803    GLU 97 10/17/2017 0803        Component Value Date/Time    CALCIUM 9.2 10/17/2017 0803    ALKPHOS 71 10/17/2017 0803    AST 20 10/17/2017 0803    ALT 16 10/17/2017 0803    BILITOT 0.3 10/17/2017 0803    ESTGFRAFRICA >60.0 10/17/2017 0803    EGFRNONAA >60.0 10/17/2017 0803        TSH   Date Value Ref Range Status   10/17/2017 5.398 (H) 0.400 - 4.000 uIU/mL Final     Free T4   Date Value Ref Range Status   10/17/2017 0.90 0.71 - 1.51 ng/dL Final       Assessment:       1. Malignant neoplasm of upper lobe of right lung    2. Secondary malignant neoplasm of bone        Plan:        1,2. He is doing well clinically and will proceed with Opdivo and will return in 2 weeks for next cycle.    Above care plan was discussed with patient and all questions were addressed to his satisfaction

## 2017-10-18 ENCOUNTER — INFUSION (OUTPATIENT)
Dept: INFUSION THERAPY | Facility: HOSPITAL | Age: 42
End: 2017-10-18
Attending: INTERNAL MEDICINE
Payer: MEDICARE

## 2017-10-18 VITALS
TEMPERATURE: 98 F | DIASTOLIC BLOOD PRESSURE: 76 MMHG | RESPIRATION RATE: 18 BRPM | HEART RATE: 79 BPM | SYSTOLIC BLOOD PRESSURE: 128 MMHG

## 2017-10-18 DIAGNOSIS — C34.91 LUNG CANCER, PRIMARY, WITH METASTASIS FROM LUNG TO OTHER SITE, RIGHT: ICD-10-CM

## 2017-10-18 DIAGNOSIS — C34.11 MALIGNANT NEOPLASM OF UPPER LOBE OF RIGHT LUNG: ICD-10-CM

## 2017-10-18 DIAGNOSIS — C79.51 SECONDARY MALIGNANT NEOPLASM OF BONE: ICD-10-CM

## 2017-10-18 DIAGNOSIS — D50.0 IRON DEFICIENCY ANEMIA DUE TO CHRONIC BLOOD LOSS: Primary | ICD-10-CM

## 2017-10-18 PROCEDURE — A4216 STERILE WATER/SALINE, 10 ML: HCPCS | Performed by: INTERNAL MEDICINE

## 2017-10-18 PROCEDURE — 96413 CHEMO IV INFUSION 1 HR: CPT

## 2017-10-18 PROCEDURE — 63600175 PHARM REV CODE 636 W HCPCS: Performed by: INTERNAL MEDICINE

## 2017-10-18 PROCEDURE — 25000003 PHARM REV CODE 250: Performed by: INTERNAL MEDICINE

## 2017-10-18 RX ORDER — SODIUM CHLORIDE 0.9 % (FLUSH) 0.9 %
10 SYRINGE (ML) INJECTION
Status: DISCONTINUED | OUTPATIENT
Start: 2017-10-18 | End: 2017-10-18 | Stop reason: HOSPADM

## 2017-10-18 RX ORDER — HEPARIN 100 UNIT/ML
500 SYRINGE INTRAVENOUS
Status: DISCONTINUED | OUTPATIENT
Start: 2017-10-18 | End: 2017-10-18 | Stop reason: HOSPADM

## 2017-10-18 RX ADMIN — HEPARIN 500 UNITS: 100 SYRINGE at 10:10

## 2017-10-18 RX ADMIN — SODIUM CHLORIDE 240 MG: 9 INJECTION, SOLUTION INTRAVENOUS at 09:10

## 2017-10-18 RX ADMIN — SODIUM CHLORIDE, PRESERVATIVE FREE 10 ML: 5 INJECTION INTRAVENOUS at 10:10

## 2017-10-18 RX ADMIN — SODIUM CHLORIDE: 9 INJECTION, SOLUTION INTRAVENOUS at 09:10

## 2017-10-18 NOTE — PLAN OF CARE
Problem: Patient Care Overview (Adult)  Goal: Plan of Care Review  Outcome: Ongoing (interventions implemented as appropriate)  Tolerated treatment well.  Advised to call MD for any problems or concerns.  AVS given.  RTC in 2 weeks.  NAD noted upon discharge.

## 2017-10-18 NOTE — PLAN OF CARE
Problem: Chemotherapy Effects (Adult)  Goal: Signs and Symptoms of Listed Potential Problems Will be Absent or Manageable (Chemotherapy Effects)  Signs and symptoms of listed potential problems will be absent or manageable by discharge/transition of care (reference Chemotherapy Effects (Adult) CPG).   Outcome: Ongoing (interventions implemented as appropriate)  Here for cycle 41 of Opdivo.  No complaints made.

## 2017-10-25 DIAGNOSIS — G89.3 NEOPLASM RELATED PAIN: ICD-10-CM

## 2017-10-25 RX ORDER — OXYCODONE AND ACETAMINOPHEN 10; 325 MG/1; MG/1
1 TABLET ORAL EVERY 6 HOURS PRN
Qty: 120 TABLET | Refills: 0 | Status: SHIPPED | OUTPATIENT
Start: 2017-10-25 | End: 2017-11-24 | Stop reason: SDUPTHER

## 2017-10-25 NOTE — TELEPHONE ENCOUNTER
----- Message from Edwige Freedman sent at 10/25/2017 11:26 AM CDT -----  Contact: Pt  Pt calling requesting a refill on Percocet and Levothyroxine    Pt call back number [phone]

## 2017-10-31 ENCOUNTER — OFFICE VISIT (OUTPATIENT)
Dept: HEMATOLOGY/ONCOLOGY | Facility: CLINIC | Age: 42
End: 2017-10-31
Payer: COMMERCIAL

## 2017-10-31 VITALS
HEIGHT: 66 IN | SYSTOLIC BLOOD PRESSURE: 118 MMHG | OXYGEN SATURATION: 96 % | TEMPERATURE: 98 F | HEART RATE: 65 BPM | BODY MASS INDEX: 29.62 KG/M2 | DIASTOLIC BLOOD PRESSURE: 80 MMHG | WEIGHT: 184.31 LBS | RESPIRATION RATE: 17 BRPM

## 2017-10-31 DIAGNOSIS — E03.2 HYPOTHYROIDISM DUE TO MEDICATION: ICD-10-CM

## 2017-10-31 DIAGNOSIS — C79.51 SECONDARY MALIGNANT NEOPLASM OF BONE: ICD-10-CM

## 2017-10-31 DIAGNOSIS — C34.11 MALIGNANT NEOPLASM OF UPPER LOBE OF RIGHT LUNG: Primary | ICD-10-CM

## 2017-10-31 PROCEDURE — 99215 OFFICE O/P EST HI 40 MIN: CPT | Mod: S$GLB,,, | Performed by: INTERNAL MEDICINE

## 2017-10-31 PROCEDURE — 99999 PR PBB SHADOW E&M-EST. PATIENT-LVL IV: CPT | Mod: PBBFAC,,, | Performed by: INTERNAL MEDICINE

## 2017-10-31 RX ORDER — SODIUM CHLORIDE 0.9 % (FLUSH) 0.9 %
10 SYRINGE (ML) INJECTION
Status: CANCELLED | OUTPATIENT
Start: 2017-11-01

## 2017-10-31 RX ORDER — HEPARIN 100 UNIT/ML
500 SYRINGE INTRAVENOUS
Status: CANCELLED | OUTPATIENT
Start: 2017-11-01

## 2017-10-31 NOTE — PROGRESS NOTES
"Subjective:       Patient ID: Yao Alan is a 42 y.o. male.    Chief Complaint: Lung Cancer and rash-resolved  Oncologic History:  Mr. Yao Alan is a 42-year-old male who has a long history of smoking and was seen in the Pulmonary Clinic by Dr. Valle on 03/05/2015 with abnormal CT scan.    Apparently, he went to the [facility] in February with coughing as well as chest pain. A chest x-ray was done, which revealed a left upper lobe lung mass. Followup CT scan was done on 02/12/2015 and that revealed posterior superior mediastinal mass adjacent and prominent enlarged upper left mediastinal lymph nodes, abutting the aortic arch as well as left subclavian artery. A  CT scan of the abdomen was done on 03/03/2015 without contrast and that revealed nonobstructive nephrolithiasis, but a 2.1 cm lytic lesion involving the left iliac wing concerning for metastatic disease given the presence of emphysema and a mediastinal soft tissue mass on the CT chest from 02/12/2015. He saw Dr. Valle after that on 03/05/2015 and underwent an IR guided biopsy of the bone lesion on 03/17/2015. 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    His EGFR/EML/ALK is negative.    His PET scan on 3/25/15 revealed Left upper lobe lung lesion with an adjacent para-aortic lymph node, right anterior rib metastasis, right iliac metastasis, and pancreatic metastasis. A pancreatic cancer primary neoplasm is less likely.  MRI brain was negative for mets.  He completed 6 cycles of Carboplatin and Alimta and was on maintenance Alimta until end of March 2016.  His bone scan from 3/16 revealed stable disease. His CT scans also from end of March 2016 revealed "Interval enlargement of left upper lobe lung mass measuring 5.9 x 3.9 cm (previously 3.3 x 2.5 cm). This mass appears to invade the mediastinum and abutting the aortic arch and left subclavian artery"  He is now on Opdivo.  CT CAP from 6/22/16 s/p 6 cycles of " "Opdivo reveals "In this patient with a history of metastatic lung cancer, the previously identified paramediastinal left upper lobe lung mass has significantly decreased in size with only a trace amount of residual soft tissue opacities seen measuring 3.2 x 1.4 cm (axial series 2, image 16).Stable lytic lesion in the right iliac wing, unchanged.The right anterior sixth rib lesion and pancreatic head abnormality are not definitely appreciated on current examination."  Bone scan from 6/23/16 reveals "Stable activity in the right sixth rib anteriorly and the right iliac bone.No new metastatic lesions visualized."  10/3/16- CT scans reveal "In this patient with a history of metastatic lung cancer, the previously identified paramediastinal left upper lobe lung mass has decreased in size with only a trace amount of residual soft tissue opacity as above.  Stable lytic lesion in the right iliac wing, unchanged"  Bone scan reveals "Right superior anterior iliac bone lesion is stable and the right sixth rib has normalized. Recent dental procedure versus periodontal disease and possible right mastoiditis".     1/9/17 CT chest/abdomen/pelvis reveals "1. In this patient with history of metastatic lung cancer, the previously identified left upper paramediastinal lesion demonstrates near complete resolution with 1.0 x 0.7 cm residual disease (2.7 x 0.8 cm previously). Additionally, there is improving sclerotic lesion in the right iliac wing consistent with treated metastatic disease.  No evidence of new lesions in the chest, abdomen or pelvis. 2. Apical predominant paraseptal emphysematous changes and bullae. 3. Additional findings as above."     3/3/17 MRI brain revealed "Stable exam. No new enhancing mass lesion to suggest intracranial metastatic disease."     CT scans from 3/20/17 which reveal "No measurable lesion identified within the left upper paramediastinal region, the location of this patient's lung cancer, suggesting " "favorable response to treatment . No new lung lesions identified. Sclerotic lesion within the right iliac wing appears unchanged. Stable appearing centrilobular and paraseptal emphysema".        CT scans from 5/29/17 reveals "1. In this patient with history of lung cancer, there is no evidence of new focal masses or new metastases. Stable right iliac wing sclerotic lesion is unchanged.  2. Stable centrilobular and paraseptal emphysema."            HPI Mr. Alan returns for follow up  and for Opdivo. He feels well and skin rash has resolved. He denies any other complaints      Review of Systems   Constitutional: Negative for appetite change, fatigue and unexpected weight change.   HENT: Negative for mouth sores.    Eyes: Negative for visual disturbance.   Respiratory: Negative for cough and shortness of breath.    Cardiovascular: Negative for chest pain.   Gastrointestinal: Negative for abdominal pain and diarrhea.   Genitourinary: Negative for frequency.   Musculoskeletal: Negative for back pain.   Skin: Negative for rash.   Neurological: Negative for headaches.   Hematological: Negative for adenopathy.   Psychiatric/Behavioral: The patient is not nervous/anxious.    All other systems reviewed and are negative.      Objective:      Physical Exam   Constitutional: He is oriented to person, place, and time. He appears well-developed and well-nourished.   HENT:   Mouth/Throat: No oropharyngeal exudate.   Cardiovascular: Normal rate and normal heart sounds.    Pulmonary/Chest: Effort normal and breath sounds normal. He has no wheezes.   Abdominal: Soft. Bowel sounds are normal. There is no tenderness.   Musculoskeletal: He exhibits no edema or tenderness.   Lymphadenopathy:     He has no cervical adenopathy.   Neurological: He is alert and oriented to person, place, and time. Coordination normal.   Skin: Skin is warm and dry. No rash noted.   Psychiatric: He has a normal mood and affect. Judgment and thought content " normal.   Vitals reviewed.      LABS:  WBC   Date Value Ref Range Status   10/31/2017 7.90 3.90 - 12.70 K/uL Final     Hemoglobin   Date Value Ref Range Status   10/31/2017 14.2 14.0 - 18.0 g/dL Final     Hematocrit   Date Value Ref Range Status   10/31/2017 43.8 40.0 - 54.0 % Final     Platelets   Date Value Ref Range Status   10/31/2017 275 150 - 350 K/uL Final     Gran #   Date Value Ref Range Status   10/31/2017 4.3 1.8 - 7.7 K/uL Final     Comment:     The ANC is based on a white cell differential from an   automated cell counter. It has not been microscopically   reviewed for the presence of abnormal cells. Clinical   correlation is required.         Chemistry        Component Value Date/Time     10/31/2017 0825    K 3.8 10/31/2017 0825     10/31/2017 0825    CO2 26 10/31/2017 0825    BUN 13 10/31/2017 0825    CREATININE 1.4 10/31/2017 0825    GLU 94 10/31/2017 0825        Component Value Date/Time    CALCIUM 9.7 10/31/2017 0825    ALKPHOS 72 10/31/2017 0825    AST 19 10/31/2017 0825    ALT 14 10/31/2017 0825    BILITOT 0.3 10/31/2017 0825    ESTGFRAFRICA >60.0 10/31/2017 0825    EGFRNONAA >60.0 10/31/2017 0825        TSH   Date Value Ref Range Status   10/17/2017 5.398 (H) 0.400 - 4.000 uIU/mL Final     Free T4   Date Value Ref Range Status   10/17/2017 0.90 0.71 - 1.51 ng/dL Final       Assessment:       1. Malignant neoplasm of upper lobe of right lung    2. Secondary malignant neoplasm of bone    3. Hypothyroidism due to medication        Plan:        1,2. He is doing well clinically and will proceed with Opdivo and Xgeva and will return in 2 weeks for next cycle  3. Continue Synthroid. Check TSH and free T4 today    Above care plan was discussed with patient and all questions were addressed to his satisfaction

## 2017-11-01 ENCOUNTER — INFUSION (OUTPATIENT)
Dept: INFUSION THERAPY | Facility: HOSPITAL | Age: 42
End: 2017-11-01
Attending: INTERNAL MEDICINE
Payer: MEDICARE

## 2017-11-01 VITALS
DIASTOLIC BLOOD PRESSURE: 80 MMHG | SYSTOLIC BLOOD PRESSURE: 149 MMHG | TEMPERATURE: 98 F | RESPIRATION RATE: 20 BRPM | HEART RATE: 73 BPM

## 2017-11-01 DIAGNOSIS — C34.11 MALIGNANT NEOPLASM OF UPPER LOBE OF RIGHT LUNG: ICD-10-CM

## 2017-11-01 DIAGNOSIS — D50.0 IRON DEFICIENCY ANEMIA DUE TO CHRONIC BLOOD LOSS: Primary | ICD-10-CM

## 2017-11-01 DIAGNOSIS — C34.91 LUNG CANCER, PRIMARY, WITH METASTASIS FROM LUNG TO OTHER SITE, RIGHT: ICD-10-CM

## 2017-11-01 DIAGNOSIS — C79.51 SECONDARY MALIGNANT NEOPLASM OF BONE: ICD-10-CM

## 2017-11-01 PROCEDURE — 63600175 PHARM REV CODE 636 W HCPCS: Performed by: INTERNAL MEDICINE

## 2017-11-01 PROCEDURE — A4216 STERILE WATER/SALINE, 10 ML: HCPCS | Performed by: INTERNAL MEDICINE

## 2017-11-01 PROCEDURE — 96372 THER/PROPH/DIAG INJ SC/IM: CPT

## 2017-11-01 PROCEDURE — 96413 CHEMO IV INFUSION 1 HR: CPT

## 2017-11-01 PROCEDURE — 25000003 PHARM REV CODE 250: Performed by: INTERNAL MEDICINE

## 2017-11-01 RX ORDER — SODIUM CHLORIDE 0.9 % (FLUSH) 0.9 %
10 SYRINGE (ML) INJECTION
Status: DISCONTINUED | OUTPATIENT
Start: 2017-11-01 | End: 2017-11-01 | Stop reason: HOSPADM

## 2017-11-01 RX ORDER — HEPARIN 100 UNIT/ML
500 SYRINGE INTRAVENOUS
Status: DISCONTINUED | OUTPATIENT
Start: 2017-11-01 | End: 2017-11-01 | Stop reason: HOSPADM

## 2017-11-01 RX ADMIN — SODIUM CHLORIDE 240 MG: 9 INJECTION, SOLUTION INTRAVENOUS at 09:11

## 2017-11-01 RX ADMIN — HEPARIN 500 UNITS: 100 SYRINGE at 10:11

## 2017-11-01 RX ADMIN — DENOSUMAB 120 MG: 120 INJECTION SUBCUTANEOUS at 10:11

## 2017-11-01 RX ADMIN — SODIUM CHLORIDE, PRESERVATIVE FREE 10 ML: 5 INJECTION INTRAVENOUS at 10:11

## 2017-11-01 RX ADMIN — SODIUM CHLORIDE: 9 INJECTION, SOLUTION INTRAVENOUS at 09:11

## 2017-11-01 NOTE — PLAN OF CARE
Problem: Patient Care Overview (Adult)  Goal: Plan of Care Review  Outcome: Ongoing (interventions implemented as appropriate)  Pt tolerated Opdivo and Xgeva with no complications. Pt denies any jaw pain. Pt instructed to call MD with any problems, and pt discharged home.

## 2017-11-13 NOTE — PROGRESS NOTES
"PATIENT: Yao Alan  MRN: [id number]  DATE: 11/14/2017    Subjective:     Chief complaint:  Chief Complaint   Patient presents with    Malignant neoplasm of upper lobe of right lung       Oncologic History:  Mr. Yao Alan is a 42-year-old male who has a long history of smoking and was seen in the Pulmonary Clinic by Dr. Valle on 03/05/2015 with abnormal CT scan.    Apparently, he went to the [facility] in February with coughing as well as chest pain. A chest x-ray was done, which revealed a left upper lobe lung mass. Followup CT scan was done on 02/12/2015 and that revealed posterior superior mediastinal mass adjacent and prominent enlarged upper left mediastinal lymph nodes, abutting the aortic arch as well as left subclavian artery. A  CT scan of the abdomen was done on 03/03/2015 without contrast and that revealed nonobstructive nephrolithiasis, but a 2.1 cm lytic lesion involving the left iliac wing concerning for metastatic disease given the presence of emphysema and a mediastinal soft tissue mass on the CT chest from 02/12/2015. He saw Dr. Valle after that on 03/05/2015 and underwent an IR guided biopsy of the bone lesion on 03/17/2015. 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    His EGFR/EML/ALK is negative.    His PET scan on 3/25/15 revealed Left upper lobe lung lesion with an adjacent para-aortic lymph node, right anterior rib metastasis, right iliac metastasis, and pancreatic metastasis. A pancreatic cancer primary neoplasm is less likely.  MRI brain was negative for mets.  He completed 6 cycles of Carboplatin and Alimta and was on maintenance Alimta until end of March 2016.  His bone scan from 3/16 revealed stable disease. His CT scans also from end of March 2016 revealed "Interval enlargement of left upper lobe lung mass measuring 5.9 x 3.9 cm (previously 3.3 x 2.5 cm). This mass appears to invade the mediastinum and abutting the aortic arch and left " "subclavian artery"  He is now on Opdivo.  CT CAP from 6/22/16 s/p 6 cycles of Opdivo reveals "In this patient with a history of metastatic lung cancer, the previously identified paramediastinal left upper lobe lung mass has significantly decreased in size with only a trace amount of residual soft tissue opacities seen measuring 3.2 x 1.4 cm (axial series 2, image 16).Stable lytic lesion in the right iliac wing, unchanged.The right anterior sixth rib lesion and pancreatic head abnormality are not definitely appreciated on current examination."  Bone scan from 6/23/16 reveals "Stable activity in the right sixth rib anteriorly and the right iliac bone.No new metastatic lesions visualized."  10/3/16- CT scans reveal "In this patient with a history of metastatic lung cancer, the previously identified paramediastinal left upper lobe lung mass has decreased in size with only a trace amount of residual soft tissue opacity as above.  Stable lytic lesion in the right iliac wing, unchanged"  Bone scan reveals "Right superior anterior iliac bone lesion is stable and the right sixth rib has normalized. Recent dental procedure versus periodontal disease and possible right mastoiditis".     1/9/17 CT chest/abdomen/pelvis reveals "1. In this patient with history of metastatic lung cancer, the previously identified left upper paramediastinal lesion demonstrates near complete resolution with 1.0 x 0.7 cm residual disease (2.7 x 0.8 cm previously). Additionally, there is improving sclerotic lesion in the right iliac wing consistent with treated metastatic disease.  No evidence of new lesions in the chest, abdomen or pelvis. 2. Apical predominant paraseptal emphysematous changes and bullae. 3. Additional findings as above."     3/3/17 MRI brain revealed "Stable exam. No new enhancing mass lesion to suggest intracranial metastatic disease."     CT scans from 3/20/17 which reveal "No measurable lesion identified within the left upper " "paramediastinal region, the location of this patient's lung cancer, suggesting favorable response to treatment . No new lung lesions identified. Sclerotic lesion within the right iliac wing appears unchanged. Stable appearing centrilobular and paraseptal emphysema".        CT scans from 5/29/17 reveals "1. In this patient with history of lung cancer, there is no evidence of new focal masses or new metastases. Stable right iliac wing sclerotic lesion is unchanged.  2. Stable centrilobular and paraseptal emphysema."     Ct scans 9/13/17 reveal "In this patient with history of lung cancer status post immunotherapy there is no evidence of local recurrence or new metastatic disease. Stable sclerotic bone lesion in the RIGHT iliac wing consistent with treated lesion. Stable emphysematous lung changes."  Bone scan 9/13/17 reveals "1.  Unchanged mild uptake at the right anterior iliac spine. 2.  No new metastatic disease."     Interval History: Mr. Alan returns for follow up and Opdivo. XGEVA is not due. He feels good today and has no complaints.   He denies any nausea, vomiting, diarrhea, constipation, abdominal pain, weight loss or loss of appetite, chest pain, shortness of breath, leg swelling, fatigue, pain, headache, dizziness, or mood changes. He is alone.    ECOG Performance Status:   ECOG SCORE    0 - Fully active-able to carry on all pre-disease performance without restriction         PMFSH: all information reviewed and updated as relevant to today's visit    Review of Systems:   Review of Systems   Constitutional: Negative for activity change, appetite change, fatigue and fever.   HENT: Negative for mouth sores, nosebleeds and sore throat.    Eyes: Negative for visual disturbance.   Respiratory: Negative for cough and shortness of breath.    Cardiovascular: Negative for chest pain, palpitations and leg swelling.   Gastrointestinal: Negative for abdominal pain, constipation, diarrhea, nausea and vomiting. " "  Genitourinary: Negative for difficulty urinating and frequency.   Musculoskeletal: Negative for arthralgias and back pain.   Skin: Negative for rash.   Neurological: Negative for dizziness, numbness and headaches.   Hematological: Negative for adenopathy. Does not bruise/bleed easily.   Psychiatric/Behavioral: Negative for confusion and sleep disturbance. The patient is not nervous/anxious.    All other systems reviewed and are negative.        Objective:      Vitals:   Vitals:    11/14/17 0900   BP: (!) 134/91   Pulse: 81   Resp: 16   Temp: 97.7 °F (36.5 °C)   TempSrc: Oral   SpO2: 99%   Weight: 82.6 kg (182 lb 1.6 oz)   Height: 5' 6" (1.676 m)     BMI: Body mass index is 29.39 kg/m².      Physical Exam:   Physical Exam   Constitutional: He is oriented to person, place, and time. He appears well-developed and well-nourished. No distress.   HENT:   Right Ear: External ear normal.   Left Ear: External ear normal.   Mouth/Throat: No oropharyngeal exudate.   Eyes: Conjunctivae and lids are normal. Pupils are equal, round, and reactive to light. No scleral icterus.   Neck: Trachea normal and normal range of motion. Neck supple. No thyromegaly present.   Cardiovascular: Normal rate, regular rhythm, normal heart sounds and normal pulses.    Pulmonary/Chest: Effort normal and breath sounds normal.   Abdominal: Soft. Normal appearance and bowel sounds are normal. He exhibits no distension and no mass. There is no hepatosplenomegaly or splenomegaly. There is no tenderness.   Musculoskeletal: Normal range of motion.   Lymphadenopathy:        Head (right side): No submental and no submandibular adenopathy present.        Head (left side): No submental and no submandibular adenopathy present.     He has no cervical adenopathy.     He has no axillary adenopathy.        Right: No supraclavicular adenopathy present.        Left: No supraclavicular adenopathy present.   Neurological: He is alert and oriented to person, place, and " time. No sensory deficit.   Skin: Skin is warm, dry and intact. No bruising and no rash noted. Nails show no clubbing.   Psychiatric: He has a normal mood and affect. His speech is normal and behavior is normal. Cognition and memory are normal.   Vitals reviewed.        Laboratory Data:  WBC   Date Value Ref Range Status   11/14/2017 6.96 3.90 - 12.70 K/uL Final     Hemoglobin   Date Value Ref Range Status   11/14/2017 14.7 14.0 - 18.0 g/dL Final     Hematocrit   Date Value Ref Range Status   11/14/2017 46.2 40.0 - 54.0 % Final     Platelets   Date Value Ref Range Status   11/14/2017 238 150 - 350 K/uL Final     Gran #   Date Value Ref Range Status   11/14/2017 3.6 1.8 - 7.7 K/uL Final     Comment:     The ANC is based on a white cell differential from an   automated cell counter. It has not been microscopically   reviewed for the presence of abnormal cells. Clinical   correlation is required.         Chemistry        Component Value Date/Time     11/14/2017 0757    K 3.9 11/14/2017 0757     11/14/2017 0757    CO2 27 11/14/2017 0757    BUN 18 11/14/2017 0757    CREATININE 1.5 (H) 11/14/2017 0757    GLU 98 11/14/2017 0757        Component Value Date/Time    CALCIUM 10.2 11/14/2017 0757    ALKPHOS 69 11/14/2017 0757    AST 22 11/14/2017 0757    ALT 17 11/14/2017 0757    BILITOT 0.4 11/14/2017 0757    ESTGFRAFRICA >60.0 11/14/2017 0757    EGFRNONAA 56.6 (A) 11/14/2017 0757              Assessment/Plan:     1. Malignant neoplasm of upper lobe of right lung    2. Secondary malignant neoplasm of bone    3. Deep vein thrombosis (DVT) of left lower extremity, unspecified chronicity, unspecified vein    4. Neoplasm related pain    5. Hypothyroidism due to medication    6. Pulmonary emphysema, unspecified emphysema type    7. Essential hypertension        Plan:    1,2. Labs reviewed. He is doing well clinically and will proceed with Opdivo. Xgeva not due today.  RTC in 2 weeks to see Dr. Garay with CBC, CMP, TSH,  T4  And for Opdivo and Xgeva. Plan to scan every 3 months.   3. Refilled Xarelto.   4. Stable, continue current regimen.  5. Continue Synthroid. Check TSH and free T4 in 2 weeks  6. Stable.  7. Controlled. Continue medications.     Med and Orders:  Orders Placed This Encounter    CBC Oncology    T4, free    TSH    Comprehensive metabolic panel    rivaroxaban (XARELTO) 20 mg Tab       Follow Up:  Return in about 2 weeks (around 11/28/2017).      More than 30 mins were spent during this encounter, greater than 50% was spent in direct counseling and/or coordination of care.   Discussed case with Dr. Garay who agrees with above plan. Patient is in agreement with the proposed treatment plan. All questions were answered to the patient's satisfaction. Pt knows to call clinic if anything is needed before the next clinic visit.      FRANCINE Calderon-ZELDA  Hematology and Medical Oncology    Distress Screening Results: Psychosocial Distress screening score of Distress Score: 0 noted and reviewed. No intervention indicated.

## 2017-11-14 ENCOUNTER — OFFICE VISIT (OUTPATIENT)
Dept: HEMATOLOGY/ONCOLOGY | Facility: CLINIC | Age: 42
End: 2017-11-14
Payer: COMMERCIAL

## 2017-11-14 VITALS
BODY MASS INDEX: 29.27 KG/M2 | OXYGEN SATURATION: 99 % | SYSTOLIC BLOOD PRESSURE: 134 MMHG | DIASTOLIC BLOOD PRESSURE: 91 MMHG | TEMPERATURE: 98 F | HEART RATE: 81 BPM | HEIGHT: 66 IN | WEIGHT: 182.13 LBS | RESPIRATION RATE: 16 BRPM

## 2017-11-14 DIAGNOSIS — C34.11 MALIGNANT NEOPLASM OF UPPER LOBE OF RIGHT LUNG: Primary | ICD-10-CM

## 2017-11-14 DIAGNOSIS — J43.9 PULMONARY EMPHYSEMA, UNSPECIFIED EMPHYSEMA TYPE: ICD-10-CM

## 2017-11-14 DIAGNOSIS — E03.2 HYPOTHYROIDISM DUE TO MEDICATION: ICD-10-CM

## 2017-11-14 DIAGNOSIS — G89.3 NEOPLASM RELATED PAIN: ICD-10-CM

## 2017-11-14 DIAGNOSIS — C79.51 SECONDARY MALIGNANT NEOPLASM OF BONE: ICD-10-CM

## 2017-11-14 DIAGNOSIS — I10 ESSENTIAL HYPERTENSION: ICD-10-CM

## 2017-11-14 DIAGNOSIS — I82.402 DEEP VEIN THROMBOSIS (DVT) OF LEFT LOWER EXTREMITY, UNSPECIFIED CHRONICITY, UNSPECIFIED VEIN: ICD-10-CM

## 2017-11-14 PROCEDURE — 99215 OFFICE O/P EST HI 40 MIN: CPT | Mod: S$GLB,,, | Performed by: INTERNAL MEDICINE

## 2017-11-14 PROCEDURE — 99999 PR PBB SHADOW E&M-EST. PATIENT-LVL IV: CPT | Mod: PBBFAC,,, | Performed by: INTERNAL MEDICINE

## 2017-11-14 RX ORDER — SODIUM CHLORIDE 0.9 % (FLUSH) 0.9 %
10 SYRINGE (ML) INJECTION
Status: CANCELLED | OUTPATIENT
Start: 2017-11-15

## 2017-11-14 RX ORDER — HEPARIN 100 UNIT/ML
500 SYRINGE INTRAVENOUS
Status: CANCELLED | OUTPATIENT
Start: 2017-11-15

## 2017-11-15 ENCOUNTER — TELEPHONE (OUTPATIENT)
Dept: HEMATOLOGY/ONCOLOGY | Facility: CLINIC | Age: 42
End: 2017-11-15

## 2017-11-15 ENCOUNTER — INFUSION (OUTPATIENT)
Dept: INFUSION THERAPY | Facility: HOSPITAL | Age: 42
End: 2017-11-15
Attending: INTERNAL MEDICINE
Payer: MEDICARE

## 2017-11-15 ENCOUNTER — DOCUMENTATION ONLY (OUTPATIENT)
Dept: HEMATOLOGY/ONCOLOGY | Facility: CLINIC | Age: 42
End: 2017-11-15

## 2017-11-15 VITALS
HEART RATE: 92 BPM | SYSTOLIC BLOOD PRESSURE: 122 MMHG | DIASTOLIC BLOOD PRESSURE: 84 MMHG | TEMPERATURE: 98 F | RESPIRATION RATE: 20 BRPM

## 2017-11-15 DIAGNOSIS — C79.51 SECONDARY MALIGNANT NEOPLASM OF BONE: ICD-10-CM

## 2017-11-15 DIAGNOSIS — C34.91 LUNG CANCER, PRIMARY, WITH METASTASIS FROM LUNG TO OTHER SITE, RIGHT: ICD-10-CM

## 2017-11-15 DIAGNOSIS — C34.11 MALIGNANT NEOPLASM OF UPPER LOBE OF RIGHT LUNG: ICD-10-CM

## 2017-11-15 DIAGNOSIS — D50.0 IRON DEFICIENCY ANEMIA DUE TO CHRONIC BLOOD LOSS: Primary | ICD-10-CM

## 2017-11-15 PROCEDURE — 25000003 PHARM REV CODE 250: Performed by: INTERNAL MEDICINE

## 2017-11-15 PROCEDURE — A4216 STERILE WATER/SALINE, 10 ML: HCPCS | Performed by: INTERNAL MEDICINE

## 2017-11-15 PROCEDURE — 63600175 PHARM REV CODE 636 W HCPCS: Performed by: INTERNAL MEDICINE

## 2017-11-15 PROCEDURE — 96413 CHEMO IV INFUSION 1 HR: CPT

## 2017-11-15 RX ORDER — SODIUM CHLORIDE 0.9 % (FLUSH) 0.9 %
10 SYRINGE (ML) INJECTION
Status: DISCONTINUED | OUTPATIENT
Start: 2017-11-15 | End: 2017-11-15 | Stop reason: HOSPADM

## 2017-11-15 RX ORDER — HEPARIN 100 UNIT/ML
500 SYRINGE INTRAVENOUS
Status: DISCONTINUED | OUTPATIENT
Start: 2017-11-15 | End: 2017-11-15 | Stop reason: HOSPADM

## 2017-11-15 RX ADMIN — HEPARIN 500 UNITS: 100 SYRINGE at 10:11

## 2017-11-15 RX ADMIN — SODIUM CHLORIDE: 9 INJECTION, SOLUTION INTRAVENOUS at 09:11

## 2017-11-15 RX ADMIN — SODIUM CHLORIDE 240 MG: 9 INJECTION, SOLUTION INTRAVENOUS at 09:11

## 2017-11-15 RX ADMIN — SODIUM CHLORIDE, PRESERVATIVE FREE 10 ML: 5 INJECTION INTRAVENOUS at 10:11

## 2017-11-15 NOTE — TELEPHONE ENCOUNTER
Spoke with Zaida YARBROUGH at [facility], ext. [phone], and she said that patient has one prescription, Xarelto, ready today and the co-pay is $8.25. Completed and faxed cost transfer form to Zaida YARBROUGH's attention at ext. [phone]. Spoke with patient via phone to his cell number [phone] to let him know and he said that he already picked up the medicine and paid the co-pay. I explained that he should hold onto his receipt and bring it when he comes back to clinic so he can get a refund and I will contact the pharmacy re: this. Called the pharmacy and spoke with Lorena re: this; he confirmed that the cost transfer form was received, and he said that they will refund patient. Discussed above and patient's case with my supervisor Hua Arshad who was following up on call from patient to my coworker Gail Serrato LCSW. Will continue to follow and assist as needed.

## 2017-11-15 NOTE — PLAN OF CARE
Problem: Patient Care Overview (Adult)  Goal: Adult Individualization and Mutuality  1. Chest Port  2. Likes warm blankets  3. Listens to music during treatment         Outcome: Ongoing (interventions implemented as appropriate)  Patient arrived to clinic; no s/s distress. Patient pending Opdivo infusion. Will continue to monitor.

## 2017-11-15 NOTE — PLAN OF CARE
Problem: Patient Care Overview (Adult)  Goal: Plan of Care Review  Outcome: Ongoing (interventions implemented as appropriate)  Patient tolerated Opdivo well. Patient given avs and instructed to call MD with any concerns.

## 2017-11-15 NOTE — PROGRESS NOTES
This SWer received a call from the  to s/w pt/wife regarding pt's social service needs/concerns.  This SWer forwarded pt's/wife concerns as the pt requested.

## 2017-11-24 ENCOUNTER — TELEPHONE (OUTPATIENT)
Dept: HEMATOLOGY/ONCOLOGY | Facility: CLINIC | Age: 42
End: 2017-11-24

## 2017-11-24 DIAGNOSIS — E03.9 HYPOTHYROIDISM, UNSPECIFIED TYPE: ICD-10-CM

## 2017-11-24 DIAGNOSIS — G89.3 NEOPLASM RELATED PAIN: ICD-10-CM

## 2017-11-24 RX ORDER — LEVOTHYROXINE SODIUM 75 UG/1
75 TABLET ORAL DAILY
Qty: 30 TABLET | Refills: 11 | Status: SHIPPED | OUTPATIENT
Start: 2017-11-24 | End: 2017-12-21 | Stop reason: SDUPTHER

## 2017-11-24 RX ORDER — OXYCODONE AND ACETAMINOPHEN 10; 325 MG/1; MG/1
1 TABLET ORAL EVERY 6 HOURS PRN
Qty: 120 TABLET | Refills: 0 | Status: SHIPPED | OUTPATIENT
Start: 2017-11-24 | End: 2017-12-21 | Stop reason: SDUPTHER

## 2017-11-24 NOTE — TELEPHONE ENCOUNTER
----- Message from Katja Ibanez sent at 11/24/2017 11:06 AM CST -----  Contact: Pt  Pt wants a refill on 2 medications levothyroxine (SYNTHROID) 75 MCG tablet and oxyCODONE-acetaminophen (PERCOCET)  mg per tablet    Call back number: [phone]

## 2017-11-28 ENCOUNTER — OFFICE VISIT (OUTPATIENT)
Dept: HEMATOLOGY/ONCOLOGY | Facility: CLINIC | Age: 42
End: 2017-11-28
Payer: COMMERCIAL

## 2017-11-28 VITALS
RESPIRATION RATE: 17 BRPM | TEMPERATURE: 98 F | OXYGEN SATURATION: 100 % | SYSTOLIC BLOOD PRESSURE: 130 MMHG | WEIGHT: 181.44 LBS | HEIGHT: 66 IN | HEART RATE: 79 BPM | BODY MASS INDEX: 29.16 KG/M2 | DIASTOLIC BLOOD PRESSURE: 91 MMHG

## 2017-11-28 DIAGNOSIS — I10 ESSENTIAL HYPERTENSION: ICD-10-CM

## 2017-11-28 DIAGNOSIS — C34.11 MALIGNANT NEOPLASM OF UPPER LOBE OF RIGHT LUNG: Primary | ICD-10-CM

## 2017-11-28 DIAGNOSIS — J43.9 PULMONARY EMPHYSEMA, UNSPECIFIED EMPHYSEMA TYPE: ICD-10-CM

## 2017-11-28 DIAGNOSIS — I82.402 DEEP VEIN THROMBOSIS (DVT) OF LEFT LOWER EXTREMITY, UNSPECIFIED CHRONICITY, UNSPECIFIED VEIN: ICD-10-CM

## 2017-11-28 DIAGNOSIS — E03.2 HYPOTHYROIDISM DUE TO MEDICATION: ICD-10-CM

## 2017-11-28 DIAGNOSIS — C79.51 SECONDARY MALIGNANT NEOPLASM OF BONE: ICD-10-CM

## 2017-11-28 DIAGNOSIS — G89.3 NEOPLASM RELATED PAIN: ICD-10-CM

## 2017-11-28 DIAGNOSIS — Z79.01 ENCOUNTER FOR CURRENT LONG-TERM USE OF ANTICOAGULANTS: ICD-10-CM

## 2017-11-28 PROBLEM — J44.9 COPD (CHRONIC OBSTRUCTIVE PULMONARY DISEASE): Status: ACTIVE | Noted: 2017-11-28

## 2017-11-28 PROBLEM — J44.9 COPD (CHRONIC OBSTRUCTIVE PULMONARY DISEASE): Status: RESOLVED | Noted: 2017-11-28 | Resolved: 2017-11-28

## 2017-11-28 PROCEDURE — 99214 OFFICE O/P EST MOD 30 MIN: CPT | Mod: S$GLB,,, | Performed by: INTERNAL MEDICINE

## 2017-11-28 PROCEDURE — 99999 PR PBB SHADOW E&M-EST. PATIENT-LVL IV: CPT | Mod: PBBFAC,,, | Performed by: INTERNAL MEDICINE

## 2017-11-28 RX ORDER — HEPARIN 100 UNIT/ML
500 SYRINGE INTRAVENOUS
Status: CANCELLED | OUTPATIENT
Start: 2017-11-29

## 2017-11-28 RX ORDER — SODIUM CHLORIDE 0.9 % (FLUSH) 0.9 %
10 SYRINGE (ML) INJECTION
Status: CANCELLED | OUTPATIENT
Start: 2017-11-29

## 2017-11-28 NOTE — PROGRESS NOTES
"PATIENT: Yao Alan  MRN: [id number]  DATE: 11/28/2017    Subjective:     Chief complaint:  Chief Complaint   Patient presents with    Malignant neoplasm of upper lobe of right lung       Oncologic History:  Mr. Yao Alan is a 42-year-old male who has a long history of smoking and was seen in the Pulmonary Clinic by Dr. aVlle on 03/05/2015 with abnormal CT scan.    Apparently, he went to the [facility] in February with coughing as well as chest pain. A chest x-ray was done, which revealed a left upper lobe lung mass. Followup CT scan was done on 02/12/2015 and that revealed posterior superior mediastinal mass adjacent and prominent enlarged upper left mediastinal lymph nodes, abutting the aortic arch as well as left subclavian artery. A  CT scan of the abdomen was done on 03/03/2015 without contrast and that revealed nonobstructive nephrolithiasis, but a 2.1 cm lytic lesion involving the left iliac wing concerning for metastatic disease given the presence of emphysema and a mediastinal soft tissue mass on the CT chest from 02/12/2015. He saw Dr. Valle after that on 03/05/2015 and underwent an IR guided biopsy of the bone lesion on 03/17/2015. 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    His EGFR/EML/ALK is negative.    His PET scan on 3/25/15 revealed Left upper lobe lung lesion with an adjacent para-aortic lymph node, right anterior rib metastasis, right iliac metastasis, and pancreatic metastasis. A pancreatic cancer primary neoplasm is less likely.  MRI brain was negative for mets.  He completed 6 cycles of Carboplatin and Alimta and was on maintenance Alimta until end of March 2016.  His bone scan from 3/16 revealed stable disease. His CT scans also from end of March 2016 revealed "Interval enlargement of left upper lobe lung mass measuring 5.9 x 3.9 cm (previously 3.3 x 2.5 cm). This mass appears to invade the mediastinum and abutting the aortic arch and left " "subclavian artery"  He is now on Opdivo.  CT CAP from 6/22/16 s/p 6 cycles of Opdivo reveals "In this patient with a history of metastatic lung cancer, the previously identified paramediastinal left upper lobe lung mass has significantly decreased in size with only a trace amount of residual soft tissue opacities seen measuring 3.2 x 1.4 cm (axial series 2, image 16).Stable lytic lesion in the right iliac wing, unchanged.The right anterior sixth rib lesion and pancreatic head abnormality are not definitely appreciated on current examination."  Bone scan from 6/23/16 reveals "Stable activity in the right sixth rib anteriorly and the right iliac bone.No new metastatic lesions visualized."  10/3/16- CT scans reveal "In this patient with a history of metastatic lung cancer, the previously identified paramediastinal left upper lobe lung mass has decreased in size with only a trace amount of residual soft tissue opacity as above.  Stable lytic lesion in the right iliac wing, unchanged"  Bone scan reveals "Right superior anterior iliac bone lesion is stable and the right sixth rib has normalized. Recent dental procedure versus periodontal disease and possible right mastoiditis".     1/9/17 CT chest/abdomen/pelvis reveals "1. In this patient with history of metastatic lung cancer, the previously identified left upper paramediastinal lesion demonstrates near complete resolution with 1.0 x 0.7 cm residual disease (2.7 x 0.8 cm previously). Additionally, there is improving sclerotic lesion in the right iliac wing consistent with treated metastatic disease.  No evidence of new lesions in the chest, abdomen or pelvis. 2. Apical predominant paraseptal emphysematous changes and bullae. 3. Additional findings as above."     3/3/17 MRI brain revealed "Stable exam. No new enhancing mass lesion to suggest intracranial metastatic disease."     CT scans from 3/20/17 which reveal "No measurable lesion identified within the left upper " "paramediastinal region, the location of this patient's lung cancer, suggesting favorable response to treatment . No new lung lesions identified. Sclerotic lesion within the right iliac wing appears unchanged. Stable appearing centrilobular and paraseptal emphysema".        CT scans from 5/29/17 reveals "1. In this patient with history of lung cancer, there is no evidence of new focal masses or new metastases. Stable right iliac wing sclerotic lesion is unchanged.  2. Stable centrilobular and paraseptal emphysema."     Ct scans 9/13/17 reveal "In this patient with history of lung cancer status post immunotherapy there is no evidence of local recurrence or new metastatic disease. Stable sclerotic bone lesion in the RIGHT iliac wing consistent with treated lesion. Stable emphysematous lung changes."  Bone scan 9/13/17 reveals "1.  Unchanged mild uptake at the right anterior iliac spine. 2.  No new metastatic disease."     Interval History: Mr. Alan returns for follow up and Opdivo. XGEVA is due. He feels good today and has no complaints.   He denies any nausea, vomiting, diarrhea, constipation, abdominal pain, weight loss or loss of appetite, chest pain, shortness of breath, leg swelling, fatigue, pain, headache, dizziness, or mood changes. He is alone.    ECOG Performance Status:   ECOG SCORE    0 - Fully active-able to carry on all pre-disease performance without restriction         PMFSH: all information reviewed and updated as relevant to today's visit    Review of Systems:   Review of Systems   Constitutional: Negative for activity change, appetite change, fatigue and fever.   HENT: Negative for mouth sores, nosebleeds and sore throat.    Eyes: Negative for visual disturbance.   Respiratory: Negative for cough and shortness of breath.    Cardiovascular: Negative for chest pain, palpitations and leg swelling.   Gastrointestinal: Negative for abdominal pain, constipation, diarrhea, nausea and vomiting. " "  Genitourinary: Negative for difficulty urinating and frequency.   Musculoskeletal: Negative for arthralgias and back pain.   Skin: Negative for rash.   Neurological: Negative for dizziness, numbness and headaches.   Hematological: Negative for adenopathy. Does not bruise/bleed easily.   Psychiatric/Behavioral: Negative for confusion and sleep disturbance. The patient is not nervous/anxious.    All other systems reviewed and are negative.        Objective:      Vitals:   Vitals:    11/28/17 1140   BP: (!) 130/91   Pulse: 79   Resp: 17   Temp: 97.5 °F (36.4 °C)   TempSrc: Oral   SpO2: 100%   Weight: 82.3 kg (181 lb 7 oz)   Height: 5' 6" (1.676 m)     BMI: Body mass index is 29.28 kg/m².      Physical Exam:   Physical Exam   Constitutional: He is oriented to person, place, and time. He appears well-developed and well-nourished. No distress.   HENT:   Right Ear: External ear normal.   Left Ear: External ear normal.   Mouth/Throat: No oropharyngeal exudate.   Eyes: Conjunctivae and lids are normal. Pupils are equal, round, and reactive to light. No scleral icterus.   Neck: Trachea normal and normal range of motion. Neck supple. No thyromegaly present.   Cardiovascular: Normal rate, regular rhythm, normal heart sounds and normal pulses.    Pulmonary/Chest: Effort normal and breath sounds normal.   Abdominal: Soft. Normal appearance and bowel sounds are normal. He exhibits no distension and no mass. There is no hepatosplenomegaly or splenomegaly. There is no tenderness.   Musculoskeletal: Normal range of motion.   Lymphadenopathy:        Head (right side): No submental and no submandibular adenopathy present.        Head (left side): No submental and no submandibular adenopathy present.     He has no cervical adenopathy.     He has no axillary adenopathy.        Right: No supraclavicular adenopathy present.        Left: No supraclavicular adenopathy present.   Neurological: He is alert and oriented to person, place, and " time. No sensory deficit.   Skin: Skin is warm, dry and intact. No bruising and no rash noted. Nails show no clubbing.   Psychiatric: He has a normal mood and affect. His speech is normal and behavior is normal. Cognition and memory are normal.   Vitals reviewed.        Laboratory Data:  WBC   Date Value Ref Range Status   11/28/2017 7.15 3.90 - 12.70 K/uL Final     Hemoglobin   Date Value Ref Range Status   11/28/2017 13.9 (L) 14.0 - 18.0 g/dL Final     Hematocrit   Date Value Ref Range Status   11/28/2017 43.1 40.0 - 54.0 % Final     Platelets   Date Value Ref Range Status   11/28/2017 254 150 - 350 K/uL Final     Gran #   Date Value Ref Range Status   11/28/2017 3.7 1.8 - 7.7 K/uL Final     Comment:     The ANC is based on a white cell differential from an   automated cell counter. It has not been microscopically   reviewed for the presence of abnormal cells. Clinical   correlation is required.         Chemistry        Component Value Date/Time     11/28/2017 1119    K 3.7 11/28/2017 1119     11/28/2017 1119    CO2 25 11/28/2017 1119    BUN 14 11/28/2017 1119    CREATININE 1.5 (H) 11/28/2017 1119     11/28/2017 1119        Component Value Date/Time    CALCIUM 9.9 11/28/2017 1119    ALKPHOS 70 11/28/2017 1119    AST 25 11/28/2017 1119    ALT 17 11/28/2017 1119    BILITOT 0.3 11/28/2017 1119    ESTGFRAFRICA >60.0 11/28/2017 1119    EGFRNONAA 56.6 (A) 11/28/2017 1119        TSH   Date Value Ref Range Status   11/28/2017 2.360 0.400 - 4.000 uIU/mL Final     Free T4   Date Value Ref Range Status   11/28/2017 0.99 0.71 - 1.51 ng/dL Final           Assessment/Plan:     1. Malignant neoplasm of upper lobe of right lung    2. Secondary malignant neoplasm of bone    3. Neoplasm related pain    4. Pulmonary emphysema, unspecified emphysema type    5. Essential hypertension    6. Deep vein thrombosis (DVT) of left lower extremity, unspecified chronicity, unspecified vein    7. Encounter for current  long-term use of anticoagulants    8. Hypothyroidism due to medication        Plan:    1,2. Labs reviewed. He is doing well clinically and will proceed with Opdivo. Xgeva due.  RTC in 2 weeks to see Dr. Garay with CBC, CMP, TSH, T4  And for Opdivo.  Xgeva not due in 2 weeks. Plan to scan every 3 months.   3. Stable, continue current regimen.  4. Stable, continue to monitor.   5. Controlled  6,7. Stable, continue Xarelto.   8.  Continue Synthroid. Check TSH and free T4 in 2 weeks    Med and Orders:  Orders Placed This Encounter    CBC Oncology    Comprehensive metabolic panel    TSH    T4, free       Follow Up:  Return in about 2 weeks (around 12/12/2017).      More than 30 mins were spent during this encounter, greater than 50% was spent in direct counseling and/or coordination of care.   Discussed case with Dr. Garay who agrees with above plan. Patient is in agreement with the proposed treatment plan. All questions were answered to the patient's satisfaction. Pt knows to call clinic if anything is needed before the next clinic visit.        FARNCINE Calderon-ZELDA  Hematology and Medical Oncology        Distress Screening Results: Psychosocial Distress screening score of Distress Score: 0 noted and reviewed. No intervention indicated.

## 2017-11-29 ENCOUNTER — INFUSION (OUTPATIENT)
Dept: INFUSION THERAPY | Facility: HOSPITAL | Age: 42
End: 2017-11-29
Attending: INTERNAL MEDICINE
Payer: MEDICARE

## 2017-11-29 VITALS
HEART RATE: 82 BPM | SYSTOLIC BLOOD PRESSURE: 100 MMHG | TEMPERATURE: 98 F | RESPIRATION RATE: 16 BRPM | DIASTOLIC BLOOD PRESSURE: 73 MMHG

## 2017-11-29 DIAGNOSIS — C79.51 SECONDARY MALIGNANT NEOPLASM OF BONE: ICD-10-CM

## 2017-11-29 DIAGNOSIS — D50.0 IRON DEFICIENCY ANEMIA DUE TO CHRONIC BLOOD LOSS: Primary | ICD-10-CM

## 2017-11-29 DIAGNOSIS — C34.91 LUNG CANCER, PRIMARY, WITH METASTASIS FROM LUNG TO OTHER SITE, RIGHT: ICD-10-CM

## 2017-11-29 DIAGNOSIS — C34.11 MALIGNANT NEOPLASM OF UPPER LOBE OF RIGHT LUNG: ICD-10-CM

## 2017-11-29 PROCEDURE — 96372 THER/PROPH/DIAG INJ SC/IM: CPT

## 2017-11-29 PROCEDURE — 25000003 PHARM REV CODE 250: Performed by: INTERNAL MEDICINE

## 2017-11-29 PROCEDURE — 63600175 PHARM REV CODE 636 W HCPCS: Performed by: INTERNAL MEDICINE

## 2017-11-29 PROCEDURE — 96413 CHEMO IV INFUSION 1 HR: CPT

## 2017-11-29 RX ORDER — HEPARIN 100 UNIT/ML
500 SYRINGE INTRAVENOUS
Status: DISCONTINUED | OUTPATIENT
Start: 2017-11-29 | End: 2017-11-29 | Stop reason: HOSPADM

## 2017-11-29 RX ORDER — SODIUM CHLORIDE 0.9 % (FLUSH) 0.9 %
10 SYRINGE (ML) INJECTION
Status: DISCONTINUED | OUTPATIENT
Start: 2017-11-29 | End: 2017-11-29 | Stop reason: HOSPADM

## 2017-11-29 RX ADMIN — DENOSUMAB 120 MG: 120 INJECTION SUBCUTANEOUS at 09:11

## 2017-11-29 RX ADMIN — HEPARIN 500 UNITS: 100 SYRINGE at 10:11

## 2017-11-29 RX ADMIN — SODIUM CHLORIDE: 9 INJECTION, SOLUTION INTRAVENOUS at 09:11

## 2017-11-29 RX ADMIN — SODIUM CHLORIDE 240 MG: 9 INJECTION, SOLUTION INTRAVENOUS at 09:11

## 2017-11-29 NOTE — PLAN OF CARE
Problem: Patient Care Overview (Adult)  Goal: Plan of Care Review  Outcome: Ongoing (interventions implemented as appropriate)  Patient tolerated infusion well.  No reaction suspected. Administered Xgeva to right arm.  No questions or concerns.  AVS given to patient.  Patient ambulated off unit unassisted.

## 2017-11-29 NOTE — PLAN OF CARE
Problem: Chemotherapy Effects (Adult)  Goal: Signs and Symptoms of Listed Potential Problems Will be Absent or Manageable (Chemotherapy Effects)  Signs and symptoms of listed potential problems will be absent or manageable by discharge/transition of care (reference Chemotherapy Effects (Adult) CPG).   Outcome: Ongoing (interventions implemented as appropriate)  Patient here for Opdivo and Xgeva.  Assessment complete and labs reviewed.  Patient endorses taking home calcium and vitamin D.  Denies jaw pain or recent dental surgery. VSS.  No needs expressed at this time.  Will continue to monitor.

## 2017-12-01 ENCOUNTER — HOSPITAL ENCOUNTER (EMERGENCY)
Facility: OTHER | Age: 42
Discharge: HOME | End: 2017-12-01
Attending: EMERGENCY MEDICINE
Payer: MEDICARE

## 2017-12-01 VITALS
RESPIRATION RATE: 16 BRPM | OXYGEN SATURATION: 98 % | TEMPERATURE: 99 F | DIASTOLIC BLOOD PRESSURE: 98 MMHG | BODY MASS INDEX: 29.09 KG/M2 | HEIGHT: 66 IN | WEIGHT: 181 LBS | HEART RATE: 91 BPM | SYSTOLIC BLOOD PRESSURE: 141 MMHG
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
## 2017-12-01 PROCEDURE — 99283 EMERGENCY DEPT VISIT LOW MDM: CPT

## 2017-12-01 RX ORDER — METHOCARBAMOL 500 MG/1
1000 TABLET, FILM COATED ORAL 3 TIMES DAILY
Qty: 15 TABLET | Refills: 0 | Status: SHIPPED | OUTPATIENT
Start: 2017-12-01 | End: 2017-12-06
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
## 2017-12-07 ENCOUNTER — HOSPITAL ENCOUNTER (OUTPATIENT)
Dept: RADIOLOGY | Facility: HOSPITAL | Age: 42
Discharge: HOME | End: 2017-12-07
Attending: INTERNAL MEDICINE
Payer: MEDICARE

## 2017-12-07 ENCOUNTER — TELEPHONE (OUTPATIENT)
Dept: HEMATOLOGY/ONCOLOGY | Facility: CLINIC | Age: 42
End: 2017-12-07

## 2017-12-07 ENCOUNTER — TELEPHONE (OUTPATIENT)
Dept: ONCOLOGY | Facility: HOSPITAL | Age: 42
End: 2017-12-07

## 2017-12-07 DIAGNOSIS — R52 PAIN: ICD-10-CM

## 2017-12-07 DIAGNOSIS — G89.3 NEOPLASM RELATED PAIN: ICD-10-CM

## 2017-12-07 DIAGNOSIS — R52 PAIN: Primary | ICD-10-CM

## 2017-12-07 PROCEDURE — 72070 X-RAY EXAM THORAC SPINE 2VWS: CPT | Mod: 26,,, | Performed by: RADIOLOGY

## 2017-12-07 PROCEDURE — 72100 X-RAY EXAM L-S SPINE 2/3 VWS: CPT | Mod: 26,,, | Performed by: RADIOLOGY

## 2017-12-07 PROCEDURE — 72100 X-RAY EXAM L-S SPINE 2/3 VWS: CPT | Mod: TC

## 2017-12-07 PROCEDURE — 72070 X-RAY EXAM THORAC SPINE 2VWS: CPT | Mod: TC

## 2017-12-07 NOTE — TELEPHONE ENCOUNTER
Spoke with patient in the lobby. Notified him his labs and xray were OK. Based on his increase in back pain, advised him to proceed to ED for further work-up---specifically MRI. Patient is agreeable to going to the ED tonight on the [facility] after picking up his children this afternoon.
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
## 2017-12-08 NOTE — TELEPHONE ENCOUNTER
TRAN Devine RN   Caller: Patient             You're welcome.   Ok    Previous Messages      ----- Message -----   From: Yasmin Cain RN   Sent: 12/7/2017   3:42 PM   To: Chani Cevallos LCSW     Thanks, chani....   Will do     ----- Message -----   From: Chani Cevallos LCSW   Sent: 12/7/2017   3:19 PM   To: Yasmin Cain RN     Received call from patient - he is finished with tests. I advised him to go to the [facility] waiting area because Dr. Garay needs to review the results and go over with him. He is heading to the waiting area now.   Let me know the outcome.

## 2017-12-12 ENCOUNTER — OFFICE VISIT (OUTPATIENT)
Dept: HEMATOLOGY/ONCOLOGY | Facility: CLINIC | Age: 42
End: 2017-12-12
Payer: COMMERCIAL

## 2017-12-12 ENCOUNTER — LAB VISIT (OUTPATIENT)
Dept: LAB | Facility: HOSPITAL | Age: 42
End: 2017-12-12
Payer: MEDICARE

## 2017-12-12 VITALS
OXYGEN SATURATION: 98 % | HEIGHT: 66 IN | DIASTOLIC BLOOD PRESSURE: 94 MMHG | SYSTOLIC BLOOD PRESSURE: 147 MMHG | BODY MASS INDEX: 30.29 KG/M2 | TEMPERATURE: 99 F | HEART RATE: 96 BPM | RESPIRATION RATE: 17 BRPM | WEIGHT: 188.5 LBS

## 2017-12-12 DIAGNOSIS — C34.11 MALIGNANT NEOPLASM OF UPPER LOBE OF RIGHT LUNG: Primary | ICD-10-CM

## 2017-12-12 DIAGNOSIS — C79.51 SECONDARY MALIGNANT NEOPLASM OF BONE: ICD-10-CM

## 2017-12-12 DIAGNOSIS — C34.11 MALIGNANT NEOPLASM OF UPPER LOBE OF RIGHT LUNG: ICD-10-CM

## 2017-12-12 DIAGNOSIS — I10 ESSENTIAL HYPERTENSION: ICD-10-CM

## 2017-12-12 DIAGNOSIS — E03.2 HYPOTHYROIDISM DUE TO MEDICATION: ICD-10-CM

## 2017-12-12 DIAGNOSIS — I82.402 DEEP VEIN THROMBOSIS (DVT) OF LEFT LOWER EXTREMITY, UNSPECIFIED CHRONICITY, UNSPECIFIED VEIN: ICD-10-CM
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
## 2017-12-12 DIAGNOSIS — G89.3 NEOPLASM RELATED PAIN: ICD-10-CM

## 2017-12-12 DIAGNOSIS — D68.69 HYPERCOAGULABLE STATE, SECONDARY: ICD-10-CM

## 2017-12-12 DIAGNOSIS — J43.9 PULMONARY EMPHYSEMA, UNSPECIFIED EMPHYSEMA TYPE: ICD-10-CM

## 2017-12-12 LAB
ALBUMIN SERPL BCP-MCNC: 3.8 G/DL
ALP SERPL-CCNC: 74 U/L
ALT SERPL W/O P-5'-P-CCNC: 17 U/L
ANION GAP SERPL CALC-SCNC: 9 MMOL/L
AST SERPL-CCNC: 19 U/L
BILIRUB SERPL-MCNC: 0.3 MG/DL
BUN SERPL-MCNC: 16 MG/DL
CALCIUM SERPL-MCNC: 9.9 MG/DL
CHLORIDE SERPL-SCNC: 106 MMOL/L
CO2 SERPL-SCNC: 27 MMOL/L
CREAT SERPL-MCNC: 1.5 MG/DL
ERYTHROCYTE [DISTWIDTH] IN BLOOD BY AUTOMATED COUNT: 14.1 %
EST. GFR  (AFRICAN AMERICAN): >60 ML/MIN/1.73 M^2
EST. GFR  (NON AFRICAN AMERICAN): 56.6 ML/MIN/1.73 M^2
GLUCOSE SERPL-MCNC: 103 MG/DL
HCT VFR BLD AUTO: 43.9 %
HGB BLD-MCNC: 14.5 G/DL
IMM GRANULOCYTES # BLD AUTO: 0.03 K/UL
MCH RBC QN AUTO: 26.9 PG
MCHC RBC AUTO-ENTMCNC: 33 G/DL
MCV RBC AUTO: 81 FL
NEUTROPHILS # BLD AUTO: 4.5 K/UL
PLATELET # BLD AUTO: 262 K/UL
PMV BLD AUTO: 10.7 FL
POTASSIUM SERPL-SCNC: 3.8 MMOL/L
PROT SERPL-MCNC: 7.6 G/DL
RBC # BLD AUTO: 5.39 M/UL
SODIUM SERPL-SCNC: 142 MMOL/L
T4 FREE SERPL-MCNC: 0.87 NG/DL
TSH SERPL DL<=0.005 MIU/L-ACNC: 5.11 UIU/ML
WBC # BLD AUTO: 8.51 K/UL

## 2017-12-12 PROCEDURE — 84439 ASSAY OF FREE THYROXINE: CPT

## 2017-12-12 PROCEDURE — 84443 ASSAY THYROID STIM HORMONE: CPT

## 2017-12-12 PROCEDURE — 85027 COMPLETE CBC AUTOMATED: CPT

## 2017-12-12 PROCEDURE — 99999 PR PBB SHADOW E&M-EST. PATIENT-LVL V: CPT | Mod: PBBFAC,,, | Performed by: INTERNAL MEDICINE

## 2017-12-12 PROCEDURE — 99215 OFFICE O/P EST HI 40 MIN: CPT | Mod: S$GLB,,, | Performed by: INTERNAL MEDICINE

## 2017-12-12 PROCEDURE — 80053 COMPREHEN METABOLIC PANEL: CPT

## 2017-12-12 PROCEDURE — 36415 COLL VENOUS BLD VENIPUNCTURE: CPT

## 2017-12-12 RX ORDER — SODIUM CHLORIDE 0.9 % (FLUSH) 0.9 %
10 SYRINGE (ML) INJECTION
Status: CANCELLED | OUTPATIENT
Start: 2017-12-13

## 2017-12-12 RX ORDER — HEPARIN 100 UNIT/ML
500 SYRINGE INTRAVENOUS
Status: CANCELLED | OUTPATIENT
Start: 2017-12-13
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
## 2017-12-12 NOTE — Clinical Note
RTC in 2 weeks to see Dr. Garay with CBC, CMP, TSH, T4, CT chest/abd/pelvis and for Opdivo.  Xgeva due in 2 weeks.

## 2017-12-13 ENCOUNTER — INFUSION (OUTPATIENT)
Dept: INFUSION THERAPY | Facility: HOSPITAL | Age: 42
End: 2017-12-13
Attending: INTERNAL MEDICINE
Payer: MEDICARE

## 2017-12-13 VITALS
SYSTOLIC BLOOD PRESSURE: 136 MMHG | TEMPERATURE: 99 F | DIASTOLIC BLOOD PRESSURE: 90 MMHG | HEART RATE: 87 BPM | RESPIRATION RATE: 18 BRPM

## 2017-12-13 DIAGNOSIS — C34.11 MALIGNANT NEOPLASM OF UPPER LOBE OF RIGHT LUNG: ICD-10-CM

## 2017-12-13 DIAGNOSIS — C79.51 SECONDARY MALIGNANT NEOPLASM OF BONE: ICD-10-CM

## 2017-12-13 DIAGNOSIS — D50.0 IRON DEFICIENCY ANEMIA DUE TO CHRONIC BLOOD LOSS: Primary | ICD-10-CM

## 2017-12-13 DIAGNOSIS — C34.91 LUNG CANCER, PRIMARY, WITH METASTASIS FROM LUNG TO OTHER SITE, RIGHT: ICD-10-CM

## 2017-12-13 PROCEDURE — 63600175 PHARM REV CODE 636 W HCPCS: Performed by: INTERNAL MEDICINE

## 2017-12-13 PROCEDURE — A4216 STERILE WATER/SALINE, 10 ML: HCPCS | Performed by: INTERNAL MEDICINE

## 2017-12-13 PROCEDURE — 96413 CHEMO IV INFUSION 1 HR: CPT

## 2017-12-13 PROCEDURE — 25000003 PHARM REV CODE 250: Performed by: INTERNAL MEDICINE

## 2017-12-13 RX ORDER — SODIUM CHLORIDE 0.9 % (FLUSH) 0.9 %
10 SYRINGE (ML) INJECTION
Status: DISCONTINUED | OUTPATIENT
Start: 2017-12-13 | End: 2017-12-13 | Stop reason: HOSPADM

## 2017-12-13 RX ORDER — HEPARIN 100 UNIT/ML
500 SYRINGE INTRAVENOUS
Status: DISCONTINUED | OUTPATIENT
Start: 2017-12-13 | End: 2017-12-13 | Stop reason: HOSPADM

## 2017-12-13 RX ADMIN — SODIUM CHLORIDE 240 MG: 9 INJECTION, SOLUTION INTRAVENOUS at 09:12

## 2017-12-13 RX ADMIN — SODIUM CHLORIDE, PRESERVATIVE FREE 10 ML: 5 INJECTION INTRAVENOUS at 10:12

## 2017-12-13 RX ADMIN — SODIUM CHLORIDE: 900 INJECTION, SOLUTION INTRAVENOUS at 08:12

## 2017-12-13 RX ADMIN — HEPARIN 500 UNITS: 100 SYRINGE at 10:12

## 2017-12-13 NOTE — PLAN OF CARE
Problem: Patient Care Overview (Adult)  Goal: Plan of Care Review  Outcome: Ongoing (interventions implemented as appropriate)  Pt tolerated opdivo infusion without issue, avs given, rtc 12/27/17, no distress noted upon d/c to home

## 2017-12-13 NOTE — PLAN OF CARE
Problem: Chemotherapy Effects (Adult)  Goal: Signs and Symptoms of Listed Potential Problems Will be Absent or Manageable (Chemotherapy Effects)  Signs and symptoms of listed potential problems will be absent or manageable by discharge/transition of care (reference Chemotherapy Effects (Adult) CPG).   Outcome: Ongoing (interventions implemented as appropriate)  Pt here for opdivo D1C45 infusion, labs, hx, meds, allergies reviewed, pt with no complaints or concerns, reclined in chair, continue to monitor

## 2017-12-16 DIAGNOSIS — I82.402 DEEP VEIN THROMBOSIS (DVT) OF LEFT LOWER EXTREMITY, UNSPECIFIED CHRONICITY, UNSPECIFIED VEIN: ICD-10-CM

## 2017-12-21 DIAGNOSIS — G89.3 NEOPLASM RELATED PAIN: ICD-10-CM

## 2017-12-21 DIAGNOSIS — E03.9 HYPOTHYROIDISM, UNSPECIFIED TYPE: ICD-10-CM

## 2017-12-21 RX ORDER — OXYCODONE AND ACETAMINOPHEN 10; 325 MG/1; MG/1
1 TABLET ORAL EVERY 6 HOURS PRN
Qty: 120 TABLET | Refills: 0 | Status: SHIPPED | OUTPATIENT
Start: 2017-12-21 | End: 2018-01-15 | Stop reason: SDUPTHER

## 2017-12-21 RX ORDER — LEVOTHYROXINE SODIUM 75 UG/1
75 TABLET ORAL DAILY
Qty: 30 TABLET | Refills: 11 | Status: SHIPPED | OUTPATIENT
Start: 2017-12-21 | End: 2017-12-22

## 2017-12-21 NOTE — TELEPHONE ENCOUNTER
----- Message from Edwige Freedman sent at 12/21/2017 10:03 AM CST -----  Contact: Pt  Pt calling requesting refills on Synthroid and Percocet      Pt call back number [phone]

## 2017-12-22 ENCOUNTER — HOSPITAL ENCOUNTER (OUTPATIENT)
Dept: RADIOLOGY | Facility: HOSPITAL | Age: 42
Discharge: HOME | End: 2017-12-22
Attending: NURSE PRACTITIONER
Payer: MEDICARE

## 2017-12-22 ENCOUNTER — TELEPHONE (OUTPATIENT)
Dept: HEMATOLOGY/ONCOLOGY | Facility: CLINIC | Age: 42
End: 2017-12-22

## 2017-12-22 ENCOUNTER — HOSPITAL ENCOUNTER (OUTPATIENT)
Dept: RADIOLOGY | Facility: HOSPITAL | Age: 42
Discharge: HOME | End: 2017-12-22
Attending: NURSE PRACTITIONER
Payer: COMMERCIAL

## 2017-12-22 DIAGNOSIS — C79.51 SECONDARY MALIGNANT NEOPLASM OF BONE: ICD-10-CM

## 2017-12-22 DIAGNOSIS — C34.11 MALIGNANT NEOPLASM OF UPPER LOBE OF RIGHT LUNG: ICD-10-CM

## 2017-12-22 DIAGNOSIS — E03.9 HYPOTHYROIDISM, UNSPECIFIED TYPE: ICD-10-CM

## 2017-12-22 DIAGNOSIS — E03.9 HYPOTHYROIDISM, UNSPECIFIED TYPE: Primary | ICD-10-CM

## 2017-12-22 PROCEDURE — 71260 CT THORAX DX C+: CPT | Mod: TC

## 2017-12-22 PROCEDURE — 74177 CT ABD & PELVIS W/CONTRAST: CPT | Mod: 26,,, | Performed by: RADIOLOGY

## 2017-12-22 PROCEDURE — 74177 CT ABD & PELVIS W/CONTRAST: CPT | Mod: TC

## 2017-12-22 PROCEDURE — 71260 CT THORAX DX C+: CPT | Mod: 26,,, | Performed by: RADIOLOGY

## 2017-12-22 PROCEDURE — 25500020 PHARM REV CODE 255: Performed by: NURSE PRACTITIONER

## 2017-12-22 RX ORDER — LEVOTHYROXINE SODIUM 100 UG/1
100 TABLET ORAL DAILY
Qty: 30 TABLET | Refills: 11 | Status: SHIPPED | OUTPATIENT
Start: 2017-12-22 | End: 2017-12-22 | Stop reason: SDUPTHER

## 2017-12-22 RX ORDER — LEVOTHYROXINE SODIUM 100 UG/1
100 TABLET ORAL DAILY
Qty: 30 TABLET | Refills: 1 | Status: SHIPPED | OUTPATIENT
Start: 2017-12-22 | End: 2018-01-15 | Stop reason: SDUPTHER

## 2017-12-22 RX ADMIN — IOHEXOL 15 ML: 350 INJECTION, SOLUTION INTRAVENOUS at 02:12

## 2017-12-22 RX ADMIN — IOHEXOL 100 ML: 350 INJECTION, SOLUTION INTRAVENOUS at 03:12

## 2017-12-22 NOTE — TELEPHONE ENCOUNTER
----- Message from Jamar Soliman NP sent at 12/22/2017  3:17 PM CST -----  Please have him Increase Synthroid 100 mcg daily. Rx Escribed. PJ

## 2017-12-22 NOTE — TELEPHONE ENCOUNTER
Left message letting patient know that Nicki Lora had increased his synthroid and it has been sent to pharmacy. He can call clinic with any questions.

## 2017-12-27 ENCOUNTER — INFUSION (OUTPATIENT)
Dept: INFUSION THERAPY | Facility: HOSPITAL | Age: 42
End: 2017-12-27
Attending: INTERNAL MEDICINE
Payer: MEDICARE

## 2017-12-27 ENCOUNTER — OFFICE VISIT (OUTPATIENT)
Dept: HEMATOLOGY/ONCOLOGY | Facility: CLINIC | Age: 42
End: 2017-12-27
Payer: COMMERCIAL

## 2017-12-27 ENCOUNTER — RESEARCH ENCOUNTER (OUTPATIENT)
Dept: RESEARCH | Facility: HOSPITAL | Age: 42
End: 2017-12-27

## 2017-12-27 VITALS
HEIGHT: 66 IN | HEART RATE: 92 BPM | RESPIRATION RATE: 16 BRPM | TEMPERATURE: 98 F | WEIGHT: 185.63 LBS | SYSTOLIC BLOOD PRESSURE: 125 MMHG | BODY MASS INDEX: 29.83 KG/M2 | TEMPERATURE: 98 F | HEART RATE: 71 BPM | DIASTOLIC BLOOD PRESSURE: 80 MMHG | RESPIRATION RATE: 16 BRPM | DIASTOLIC BLOOD PRESSURE: 79 MMHG | SYSTOLIC BLOOD PRESSURE: 115 MMHG | OXYGEN SATURATION: 97 %

## 2017-12-27 DIAGNOSIS — D50.0 IRON DEFICIENCY ANEMIA DUE TO CHRONIC BLOOD LOSS: Primary | ICD-10-CM

## 2017-12-27 DIAGNOSIS — C34.11 MALIGNANT NEOPLASM OF UPPER LOBE OF RIGHT LUNG: Primary | ICD-10-CM

## 2017-12-27 DIAGNOSIS — C79.51 SECONDARY MALIGNANT NEOPLASM OF BONE: ICD-10-CM

## 2017-12-27 DIAGNOSIS — C34.11 MALIGNANT NEOPLASM OF UPPER LOBE OF RIGHT LUNG: ICD-10-CM

## 2017-12-27 DIAGNOSIS — E03.2 HYPOTHYROIDISM DUE TO MEDICATION: ICD-10-CM

## 2017-12-27 DIAGNOSIS — C34.91 LUNG CANCER, PRIMARY, WITH METASTASIS FROM LUNG TO OTHER SITE, RIGHT: ICD-10-CM

## 2017-12-27 PROCEDURE — 99999 PR PBB SHADOW E&M-EST. PATIENT-LVL IV: CPT | Mod: PBBFAC,,, | Performed by: INTERNAL MEDICINE

## 2017-12-27 PROCEDURE — 63600175 PHARM REV CODE 636 W HCPCS: Performed by: INTERNAL MEDICINE

## 2017-12-27 PROCEDURE — 96413 CHEMO IV INFUSION 1 HR: CPT

## 2017-12-27 PROCEDURE — 99215 OFFICE O/P EST HI 40 MIN: CPT | Mod: S$GLB,,, | Performed by: INTERNAL MEDICINE

## 2017-12-27 PROCEDURE — 25000003 PHARM REV CODE 250: Performed by: INTERNAL MEDICINE

## 2017-12-27 PROCEDURE — 96372 THER/PROPH/DIAG INJ SC/IM: CPT

## 2017-12-27 RX ORDER — SODIUM CHLORIDE 0.9 % (FLUSH) 0.9 %
10 SYRINGE (ML) INJECTION
Status: CANCELLED | OUTPATIENT
Start: 2017-12-27

## 2017-12-27 RX ORDER — SODIUM CHLORIDE 0.9 % (FLUSH) 0.9 %
10 SYRINGE (ML) INJECTION
Status: DISCONTINUED | OUTPATIENT
Start: 2017-12-27 | End: 2017-12-27 | Stop reason: HOSPADM

## 2017-12-27 RX ORDER — HEPARIN 100 UNIT/ML
500 SYRINGE INTRAVENOUS
Status: CANCELLED | OUTPATIENT
Start: 2017-12-27

## 2017-12-27 RX ORDER — HEPARIN 100 UNIT/ML
500 SYRINGE INTRAVENOUS
Status: DISCONTINUED | OUTPATIENT
Start: 2017-12-27 | End: 2017-12-27 | Stop reason: HOSPADM

## 2017-12-27 RX ADMIN — SODIUM CHLORIDE 240 MG: 9 INJECTION, SOLUTION INTRAVENOUS at 10:12

## 2017-12-27 RX ADMIN — DENOSUMAB 120 MG: 120 INJECTION SUBCUTANEOUS at 09:12

## 2017-12-27 RX ADMIN — SODIUM CHLORIDE: 9 INJECTION, SOLUTION INTRAVENOUS at 09:12

## 2017-12-27 RX ADMIN — HEPARIN 500 UNITS: 100 SYRINGE at 11:12

## 2017-12-27 NOTE — PROGRESS NOTES
The patient was approached by me in [facility] regarding participation in [employer]'s iSpecimen ([id number]) study (IRB#[id number]). Pt was agreeable.    The Informed Consent Form (ICF) was reviewed with pt. The discussion included:    - participation is voluntary;  - the blood specimen (2 tubes) will be collected at time of patient's next routine blood draw;   - pt can change his mind about participating at any time;  - if he changes his mind about participation, he can call us at contact info in the ICF, and we will discard samples remaining;  - samples that have been used prior to his notification will still be included in research;  - specimens will be stored with unique code that can only be linked to pt by [employer] staff;  - all medical information released to researchers will be stripped of identifiers;  - samples will not be released to outside researchers unless approved by internal committee;  - there is a small risk of loss of confidentiality, but we make every effort to ensure privacy;  - no other physical risks outside of those involved in standard of care procedure.    Dr. Garay approved of the patient's participation and will continue to take care of the patient per her usual protocol - participation in [employer] program will not change the patient's present or future medical care.     Pt did not have any questions. When asked, patient recalled participating in a [employer] one-time blood collection study approximately 1 year ago; he stated he was interested in participating in another. He willingly and independently signed the ICF. A copy of the signed ICF and my business card were given to pt with instructions to call with any questions that may arise or if he should change his mind regarding participation in [employer] program.

## 2017-12-27 NOTE — PLAN OF CARE
Problem: Chemotherapy Effects (Adult)  Goal: Signs and Symptoms of Listed Potential Problems Will be Absent or Manageable (Chemotherapy Effects)  Signs and symptoms of listed potential problems will be absent or manageable by discharge/transition of care (reference Chemotherapy Effects (Adult) CPG).   Outcome: Ongoing (interventions implemented as appropriate)  Patient here for Opdivo and Xgeva.  Assessment complete and labs reviewed.  VSS.  Patient endorses taking vitamin D and calcium.  Denies jaw pain.  No needs expressed at this time.  Will continue to monitor.

## 2017-12-27 NOTE — PROGRESS NOTES
"Subjective:       Patient ID: Yao Alan is a 42 y.o. male.    Chief Complaint: Malignant neoplasm of upper lobe of right lung and lower back pain 6/10  Oncologic History:  Mr. Yao Alan is a 42-year-old male who has a long history of smoking and was seen in the Pulmonary Clinic by Dr. Valle on 03/05/2015 with abnormal CT scan.    Apparently, he went to the [facility] in February with coughing as well as chest pain. A chest x-ray was done, which revealed a left upper lobe lung mass. Followup CT scan was done on 02/12/2015 and that revealed posterior superior mediastinal mass adjacent and prominent enlarged upper left mediastinal lymph nodes, abutting the aortic arch as well as left subclavian artery. A  CT scan of the abdomen was done on 03/03/2015 without contrast and that revealed nonobstructive nephrolithiasis, but a 2.1 cm lytic lesion involving the left iliac wing concerning for metastatic disease given the presence of emphysema and a mediastinal soft tissue mass on the CT chest from 02/12/2015. He saw Dr. Valle after that on 03/05/2015 and underwent an IR guided biopsy of the bone lesion on 03/17/2015. 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    His EGFR/EML/ALK is negative.    His PET scan on 3/25/15 revealed Left upper lobe lung lesion with an adjacent para-aortic lymph node, right anterior rib metastasis, right iliac metastasis, and pancreatic metastasis. A pancreatic cancer primary neoplasm is less likely.  MRI brain was negative for mets.  He completed 6 cycles of Carboplatin and Alimta and was on maintenance Alimta until end of March 2016.  His bone scan from 3/16 revealed stable disease. His CT scans also from end of March 2016 revealed "Interval enlargement of left upper lobe lung mass measuring 5.9 x 3.9 cm (previously 3.3 x 2.5 cm). This mass appears to invade the mediastinum and abutting the aortic arch and left subclavian artery"  He is now on " "Opdivo.  CT CAP from 6/22/16 s/p 6 cycles of Opdivo reveals "In this patient with a history of metastatic lung cancer, the previously identified paramediastinal left upper lobe lung mass has significantly decreased in size with only a trace amount of residual soft tissue opacities seen measuring 3.2 x 1.4 cm (axial series 2, image 16).Stable lytic lesion in the right iliac wing, unchanged.The right anterior sixth rib lesion and pancreatic head abnormality are not definitely appreciated on current examination."  Bone scan from 6/23/16 reveals "Stable activity in the right sixth rib anteriorly and the right iliac bone.No new metastatic lesions visualized."  10/3/16- CT scans reveal "In this patient with a history of metastatic lung cancer, the previously identified paramediastinal left upper lobe lung mass has decreased in size with only a trace amount of residual soft tissue opacity as above.  Stable lytic lesion in the right iliac wing, unchanged"  Bone scan reveals "Right superior anterior iliac bone lesion is stable and the right sixth rib has normalized. Recent dental procedure versus periodontal disease and possible right mastoiditis".     1/9/17 CT chest/abdomen/pelvis reveals "1. In this patient with history of metastatic lung cancer, the previously identified left upper paramediastinal lesion demonstrates near complete resolution with 1.0 x 0.7 cm residual disease (2.7 x 0.8 cm previously). Additionally, there is improving sclerotic lesion in the right iliac wing consistent with treated metastatic disease.  No evidence of new lesions in the chest, abdomen or pelvis. 2. Apical predominant paraseptal emphysematous changes and bullae. 3. Additional findings as above."     3/3/17 MRI brain revealed "Stable exam. No new enhancing mass lesion to suggest intracranial metastatic disease."     CT scans from 3/20/17 which reveal "No measurable lesion identified within the left upper paramediastinal region, the location " "of this patient's lung cancer, suggesting favorable response to treatment . No new lung lesions identified. Sclerotic lesion within the right iliac wing appears unchanged. Stable appearing centrilobular and paraseptal emphysema".        CT scans from 5/29/17 reveals "1. In this patient with history of lung cancer, there is no evidence of new focal masses or new metastases. Stable right iliac wing sclerotic lesion is unchanged.  2. Stable centrilobular and paraseptal emphysema."     Ct scans 9/13/17 reveal "In this patient with history of lung cancer status post immunotherapy there is no evidence of local recurrence or new metastatic disease. Stable sclerotic bone lesion in the RIGHT iliac wing consistent with treated lesion. Stable emphysematous lung changes."  Bone scan 9/13/17 reveals "1.  Unchanged mild uptake at the right anterior iliac spine. 2.  No new metastatic disease."       HPI Mr. Alan returns for follow up and Opdivo.   His CT scans from 12/22/17 reveal "No evidence of local recurrence or metastatic disease. Emphysematous and bullous changes in lungs. Although the patient has a history of malignancy, no measurable lesions per the RECIST criteria are identified"     Review of Systems   Constitutional: Negative for appetite change, fatigue and unexpected weight change.   HENT: Negative for mouth sores.    Eyes: Negative for visual disturbance.   Respiratory: Negative for cough and shortness of breath.    Cardiovascular: Negative for chest pain.   Gastrointestinal: Negative for abdominal pain and diarrhea.   Genitourinary: Negative for frequency.   Musculoskeletal: Negative for back pain.   Skin: Negative for rash.   Neurological: Negative for headaches.   Hematological: Negative for adenopathy.   Psychiatric/Behavioral: The patient is not nervous/anxious.    All other systems reviewed and are negative.      Objective:      Physical Exam   Constitutional: He is oriented to person, place, and time. He " appears well-developed and well-nourished.   HENT:   Mouth/Throat: No oropharyngeal exudate.   Cardiovascular: Normal rate and normal heart sounds.    Pulmonary/Chest: Effort normal and breath sounds normal. He has no wheezes.   Abdominal: Soft. Bowel sounds are normal. There is no tenderness.   Musculoskeletal: He exhibits no edema or tenderness.   Lymphadenopathy:     He has no cervical adenopathy.   Neurological: He is alert and oriented to person, place, and time. Coordination normal.   Skin: Skin is warm and dry. No rash noted.   Psychiatric: He has a normal mood and affect. Judgment and thought content normal.   Vitals reviewed.      LABS:  WBC   Date Value Ref Range Status   12/22/2017 8.86 3.90 - 12.70 K/uL Final     Hemoglobin   Date Value Ref Range Status   12/22/2017 13.9 (L) 14.0 - 18.0 g/dL Final     Hematocrit   Date Value Ref Range Status   12/22/2017 42.8 40.0 - 54.0 % Final     Platelets   Date Value Ref Range Status   12/22/2017 290 150 - 350 K/uL Final     Gran #   Date Value Ref Range Status   12/22/2017 4.7 1.8 - 7.7 K/uL Final     Comment:     The ANC is based on a white cell differential from an   automated cell counter. It has not been microscopically   reviewed for the presence of abnormal cells. Clinical   correlation is required.         Chemistry        Component Value Date/Time     12/22/2017 1348    K 4.1 12/22/2017 1348     12/22/2017 1348    CO2 24 12/22/2017 1348    BUN 13 12/22/2017 1348    CREATININE 1.4 12/22/2017 1348    GLU 93 12/22/2017 1348        Component Value Date/Time    CALCIUM 9.7 12/22/2017 1348    ALKPHOS 74 12/22/2017 1348    AST 23 12/22/2017 1348    ALT 22 12/22/2017 1348    BILITOT 0.4 12/22/2017 1348    ESTGFRAFRICA >60.0 12/22/2017 1348    EGFRNONAA >60.0 12/22/2017 1348        TSH   Date Value Ref Range Status   12/22/2017 7.987 (H) 0.400 - 4.000 uIU/mL Final     Free T4   Date Value Ref Range Status   12/22/2017 0.91 0.71 - 1.51 ng/dL Final      Assessment:       1. Malignant neoplasm of upper lobe of right lung    2. Secondary malignant neoplasm of bone    3. Hypothyroidism due to medication        Plan:        1,2. He is doing well with no evidence of disease on his CT scans. He will proceed with Opdivo immunotherapy and Xgeva today and will return in 2 weeks for next cycle  3. Synthroid increased to 100 mcg on 12/22/17. Will recheck labs in 2 weeks.    Above care plan was discussed with patient and all questions were addressed to his satisfaction

## 2018-01-08 DIAGNOSIS — Z00.6 EXAMINATION OF PARTICIPANT IN CLINICAL TRIAL: ICD-10-CM

## 2018-01-08 DIAGNOSIS — C34.90 MALIGNANT NEOPLASM OF LUNG, UNSPECIFIED LATERALITY, UNSPECIFIED PART OF LUNG: Primary | ICD-10-CM

## 2018-01-08 NOTE — PROGRESS NOTES
"PATIENT: Yao Alan  MRN: [id number]  DATE: 1/9/2018    Subjective:     Chief complaint:  Chief Complaint   Patient presents with    Malignant neoplasm of upper lobe of right lung    epistaxis yesterday       Oncologic History:  Mr. Yao Alan is a 42-year-old male who has a long history of smoking and was seen in the Pulmonary Clinic by Dr. Valle on 03/05/2015 with abnormal CT scan.    Apparently, he went to the [facility] in February with coughing as well as chest pain. A chest x-ray was done, which revealed a left upper lobe lung mass. Followup CT scan was done on 02/12/2015 and that revealed posterior superior mediastinal mass adjacent and prominent enlarged upper left mediastinal lymph nodes, abutting the aortic arch as well as left subclavian artery. A  CT scan of the abdomen was done on 03/03/2015 without contrast and that revealed nonobstructive nephrolithiasis, but a 2.1 cm lytic lesion involving the left iliac wing concerning for metastatic disease given the presence of emphysema and a mediastinal soft tissue mass on the CT chest from 02/12/2015. He saw Dr. Valle after that on 03/05/2015 and underwent an IR guided biopsy of the bone lesion on 03/17/2015. 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    His EGFR/EML/ALK is negative.    His PET scan on 3/25/15 revealed Left upper lobe lung lesion with an adjacent para-aortic lymph node, right anterior rib metastasis, right iliac metastasis, and pancreatic metastasis. A pancreatic cancer primary neoplasm is less likely.  MRI brain was negative for mets.  He completed 6 cycles of Carboplatin and Alimta and was on maintenance Alimta until end of March 2016.  His bone scan from 3/16 revealed stable disease. His CT scans also from end of March 2016 revealed "Interval enlargement of left upper lobe lung mass measuring 5.9 x 3.9 cm (previously 3.3 x 2.5 cm). This mass appears to invade the mediastinum and abutting the aortic " "arch and left subclavian artery"    He is now on Opdivo.    CT CAP from 6/22/16 s/p 6 cycles of Opdivo reveals "In this patient with a history of metastatic lung cancer, the previously identified paramediastinal left upper lobe lung mass has significantly decreased in size with only a trace amount of residual soft tissue opacities seen measuring 3.2 x 1.4 cm (axial series 2, image 16).Stable lytic lesion in the right iliac wing, unchanged.The right anterior sixth rib lesion and pancreatic head abnormality are not definitely appreciated on current examination."  Bone scan from 6/23/16 reveals "Stable activity in the right sixth rib anteriorly and the right iliac bone.No new metastatic lesions visualized."  10/3/16- CT scans reveal "In this patient with a history of metastatic lung cancer, the previously identified paramediastinal left upper lobe lung mass has decreased in size with only a trace amount of residual soft tissue opacity as above.  Stable lytic lesion in the right iliac wing, unchanged"  Bone scan reveals "Right superior anterior iliac bone lesion is stable and the right sixth rib has normalized. Recent dental procedure versus periodontal disease and possible right mastoiditis".     1/9/17 CT chest/abdomen/pelvis reveals "1. In this patient with history of metastatic lung cancer, the previously identified left upper paramediastinal lesion demonstrates near complete resolution with 1.0 x 0.7 cm residual disease (2.7 x 0.8 cm previously). Additionally, there is improving sclerotic lesion in the right iliac wing consistent with treated metastatic disease.  No evidence of new lesions in the chest, abdomen or pelvis. 2. Apical predominant paraseptal emphysematous changes and bullae. 3. Additional findings as above."     3/3/17 MRI brain revealed "Stable exam. No new enhancing mass lesion to suggest intracranial metastatic disease."     CT scans from 3/20/17 which reveal "No measurable lesion identified within " "the left upper paramediastinal region, the location of this patient's lung cancer, suggesting favorable response to treatment . No new lung lesions identified. Sclerotic lesion within the right iliac wing appears unchanged. Stable appearing centrilobular and paraseptal emphysema".        CT scans from 5/29/17 reveals "1. In this patient with history of lung cancer, there is no evidence of new focal masses or new metastases. Stable right iliac wing sclerotic lesion is unchanged.  2. Stable centrilobular and paraseptal emphysema."     Ct scans 9/13/17 reveal "In this patient with history of lung cancer status post immunotherapy there is no evidence of local recurrence or new metastatic disease. Stable sclerotic bone lesion in the RIGHT iliac wing consistent with treated lesion. Stable emphysematous lung changes."  Bone scan 9/13/17 reveals "1.  Unchanged mild uptake at the right anterior iliac spine. 2.  No new metastatic disease."      His CT scans from 12/22/17 reveal "No evidence of local recurrence or metastatic disease. Emphysematous and bullous changes in lungs. Although the patient has a history of malignancy, no measurable lesions per the RECIST criteria are identified"       Interval History: Mr. Alan returns for follow up and Opdivo. XGEVA not due. He feels good today. No complaints. Pain is controlled.  He denies any nausea, vomiting, diarrhea, constipation, abdominal pain, weight loss or loss of appetite, chest pain, shortness of breath, leg swelling, fatigue, pain, headache, dizziness, or mood changes. He is alone.     ECOG Performance Status:   ECOG SCORE    0 - Fully active-able to carry on all pre-disease performance without restriction         PMFSH: all information reviewed and updated as relevant to today's visit    Review of Systems:   Review of Systems   Constitutional: Negative for activity change, appetite change, fatigue and fever.   HENT: Negative for mouth sores, nosebleeds and sore throat.  " "  Eyes: Negative for visual disturbance.   Respiratory: Negative for cough and shortness of breath.    Cardiovascular: Negative for chest pain, palpitations and leg swelling.   Gastrointestinal: Negative for abdominal pain, constipation, diarrhea, nausea and vomiting.   Genitourinary: Negative for difficulty urinating and frequency.   Musculoskeletal: Negative for arthralgias and back pain.   Skin: Negative for rash.   Neurological: Negative for dizziness, numbness and headaches.   Hematological: Negative for adenopathy. Does not bruise/bleed easily.   Psychiatric/Behavioral: Negative for confusion and sleep disturbance. The patient is not nervous/anxious.    All other systems reviewed and are negative.        Objective:      Vitals:   Vitals:    01/09/18 0914   BP: 132/87   Pulse: 93   Resp: 16   Temp: 98 °F (36.7 °C)   TempSrc: Oral   SpO2: 100%   Weight: 83.9 kg (184 lb 15.5 oz)   Height: 5' 6" (1.676 m)     BMI: Body mass index is 29.85 kg/m².      Physical Exam:   Physical Exam   Constitutional: He is oriented to person, place, and time. He appears well-developed and well-nourished. No distress.   HENT:   Right Ear: External ear normal.   Left Ear: External ear normal.   Mouth/Throat: No oropharyngeal exudate.   Eyes: Conjunctivae and lids are normal. Pupils are equal, round, and reactive to light. No scleral icterus.   Neck: Trachea normal and normal range of motion. Neck supple. No thyromegaly present.   Cardiovascular: Normal rate, regular rhythm, normal heart sounds and normal pulses.    Pulmonary/Chest: Effort normal and breath sounds normal.   Abdominal: Soft. Normal appearance and bowel sounds are normal. He exhibits no distension and no mass. There is no hepatosplenomegaly or splenomegaly. There is no tenderness.   Musculoskeletal: Normal range of motion.   Lymphadenopathy:        Head (right side): No submental and no submandibular adenopathy present.        Head (left side): No submental and no " submandibular adenopathy present.     He has no cervical adenopathy.     He has no axillary adenopathy.        Right: No supraclavicular adenopathy present.        Left: No supraclavicular adenopathy present.   Neurological: He is alert and oriented to person, place, and time. He has normal reflexes. No sensory deficit.   Skin: Skin is warm, dry and intact. No bruising and no rash noted. Nails show no clubbing.   Psychiatric: He has a normal mood and affect. His speech is normal and behavior is normal. Cognition and memory are normal.   Vitals reviewed.        Laboratory Data:  WBC   Date Value Ref Range Status   01/09/2018 8.31 3.90 - 12.70 K/uL Final     Hemoglobin   Date Value Ref Range Status   01/09/2018 14.1 14.0 - 18.0 g/dL Final     Hematocrit   Date Value Ref Range Status   01/09/2018 43.7 40.0 - 54.0 % Final     Platelets   Date Value Ref Range Status   01/09/2018 254 150 - 350 K/uL Final     Gran #   Date Value Ref Range Status   01/09/2018 4.7 1.8 - 7.7 K/uL Final     Comment:     The ANC is based on a white cell differential from an   automated cell counter. It has not been microscopically   reviewed for the presence of abnormal cells. Clinical   correlation is required.         Chemistry        Component Value Date/Time     01/09/2018 0908    K 3.6 01/09/2018 0908     01/09/2018 0908    CO2 29 01/09/2018 0908    BUN 10 01/09/2018 0908    CREATININE 1.4 01/09/2018 0908    GLU 94 01/09/2018 0908        Component Value Date/Time    CALCIUM 9.3 01/09/2018 0908    ALKPHOS 64 01/09/2018 0908    AST 21 01/09/2018 0908    ALT 15 01/09/2018 0908    BILITOT 0.4 01/09/2018 0908    ESTGFRAFRICA >60.0 01/09/2018 0908    EGFRNONAA >60.0 01/09/2018 0908          TSH   Date Value Ref Range Status   01/09/2018 2.277 0.400 - 4.000 uIU/mL Final     Free T4   Date Value Ref Range Status   01/09/2018 1.08 0.71 - 1.51 ng/dL Final         Assessment/Plan:     1. Primary malignant neoplasm of right lung metastatic  to other site    2. Secondary malignant neoplasm of bone    3. Neoplasm related pain    4. Iron deficiency anemia due to chronic blood loss    5. Hypothyroidism due to medication    6. Pulmonary emphysema, unspecified emphysema type    7. Essential hypertension    8. Deep vein thrombosis (DVT) of left lower extremity, unspecified chronicity, unspecified vein    9. Hypercoagulable state, secondary    10. Current use of long term anticoagulation        Plan:    1,2. Labs reviewed. He will proceed with Opdivo immunotherapy. Xgeva not due today. RTC in 2 weeks to see Dr. Garay with CBC, CMP, TSH, t4 and for Opdivo and XGEVA.   3. Stable, continue current regimen.  4. Stable, continue to monitor.   5. Thyroid studies normal today. Continue Synthroid 100mcg daily.    6. Stable. Continue meds/inhaler.  7. Controlled. Continue meds.   8,9,10. Stable, continue Xarelto.       Med and Orders:  Orders Placed This Encounter    CBC Oncology    T4, free    TSH    Comprehensive metabolic panel       Follow Up:  Return in about 2 weeks (around 1/23/2018).      More than 30 mins were spent during this encounter, greater than 50% was spent in direct counseling and/or coordination of care.   Patient was also seen and examined by Dr. Garay who agrees with above plan. Patient is in agreement with the proposed treatment plan. All questions were answered to the patient's satisfaction. Pt knows to call clinic if anything is needed before the next clinic visit.      CARYL CalderonP-ZELDA  Hematology and Medical Oncology    Distress Screening Results: Psychosocial Distress screening score of Distress Score: 0 noted and reviewed. No intervention indicated.     STAFF NOTE:  I have reviewed the notes, assessments, and/or procedures performed by the nurse practitioner, as above.  I have personally interviewed the patient at the beside, and rounded with the nurse practitioner. I formulated the plan of care.  I concur with her  documentation of Yao Alan.

## 2018-01-09 ENCOUNTER — OFFICE VISIT (OUTPATIENT)
Dept: HEMATOLOGY/ONCOLOGY | Facility: CLINIC | Age: 43
End: 2018-01-09
Payer: MEDICARE

## 2018-01-09 ENCOUNTER — LAB VISIT (OUTPATIENT)
Dept: LAB | Facility: HOSPITAL | Age: 43
End: 2018-01-09
Attending: INTERNAL MEDICINE
Payer: MEDICARE

## 2018-01-09 VITALS
WEIGHT: 184.94 LBS | HEART RATE: 93 BPM | TEMPERATURE: 98 F | SYSTOLIC BLOOD PRESSURE: 132 MMHG | HEIGHT: 66 IN | BODY MASS INDEX: 29.72 KG/M2 | OXYGEN SATURATION: 100 % | DIASTOLIC BLOOD PRESSURE: 87 MMHG | RESPIRATION RATE: 16 BRPM

## 2018-01-09 DIAGNOSIS — D68.69 HYPERCOAGULABLE STATE, SECONDARY: ICD-10-CM

## 2018-01-09 DIAGNOSIS — J43.9 PULMONARY EMPHYSEMA, UNSPECIFIED EMPHYSEMA TYPE: ICD-10-CM

## 2018-01-09 DIAGNOSIS — C34.91 PRIMARY MALIGNANT NEOPLASM OF RIGHT LUNG METASTATIC TO OTHER SITE: Primary | ICD-10-CM

## 2018-01-09 DIAGNOSIS — I82.402 DEEP VEIN THROMBOSIS (DVT) OF LEFT LOWER EXTREMITY, UNSPECIFIED CHRONICITY, UNSPECIFIED VEIN: ICD-10-CM

## 2018-01-09 DIAGNOSIS — Z00.6 EXAMINATION OF PARTICIPANT IN CLINICAL TRIAL: ICD-10-CM

## 2018-01-09 DIAGNOSIS — I10 ESSENTIAL HYPERTENSION: ICD-10-CM

## 2018-01-09 DIAGNOSIS — E03.2 HYPOTHYROIDISM DUE TO MEDICATION: ICD-10-CM

## 2018-01-09 DIAGNOSIS — Z79.01 CURRENT USE OF LONG TERM ANTICOAGULATION: ICD-10-CM

## 2018-01-09 DIAGNOSIS — C79.51 SECONDARY MALIGNANT NEOPLASM OF BONE: ICD-10-CM

## 2018-01-09 DIAGNOSIS — C34.11 MALIGNANT NEOPLASM OF UPPER LOBE OF RIGHT LUNG: ICD-10-CM

## 2018-01-09 DIAGNOSIS — G89.3 NEOPLASM RELATED PAIN: ICD-10-CM

## 2018-01-09 DIAGNOSIS — C34.90 MALIGNANT NEOPLASM OF LUNG, UNSPECIFIED LATERALITY, UNSPECIFIED PART OF LUNG: ICD-10-CM

## 2018-01-09 DIAGNOSIS — D50.0 IRON DEFICIENCY ANEMIA DUE TO CHRONIC BLOOD LOSS: ICD-10-CM

## 2018-01-09 LAB
ALBUMIN SERPL BCP-MCNC: 3.8 G/DL
ALP SERPL-CCNC: 64 U/L
ALT SERPL W/O P-5'-P-CCNC: 15 U/L
ANION GAP SERPL CALC-SCNC: 5 MMOL/L
AST SERPL-CCNC: 21 U/L
BILIRUB SERPL-MCNC: 0.4 MG/DL
BUN SERPL-MCNC: 10 MG/DL
CALCIUM SERPL-MCNC: 9.3 MG/DL
CHLORIDE SERPL-SCNC: 107 MMOL/L
CO2 SERPL-SCNC: 29 MMOL/L
CREAT SERPL-MCNC: 1.4 MG/DL
DRUG STUDY SPECIMEN TYPE: NORMAL
DRUG STUDY TEST NAME: NORMAL
DRUG STUDY TEST RESULT: NORMAL
ERYTHROCYTE [DISTWIDTH] IN BLOOD BY AUTOMATED COUNT: 13.9 %
EST. GFR  (AFRICAN AMERICAN): >60 ML/MIN/1.73 M^2
EST. GFR  (NON AFRICAN AMERICAN): >60 ML/MIN/1.73 M^2
GLUCOSE SERPL-MCNC: 94 MG/DL
HCT VFR BLD AUTO: 43.7 %
HGB BLD-MCNC: 14.1 G/DL
IMM GRANULOCYTES # BLD AUTO: 0.02 K/UL
MCH RBC QN AUTO: 26.5 PG
MCHC RBC AUTO-ENTMCNC: 32.3 G/DL
MCV RBC AUTO: 82 FL
NEUTROPHILS # BLD AUTO: 4.7 K/UL
PLATELET # BLD AUTO: 254 K/UL
PMV BLD AUTO: 10.7 FL
POTASSIUM SERPL-SCNC: 3.6 MMOL/L
PROT SERPL-MCNC: 7.5 G/DL
RBC # BLD AUTO: 5.32 M/UL
SODIUM SERPL-SCNC: 141 MMOL/L
T4 FREE SERPL-MCNC: 1.08 NG/DL
TSH SERPL DL<=0.005 MIU/L-ACNC: 2.28 UIU/ML
WBC # BLD AUTO: 8.31 K/UL

## 2018-01-09 PROCEDURE — 36415 COLL VENOUS BLD VENIPUNCTURE: CPT

## 2018-01-09 PROCEDURE — 84443 ASSAY THYROID STIM HORMONE: CPT

## 2018-01-09 PROCEDURE — 99214 OFFICE O/P EST MOD 30 MIN: CPT | Mod: PBBFAC | Performed by: INTERNAL MEDICINE

## 2018-01-09 PROCEDURE — 99999 PR PBB SHADOW E&M-EST. PATIENT-LVL IV: CPT | Mod: PBBFAC,,, | Performed by: INTERNAL MEDICINE

## 2018-01-09 PROCEDURE — 80053 COMPREHEN METABOLIC PANEL: CPT

## 2018-01-09 PROCEDURE — 84439 ASSAY OF FREE THYROXINE: CPT

## 2018-01-09 PROCEDURE — 99215 OFFICE O/P EST HI 40 MIN: CPT | Mod: S$PBB,,, | Performed by: INTERNAL MEDICINE

## 2018-01-09 PROCEDURE — 99000 SPECIMEN HANDLING OFFICE-LAB: CPT

## 2018-01-09 PROCEDURE — 85027 COMPLETE CBC AUTOMATED: CPT

## 2018-01-09 RX ORDER — SODIUM CHLORIDE 0.9 % (FLUSH) 0.9 %
10 SYRINGE (ML) INJECTION
Status: CANCELLED | OUTPATIENT
Start: 2018-01-10

## 2018-01-09 RX ORDER — HEPARIN 100 UNIT/ML
500 SYRINGE INTRAVENOUS
Status: CANCELLED | OUTPATIENT
Start: 2018-01-10

## 2018-01-10 ENCOUNTER — INFUSION (OUTPATIENT)
Dept: INFUSION THERAPY | Facility: HOSPITAL | Age: 43
End: 2018-01-10
Attending: INTERNAL MEDICINE
Payer: MEDICARE

## 2018-01-10 VITALS
RESPIRATION RATE: 18 BRPM | DIASTOLIC BLOOD PRESSURE: 76 MMHG | TEMPERATURE: 98 F | SYSTOLIC BLOOD PRESSURE: 131 MMHG | HEART RATE: 83 BPM

## 2018-01-10 DIAGNOSIS — C79.51 SECONDARY MALIGNANT NEOPLASM OF BONE: ICD-10-CM

## 2018-01-10 DIAGNOSIS — C34.11 MALIGNANT NEOPLASM OF UPPER LOBE OF RIGHT LUNG: ICD-10-CM

## 2018-01-10 DIAGNOSIS — D50.0 IRON DEFICIENCY ANEMIA DUE TO CHRONIC BLOOD LOSS: Primary | ICD-10-CM

## 2018-01-10 DIAGNOSIS — C34.91 LUNG CANCER, PRIMARY, WITH METASTASIS FROM LUNG TO OTHER SITE, RIGHT: ICD-10-CM

## 2018-01-10 PROCEDURE — A4216 STERILE WATER/SALINE, 10 ML: HCPCS | Performed by: INTERNAL MEDICINE

## 2018-01-10 PROCEDURE — 25000003 PHARM REV CODE 250: Performed by: INTERNAL MEDICINE

## 2018-01-10 PROCEDURE — 96413 CHEMO IV INFUSION 1 HR: CPT

## 2018-01-10 PROCEDURE — 63600175 PHARM REV CODE 636 W HCPCS: Performed by: INTERNAL MEDICINE

## 2018-01-10 RX ORDER — HEPARIN 100 UNIT/ML
500 SYRINGE INTRAVENOUS
Status: DISCONTINUED | OUTPATIENT
Start: 2018-01-10 | End: 2018-01-10 | Stop reason: HOSPADM

## 2018-01-10 RX ORDER — SODIUM CHLORIDE 0.9 % (FLUSH) 0.9 %
10 SYRINGE (ML) INJECTION
Status: DISCONTINUED | OUTPATIENT
Start: 2018-01-10 | End: 2018-01-10 | Stop reason: HOSPADM

## 2018-01-10 RX ADMIN — HEPARIN 500 UNITS: 100 SYRINGE at 03:01

## 2018-01-10 RX ADMIN — SODIUM CHLORIDE, PRESERVATIVE FREE 10 ML: 5 INJECTION INTRAVENOUS at 03:01

## 2018-01-10 RX ADMIN — SODIUM CHLORIDE: 0.9 INJECTION, SOLUTION INTRAVENOUS at 02:01

## 2018-01-10 RX ADMIN — SODIUM CHLORIDE 240 MG: 9 INJECTION, SOLUTION INTRAVENOUS at 02:01

## 2018-01-10 NOTE — PLAN OF CARE
Problem: Patient Care Overview (Adult)  Goal: Plan of Care Review  Outcome: Ongoing (interventions implemented as appropriate)  Pt tolerated tx without complications. VSS. No s/s of reaction. AVS given to patient.

## 2018-01-10 NOTE — PLAN OF CARE
Problem: Chemotherapy Effects (Adult)  Goal: Signs and Symptoms of Listed Potential Problems Will be Absent or Manageable (Chemotherapy Effects)  Signs and symptoms of listed potential problems will be absent or manageable by discharge/transition of care (reference Chemotherapy Effects (Adult) CPG).   Outcome: Ongoing (interventions implemented as appropriate)  Pt here for opdivo. VSS. Consent/labs/meds/allergies reviewed. PAC accessed with blood return noted. All questions answered. Will continue to monitor.

## 2018-01-15 ENCOUNTER — TELEPHONE (OUTPATIENT)
Dept: HEMATOLOGY/ONCOLOGY | Facility: CLINIC | Age: 43
End: 2018-01-15

## 2018-01-15 DIAGNOSIS — E03.9 HYPOTHYROIDISM, UNSPECIFIED TYPE: ICD-10-CM

## 2018-01-15 DIAGNOSIS — G89.3 NEOPLASM RELATED PAIN: ICD-10-CM

## 2018-01-15 DIAGNOSIS — I82.402 DEEP VEIN THROMBOSIS (DVT) OF LEFT LOWER EXTREMITY, UNSPECIFIED CHRONICITY, UNSPECIFIED VEIN: ICD-10-CM

## 2018-01-15 RX ORDER — LEVOTHYROXINE SODIUM 100 UG/1
100 TABLET ORAL DAILY
Qty: 30 TABLET | Refills: 1 | Status: SHIPPED | OUTPATIENT
Start: 2018-01-15 | End: 2018-02-14 | Stop reason: SDUPTHER

## 2018-01-15 RX ORDER — OXYCODONE AND ACETAMINOPHEN 10; 325 MG/1; MG/1
1 TABLET ORAL EVERY 6 HOURS PRN
Qty: 120 TABLET | Refills: 0 | Status: SHIPPED | OUTPATIENT
Start: 2018-01-15 | End: 2018-02-14 | Stop reason: SDUPTHER

## 2018-01-15 NOTE — TELEPHONE ENCOUNTER
Received call this morning from patient re: needing some assistance completing some paperwork, and also needing refill of several medications. Advised that he call the clinic to speak with Dr. Garay's nurse about the prescriptions needing refill orders, as I was on hospital rounds at the time and unable to send a message for him via Epic. Explained that [facility] is closed today for the [employer] holiday, but I would speak with the pharmacist at [facility] to see if they can wait until tomorrow to speak with the staff at [facility] re: co-pay assistance, and allow patient to  the medications today if needed. Patient inquired if the [facility] Patient Assistance Fund per patient amount ($1000) can be re-applied for yearly; explained to him that $1000 is the maximum total per patient over the entire course of care, and is not available yearly. Informed him that the financial assistance through [facility] for his medical bills can be applied for multiple times once medical balances accumulate. Patient stated understanding.  Called [facility] this afternoon and spoke with the Pharmacist Lorena; he stated that they received the refill prescriptions and were able to process the Xarelto and Synthroid, these medications are ready for pick-up and the total co-pay amount is $11.70, however it is too soon for insurance for refill of Percocet until the 18th. Lorena agreed with waiting until tomorrow for [facility]. Called patient to discuss arrangements with him and he stated agreement. Patient asked about meeting tomorrow at 11 AM and scheduled this with patient to review the paperwork that he needs help completing. Will continue to follow and assist as needs identified.

## 2018-01-15 NOTE — TELEPHONE ENCOUNTER
----- Message from Maria A Serrato sent at 1/15/2018  9:45 AM CST -----  Contact: Self   Pt needs a refill on levothyroxine (SYNTHROID) 100 MCG tablet, rivaroxaban (XARELTO) 20 mg Tab and oxyCODONE-acetaminophen (PERCOCET)  mg per tablet called into pharmacy      Pt can be reached at [phone]

## 2018-01-23 ENCOUNTER — OFFICE VISIT (OUTPATIENT)
Dept: HEMATOLOGY/ONCOLOGY | Facility: CLINIC | Age: 43
End: 2018-01-23
Payer: MEDICARE

## 2018-01-23 VITALS
WEIGHT: 184.5 LBS | OXYGEN SATURATION: 98 % | SYSTOLIC BLOOD PRESSURE: 120 MMHG | DIASTOLIC BLOOD PRESSURE: 69 MMHG | RESPIRATION RATE: 16 BRPM | HEIGHT: 66 IN | TEMPERATURE: 98 F | HEART RATE: 72 BPM | BODY MASS INDEX: 29.65 KG/M2

## 2018-01-23 DIAGNOSIS — J43.9 PULMONARY EMPHYSEMA, UNSPECIFIED EMPHYSEMA TYPE: ICD-10-CM

## 2018-01-23 DIAGNOSIS — I82.402 DEEP VEIN THROMBOSIS (DVT) OF LEFT LOWER EXTREMITY, UNSPECIFIED CHRONICITY, UNSPECIFIED VEIN: ICD-10-CM

## 2018-01-23 DIAGNOSIS — G89.3 NEOPLASM RELATED PAIN: ICD-10-CM

## 2018-01-23 DIAGNOSIS — C34.11 MALIGNANT NEOPLASM OF UPPER LOBE OF RIGHT LUNG: Primary | ICD-10-CM

## 2018-01-23 DIAGNOSIS — C79.51 SECONDARY MALIGNANT NEOPLASM OF BONE: ICD-10-CM

## 2018-01-23 DIAGNOSIS — I10 ESSENTIAL HYPERTENSION: ICD-10-CM

## 2018-01-23 DIAGNOSIS — E03.2 HYPOTHYROIDISM DUE TO MEDICATION: ICD-10-CM

## 2018-01-23 DIAGNOSIS — Z79.01 CURRENT USE OF LONG TERM ANTICOAGULATION: ICD-10-CM

## 2018-01-23 PROCEDURE — 99214 OFFICE O/P EST MOD 30 MIN: CPT | Mod: PBBFAC | Performed by: INTERNAL MEDICINE

## 2018-01-23 PROCEDURE — 99215 OFFICE O/P EST HI 40 MIN: CPT | Mod: S$PBB,,, | Performed by: INTERNAL MEDICINE

## 2018-01-23 PROCEDURE — 99999 PR PBB SHADOW E&M-EST. PATIENT-LVL IV: CPT | Mod: PBBFAC,,, | Performed by: INTERNAL MEDICINE

## 2018-01-23 RX ORDER — HEPARIN 100 UNIT/ML
500 SYRINGE INTRAVENOUS
Status: CANCELLED | OUTPATIENT
Start: 2018-01-24

## 2018-01-23 RX ORDER — SODIUM CHLORIDE 0.9 % (FLUSH) 0.9 %
10 SYRINGE (ML) INJECTION
Status: CANCELLED | OUTPATIENT
Start: 2018-01-24

## 2018-01-23 NOTE — PROGRESS NOTES
"PATIENT: Yao Alan  MRN: [id number]  DATE: 1/23/2018    Subjective:     Chief complaint:  Chief Complaint   Patient presents with    Lung Cancer       Oncologic History:  Mr. Yao Alan is a 42-year-old male who has a long history of smoking and was seen in the Pulmonary Clinic by Dr. Valle on 03/05/2015 with abnormal CT scan.    Apparently, he went to the [facility] in February with coughing as well as chest pain. A chest x-ray was done, which revealed a left upper lobe lung mass. Followup CT scan was done on 02/12/2015 and that revealed posterior superior mediastinal mass adjacent and prominent enlarged upper left mediastinal lymph nodes, abutting the aortic arch as well as left subclavian artery. A  CT scan of the abdomen was done on 03/03/2015 without contrast and that revealed nonobstructive nephrolithiasis, but a 2.1 cm lytic lesion involving the left iliac wing concerning for metastatic disease given the presence of emphysema and a mediastinal soft tissue mass on the CT chest from 02/12/2015. He saw Dr. Valle after that on 03/05/2015 and underwent an IR guided biopsy of the bone lesion on 03/17/2015. 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    His EGFR/EML/ALK is negative.    His PET scan on 3/25/15 revealed Left upper lobe lung lesion with an adjacent para-aortic lymph node, right anterior rib metastasis, right iliac metastasis, and pancreatic metastasis. A pancreatic cancer primary neoplasm is less likely.  MRI brain was negative for mets.  He completed 6 cycles of Carboplatin and Alimta and was on maintenance Alimta until end of March 2016.  His bone scan from 3/16 revealed stable disease. His CT scans also from end of March 2016 revealed "Interval enlargement of left upper lobe lung mass measuring 5.9 x 3.9 cm (previously 3.3 x 2.5 cm). This mass appears to invade the mediastinum and abutting the aortic arch and left subclavian artery"    He is now on " "Opdivo.    CT CAP from 6/22/16 s/p 6 cycles of Opdivo reveals "In this patient with a history of metastatic lung cancer, the previously identified paramediastinal left upper lobe lung mass has significantly decreased in size with only a trace amount of residual soft tissue opacities seen measuring 3.2 x 1.4 cm (axial series 2, image 16).Stable lytic lesion in the right iliac wing, unchanged.The right anterior sixth rib lesion and pancreatic head abnormality are not definitely appreciated on current examination."  Bone scan from 6/23/16 reveals "Stable activity in the right sixth rib anteriorly and the right iliac bone.No new metastatic lesions visualized."  10/3/16- CT scans reveal "In this patient with a history of metastatic lung cancer, the previously identified paramediastinal left upper lobe lung mass has decreased in size with only a trace amount of residual soft tissue opacity as above.  Stable lytic lesion in the right iliac wing, unchanged"  Bone scan reveals "Right superior anterior iliac bone lesion is stable and the right sixth rib has normalized. Recent dental procedure versus periodontal disease and possible right mastoiditis".     1/9/17 CT chest/abdomen/pelvis reveals "1. In this patient with history of metastatic lung cancer, the previously identified left upper paramediastinal lesion demonstrates near complete resolution with 1.0 x 0.7 cm residual disease (2.7 x 0.8 cm previously). Additionally, there is improving sclerotic lesion in the right iliac wing consistent with treated metastatic disease.  No evidence of new lesions in the chest, abdomen or pelvis. 2. Apical predominant paraseptal emphysematous changes and bullae. 3. Additional findings as above."     3/3/17 MRI brain revealed "Stable exam. No new enhancing mass lesion to suggest intracranial metastatic disease."     CT scans from 3/20/17 which reveal "No measurable lesion identified within the left upper paramediastinal region, the " "location of this patient's lung cancer, suggesting favorable response to treatment . No new lung lesions identified. Sclerotic lesion within the right iliac wing appears unchanged. Stable appearing centrilobular and paraseptal emphysema".        CT scans from 5/29/17 reveals "1. In this patient with history of lung cancer, there is no evidence of new focal masses or new metastases. Stable right iliac wing sclerotic lesion is unchanged.  2. Stable centrilobular and paraseptal emphysema."     Ct scans 9/13/17 reveal "In this patient with history of lung cancer status post immunotherapy there is no evidence of local recurrence or new metastatic disease. Stable sclerotic bone lesion in the RIGHT iliac wing consistent with treated lesion. Stable emphysematous lung changes."  Bone scan 9/13/17 reveals "1.  Unchanged mild uptake at the right anterior iliac spine. 2.  No new metastatic disease."      His CT scans from 12/22/17 reveal "No evidence of local recurrence or metastatic disease. Emphysematous and bullous changes in lungs. Although the patient has a history of malignancy, no measurable lesions per the RECIST criteria are identified"       Interval History: Mr. Alan returns for follow up and Opdivo. XGEVA is due. He feels good today. No complaints. Pain is controlled.  He denies any nausea, vomiting, diarrhea, constipation, abdominal pain, weight loss or loss of appetite, chest pain, shortness of breath, leg swelling, fatigue, pain, headache, dizziness, or mood changes. He is alone.     ECOG Performance Status:   ECOG SCORE    0 - Fully active-able to carry on all pre-disease performance without restriction         PMFSH: all information reviewed and updated as relevant to today's visit    Review of Systems:   Review of Systems   Constitutional: Negative for activity change, appetite change, fatigue and fever.   HENT: Negative for mouth sores, nosebleeds and sore throat.    Eyes: Negative for visual disturbance. " "  Respiratory: Negative for cough and shortness of breath.    Cardiovascular: Negative for chest pain, palpitations and leg swelling.   Gastrointestinal: Negative for abdominal pain, constipation, diarrhea, nausea and vomiting.   Genitourinary: Negative for difficulty urinating and frequency.   Musculoskeletal: Negative for arthralgias and back pain.   Skin: Negative for rash.   Neurological: Negative for dizziness, numbness and headaches.   Hematological: Negative for adenopathy. Does not bruise/bleed easily.   Psychiatric/Behavioral: Negative for confusion and sleep disturbance. The patient is not nervous/anxious.    All other systems reviewed and are negative.        Objective:      Vitals:   Vitals:    01/23/18 0937   BP: 120/69   Pulse: 72   Resp: 16   Temp: 97.5 °F (36.4 °C)   TempSrc: Oral   SpO2: 98%   Weight: 83.7 kg (184 lb 8.4 oz)   Height: 5' 6" (1.676 m)     BMI: Body mass index is 29.78 kg/m².      Physical Exam:   Physical Exam   Constitutional: He is oriented to person, place, and time. He appears well-developed and well-nourished. No distress.   HENT:   Right Ear: External ear normal.   Left Ear: External ear normal.   Mouth/Throat: No oropharyngeal exudate.   Eyes: Conjunctivae and lids are normal. Pupils are equal, round, and reactive to light. No scleral icterus.   Neck: Trachea normal and normal range of motion. Neck supple. No thyromegaly present.   Cardiovascular: Normal rate, regular rhythm, normal heart sounds and normal pulses.    Pulmonary/Chest: Effort normal and breath sounds normal.   Abdominal: Soft. Normal appearance and bowel sounds are normal. He exhibits no distension and no mass. There is no hepatosplenomegaly or splenomegaly. There is no tenderness.   Musculoskeletal: Normal range of motion.   Lymphadenopathy:        Head (right side): No submental and no submandibular adenopathy present.        Head (left side): No submental and no submandibular adenopathy present.     He has no " cervical adenopathy.     He has no axillary adenopathy.        Right: No supraclavicular adenopathy present.        Left: No supraclavicular adenopathy present.   Neurological: He is alert and oriented to person, place, and time. He has normal reflexes. No sensory deficit.   Skin: Skin is warm, dry and intact. No bruising and no rash noted. Nails show no clubbing.   Psychiatric: He has a normal mood and affect. His speech is normal and behavior is normal. Cognition and memory are normal.   Vitals reviewed.        Laboratory Data:  WBC   Date Value Ref Range Status   01/23/2018 7.33 3.90 - 12.70 K/uL Final     Hemoglobin   Date Value Ref Range Status   01/23/2018 14.2 14.0 - 18.0 g/dL Final     Hematocrit   Date Value Ref Range Status   01/23/2018 43.8 40.0 - 54.0 % Final     Platelets   Date Value Ref Range Status   01/23/2018 275 150 - 350 K/uL Final     Gran #   Date Value Ref Range Status   01/23/2018 3.6 1.8 - 7.7 K/uL Final     Comment:     The ANC is based on a white cell differential from an   automated cell counter. It has not been microscopically   reviewed for the presence of abnormal cells. Clinical   correlation is required.         Chemistry        Component Value Date/Time     01/23/2018 0933    K 4.0 01/23/2018 0933     01/23/2018 0933    CO2 27 01/23/2018 0933    BUN 17 01/23/2018 0933    CREATININE 1.3 01/23/2018 0933    GLU 89 01/23/2018 0933        Component Value Date/Time    CALCIUM 10.1 01/23/2018 0933    ALKPHOS 65 01/23/2018 0933    AST 19 01/23/2018 0933    ALT 16 01/23/2018 0933    BILITOT 0.3 01/23/2018 0933    ESTGFRAFRICA >60.0 01/23/2018 0933    EGFRNONAA >60.0 01/23/2018 0933          TSH   Date Value Ref Range Status   01/23/2018 1.479 0.400 - 4.000 uIU/mL Final     Free T4   Date Value Ref Range Status   01/23/2018 1.14 0.71 - 1.51 ng/dL Final         Assessment/Plan:     1. Malignant neoplasm of upper lobe of right lung    2. Secondary malignant neoplasm of bone    3.  Neoplasm related pain    4. Pulmonary emphysema, unspecified emphysema type    5. Essential hypertension    6. Deep vein thrombosis (DVT) of left lower extremity, unspecified chronicity, unspecified vein    7. Current use of long term anticoagulation    8. Hypothyroidism due to medication        Plan:    1,2. Labs reviewed. He will proceed with Opdivo immunotherapy. Xgeva due today. RTC in 2 weeks to see Dr. Garay with CBC, CMP, TSH, t4 and for Opdivo. XGEVA not due in 2 weeks.   3. Stable, continue current regimen.  4. Stable. Continue meds/inhaler  5. Controlled. Continue meds.    6,7. Stable, continue Xarelto.  8. Thyroid studies normal today. Continue Synthroid 100mcg daily.        Med and Orders:  Orders Placed This Encounter    CBC Oncology    T4, free    TSH    Comprehensive metabolic panel       Follow Up:  Follow-up in about 2 weeks (around 2/6/2018).      More than 30 mins were spent during this encounter, greater than 50% was spent in direct counseling and/or coordination of care.   Patient was also seen and examined by Dr. Garay who agrees with above plan. Patient is in agreement with the proposed treatment plan. All questions were answered to the patient's satisfaction. Pt knows to call clinic if anything is needed before the next clinic visit.      Jamar Soliman, CARYLP-C  Hematology and Medical Oncology        Distress Screening Results: Psychosocial Distress screening score of Distress Score: 0 noted and reviewed. No intervention indicated.     STAFF NOTE:  I have reviewed the notes, assessments, and/or procedures performed by the nurse practitioner, as above.  I have personally interviewed the patient at the beside, and rounded with the nurse practitioner. I formulated the plan of care.  I concur with her documentation of Yao Alan.

## 2018-01-24 ENCOUNTER — INFUSION (OUTPATIENT)
Dept: INFUSION THERAPY | Facility: HOSPITAL | Age: 43
End: 2018-01-24
Attending: INTERNAL MEDICINE
Payer: MEDICARE

## 2018-01-24 VITALS
RESPIRATION RATE: 79 BRPM | DIASTOLIC BLOOD PRESSURE: 76 MMHG | HEART RATE: 79 BPM | TEMPERATURE: 98 F | SYSTOLIC BLOOD PRESSURE: 117 MMHG

## 2018-01-24 DIAGNOSIS — C34.91 LUNG CANCER, PRIMARY, WITH METASTASIS FROM LUNG TO OTHER SITE, RIGHT: ICD-10-CM

## 2018-01-24 DIAGNOSIS — C79.51 SECONDARY MALIGNANT NEOPLASM OF BONE: ICD-10-CM

## 2018-01-24 DIAGNOSIS — C34.11 MALIGNANT NEOPLASM OF UPPER LOBE OF RIGHT LUNG: ICD-10-CM

## 2018-01-24 DIAGNOSIS — D50.0 IRON DEFICIENCY ANEMIA DUE TO CHRONIC BLOOD LOSS: Primary | ICD-10-CM

## 2018-01-24 PROCEDURE — 96413 CHEMO IV INFUSION 1 HR: CPT

## 2018-01-24 PROCEDURE — 25000003 PHARM REV CODE 250: Performed by: INTERNAL MEDICINE

## 2018-01-24 PROCEDURE — 96372 THER/PROPH/DIAG INJ SC/IM: CPT

## 2018-01-24 PROCEDURE — 63600175 PHARM REV CODE 636 W HCPCS: Performed by: INTERNAL MEDICINE

## 2018-01-24 PROCEDURE — A4216 STERILE WATER/SALINE, 10 ML: HCPCS | Performed by: INTERNAL MEDICINE

## 2018-01-24 RX ORDER — SODIUM CHLORIDE 0.9 % (FLUSH) 0.9 %
10 SYRINGE (ML) INJECTION
Status: DISCONTINUED | OUTPATIENT
Start: 2018-01-24 | End: 2018-01-24 | Stop reason: HOSPADM

## 2018-01-24 RX ORDER — HEPARIN 100 UNIT/ML
500 SYRINGE INTRAVENOUS
Status: DISCONTINUED | OUTPATIENT
Start: 2018-01-24 | End: 2018-01-24 | Stop reason: HOSPADM

## 2018-01-24 RX ADMIN — SODIUM CHLORIDE 240 MG: 9 INJECTION, SOLUTION INTRAVENOUS at 04:01

## 2018-01-24 RX ADMIN — SODIUM CHLORIDE, PRESERVATIVE FREE 10 ML: 5 INJECTION INTRAVENOUS at 05:01

## 2018-01-24 RX ADMIN — HEPARIN 500 UNITS: 100 SYRINGE at 05:01

## 2018-01-24 RX ADMIN — SODIUM CHLORIDE: 0.9 INJECTION, SOLUTION INTRAVENOUS at 04:01

## 2018-01-24 RX ADMIN — DENOSUMAB 120 MG: 120 INJECTION SUBCUTANEOUS at 05:01

## 2018-01-24 NOTE — PLAN OF CARE
Problem: Patient Care Overview (Adult)  Goal: Plan of Care Review  Outcome: Ongoing (interventions implemented as appropriate)  Infusion completed, SQ injection given, pt tolerated all well; discussed importance of good daily hydration; pt instructed to contact MD for any needs or concerns; printed AVS given to pt, pt verbalized understanding of all discussed and when to report next

## 2018-01-24 NOTE — PLAN OF CARE
Problem: Chemotherapy Effects (Adult)  Goal: Signs and Symptoms of Listed Potential Problems Will be Absent or Manageable (Chemotherapy Effects)  Signs and symptoms of listed potential problems will be absent or manageable by discharge/transition of care (reference Chemotherapy Effects (Adult) CPG).   Outcome: Ongoing (interventions implemented as appropriate)  Pt here for Opdivo infusion, reports tolerating treatment well, no complaints or concerns at present; discussed Xgeva injection today, pt reports taking oral calcium/Vit D supplement daily, discussed reason for same; discussed treatment plan today and pt agrees to proceed

## 2018-01-31 ENCOUNTER — TELEPHONE (OUTPATIENT)
Dept: HEMATOLOGY/ONCOLOGY | Facility: CLINIC | Age: 43
End: 2018-01-31

## 2018-01-31 NOTE — TELEPHONE ENCOUNTER
tried calling pt on 3 numbers provided in chart but received no answer,  wasn't able to leave any messages,  called pt to discuss times change for appointments on 2/6/18

## 2018-02-05 NOTE — PROGRESS NOTES
"CC: Pulmonary adenocarcinoma, follow up    HPI: Mr. Yao Alan is a 43-year-old male who has a long history of smoking and was seen in the Pulmonary Clinic by Dr. Valle on 03/05/2015 with abnormal CT scan.    Apparently, he went to the [facility] in February with coughing as well as chest pain. A chest x-ray was done, which revealed a left upper lobe lung mass. Followup CT scan was done on 02/12/2015 and that revealed posterior superior mediastinal mass adjacent and prominent enlarged upper left mediastinal lymph nodes, abutting the aortic arch as well as left subclavian artery. A  CT scan of the abdomen was done on 03/03/2015 without contrast and that revealed nonobstructive nephrolithiasis, but a 2.1 cm lytic lesion involving the left iliac wing concerning for metastatic disease given the presence of emphysema and a mediastinal soft tissue mass on the CT chest from 02/12/2015. He saw Dr. Valle after that on 03/05/2015 and underwent an IR guided biopsy of the bone lesion on 03/17/2015. 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    His EGFR/EML/ALK is negative.    His PET scan on 3/25/15 revealed Left upper lobe lung lesion with an adjacent para-aortic lymph node, right anterior rib metastasis, right iliac metastasis, and pancreatic metastasis. A pancreatic cancer primary neoplasm is less likely.  MRI brain was negative for mets.  He completed 6 cycles of Carboplatin and Alimta and was on maintenance Alimta until end of March 2016.  His bone scan from 3/16 revealed stable disease. His CT scans also from end of March 2016 revealed "Interval enlargement of left upper lobe lung mass measuring 5.9 x 3.9 cm (previously 3.3 x 2.5 cm). This mass appears to invade the mediastinum and abutting the aortic arch and left subclavian artery"     He is now on Opdivo.     CT CAP from 6/22/16 s/p 6 cycles of Opdivo reveals "In this patient with a history of metastatic lung cancer, the " "previously identified paramediastinal left upper lobe lung mass has significantly decreased in size with only a trace amount of residual soft tissue opacities seen measuring 3.2 x 1.4 cm (axial series 2, image 16).Stable lytic lesion in the right iliac wing, unchanged.The right anterior sixth rib lesion and pancreatic head abnormality are not definitely appreciated on current examination."  Bone scan from 6/23/16 reveals "Stable activity in the right sixth rib anteriorly and the right iliac bone.No new metastatic lesions visualized."  10/3/16- CT scans reveal "In this patient with a history of metastatic lung cancer, the previously identified paramediastinal left upper lobe lung mass has decreased in size with only a trace amount of residual soft tissue opacity as above.  Stable lytic lesion in the right iliac wing, unchanged"  Bone scan reveals "Right superior anterior iliac bone lesion is stable and the right sixth rib has normalized. Recent dental procedure versus periodontal disease and possible right mastoiditis".     1/9/17 CT chest/abdomen/pelvis reveals "1. In this patient with history of metastatic lung cancer, the previously identified left upper paramediastinal lesion demonstrates near complete resolution with 1.0 x 0.7 cm residual disease (2.7 x 0.8 cm previously). Additionally, there is improving sclerotic lesion in the right iliac wing consistent with treated metastatic disease.  No evidence of new lesions in the chest, abdomen or pelvis. 2. Apical predominant paraseptal emphysematous changes and bullae. 3. Additional findings as above."     3/3/17 MRI brain revealed "Stable exam. No new enhancing mass lesion to suggest intracranial metastatic disease."     CT scans from 3/20/17 which reveal "No measurable lesion identified within the left upper paramediastinal region, the location of this patient's lung cancer, suggesting favorable response to treatment . No new lung lesions identified. Sclerotic " "lesion within the right iliac wing appears unchanged. Stable appearing centrilobular and paraseptal emphysema".        CT scans from 5/29/17 reveals "1. In this patient with history of lung cancer, there is no evidence of new focal masses or new metastases. Stable right iliac wing sclerotic lesion is unchanged.  2. Stable centrilobular and paraseptal emphysema."     Ct scans 9/13/17 reveal "In this patient with history of lung cancer status post immunotherapy there is no evidence of local recurrence or new metastatic disease. Stable sclerotic bone lesion in the RIGHT iliac wing consistent with treated lesion. Stable emphysematous lung changes."  Bone scan 9/13/17 reveals "1.  Unchanged mild uptake at the right anterior iliac spine. 2.  No new metastatic disease."      His CT scans from 12/22/17 reveal "No evidence of local recurrence or metastatic disease. Emphysematous and bullous changes in lungs. Although the patient has a history of malignancy, no measurable lesions per the RECIST criteria are identified"        Interval History: Mr. Alan returns for follow up and Opdivo. He feels good today. No complaints. Pain is controlled.  He denies any nausea, vomiting, diarrhea, abdominal pain, weight loss or loss of appetite, chest pain, shortness of breath, leg swelling, fatigue, pain, headache, dizziness, or mood changes. He ahs constipation for which he uses stool softeners.He has noted occasional blood streaked stools.      Review of Systems   Constitutional: Positive for malaise/fatigue. Negative for fever and weight loss.   HENT: Negative for congestion and nosebleeds.    Eyes: Negative for blurred vision and double vision.   Respiratory: Negative for cough, hemoptysis, shortness of breath and wheezing.    Cardiovascular: Negative for chest pain, palpitations, leg swelling and PND.   Gastrointestinal: Positive for blood in stool and constipation. Negative for abdominal pain, diarrhea, heartburn and nausea.        He " has noted occasional blood in his stools, when he strains   Genitourinary: Negative for dysuria and urgency.   Musculoskeletal: Negative for myalgias.   Neurological: Negative for dizziness, sensory change, speech change and headaches.   Endo/Heme/Allergies: Does not bruise/bleed easily.   Psychiatric/Behavioral: Negative for depression.       Current Outpatient Prescriptions   Medication Sig    albuterol 90 mcg/actuation inhaler Inhale 2 puffs into the lungs every 6 (six) hours as needed for Wheezing.    amlodipine (NORVASC) 10 MG tablet Take 1 tablet (10 mg total) by mouth once daily.    clotrimazole (LOTRIMIN) 1 % cream Apply to affected area 2 times daily    diphenhydrAMINE (BENADRYL) 25 mg capsule Take 1 each (25 mg total) by mouth every 6 (six) hours as needed for Itching or Allergies.    docusate sodium (COLACE) 100 MG capsule Take 1 capsule (100 mg total) by mouth 2 (two) times daily.    ferrous sulfate 325 (65 FE) MG EC tablet Take 1 tablet (325 mg total) by mouth 3 (three) times daily with meals.    hydrOXYzine HCl (ATARAX) 25 MG tablet Take 2 tablets (50 mg total) by mouth 3 (three) times daily as needed for Itching.    lactulose (CHRONULAC) 10 gram/15 mL solution Take 30 mLs (20 g total) by mouth 3 (three) times daily.    levocetirizine (XYZAL) 5 MG tablet Take 1 tablet (5 mg total) by mouth every evening.    levothyroxine (SYNTHROID) 100 MCG tablet Take 1 tablet (100 mcg total) by mouth once daily.    metoclopramide HCl (REGLAN) 10 MG tablet Take 1 tablet (10 mg total) by mouth every 6 (six) hours as needed (nausea/vomiting and/or abdominal cramps).    naloxegol 25 mg Tab Take 25 mg by mouth once daily.    omeprazole (PRILOSEC) 20 MG capsule Take 1 capsule (20 mg total) by mouth once daily.    oxyCODONE-acetaminophen (PERCOCET)  mg per tablet Take 1 tablet by mouth every 6 (six) hours as needed for Pain.    polyethylene glycol (GLYCOLAX) 17 gram/dose powder Take 17 g by mouth once  daily.    PROAIR HFA 90 mcg/actuation inhaler INHALE TWO PUFFS BY MOUTH EVERY 6 HOURS AS NEEDED FOR WHEEZING    rivaroxaban (XARELTO) 20 mg Tab Take 1 tablet (20 mg total) by mouth once daily.    triamcinolone acetonide 0.1% (KENALOG) 0.1 % ointment Apply topically 2 (two) times daily.     No current facility-administered medications for this visit.      Vitals:    02/06/18 0913   BP: (!) 143/92   Pulse: 75   Resp: 18   Temp: 98.2 °F (36.8 °C)     Physical Exam   Constitutional: He is oriented to person, place, and time. He appears well-developed. No distress.   HENT:   Head: Normocephalic and atraumatic.   Mouth/Throat: No oropharyngeal exudate.   Eyes: Pupils are equal, round, and reactive to light. No scleral icterus.   Neck: Normal range of motion.   Cardiovascular: Normal rate, regular rhythm and normal heart sounds.    No murmur heard.  Pulmonary/Chest: Effort normal and breath sounds normal. No respiratory distress. He has no wheezes.   Abdominal: Soft. He exhibits no distension. There is no tenderness. There is no guarding.   Musculoskeletal: He exhibits no edema.   Lymphadenopathy:     He has no cervical adenopathy.   Neurological: He is alert and oriented to person, place, and time. No cranial nerve deficit.   Skin: Skin is warm.   Psychiatric: He has a normal mood and affect.     Component      Latest Ref Rng & Units 2/6/2018   Sodium      136 - 145 mmol/L 139   Potassium      3.5 - 5.1 mmol/L 4.0   Chloride      95 - 110 mmol/L 104   CO2      23 - 29 mmol/L 24   Glucose      70 - 110 mg/dL 92   BUN, Bld      6 - 20 mg/dL 14   Creatinine      0.5 - 1.4 mg/dL 1.3   Calcium      8.7 - 10.5 mg/dL 9.9   Total Protein      6.0 - 8.4 g/dL 7.3   Albumin      3.5 - 5.2 g/dL 3.7   Total Bilirubin      0.1 - 1.0 mg/dL 0.3   Alkaline Phosphatase      55 - 135 U/L 71   AST      10 - 40 U/L 25   ALT      10 - 44 U/L 26   Anion Gap      8 - 16 mmol/L 11   eGFR if African American      >60 mL/min/1.73 m:2 >60.0    eGFR if non African American      >60 mL/min/1.73 m:2 >60.0   WBC      3.90 - 12.70 K/uL 8.35   RBC      4.60 - 6.20 M/uL 5.41   Hemoglobin      14.0 - 18.0 g/dL 14.1   Hematocrit      40.0 - 54.0 % 44.5   MCV      82 - 98 fL 82   MCH      27.0 - 31.0 pg 26.1 (L)   MCHC      32.0 - 36.0 g/dL 31.7 (L)   RDW      11.5 - 14.5 % 14.2   Platelets      150 - 350 K/uL 266   MPV      9.2 - 12.9 fL 10.5   Gran # (ANC)      1.8 - 7.7 K/uL 4.0   Immature Grans (Abs)      0.00 - 0.04 K/uL 0.03   Free T4      0.71 - 1.51 ng/dL 1.33   TSH      0.400 - 4.000 uIU/mL 0.400       Assessment:    1.Malignant neoplasm of upper lobe of right lung   2.Secondary malignant neoplasm of bone   3.Neoplasm related pain   4.Emphysema  5. Essential HTn  6. Deep vein thrombosis (DVT) of left lower extremity, unspecified chronicity, unspecified vein   7. Hypothyroidism  8. Constipation     Plan:     1,2. Labs reviewed. He will proceed with Nivolumab on 2/7/18. RTC in 2 weeks to see Dr. Garay with CBC, CMP, and for Opdivo.  3. Stable, continue current regimen.  4. Stable. Continue meds/inhaler  5. Controlled. Continue meds.    6. Stable, continue Xarelto.  7. Thyroid function normal. Continue Synthroid 100mcg daily.    8. Uses stool softeners          Distress Screening Results: Psychosocial Distress screening score of Distress Score: 0 noted and reviewed. No intervention indicated.

## 2018-02-06 ENCOUNTER — LAB VISIT (OUTPATIENT)
Dept: LAB | Facility: HOSPITAL | Age: 43
End: 2018-02-06
Attending: INTERNAL MEDICINE
Payer: MEDICARE

## 2018-02-06 ENCOUNTER — OFFICE VISIT (OUTPATIENT)
Dept: HEMATOLOGY/ONCOLOGY | Facility: CLINIC | Age: 43
End: 2018-02-06
Payer: MEDICARE

## 2018-02-06 VITALS
DIASTOLIC BLOOD PRESSURE: 92 MMHG | RESPIRATION RATE: 18 BRPM | HEIGHT: 66 IN | BODY MASS INDEX: 29.83 KG/M2 | TEMPERATURE: 98 F | SYSTOLIC BLOOD PRESSURE: 143 MMHG | WEIGHT: 185.63 LBS | HEART RATE: 75 BPM | OXYGEN SATURATION: 99 %

## 2018-02-06 DIAGNOSIS — C34.11 MALIGNANT NEOPLASM OF UPPER LOBE OF RIGHT LUNG: ICD-10-CM

## 2018-02-06 DIAGNOSIS — C34.91 PRIMARY MALIGNANT NEOPLASM OF RIGHT LUNG METASTATIC TO OTHER SITE: Primary | ICD-10-CM

## 2018-02-06 DIAGNOSIS — E03.2 HYPOTHYROIDISM DUE TO MEDICATION: ICD-10-CM

## 2018-02-06 DIAGNOSIS — K59.01 SLOW TRANSIT CONSTIPATION: ICD-10-CM

## 2018-02-06 DIAGNOSIS — D68.69 HYPERCOAGULABLE STATE, SECONDARY: ICD-10-CM

## 2018-02-06 DIAGNOSIS — C79.51 SECONDARY MALIGNANT NEOPLASM OF BONE: ICD-10-CM

## 2018-02-06 LAB
ALBUMIN SERPL BCP-MCNC: 3.7 G/DL
ALP SERPL-CCNC: 71 U/L
ALT SERPL W/O P-5'-P-CCNC: 26 U/L
ANION GAP SERPL CALC-SCNC: 11 MMOL/L
AST SERPL-CCNC: 25 U/L
BILIRUB SERPL-MCNC: 0.3 MG/DL
BUN SERPL-MCNC: 14 MG/DL
CALCIUM SERPL-MCNC: 9.9 MG/DL
CHLORIDE SERPL-SCNC: 104 MMOL/L
CO2 SERPL-SCNC: 24 MMOL/L
CREAT SERPL-MCNC: 1.3 MG/DL
ERYTHROCYTE [DISTWIDTH] IN BLOOD BY AUTOMATED COUNT: 14.2 %
EST. GFR  (AFRICAN AMERICAN): >60 ML/MIN/1.73 M^2
EST. GFR  (NON AFRICAN AMERICAN): >60 ML/MIN/1.73 M^2
GLUCOSE SERPL-MCNC: 92 MG/DL
HCT VFR BLD AUTO: 44.5 %
HGB BLD-MCNC: 14.1 G/DL
IMM GRANULOCYTES # BLD AUTO: 0.03 K/UL
MCH RBC QN AUTO: 26.1 PG
MCHC RBC AUTO-ENTMCNC: 31.7 G/DL
MCV RBC AUTO: 82 FL
NEUTROPHILS # BLD AUTO: 4 K/UL
PLATELET # BLD AUTO: 266 K/UL
PMV BLD AUTO: 10.5 FL
POTASSIUM SERPL-SCNC: 4 MMOL/L
PROT SERPL-MCNC: 7.3 G/DL
RBC # BLD AUTO: 5.41 M/UL
SODIUM SERPL-SCNC: 139 MMOL/L
T4 FREE SERPL-MCNC: 1.33 NG/DL
TSH SERPL DL<=0.005 MIU/L-ACNC: 0.4 UIU/ML
WBC # BLD AUTO: 8.35 K/UL

## 2018-02-06 PROCEDURE — 84439 ASSAY OF FREE THYROXINE: CPT

## 2018-02-06 PROCEDURE — 80053 COMPREHEN METABOLIC PANEL: CPT

## 2018-02-06 PROCEDURE — 85027 COMPLETE CBC AUTOMATED: CPT

## 2018-02-06 PROCEDURE — 99214 OFFICE O/P EST MOD 30 MIN: CPT | Mod: PBBFAC | Performed by: INTERNAL MEDICINE

## 2018-02-06 PROCEDURE — 36415 COLL VENOUS BLD VENIPUNCTURE: CPT

## 2018-02-06 PROCEDURE — 99214 OFFICE O/P EST MOD 30 MIN: CPT | Mod: S$PBB,,, | Performed by: INTERNAL MEDICINE

## 2018-02-06 PROCEDURE — 99999 PR PBB SHADOW E&M-EST. PATIENT-LVL IV: CPT | Mod: PBBFAC,,, | Performed by: INTERNAL MEDICINE

## 2018-02-06 PROCEDURE — 84443 ASSAY THYROID STIM HORMONE: CPT

## 2018-02-06 RX ORDER — SODIUM CHLORIDE 0.9 % (FLUSH) 0.9 %
10 SYRINGE (ML) INJECTION
Status: CANCELLED | OUTPATIENT
Start: 2018-02-07

## 2018-02-06 RX ORDER — HEPARIN 100 UNIT/ML
500 SYRINGE INTRAVENOUS
Status: CANCELLED | OUTPATIENT
Start: 2018-02-07

## 2018-02-06 NOTE — LETTER
February 6, 2018      Bel Garay MD  [address]  [facility] [address]           [facility]  [address]  [facility] [address]  Phone: [phone]          Patient: Yao Alan   MR Number: [id number]   YOB: 1975   Date of Visit: 2/6/2018       Dear Dr. Bel Garay:    Thank you for referring Yao Alan to me for evaluation. Attached you will find relevant portions of my assessment and plan of care.    If you have questions, please do not hesitate to call me. I look forward to following Yao Alan along with you.    Sincerely,    Thania Thomas MD    Enclosure  CC:  No Recipients    If you would like to receive this communication electronically, please contact [email or web address] or [phone] to request more information on [facility] Link access.    For providers and/or their staff who would like to refer a patient to [facility], please contact us through our one-stop-shop provider referral line, Aime Domínguez, at [phone].    If you feel you have received this communication in error or would no longer like to receive these types of communications, please e-mail [email or web address]

## 2018-02-07 ENCOUNTER — INFUSION (OUTPATIENT)
Dept: INFUSION THERAPY | Facility: HOSPITAL | Age: 43
End: 2018-02-07
Attending: INTERNAL MEDICINE
Payer: MEDICARE

## 2018-02-07 VITALS — RESPIRATION RATE: 18 BRPM | TEMPERATURE: 98 F

## 2018-02-07 DIAGNOSIS — C34.11 MALIGNANT NEOPLASM OF UPPER LOBE OF RIGHT LUNG: ICD-10-CM

## 2018-02-07 DIAGNOSIS — C79.51 SECONDARY MALIGNANT NEOPLASM OF BONE: ICD-10-CM

## 2018-02-07 DIAGNOSIS — C34.91 LUNG CANCER, PRIMARY, WITH METASTASIS FROM LUNG TO OTHER SITE, RIGHT: ICD-10-CM

## 2018-02-07 DIAGNOSIS — D50.0 IRON DEFICIENCY ANEMIA DUE TO CHRONIC BLOOD LOSS: Primary | ICD-10-CM

## 2018-02-07 PROCEDURE — 96413 CHEMO IV INFUSION 1 HR: CPT

## 2018-02-07 PROCEDURE — 25000003 PHARM REV CODE 250: Performed by: INTERNAL MEDICINE

## 2018-02-07 PROCEDURE — 63600175 PHARM REV CODE 636 W HCPCS: Performed by: INTERNAL MEDICINE

## 2018-02-07 PROCEDURE — A4216 STERILE WATER/SALINE, 10 ML: HCPCS | Performed by: INTERNAL MEDICINE

## 2018-02-07 RX ORDER — HEPARIN 100 UNIT/ML
500 SYRINGE INTRAVENOUS
Status: DISCONTINUED | OUTPATIENT
Start: 2018-02-07 | End: 2018-02-07 | Stop reason: HOSPADM

## 2018-02-07 RX ORDER — SODIUM CHLORIDE 0.9 % (FLUSH) 0.9 %
10 SYRINGE (ML) INJECTION
Status: DISCONTINUED | OUTPATIENT
Start: 2018-02-07 | End: 2018-02-07 | Stop reason: HOSPADM

## 2018-02-07 RX ADMIN — SODIUM CHLORIDE: 0.9 INJECTION, SOLUTION INTRAVENOUS at 03:02

## 2018-02-07 RX ADMIN — SODIUM CHLORIDE 240 MG: 900 INJECTION, SOLUTION INTRAVENOUS at 04:02

## 2018-02-07 RX ADMIN — HEPARIN 500 UNITS: 100 SYRINGE at 04:02

## 2018-02-07 RX ADMIN — SODIUM CHLORIDE, PRESERVATIVE FREE 10 ML: 5 INJECTION INTRAVENOUS at 04:02

## 2018-02-07 NOTE — PLAN OF CARE
Problem: Patient Care Overview (Adult)  Goal: Plan of Care Review  Outcome: Ongoing (interventions implemented as appropriate)  9874 -- Patient tolerated treatment well.  Discharged without S/S of adverse effects.  AVS given.  Patient instructed to call provider with any questions or concerns.

## 2018-02-07 NOTE — PLAN OF CARE
Problem: Patient Care Overview (Adult)  Goal: Adult Individualization and Mutuality  1. Chest Port  2. Likes warm blankets  3. Listens to music during treatment         Outcome: Ongoing (interventions implemented as appropriate)  1537 --  Patient's labs, history, allergies, and medication reviewed.  Patient to receive Optivo.  Discussed plan of care with patient.  Patient in agreement.  PAC accessed.  Good blood return noted.  Will monitor.

## 2018-02-14 DIAGNOSIS — I82.402 DEEP VEIN THROMBOSIS (DVT) OF LEFT LOWER EXTREMITY, UNSPECIFIED CHRONICITY, UNSPECIFIED VEIN: ICD-10-CM

## 2018-02-14 DIAGNOSIS — G89.3 NEOPLASM RELATED PAIN: ICD-10-CM

## 2018-02-14 DIAGNOSIS — E03.9 HYPOTHYROIDISM, UNSPECIFIED TYPE: ICD-10-CM

## 2018-02-14 RX ORDER — LEVOTHYROXINE SODIUM 100 UG/1
100 TABLET ORAL DAILY
Qty: 30 TABLET | Refills: 1 | Status: SHIPPED | OUTPATIENT
Start: 2018-02-14 | End: 2018-03-13 | Stop reason: SDUPTHER

## 2018-02-14 RX ORDER — OXYCODONE AND ACETAMINOPHEN 10; 325 MG/1; MG/1
1 TABLET ORAL EVERY 6 HOURS PRN
Qty: 120 TABLET | Refills: 0 | Status: SHIPPED | OUTPATIENT
Start: 2018-02-14 | End: 2018-03-13 | Stop reason: SDUPTHER

## 2018-02-14 NOTE — TELEPHONE ENCOUNTER
----- Message from Willy Perrin sent at 2/14/2018  8:59 AM CST -----  Contact: Pt   Pt is requesting refill on:    rivaroxaban (XARELTO) 20 mg Tab  oxyCODONE-acetaminophen (PERCOCET)  mg per tablet  levothyroxine (SYNTHROID) 100 MCG tablet    [facility]::[phone]

## 2018-02-19 ENCOUNTER — TELEPHONE (OUTPATIENT)
Dept: HEMATOLOGY/ONCOLOGY | Facility: CLINIC | Age: 43
End: 2018-02-19

## 2018-02-19 DIAGNOSIS — R53.83 OTHER FATIGUE: ICD-10-CM

## 2018-02-19 DIAGNOSIS — C34.90 MALIGNANT NEOPLASM OF LUNG, UNSPECIFIED LATERALITY, UNSPECIFIED PART OF LUNG: Primary | ICD-10-CM

## 2018-02-19 NOTE — TELEPHONE ENCOUNTER
spoke with pt this morning in regards to upcoming appointments on 02/20 and 02/21, pt has confirm.

## 2018-02-19 NOTE — TELEPHONE ENCOUNTER
----- Message from Willy Perrin sent at 2/19/2018 10:53 AM CST -----  Contact: Pt   Will like a call from office regarding when should he be seeing dr carl again     Contact:[phone]

## 2018-02-21 ENCOUNTER — OFFICE VISIT (OUTPATIENT)
Dept: HEMATOLOGY/ONCOLOGY | Facility: CLINIC | Age: 43
End: 2018-02-21
Payer: MEDICARE

## 2018-02-21 ENCOUNTER — INFUSION (OUTPATIENT)
Dept: INFUSION THERAPY | Facility: HOSPITAL | Age: 43
End: 2018-02-21
Attending: INTERNAL MEDICINE
Payer: MEDICARE

## 2018-02-21 VITALS
TEMPERATURE: 98 F | HEIGHT: 66 IN | WEIGHT: 181.44 LBS | BODY MASS INDEX: 29.16 KG/M2 | DIASTOLIC BLOOD PRESSURE: 87 MMHG | OXYGEN SATURATION: 96 % | HEART RATE: 94 BPM | SYSTOLIC BLOOD PRESSURE: 123 MMHG | RESPIRATION RATE: 16 BRPM

## 2018-02-21 VITALS
HEART RATE: 71 BPM | SYSTOLIC BLOOD PRESSURE: 105 MMHG | RESPIRATION RATE: 18 BRPM | DIASTOLIC BLOOD PRESSURE: 75 MMHG | TEMPERATURE: 98 F

## 2018-02-21 DIAGNOSIS — C34.11 MALIGNANT NEOPLASM OF UPPER LOBE OF RIGHT LUNG: ICD-10-CM

## 2018-02-21 DIAGNOSIS — C34.91 PRIMARY MALIGNANT NEOPLASM OF RIGHT LUNG METASTATIC TO OTHER SITE: Primary | ICD-10-CM

## 2018-02-21 DIAGNOSIS — C34.91 LUNG CANCER, PRIMARY, WITH METASTASIS FROM LUNG TO OTHER SITE, RIGHT: ICD-10-CM

## 2018-02-21 DIAGNOSIS — C79.51 SECONDARY MALIGNANT NEOPLASM OF BONE: ICD-10-CM

## 2018-02-21 DIAGNOSIS — D50.0 IRON DEFICIENCY ANEMIA DUE TO CHRONIC BLOOD LOSS: Primary | ICD-10-CM

## 2018-02-21 PROCEDURE — 99214 OFFICE O/P EST MOD 30 MIN: CPT | Mod: PBBFAC,25 | Performed by: INTERNAL MEDICINE

## 2018-02-21 PROCEDURE — 99215 OFFICE O/P EST HI 40 MIN: CPT | Mod: S$PBB,,, | Performed by: INTERNAL MEDICINE

## 2018-02-21 PROCEDURE — 96413 CHEMO IV INFUSION 1 HR: CPT

## 2018-02-21 PROCEDURE — 63600175 PHARM REV CODE 636 W HCPCS: Mod: JG | Performed by: INTERNAL MEDICINE

## 2018-02-21 PROCEDURE — 25000003 PHARM REV CODE 250: Performed by: INTERNAL MEDICINE

## 2018-02-21 PROCEDURE — A4216 STERILE WATER/SALINE, 10 ML: HCPCS | Performed by: INTERNAL MEDICINE

## 2018-02-21 PROCEDURE — 99999 PR PBB SHADOW E&M-EST. PATIENT-LVL IV: CPT | Mod: PBBFAC,,, | Performed by: INTERNAL MEDICINE

## 2018-02-21 RX ORDER — SODIUM CHLORIDE 0.9 % (FLUSH) 0.9 %
10 SYRINGE (ML) INJECTION
Status: CANCELLED | OUTPATIENT
Start: 2018-02-21

## 2018-02-21 RX ORDER — HEPARIN 100 UNIT/ML
500 SYRINGE INTRAVENOUS
Status: DISCONTINUED | OUTPATIENT
Start: 2018-02-21 | End: 2018-02-21 | Stop reason: HOSPADM

## 2018-02-21 RX ORDER — SODIUM CHLORIDE 0.9 % (FLUSH) 0.9 %
10 SYRINGE (ML) INJECTION
Status: DISCONTINUED | OUTPATIENT
Start: 2018-02-21 | End: 2018-02-21 | Stop reason: HOSPADM

## 2018-02-21 RX ORDER — HEPARIN 100 UNIT/ML
500 SYRINGE INTRAVENOUS
Status: CANCELLED | OUTPATIENT
Start: 2018-02-21

## 2018-02-21 RX ADMIN — SODIUM CHLORIDE, PRESERVATIVE FREE 10 ML: 5 INJECTION INTRAVENOUS at 04:02

## 2018-02-21 RX ADMIN — SODIUM CHLORIDE 240 MG: 9 INJECTION, SOLUTION INTRAVENOUS at 03:02

## 2018-02-21 RX ADMIN — HEPARIN 500 UNITS: 100 SYRINGE at 04:02

## 2018-02-21 RX ADMIN — SODIUM CHLORIDE: 9 INJECTION, SOLUTION INTRAVENOUS at 03:02

## 2018-02-21 NOTE — PLAN OF CARE
Problem: Patient Care Overview (Adult)  Goal: Adult Individualization and Mutuality  1. Chest Port  2. Likes warm blankets  3. Listens to music during treatment         Outcome: Ongoing (interventions implemented as appropriate)  1500 --  Patient's labs, history, allergies, and medication reviewed.  Patient to receive Optivo over 30 minutes.  Discussed plan of care with patient.  Patient in agreement.  PAC accessed.  Good blood return noted.  Will monitor.

## 2018-02-21 NOTE — PLAN OF CARE
Problem: Patient Care Overview (Adult)  Goal: Plan of Care Review  Outcome: Ongoing (interventions implemented as appropriate)  3915 -- Patient tolerated treatment well.  According to MD note patient to receive Xgeva in two weeks.  Patient aware.  Discharged without S/S of adverse effects.  AVS given.  Patient instructed to call provider with any questions or concerns.

## 2018-02-21 NOTE — PROGRESS NOTES
"Subjective:       Patient ID: Yao Alan is a 43 y.o. male.    Chief Complaint: Malignant neoplasm of upper lobe of right lung  Oncologic History:  Mr. Yao Alan is a 42-year-old male who has a long history of smoking and was seen in the Pulmonary Clinic by Dr. Valle on 03/05/2015 with abnormal CT scan.    Apparently, he went to the [facility] in February with coughing as well as chest pain. A chest x-ray was done, which revealed a left upper lobe lung mass. Followup CT scan was done on 02/12/2015 and that revealed posterior superior mediastinal mass adjacent and prominent enlarged upper left mediastinal lymph nodes, abutting the aortic arch as well as left subclavian artery. A  CT scan of the abdomen was done on 03/03/2015 without contrast and that revealed nonobstructive nephrolithiasis, but a 2.1 cm lytic lesion involving the left iliac wing concerning for metastatic disease given the presence of emphysema and a mediastinal soft tissue mass on the CT chest from 02/12/2015. He saw Dr. Valle after that on 03/05/2015 and underwent an IR guided biopsy of the bone lesion on 03/17/2015. 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    His EGFR/EML/ALK is negative.    His PET scan on 3/25/15 revealed Left upper lobe lung lesion with an adjacent para-aortic lymph node, right anterior rib metastasis, right iliac metastasis, and pancreatic metastasis. A pancreatic cancer primary neoplasm is less likely.  MRI brain was negative for mets.  He completed 6 cycles of Carboplatin and Alimta and was on maintenance Alimta until end of March 2016.  His bone scan from 3/16 revealed stable disease. His CT scans also from end of March 2016 revealed "Interval enlargement of left upper lobe lung mass measuring 5.9 x 3.9 cm (previously 3.3 x 2.5 cm). This mass appears to invade the mediastinum and abutting the aortic arch and left subclavian artery"     He is now on Opdivo.     CT CAP from " "6/22/16 s/p 6 cycles of Opdivo reveals "In this patient with a history of metastatic lung cancer, the previously identified paramediastinal left upper lobe lung mass has significantly decreased in size with only a trace amount of residual soft tissue opacities seen measuring 3.2 x 1.4 cm (axial series 2, image 16).Stable lytic lesion in the right iliac wing, unchanged.The right anterior sixth rib lesion and pancreatic head abnormality are not definitely appreciated on current examination."  Bone scan from 6/23/16 reveals "Stable activity in the right sixth rib anteriorly and the right iliac bone.No new metastatic lesions visualized."  10/3/16- CT scans reveal "In this patient with a history of metastatic lung cancer, the previously identified paramediastinal left upper lobe lung mass has decreased in size with only a trace amount of residual soft tissue opacity as above.  Stable lytic lesion in the right iliac wing, unchanged"  Bone scan reveals "Right superior anterior iliac bone lesion is stable and the right sixth rib has normalized. Recent dental procedure versus periodontal disease and possible right mastoiditis".     1/9/17 CT chest/abdomen/pelvis reveals "1. In this patient with history of metastatic lung cancer, the previously identified left upper paramediastinal lesion demonstrates near complete resolution with 1.0 x 0.7 cm residual disease (2.7 x 0.8 cm previously). Additionally, there is improving sclerotic lesion in the right iliac wing consistent with treated metastatic disease.  No evidence of new lesions in the chest, abdomen or pelvis. 2. Apical predominant paraseptal emphysematous changes and bullae. 3. Additional findings as above."     3/3/17 MRI brain revealed "Stable exam. No new enhancing mass lesion to suggest intracranial metastatic disease."     CT scans from 3/20/17 which reveal "No measurable lesion identified within the left upper paramediastinal region, the location of this patient's " "lung cancer, suggesting favorable response to treatment . No new lung lesions identified. Sclerotic lesion within the right iliac wing appears unchanged. Stable appearing centrilobular and paraseptal emphysema".        CT scans from 5/29/17 reveals "1. In this patient with history of lung cancer, there is no evidence of new focal masses or new metastases. Stable right iliac wing sclerotic lesion is unchanged.  2. Stable centrilobular and paraseptal emphysema."     Ct scans 9/13/17 reveal "In this patient with history of lung cancer status post immunotherapy there is no evidence of local recurrence or new metastatic disease. Stable sclerotic bone lesion in the RIGHT iliac wing consistent with treated lesion. Stable emphysematous lung changes."  Bone scan 9/13/17 reveals "1.  Unchanged mild uptake at the right anterior iliac spine. 2.  No new metastatic disease."      His CT scans from 12/22/17 reveal "No evidence of local recurrence or metastatic disease. Emphysematous and bullous changes in lungs. Although the patient has a history of malignancy, no measurable lesions per the RECIST criteria are identified"        HPI Mr. Alan returns for follow up and Opdivo. He feels well and denies any issues.    Review of Systems   Constitutional: Negative for appetite change, fatigue and unexpected weight change.   HENT: Negative for mouth sores.    Eyes: Negative for visual disturbance.   Respiratory: Negative for cough and shortness of breath.    Cardiovascular: Negative for chest pain.   Gastrointestinal: Negative for abdominal pain and diarrhea.   Genitourinary: Negative for frequency.   Musculoskeletal: Negative for back pain.   Skin: Negative for rash.   Neurological: Negative for headaches.   Hematological: Negative for adenopathy.   Psychiatric/Behavioral: The patient is not nervous/anxious.    All other systems reviewed and are negative.      Objective:      Physical Exam   Constitutional: He is oriented to person, " place, and time. He appears well-developed and well-nourished.   HENT:   Mouth/Throat: No oropharyngeal exudate.   Cardiovascular: Normal rate and normal heart sounds.    Pulmonary/Chest: Effort normal and breath sounds normal. He has no wheezes.   Abdominal: Soft. Bowel sounds are normal. There is no tenderness.   Musculoskeletal: He exhibits no edema or tenderness.   Lymphadenopathy:     He has no cervical adenopathy.   Neurological: He is alert and oriented to person, place, and time. Coordination normal.   Skin: Skin is warm and dry. No rash noted.   Psychiatric: He has a normal mood and affect. Judgment and thought content normal.   Vitals reviewed.      LABS:  WBC   Date Value Ref Range Status   02/20/2018 8.10 3.90 - 12.70 K/uL Final     Hemoglobin   Date Value Ref Range Status   02/20/2018 13.9 (L) 14.0 - 18.0 g/dL Final     Hematocrit   Date Value Ref Range Status   02/20/2018 43.7 40.0 - 54.0 % Final     Platelets   Date Value Ref Range Status   02/20/2018 260 150 - 350 K/uL Final     Gran # (ANC)   Date Value Ref Range Status   02/20/2018 4.4 1.8 - 7.7 K/uL Final     Comment:     The ANC is based on a white cell differential from an   automated cell counter. It has not been microscopically   reviewed for the presence of abnormal cells. Clinical   correlation is required.         Chemistry        Component Value Date/Time     02/20/2018 0754    K 3.7 02/20/2018 0754     02/20/2018 0754    CO2 23 02/20/2018 0754    BUN 12 02/20/2018 0754    CREATININE 1.3 02/20/2018 0754     (H) 02/20/2018 0754        Component Value Date/Time    CALCIUM 8.9 02/20/2018 0754    ALKPHOS 77 02/20/2018 0754    AST 18 02/20/2018 0754    ALT 17 02/20/2018 0754    BILITOT 0.4 02/20/2018 0754    ESTGFRAFRICA >60.0 02/20/2018 0754    EGFRNONAA >60.0 02/20/2018 0754        TSH   Date Value Ref Range Status   02/20/2018 0.307 (L) 0.400 - 4.000 uIU/mL Final     Free T4   Date Value Ref Range Status   02/20/2018 1.09  0.71 - 1.51 ng/dL Final     Assessment:       1. Primary malignant neoplasm of right lung metastatic to other site    2. Secondary malignant neoplasm of bone        Plan:        1. He will proceed with Opdivo and will return in 2 weeks for Opdivo and Xgeva.    Above care plan was discussed with patient and all questions were addressed to his satisfaction

## 2018-03-07 ENCOUNTER — INFUSION (OUTPATIENT)
Dept: INFUSION THERAPY | Facility: HOSPITAL | Age: 43
End: 2018-03-07
Attending: INTERNAL MEDICINE
Payer: MEDICARE

## 2018-03-07 ENCOUNTER — OFFICE VISIT (OUTPATIENT)
Dept: HEMATOLOGY/ONCOLOGY | Facility: CLINIC | Age: 43
End: 2018-03-07
Payer: MEDICARE

## 2018-03-07 VITALS
SYSTOLIC BLOOD PRESSURE: 153 MMHG | TEMPERATURE: 98 F | WEIGHT: 184.31 LBS | DIASTOLIC BLOOD PRESSURE: 89 MMHG | HEIGHT: 66 IN | SYSTOLIC BLOOD PRESSURE: 137 MMHG | RESPIRATION RATE: 18 BRPM | OXYGEN SATURATION: 100 % | BODY MASS INDEX: 29.62 KG/M2 | HEART RATE: 64 BPM | HEART RATE: 72 BPM | WEIGHT: 184.31 LBS | TEMPERATURE: 98 F | BODY MASS INDEX: 29.62 KG/M2 | RESPIRATION RATE: 18 BRPM | DIASTOLIC BLOOD PRESSURE: 100 MMHG | HEIGHT: 66 IN

## 2018-03-07 DIAGNOSIS — D50.0 IRON DEFICIENCY ANEMIA DUE TO CHRONIC BLOOD LOSS: Primary | ICD-10-CM

## 2018-03-07 DIAGNOSIS — C79.51 SECONDARY MALIGNANT NEOPLASM OF BONE: ICD-10-CM

## 2018-03-07 DIAGNOSIS — C34.11 MALIGNANT NEOPLASM OF UPPER LOBE OF RIGHT LUNG: ICD-10-CM

## 2018-03-07 DIAGNOSIS — C34.91 PRIMARY MALIGNANT NEOPLASM OF RIGHT LUNG METASTATIC TO OTHER SITE: Primary | ICD-10-CM

## 2018-03-07 DIAGNOSIS — E03.2 HYPOTHYROIDISM DUE TO MEDICATION: ICD-10-CM

## 2018-03-07 DIAGNOSIS — C34.91 LUNG CANCER, PRIMARY, WITH METASTASIS FROM LUNG TO OTHER SITE, RIGHT: ICD-10-CM

## 2018-03-07 PROCEDURE — 63600175 PHARM REV CODE 636 W HCPCS: Mod: JG | Performed by: INTERNAL MEDICINE

## 2018-03-07 PROCEDURE — 99214 OFFICE O/P EST MOD 30 MIN: CPT | Mod: PBBFAC | Performed by: INTERNAL MEDICINE

## 2018-03-07 PROCEDURE — 25000003 PHARM REV CODE 250: Performed by: INTERNAL MEDICINE

## 2018-03-07 PROCEDURE — 99999 PR PBB SHADOW E&M-EST. PATIENT-LVL IV: CPT | Mod: PBBFAC,,, | Performed by: INTERNAL MEDICINE

## 2018-03-07 PROCEDURE — 99215 OFFICE O/P EST HI 40 MIN: CPT | Mod: S$PBB,,, | Performed by: INTERNAL MEDICINE

## 2018-03-07 PROCEDURE — 96372 THER/PROPH/DIAG INJ SC/IM: CPT

## 2018-03-07 PROCEDURE — 96413 CHEMO IV INFUSION 1 HR: CPT

## 2018-03-07 RX ORDER — SODIUM CHLORIDE 0.9 % (FLUSH) 0.9 %
10 SYRINGE (ML) INJECTION
Status: DISCONTINUED | OUTPATIENT
Start: 2018-03-07 | End: 2018-03-07 | Stop reason: HOSPADM

## 2018-03-07 RX ORDER — HEPARIN 100 UNIT/ML
500 SYRINGE INTRAVENOUS
Status: CANCELLED | OUTPATIENT
Start: 2018-03-07

## 2018-03-07 RX ORDER — HEPARIN 100 UNIT/ML
500 SYRINGE INTRAVENOUS
Status: DISCONTINUED | OUTPATIENT
Start: 2018-03-07 | End: 2018-03-07 | Stop reason: HOSPADM

## 2018-03-07 RX ORDER — SODIUM CHLORIDE 0.9 % (FLUSH) 0.9 %
10 SYRINGE (ML) INJECTION
Status: CANCELLED | OUTPATIENT
Start: 2018-03-07

## 2018-03-07 RX ADMIN — SODIUM CHLORIDE 240 MG: 9 INJECTION, SOLUTION INTRAVENOUS at 08:03

## 2018-03-07 RX ADMIN — DENOSUMAB 120 MG: 120 INJECTION SUBCUTANEOUS at 08:03

## 2018-03-07 RX ADMIN — HEPARIN 500 UNITS: 100 SYRINGE at 09:03

## 2018-03-07 RX ADMIN — SODIUM CHLORIDE: 9 INJECTION, SOLUTION INTRAVENOUS at 08:03

## 2018-03-07 NOTE — PLAN OF CARE
Problem: Patient Care Overview (Adult)  Goal: Plan of Care Review  Outcome: Ongoing (interventions implemented as appropriate)  Patient tolerated opdivo well today. Also received xgeva injection to right upper arm. Also tolerated well. Verbalized understanding to call MD for any questions/concerns. Port + blood return present, flushed, hep locked and deaccessed. AVS given. Discharged home, ambulated independently.

## 2018-03-07 NOTE — PROGRESS NOTES
"Subjective:       Patient ID: Yao Alan is a 43 y.o. male.    Chief Complaint: Primary malignant neoplasm of right lung metastatic to other  Oncologic History:  Mr. Yao Alan is a 42-year-old male who has a long history of smoking and was seen in the Pulmonary Clinic by Dr. Valle on 03/05/2015 with abnormal CT scan.    Apparently, he went to the [facility] in February with coughing as well as chest pain. A chest x-ray was done, which revealed a left upper lobe lung mass. Followup CT scan was done on 02/12/2015 and that revealed posterior superior mediastinal mass adjacent and prominent enlarged upper left mediastinal lymph nodes, abutting the aortic arch as well as left subclavian artery. A  CT scan of the abdomen was done on 03/03/2015 without contrast and that revealed nonobstructive nephrolithiasis, but a 2.1 cm lytic lesion involving the left iliac wing concerning for metastatic disease given the presence of emphysema and a mediastinal soft tissue mass on the CT chest from 02/12/2015. He saw Dr. Valle after that on 03/05/2015 and underwent an IR guided biopsy of the bone lesion on 03/17/2015. 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    His EGFR/EML/ALK is negative.    His PET scan on 3/25/15 revealed Left upper lobe lung lesion with an adjacent para-aortic lymph node, right anterior rib metastasis, right iliac metastasis, and pancreatic metastasis. A pancreatic cancer primary neoplasm is less likely.  MRI brain was negative for mets.  He completed 6 cycles of Carboplatin and Alimta and was on maintenance Alimta until end of March 2016.  His bone scan from 3/16 revealed stable disease. His CT scans also from end of March 2016 revealed "Interval enlargement of left upper lobe lung mass measuring 5.9 x 3.9 cm (previously 3.3 x 2.5 cm). This mass appears to invade the mediastinum and abutting the aortic arch and left subclavian artery"     He is now on Opdivo.     CT " "CAP from 6/22/16 s/p 6 cycles of Opdivo reveals "In this patient with a history of metastatic lung cancer, the previously identified paramediastinal left upper lobe lung mass has significantly decreased in size with only a trace amount of residual soft tissue opacities seen measuring 3.2 x 1.4 cm (axial series 2, image 16).Stable lytic lesion in the right iliac wing, unchanged.The right anterior sixth rib lesion and pancreatic head abnormality are not definitely appreciated on current examination."  Bone scan from 6/23/16 reveals "Stable activity in the right sixth rib anteriorly and the right iliac bone.No new metastatic lesions visualized."  10/3/16- CT scans reveal "In this patient with a history of metastatic lung cancer, the previously identified paramediastinal left upper lobe lung mass has decreased in size with only a trace amount of residual soft tissue opacity as above.  Stable lytic lesion in the right iliac wing, unchanged"  Bone scan reveals "Right superior anterior iliac bone lesion is stable and the right sixth rib has normalized. Recent dental procedure versus periodontal disease and possible right mastoiditis".     1/9/17 CT chest/abdomen/pelvis reveals "1. In this patient with history of metastatic lung cancer, the previously identified left upper paramediastinal lesion demonstrates near complete resolution with 1.0 x 0.7 cm residual disease (2.7 x 0.8 cm previously). Additionally, there is improving sclerotic lesion in the right iliac wing consistent with treated metastatic disease.  No evidence of new lesions in the chest, abdomen or pelvis. 2. Apical predominant paraseptal emphysematous changes and bullae. 3. Additional findings as above."     3/3/17 MRI brain revealed "Stable exam. No new enhancing mass lesion to suggest intracranial metastatic disease."     CT scans from 3/20/17 which reveal "No measurable lesion identified within the left upper paramediastinal region, the location of this " "patient's lung cancer, suggesting favorable response to treatment . No new lung lesions identified. Sclerotic lesion within the right iliac wing appears unchanged. Stable appearing centrilobular and paraseptal emphysema".        CT scans from 5/29/17 reveals "1. In this patient with history of lung cancer, there is no evidence of new focal masses or new metastases. Stable right iliac wing sclerotic lesion is unchanged.  2. Stable centrilobular and paraseptal emphysema."     Ct scans 9/13/17 reveal "In this patient with history of lung cancer status post immunotherapy there is no evidence of local recurrence or new metastatic disease. Stable sclerotic bone lesion in the RIGHT iliac wing consistent with treated lesion. Stable emphysematous lung changes."  Bone scan 9/13/17 reveals "1.  Unchanged mild uptake at the right anterior iliac spine. 2.  No new metastatic disease."      His CT scans from 12/22/17 reveal "No evidence of local recurrence or metastatic disease. Emphysematous and bullous changes in lungs. Although the patient has a history of malignancy, no measurable lesions per the RECIST criteria are identified"        HPI Mr. Alan returns for follow up and Opdivo. He notes abdomen cramps started this morning since eating crawfish yesterday. No nausea, vomiting and diarrhea..  He denies any nausea, vomiting, diarrhea, constipation, abdominal pain, weight loss or loss of appetite, chest pain, shortness of breath, leg swelling, fatigue, pain, headache, dizziness, or mood changes. His ECOG PS is zero. He is by himself           Review of Systems   Constitutional: Negative for appetite change, fatigue and unexpected weight change.   HENT: Negative for mouth sores.    Eyes: Negative for visual disturbance.   Respiratory: Negative for cough and shortness of breath.    Cardiovascular: Negative for chest pain.   Gastrointestinal: Positive for abdominal pain. Negative for diarrhea.   Genitourinary: Negative for " frequency.   Musculoskeletal: Negative for back pain.   Skin: Negative for rash.   Neurological: Negative for headaches.   Hematological: Negative for adenopathy.   Psychiatric/Behavioral: The patient is not nervous/anxious.    All other systems reviewed and are negative.      Objective:      Physical Exam   Constitutional: He is oriented to person, place, and time. He appears well-developed and well-nourished.   HENT:   Mouth/Throat: No oropharyngeal exudate.   Cardiovascular: Normal rate and normal heart sounds.    Pulmonary/Chest: Effort normal and breath sounds normal. He has no wheezes.   Abdominal: Soft. Bowel sounds are normal. There is no tenderness.   Musculoskeletal: He exhibits no edema or tenderness.   Lymphadenopathy:     He has no cervical adenopathy.   Neurological: He is alert and oriented to person, place, and time. Coordination normal.   Skin: Skin is warm and dry. No rash noted.   Psychiatric: He has a normal mood and affect. Judgment and thought content normal.   Vitals reviewed.      LABS:  WBC   Date Value Ref Range Status   03/06/2018 8.29 3.90 - 12.70 K/uL Final     Hemoglobin   Date Value Ref Range Status   03/06/2018 14.0 14.0 - 18.0 g/dL Final     Hematocrit   Date Value Ref Range Status   03/06/2018 43.4 40.0 - 54.0 % Final     Platelets   Date Value Ref Range Status   03/06/2018 246 150 - 350 K/uL Final     Gran # (ANC)   Date Value Ref Range Status   03/06/2018 4.1 1.8 - 7.7 K/uL Final     Comment:     The ANC is based on a white cell differential from an   automated cell counter. It has not been microscopically   reviewed for the presence of abnormal cells. Clinical   correlation is required.         Chemistry        Component Value Date/Time     03/06/2018 0759    K 4.0 03/06/2018 0759     03/06/2018 0759    CO2 24 03/06/2018 0759    BUN 17 03/06/2018 0759    CREATININE 1.2 03/06/2018 0759    GLU 90 03/06/2018 0759        Component Value Date/Time    CALCIUM 9.4 03/06/2018  0759    ALKPHOS 65 03/06/2018 0759    AST 32 03/06/2018 0759    ALT 23 03/06/2018 0759    BILITOT 0.4 03/06/2018 0759    ESTGFRAFRICA >60.0 03/06/2018 0759    EGFRNONAA >60.0 03/06/2018 0759        TSH   Date Value Ref Range Status   02/20/2018 0.307 (L) 0.400 - 4.000 uIU/mL Final     Free T4   Date Value Ref Range Status   02/20/2018 1.09 0.71 - 1.51 ng/dL Final     Assessment:       1. Primary malignant neoplasm of right lung metastatic to other site    2. Secondary malignant neoplasm of bone    3. Hypothyroidism due to medication        Plan:        1,2. He will proceed with Opdivo and Xgeva and will return in 2 weeks for next cycle  3. Compliant with Synthroid and will recheck TSH and free T4 in 2 weeks    Above care plan was discussed with patient and all questions were addressed to satisfaction

## 2018-03-13 DIAGNOSIS — G89.3 NEOPLASM RELATED PAIN: ICD-10-CM

## 2018-03-13 DIAGNOSIS — I82.402 DEEP VEIN THROMBOSIS (DVT) OF LEFT LOWER EXTREMITY, UNSPECIFIED CHRONICITY, UNSPECIFIED VEIN: ICD-10-CM

## 2018-03-13 DIAGNOSIS — E03.9 HYPOTHYROIDISM, UNSPECIFIED TYPE: ICD-10-CM

## 2018-03-13 RX ORDER — LEVOTHYROXINE SODIUM 100 UG/1
100 TABLET ORAL DAILY
Qty: 30 TABLET | Refills: 1 | Status: SHIPPED | OUTPATIENT
Start: 2018-03-13 | End: 2018-04-10 | Stop reason: SDUPTHER

## 2018-03-13 RX ORDER — OXYCODONE AND ACETAMINOPHEN 10; 325 MG/1; MG/1
1 TABLET ORAL EVERY 6 HOURS PRN
Qty: 120 TABLET | Refills: 0 | Status: SHIPPED | OUTPATIENT
Start: 2018-03-13 | End: 2018-04-10 | Stop reason: SDUPTHER

## 2018-03-13 RX ORDER — OXYCODONE AND ACETAMINOPHEN 10; 325 MG/1; MG/1
TABLET ORAL
Qty: 120 TABLET | Refills: 0 | OUTPATIENT
Start: 2018-03-13

## 2018-03-13 NOTE — TELEPHONE ENCOUNTER
----- Message from Kami Vidal sent at 3/13/2018  8:19 AM CDT -----  Contact: Pt  Pt Called to get a Refill on Medication rivaroxaban (XARELTO) 20 mg Tab & oxyCODONE-acetaminophen (PERCOCET)  mg per tablet & levothyroxine (SYNTHROID) 100 MCG tablet  [facility] - [place], [place] - [address]  [phone] (Phone)  [phone] (Fax)  CallBack#[phone]  Thank You  CHAR Vidal

## 2018-03-22 ENCOUNTER — TELEPHONE (OUTPATIENT)
Dept: HEMATOLOGY/ONCOLOGY | Facility: CLINIC | Age: 43
End: 2018-03-22

## 2018-03-22 NOTE — TELEPHONE ENCOUNTER
Informed this morning by the [facility] Receptionist, Zaida, that patient had knocked at my door this morning and told her that he's been trying to reach me. I arrived after 10 AM from after coming from my own medical appt. Zaida gave me his phone number, [phone]. I have no missed calls/voice mails from patient. Called patient's number and left a voice mail message for him. Will continue to follow.

## 2018-03-23 ENCOUNTER — INFUSION (OUTPATIENT)
Dept: INFUSION THERAPY | Facility: HOSPITAL | Age: 43
End: 2018-03-23
Attending: INTERNAL MEDICINE
Payer: MEDICARE

## 2018-03-23 ENCOUNTER — OFFICE VISIT (OUTPATIENT)
Dept: HEMATOLOGY/ONCOLOGY | Facility: CLINIC | Age: 43
End: 2018-03-23
Payer: MEDICARE

## 2018-03-23 VITALS
WEIGHT: 184.31 LBS | SYSTOLIC BLOOD PRESSURE: 129 MMHG | HEART RATE: 68 BPM | DIASTOLIC BLOOD PRESSURE: 89 MMHG | RESPIRATION RATE: 16 BRPM | HEIGHT: 66 IN | BODY MASS INDEX: 29.62 KG/M2 | TEMPERATURE: 98 F

## 2018-03-23 VITALS
HEIGHT: 66 IN | TEMPERATURE: 98 F | DIASTOLIC BLOOD PRESSURE: 86 MMHG | BODY MASS INDEX: 29.62 KG/M2 | WEIGHT: 184.31 LBS | RESPIRATION RATE: 17 BRPM | SYSTOLIC BLOOD PRESSURE: 119 MMHG | OXYGEN SATURATION: 99 % | HEART RATE: 91 BPM

## 2018-03-23 DIAGNOSIS — C79.51 SECONDARY MALIGNANT NEOPLASM OF BONE: ICD-10-CM

## 2018-03-23 DIAGNOSIS — C34.11 MALIGNANT NEOPLASM OF UPPER LOBE OF RIGHT LUNG: Primary | ICD-10-CM

## 2018-03-23 DIAGNOSIS — C34.91 LUNG CANCER, PRIMARY, WITH METASTASIS FROM LUNG TO OTHER SITE, RIGHT: ICD-10-CM

## 2018-03-23 DIAGNOSIS — D50.0 IRON DEFICIENCY ANEMIA DUE TO CHRONIC BLOOD LOSS: Primary | ICD-10-CM

## 2018-03-23 DIAGNOSIS — C34.11 MALIGNANT NEOPLASM OF UPPER LOBE OF RIGHT LUNG: ICD-10-CM

## 2018-03-23 PROCEDURE — 99215 OFFICE O/P EST HI 40 MIN: CPT | Mod: S$PBB,,, | Performed by: INTERNAL MEDICINE

## 2018-03-23 PROCEDURE — 96413 CHEMO IV INFUSION 1 HR: CPT

## 2018-03-23 PROCEDURE — 63600175 PHARM REV CODE 636 W HCPCS: Mod: JG | Performed by: INTERNAL MEDICINE

## 2018-03-23 PROCEDURE — 99215 OFFICE O/P EST HI 40 MIN: CPT | Mod: PBBFAC,25 | Performed by: INTERNAL MEDICINE

## 2018-03-23 PROCEDURE — 99999 PR PBB SHADOW E&M-EST. PATIENT-LVL V: CPT | Mod: PBBFAC,,, | Performed by: INTERNAL MEDICINE

## 2018-03-23 PROCEDURE — 25000003 PHARM REV CODE 250: Performed by: INTERNAL MEDICINE

## 2018-03-23 RX ORDER — HEPARIN 100 UNIT/ML
500 SYRINGE INTRAVENOUS
Status: DISCONTINUED | OUTPATIENT
Start: 2018-03-23 | End: 2018-03-23 | Stop reason: HOSPADM

## 2018-03-23 RX ORDER — HEPARIN 100 UNIT/ML
500 SYRINGE INTRAVENOUS
Status: CANCELLED | OUTPATIENT
Start: 2018-03-23

## 2018-03-23 RX ORDER — SODIUM CHLORIDE 0.9 % (FLUSH) 0.9 %
10 SYRINGE (ML) INJECTION
Status: CANCELLED | OUTPATIENT
Start: 2018-03-23

## 2018-03-23 RX ORDER — SODIUM CHLORIDE 0.9 % (FLUSH) 0.9 %
10 SYRINGE (ML) INJECTION
Status: DISCONTINUED | OUTPATIENT
Start: 2018-03-23 | End: 2018-03-23 | Stop reason: HOSPADM

## 2018-03-23 RX ADMIN — SODIUM CHLORIDE: 9 INJECTION, SOLUTION INTRAVENOUS at 08:03

## 2018-03-23 RX ADMIN — SODIUM CHLORIDE 240 MG: 9 INJECTION, SOLUTION INTRAVENOUS at 08:03

## 2018-03-23 NOTE — PLAN OF CARE
Problem: Chemotherapy Effects (Adult)  Goal: Signs and Symptoms of Listed Potential Problems Will be Absent or Manageable (Chemotherapy Effects)  Signs and symptoms of listed potential problems will be absent or manageable by discharge/transition of care (reference Chemotherapy Effects (Adult) CPG).   Outcome: Ongoing (interventions implemented as appropriate)  Patient here for Opdivo.  Assessment complete and labs reviewed. VSS.  Chair reclined and blanket offered.  No needs expressed at this time.  Will continue to monitor.

## 2018-03-23 NOTE — PROGRESS NOTES
"Subjective:       Patient ID: Yao Alan is a 43 y.o. male.    Chief Complaint: Primary malignant neoplasm of right lung metastatic to other  Oncologic History:  Mr. Yao Alan is a 42-year-old male who has a long history of smoking and was seen in the Pulmonary Clinic by Dr. Valle on 03/05/2015 with abnormal CT scan.    Apparently, he went to the [facility] in February with coughing as well as chest pain. A chest x-ray was done, which revealed a left upper lobe lung mass. Followup CT scan was done on 02/12/2015 and that revealed posterior superior mediastinal mass adjacent and prominent enlarged upper left mediastinal lymph nodes, abutting the aortic arch as well as left subclavian artery. A  CT scan of the abdomen was done on 03/03/2015 without contrast and that revealed nonobstructive nephrolithiasis, but a 2.1 cm lytic lesion involving the left iliac wing concerning for metastatic disease given the presence of emphysema and a mediastinal soft tissue mass on the CT chest from 02/12/2015. He saw Dr. Valle after that on 03/05/2015 and underwent an IR guided biopsy of the bone lesion on 03/17/2015. 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    His EGFR/EML/ALK is negative.    His PET scan on 3/25/15 revealed Left upper lobe lung lesion with an adjacent para-aortic lymph node, right anterior rib metastasis, right iliac metastasis, and pancreatic metastasis. A pancreatic cancer primary neoplasm is less likely.  MRI brain was negative for mets.  He completed 6 cycles of Carboplatin and Alimta and was on maintenance Alimta until end of March 2016.  His bone scan from 3/16 revealed stable disease. His CT scans also from end of March 2016 revealed "Interval enlargement of left upper lobe lung mass measuring 5.9 x 3.9 cm (previously 3.3 x 2.5 cm). This mass appears to invade the mediastinum and abutting the aortic arch and left subclavian artery"     He is now on Opdivo.     CT " "CAP from 6/22/16 s/p 6 cycles of Opdivo reveals "In this patient with a history of metastatic lung cancer, the previously identified paramediastinal left upper lobe lung mass has significantly decreased in size with only a trace amount of residual soft tissue opacities seen measuring 3.2 x 1.4 cm (axial series 2, image 16).Stable lytic lesion in the right iliac wing, unchanged.The right anterior sixth rib lesion and pancreatic head abnormality are not definitely appreciated on current examination."  Bone scan from 6/23/16 reveals "Stable activity in the right sixth rib anteriorly and the right iliac bone.No new metastatic lesions visualized."  10/3/16- CT scans reveal "In this patient with a history of metastatic lung cancer, the previously identified paramediastinal left upper lobe lung mass has decreased in size with only a trace amount of residual soft tissue opacity as above.  Stable lytic lesion in the right iliac wing, unchanged"  Bone scan reveals "Right superior anterior iliac bone lesion is stable and the right sixth rib has normalized. Recent dental procedure versus periodontal disease and possible right mastoiditis".     1/9/17 CT chest/abdomen/pelvis reveals "1. In this patient with history of metastatic lung cancer, the previously identified left upper paramediastinal lesion demonstrates near complete resolution with 1.0 x 0.7 cm residual disease (2.7 x 0.8 cm previously). Additionally, there is improving sclerotic lesion in the right iliac wing consistent with treated metastatic disease.  No evidence of new lesions in the chest, abdomen or pelvis. 2. Apical predominant paraseptal emphysematous changes and bullae. 3. Additional findings as above."     3/3/17 MRI brain revealed "Stable exam. No new enhancing mass lesion to suggest intracranial metastatic disease."     CT scans from 3/20/17 which reveal "No measurable lesion identified within the left upper paramediastinal region, the location of this " "patient's lung cancer, suggesting favorable response to treatment . No new lung lesions identified. Sclerotic lesion within the right iliac wing appears unchanged. Stable appearing centrilobular and paraseptal emphysema".        CT scans from 5/29/17 reveals "1. In this patient with history of lung cancer, there is no evidence of new focal masses or new metastases. Stable right iliac wing sclerotic lesion is unchanged.  2. Stable centrilobular and paraseptal emphysema."     Ct scans 9/13/17 reveal "In this patient with history of lung cancer status post immunotherapy there is no evidence of local recurrence or new metastatic disease. Stable sclerotic bone lesion in the RIGHT iliac wing consistent with treated lesion. Stable emphysematous lung changes."  Bone scan 9/13/17 reveals "1.  Unchanged mild uptake at the right anterior iliac spine. 2.  No new metastatic disease."      His CT scans from 12/22/17 reveal "No evidence of local recurrence or metastatic disease. Emphysematous and bullous changes in lungs. Although the patient has a history of malignancy, no measurable lesions per the RECIST criteria are identified"        HPI Mr. Alan returns for follow up and Opdivo.  He feels well and denies any new issues      Review of Systems   Constitutional: Negative for appetite change, fatigue and unexpected weight change.   HENT: Negative for mouth sores.    Eyes: Negative for visual disturbance.   Respiratory: Negative for cough and shortness of breath.    Cardiovascular: Negative for chest pain.   Gastrointestinal: Negative for abdominal pain and diarrhea.   Genitourinary: Negative for frequency.   Musculoskeletal: Negative for back pain.   Skin: Negative for rash.   Neurological: Negative for headaches.   Hematological: Negative for adenopathy.   Psychiatric/Behavioral: The patient is not nervous/anxious.    All other systems reviewed and are negative.      Objective:      Physical Exam   Constitutional: He is oriented " to person, place, and time. He appears well-developed and well-nourished.   HENT:   Mouth/Throat: No oropharyngeal exudate.   Cardiovascular: Normal rate and normal heart sounds.    Pulmonary/Chest: Effort normal and breath sounds normal. He has no wheezes.   Abdominal: Soft. Bowel sounds are normal. There is no tenderness.   Musculoskeletal: He exhibits no edema or tenderness.   Lymphadenopathy:     He has no cervical adenopathy.   Neurological: He is alert and oriented to person, place, and time. Coordination normal.   Skin: Skin is warm and dry. No rash noted.   Psychiatric: He has a normal mood and affect. Judgment and thought content normal.   Vitals reviewed.      LABS:  WBC   Date Value Ref Range Status   03/22/2018 9.59 3.90 - 12.70 K/uL Final     Hemoglobin   Date Value Ref Range Status   03/22/2018 15.2 14.0 - 18.0 g/dL Final     Hematocrit   Date Value Ref Range Status   03/22/2018 47.2 40.0 - 54.0 % Final     Platelets   Date Value Ref Range Status   03/22/2018 278 150 - 350 K/uL Final     Gran # (ANC)   Date Value Ref Range Status   03/22/2018 4.5 1.8 - 7.7 K/uL Final     Comment:     The ANC is based on a white cell differential from an   automated cell counter. It has not been microscopically   reviewed for the presence of abnormal cells. Clinical   correlation is required.         Chemistry        Component Value Date/Time     03/22/2018 0754    K 4.3 03/22/2018 0754     03/22/2018 0754    CO2 27 03/22/2018 0754    BUN 10 03/22/2018 0754    CREATININE 1.3 03/22/2018 0754    GLU 86 03/22/2018 0754        Component Value Date/Time    CALCIUM 9.8 03/22/2018 0754    ALKPHOS 76 03/22/2018 0754    AST 25 03/22/2018 0754    ALT 34 03/22/2018 0754    BILITOT 0.3 03/22/2018 0754    ESTGFRAFRICA >60.0 03/22/2018 0754    EGFRNONAA >60.0 03/22/2018 0754        TSH   Date Value Ref Range Status   03/22/2018 0.412 0.400 - 4.000 uIU/mL Final     Free T4   Date Value Ref Range Status   03/22/2018 1.04  0.71 - 1.51 ng/dL Final       Assessment:       1. Malignant neoplasm of upper lobe of right lung    2. Secondary malignant neoplasm of bone        Plan:        1,2. He will proceed with Opdivo and will return in 2 weeks for next cycle with restaging CT scans    Above care plan was discussed with patient and all questions were addressed to his satisfaction

## 2018-03-23 NOTE — PLAN OF CARE
Problem: Patient Care Overview (Adult)  Goal: Plan of Care Review  Outcome: Ongoing (interventions implemented as appropriate)  Patient tolerated treatment well.  No reaction suspected.  AVS given to patient.  Patient ambulated off unit unassisted.

## 2018-04-04 ENCOUNTER — HOSPITAL ENCOUNTER (OUTPATIENT)
Dept: RADIOLOGY | Facility: HOSPITAL | Age: 43
Discharge: HOME | End: 2018-04-04
Attending: INTERNAL MEDICINE
Payer: MEDICARE

## 2018-04-04 DIAGNOSIS — C34.11 MALIGNANT NEOPLASM OF UPPER LOBE OF RIGHT LUNG: ICD-10-CM

## 2018-04-04 PROCEDURE — 74177 CT ABD & PELVIS W/CONTRAST: CPT | Mod: 26,,, | Performed by: RADIOLOGY

## 2018-04-04 PROCEDURE — 74177 CT ABD & PELVIS W/CONTRAST: CPT | Mod: TC

## 2018-04-04 PROCEDURE — 78306 BONE IMAGING WHOLE BODY: CPT | Mod: 26,,, | Performed by: RADIOLOGY

## 2018-04-04 PROCEDURE — 71260 CT THORAX DX C+: CPT | Mod: 26,,, | Performed by: RADIOLOGY

## 2018-04-04 PROCEDURE — 25500020 PHARM REV CODE 255: Performed by: INTERNAL MEDICINE

## 2018-04-04 PROCEDURE — 71260 CT THORAX DX C+: CPT | Mod: TC

## 2018-04-04 PROCEDURE — A9503 TC99M MEDRONATE: HCPCS

## 2018-04-04 RX ADMIN — IOHEXOL 80 ML: 350 INJECTION, SOLUTION INTRAVENOUS at 12:04

## 2018-04-04 RX ADMIN — IOHEXOL 15 ML: 300 INJECTION, SOLUTION INTRAVENOUS at 12:04

## 2018-04-05 ENCOUNTER — LAB VISIT (OUTPATIENT)
Dept: LAB | Facility: HOSPITAL | Age: 43
End: 2018-04-05
Attending: INTERNAL MEDICINE
Payer: MEDICARE

## 2018-04-05 DIAGNOSIS — C34.11 MALIGNANT NEOPLASM OF UPPER LOBE OF RIGHT LUNG: ICD-10-CM

## 2018-04-05 LAB
ALBUMIN SERPL BCP-MCNC: 4.1 G/DL
ALP SERPL-CCNC: 70 U/L
ALT SERPL W/O P-5'-P-CCNC: 18 U/L
ANION GAP SERPL CALC-SCNC: 9 MMOL/L
AST SERPL-CCNC: 21 U/L
BILIRUB SERPL-MCNC: 0.4 MG/DL
BUN SERPL-MCNC: 11 MG/DL
CALCIUM SERPL-MCNC: 9.3 MG/DL
CHLORIDE SERPL-SCNC: 105 MMOL/L
CO2 SERPL-SCNC: 26 MMOL/L
CREAT SERPL-MCNC: 1.4 MG/DL
ERYTHROCYTE [DISTWIDTH] IN BLOOD BY AUTOMATED COUNT: 14.3 %
EST. GFR  (AFRICAN AMERICAN): >60 ML/MIN/1.73 M^2
EST. GFR  (NON AFRICAN AMERICAN): >60 ML/MIN/1.73 M^2
GLUCOSE SERPL-MCNC: 92 MG/DL
HCT VFR BLD AUTO: 46.8 %
HGB BLD-MCNC: 15 G/DL
IMM GRANULOCYTES # BLD AUTO: 0.02 K/UL
MCH RBC QN AUTO: 26.1 PG
MCHC RBC AUTO-ENTMCNC: 32.1 G/DL
MCV RBC AUTO: 82 FL
NEUTROPHILS # BLD AUTO: 4.1 K/UL
PLATELET # BLD AUTO: 264 K/UL
PMV BLD AUTO: 10.9 FL
POTASSIUM SERPL-SCNC: 3.8 MMOL/L
PROT SERPL-MCNC: 7.6 G/DL
RBC # BLD AUTO: 5.74 M/UL
SODIUM SERPL-SCNC: 140 MMOL/L
WBC # BLD AUTO: 8.41 K/UL

## 2018-04-05 PROCEDURE — 80053 COMPREHEN METABOLIC PANEL: CPT

## 2018-04-05 PROCEDURE — 36415 COLL VENOUS BLD VENIPUNCTURE: CPT

## 2018-04-05 PROCEDURE — 85027 COMPLETE CBC AUTOMATED: CPT

## 2018-04-06 ENCOUNTER — INFUSION (OUTPATIENT)
Dept: INFUSION THERAPY | Facility: HOSPITAL | Age: 43
End: 2018-04-06
Attending: INTERNAL MEDICINE
Payer: MEDICARE

## 2018-04-06 ENCOUNTER — OFFICE VISIT (OUTPATIENT)
Dept: HEMATOLOGY/ONCOLOGY | Facility: CLINIC | Age: 43
End: 2018-04-06
Payer: MEDICARE

## 2018-04-06 VITALS
DIASTOLIC BLOOD PRESSURE: 84 MMHG | WEIGHT: 183 LBS | HEART RATE: 71 BPM | OXYGEN SATURATION: 99 % | TEMPERATURE: 97 F | SYSTOLIC BLOOD PRESSURE: 135 MMHG | RESPIRATION RATE: 18 BRPM | BODY MASS INDEX: 29.41 KG/M2 | HEIGHT: 66 IN

## 2018-04-06 VITALS
DIASTOLIC BLOOD PRESSURE: 94 MMHG | RESPIRATION RATE: 20 BRPM | HEART RATE: 66 BPM | TEMPERATURE: 98 F | SYSTOLIC BLOOD PRESSURE: 139 MMHG

## 2018-04-06 DIAGNOSIS — C34.91 LUNG CANCER, PRIMARY, WITH METASTASIS FROM LUNG TO OTHER SITE, RIGHT: ICD-10-CM

## 2018-04-06 DIAGNOSIS — C79.51 SECONDARY MALIGNANT NEOPLASM OF BONE: ICD-10-CM

## 2018-04-06 DIAGNOSIS — C34.11 MALIGNANT NEOPLASM OF UPPER LOBE OF RIGHT LUNG: Primary | ICD-10-CM

## 2018-04-06 DIAGNOSIS — D50.0 IRON DEFICIENCY ANEMIA DUE TO CHRONIC BLOOD LOSS: Primary | ICD-10-CM

## 2018-04-06 DIAGNOSIS — C34.11 MALIGNANT NEOPLASM OF UPPER LOBE OF RIGHT LUNG: ICD-10-CM

## 2018-04-06 DIAGNOSIS — E06.9 THYROIDITIS: ICD-10-CM

## 2018-04-06 PROCEDURE — 25000003 PHARM REV CODE 250: Performed by: INTERNAL MEDICINE

## 2018-04-06 PROCEDURE — 96413 CHEMO IV INFUSION 1 HR: CPT

## 2018-04-06 PROCEDURE — A4216 STERILE WATER/SALINE, 10 ML: HCPCS | Performed by: INTERNAL MEDICINE

## 2018-04-06 PROCEDURE — 99213 OFFICE O/P EST LOW 20 MIN: CPT | Mod: PBBFAC,25 | Performed by: INTERNAL MEDICINE

## 2018-04-06 PROCEDURE — 63600175 PHARM REV CODE 636 W HCPCS: Performed by: INTERNAL MEDICINE

## 2018-04-06 PROCEDURE — 99999 PR PBB SHADOW E&M-EST. PATIENT-LVL III: CPT | Mod: PBBFAC,,, | Performed by: INTERNAL MEDICINE

## 2018-04-06 PROCEDURE — 96372 THER/PROPH/DIAG INJ SC/IM: CPT

## 2018-04-06 PROCEDURE — 99215 OFFICE O/P EST HI 40 MIN: CPT | Mod: S$PBB,,, | Performed by: INTERNAL MEDICINE

## 2018-04-06 RX ORDER — SODIUM CHLORIDE 0.9 % (FLUSH) 0.9 %
10 SYRINGE (ML) INJECTION
Status: CANCELLED | OUTPATIENT
Start: 2018-04-06

## 2018-04-06 RX ORDER — HEPARIN 100 UNIT/ML
500 SYRINGE INTRAVENOUS
Status: CANCELLED | OUTPATIENT
Start: 2018-04-06

## 2018-04-06 RX ORDER — SODIUM CHLORIDE 0.9 % (FLUSH) 0.9 %
10 SYRINGE (ML) INJECTION
Status: DISCONTINUED | OUTPATIENT
Start: 2018-04-06 | End: 2018-04-06 | Stop reason: HOSPADM

## 2018-04-06 RX ORDER — HEPARIN 100 UNIT/ML
500 SYRINGE INTRAVENOUS
Status: DISCONTINUED | OUTPATIENT
Start: 2018-04-06 | End: 2018-04-06 | Stop reason: HOSPADM

## 2018-04-06 RX ADMIN — SODIUM CHLORIDE, PRESERVATIVE FREE 10 ML: 5 INJECTION INTRAVENOUS at 10:04

## 2018-04-06 RX ADMIN — SODIUM CHLORIDE 240 MG: 9 INJECTION, SOLUTION INTRAVENOUS at 09:04

## 2018-04-06 RX ADMIN — DENOSUMAB 120 MG: 120 INJECTION SUBCUTANEOUS at 09:04

## 2018-04-06 RX ADMIN — SODIUM CHLORIDE: 9 INJECTION, SOLUTION INTRAVENOUS at 09:04

## 2018-04-06 RX ADMIN — HEPARIN 500 UNITS: 100 SYRINGE at 10:04

## 2018-04-06 NOTE — PLAN OF CARE
Problem: Chemotherapy Effects (Adult)  Goal: Signs and Symptoms of Listed Potential Problems Will be Absent or Manageable (Chemotherapy Effects)  Signs and symptoms of listed potential problems will be absent or manageable by discharge/transition of care (reference Chemotherapy Effects (Adult) CPG).   Outcome: Ongoing (interventions implemented as appropriate)  Here for Opdivo.  No complaints voiced.

## 2018-04-06 NOTE — PROGRESS NOTES
"Subjective:       Patient ID: Yao Alan is a 43 y.o. male.    Chief Complaint: Follow-up  Oncologic History:  Mr. Yao Alan is a 42-year-old male who has a long history of smoking and was seen in the Pulmonary Clinic by Dr. Valle on 03/05/2015 with abnormal CT scan.    Apparently, he went to the [facility] in February with coughing as well as chest pain. A chest x-ray was done, which revealed a left upper lobe lung mass. Followup CT scan was done on 02/12/2015 and that revealed posterior superior mediastinal mass adjacent and prominent enlarged upper left mediastinal lymph nodes, abutting the aortic arch as well as left subclavian artery. A  CT scan of the abdomen was done on 03/03/2015 without contrast and that revealed nonobstructive nephrolithiasis, but a 2.1 cm lytic lesion involving the left iliac wing concerning for metastatic disease given the presence of emphysema and a mediastinal soft tissue mass on the CT chest from 02/12/2015. He saw Dr. Valle after that on 03/05/2015 and underwent an IR guided biopsy of the bone lesion on 03/17/2015. 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    His EGFR/EML/ALK is negative.    His PET scan on 3/25/15 revealed Left upper lobe lung lesion with an adjacent para-aortic lymph node, right anterior rib metastasis, right iliac metastasis, and pancreatic metastasis. A pancreatic cancer primary neoplasm is less likely.  MRI brain was negative for mets.  He completed 6 cycles of Carboplatin and Alimta and was on maintenance Alimta until end of March 2016.  His bone scan from 3/16 revealed stable disease. His CT scans also from end of March 2016 revealed "Interval enlargement of left upper lobe lung mass measuring 5.9 x 3.9 cm (previously 3.3 x 2.5 cm). This mass appears to invade the mediastinum and abutting the aortic arch and left subclavian artery"     He is now on Opdivo since March 2016          HPI Mr. Alan returns for follow up " and Opdivo. He underwent restaging CT scans and bone scans on 4/4/18 which reveal no evidence of disease. He feels well and denies any new complaints. He is by himself today. ECOG PS is zero.    Review of Systems   Constitutional: Negative for appetite change, fatigue and unexpected weight change.   HENT: Negative for mouth sores.    Eyes: Negative for visual disturbance.   Respiratory: Negative for cough and shortness of breath.    Cardiovascular: Negative for chest pain.   Gastrointestinal: Negative for abdominal pain and diarrhea.   Genitourinary: Negative for frequency.   Musculoskeletal: Negative for back pain.   Skin: Negative for rash.   Neurological: Negative for headaches.   Hematological: Negative for adenopathy.   Psychiatric/Behavioral: The patient is not nervous/anxious.    All other systems reviewed and are negative.      PMFSH: all information reviewed and updated as relevant to today's visit  Objective:      Physical Exam   Constitutional: He is oriented to person, place, and time. He appears well-developed and well-nourished.   HENT:   Mouth/Throat: No oropharyngeal exudate.   Cardiovascular: Normal rate and normal heart sounds.    Pulmonary/Chest: Effort normal and breath sounds normal. He has no wheezes.   Abdominal: Soft. Bowel sounds are normal. There is no tenderness.   Musculoskeletal: He exhibits no edema or tenderness.   Lymphadenopathy:     He has no cervical adenopathy.   Neurological: He is alert and oriented to person, place, and time. Coordination normal.   Skin: Skin is warm and dry. No rash noted.   Psychiatric: He has a normal mood and affect. Judgment and thought content normal.   Vitals reviewed.        LABS:]  WBC   Date Value Ref Range Status   04/05/2018 8.41 3.90 - 12.70 K/uL Final     Hemoglobin   Date Value Ref Range Status   04/05/2018 15.0 14.0 - 18.0 g/dL Final     Hematocrit   Date Value Ref Range Status   04/05/2018 46.8 40.0 - 54.0 % Final     Platelets   Date Value Ref  Range Status   04/05/2018 264 150 - 350 K/uL Final     Gran # (ANC)   Date Value Ref Range Status   04/05/2018 4.1 1.8 - 7.7 K/uL Final     Comment:     The ANC is based on a white cell differential from an   automated cell counter. It has not been microscopically   reviewed for the presence of abnormal cells. Clinical   correlation is required.         Chemistry        Component Value Date/Time     04/05/2018 0758    K 3.8 04/05/2018 0758     04/05/2018 0758    CO2 26 04/05/2018 0758    BUN 11 04/05/2018 0758    CREATININE 1.4 04/05/2018 0758    GLU 92 04/05/2018 0758        Component Value Date/Time    CALCIUM 9.3 04/05/2018 0758    ALKPHOS 70 04/05/2018 0758    AST 21 04/05/2018 0758    ALT 18 04/05/2018 0758    BILITOT 0.4 04/05/2018 0758    ESTGFRAFRICA >60.0 04/05/2018 0758    EGFRNONAA >60.0 04/05/2018 0758          Assessment:       1. Malignant neoplasm of upper lobe of right lung    2. Secondary malignant neoplasm of bone    3. Thyroiditis        Plan:        1,2. He is doing very well on Opdivo. His CT scans and bone scan reveal no evidence of disease. He will continue on Opdivo and will return in 2 weeks for next cycle. He will also receive Xgeva today. Will plan on switching Xgeva to every 6 weeks  3. Stable in March 2018. Will repeat in 2 weeks.    Above care plan was discussed with patient and all questions were addressed to his satisfaction

## 2018-04-06 NOTE — PLAN OF CARE
Problem: Patient Care Overview (Adult)  Goal: Plan of Care Review  Outcome: Ongoing (interventions implemented as appropriate)  Tolerated treatment well.  Advised to call MD for any problems or concerns.  AVS given.  RTC in 2 weeks for next infusion.  NAD noted upon discharge.

## 2018-04-06 NOTE — Clinical Note
Schedule CBC,CMP and see me in 2 weeks and for Opdivo. Labs day before and see me and chemo on same day

## 2018-04-10 DIAGNOSIS — I82.402 DEEP VEIN THROMBOSIS (DVT) OF LEFT LOWER EXTREMITY, UNSPECIFIED CHRONICITY, UNSPECIFIED VEIN: ICD-10-CM

## 2018-04-10 DIAGNOSIS — E03.9 HYPOTHYROIDISM, UNSPECIFIED TYPE: ICD-10-CM

## 2018-04-10 DIAGNOSIS — G89.3 NEOPLASM RELATED PAIN: ICD-10-CM

## 2018-04-10 RX ORDER — LEVOTHYROXINE SODIUM 100 UG/1
100 TABLET ORAL DAILY
Qty: 30 TABLET | Refills: 1 | Status: SHIPPED | OUTPATIENT
Start: 2018-04-10 | End: 2018-05-10 | Stop reason: SDUPTHER

## 2018-04-10 RX ORDER — OXYCODONE AND ACETAMINOPHEN 10; 325 MG/1; MG/1
1 TABLET ORAL EVERY 6 HOURS PRN
Qty: 120 TABLET | Refills: 0 | Status: SHIPPED | OUTPATIENT
Start: 2018-04-10 | End: 2018-05-10 | Stop reason: SDUPTHER

## 2018-04-10 NOTE — TELEPHONE ENCOUNTER
----- Message from Kami Vidal sent at 4/10/2018 11:18 AM CDT -----  Contact: pt  Pt called for a medication Refill oxyCODONE-acetaminophen (PERCOCET)  mg per tablet & levothyroxine (SYNTHROID) 100 MCG tablet & rivaroxaban (XARELTO) 20 mg Tab [facility] - [place], [place] - [address]   [phone] (Phone)  [phone] (fax)  Callback#[phone]  Thank You   CHAR Vidal

## 2018-04-20 ENCOUNTER — OFFICE VISIT (OUTPATIENT)
Dept: HEMATOLOGY/ONCOLOGY | Facility: CLINIC | Age: 43
End: 2018-04-20
Payer: MEDICARE

## 2018-04-20 ENCOUNTER — INFUSION (OUTPATIENT)
Dept: INFUSION THERAPY | Facility: HOSPITAL | Age: 43
End: 2018-04-20
Attending: INTERNAL MEDICINE
Payer: MEDICARE

## 2018-04-20 VITALS
SYSTOLIC BLOOD PRESSURE: 118 MMHG | BODY MASS INDEX: 29.12 KG/M2 | DIASTOLIC BLOOD PRESSURE: 87 MMHG | HEIGHT: 66 IN | WEIGHT: 181.19 LBS | HEART RATE: 66 BPM | RESPIRATION RATE: 18 BRPM

## 2018-04-20 VITALS
RESPIRATION RATE: 18 BRPM | BODY MASS INDEX: 29.12 KG/M2 | DIASTOLIC BLOOD PRESSURE: 90 MMHG | OXYGEN SATURATION: 97 % | HEART RATE: 75 BPM | SYSTOLIC BLOOD PRESSURE: 135 MMHG | HEIGHT: 66 IN | WEIGHT: 181.19 LBS | TEMPERATURE: 98 F

## 2018-04-20 DIAGNOSIS — C34.11 MALIGNANT NEOPLASM OF UPPER LOBE OF RIGHT LUNG: Primary | ICD-10-CM

## 2018-04-20 DIAGNOSIS — D50.0 IRON DEFICIENCY ANEMIA DUE TO CHRONIC BLOOD LOSS: Primary | ICD-10-CM

## 2018-04-20 DIAGNOSIS — C79.51 SECONDARY MALIGNANT NEOPLASM OF BONE: ICD-10-CM

## 2018-04-20 DIAGNOSIS — C34.11 MALIGNANT NEOPLASM OF UPPER LOBE OF RIGHT LUNG: ICD-10-CM

## 2018-04-20 DIAGNOSIS — G89.3 NEOPLASM RELATED PAIN: ICD-10-CM

## 2018-04-20 DIAGNOSIS — C34.91 LUNG CANCER, PRIMARY, WITH METASTASIS FROM LUNG TO OTHER SITE, RIGHT: ICD-10-CM

## 2018-04-20 PROCEDURE — 99214 OFFICE O/P EST MOD 30 MIN: CPT | Mod: PBBFAC,25 | Performed by: INTERNAL MEDICINE

## 2018-04-20 PROCEDURE — 96413 CHEMO IV INFUSION 1 HR: CPT

## 2018-04-20 PROCEDURE — 25000003 PHARM REV CODE 250: Performed by: INTERNAL MEDICINE

## 2018-04-20 PROCEDURE — 99215 OFFICE O/P EST HI 40 MIN: CPT | Mod: S$PBB,,, | Performed by: INTERNAL MEDICINE

## 2018-04-20 PROCEDURE — 63600175 PHARM REV CODE 636 W HCPCS: Mod: JG | Performed by: INTERNAL MEDICINE

## 2018-04-20 PROCEDURE — 99999 PR PBB SHADOW E&M-EST. PATIENT-LVL IV: CPT | Mod: PBBFAC,,, | Performed by: INTERNAL MEDICINE

## 2018-04-20 PROCEDURE — A4216 STERILE WATER/SALINE, 10 ML: HCPCS | Performed by: INTERNAL MEDICINE

## 2018-04-20 RX ORDER — SODIUM CHLORIDE 0.9 % (FLUSH) 0.9 %
10 SYRINGE (ML) INJECTION
Status: CANCELLED | OUTPATIENT
Start: 2018-04-20

## 2018-04-20 RX ORDER — HEPARIN 100 UNIT/ML
500 SYRINGE INTRAVENOUS
Status: CANCELLED | OUTPATIENT
Start: 2018-04-20

## 2018-04-20 RX ORDER — SODIUM CHLORIDE 0.9 % (FLUSH) 0.9 %
10 SYRINGE (ML) INJECTION
Status: DISCONTINUED | OUTPATIENT
Start: 2018-04-20 | End: 2018-04-20 | Stop reason: HOSPADM

## 2018-04-20 RX ORDER — HEPARIN 100 UNIT/ML
500 SYRINGE INTRAVENOUS
Status: DISCONTINUED | OUTPATIENT
Start: 2018-04-20 | End: 2018-04-20 | Stop reason: HOSPADM

## 2018-04-20 RX ADMIN — HEPARIN 500 UNITS: 100 SYRINGE at 09:04

## 2018-04-20 RX ADMIN — SODIUM CHLORIDE: 9 INJECTION, SOLUTION INTRAVENOUS at 09:04

## 2018-04-20 RX ADMIN — SODIUM CHLORIDE 240 MG: 9 INJECTION, SOLUTION INTRAVENOUS at 09:04

## 2018-04-20 RX ADMIN — SODIUM CHLORIDE, PRESERVATIVE FREE 10 ML: 5 INJECTION INTRAVENOUS at 09:04

## 2018-04-20 NOTE — PLAN OF CARE
Problem: Patient Care Overview (Adult)  Goal: Plan of Care Review  Outcome: Ongoing (interventions implemented as appropriate)  Pt tolerated tx without complications. VSS. No s/s of reaction. Instructed to contact MD with any questions. PAC heparin locked and de-accessed. AVS given to patient.

## 2018-04-20 NOTE — PLAN OF CARE
Problem: Chemotherapy Effects (Adult)  Goal: Signs and Symptoms of Listed Potential Problems Will be Absent or Manageable (Chemotherapy Effects)  Signs and symptoms of listed potential problems will be absent or manageable by discharge/transition of care (reference Chemotherapy Effects (Adult) CPG).   Outcome: Ongoing (interventions implemented as appropriate)  Pt here for D1C54 Opdivo. VSS. No complaints voiced. Consent/labs/meds/allergies reviewed. PAC accessed with blood return noted. All questions answered. Will continue to monitor.

## 2018-04-20 NOTE — PROGRESS NOTES
"Subjective:       Patient ID: Yao Alan is a 43 y.o. male.    Chief Complaint: Malignant neoplasm of upper lobe of right lung  Oncologic History:  Mr. Yao Alan is a 42-year-old male who has a long history of smoking and was seen in the Pulmonary Clinic by Dr. Valle on 03/05/2015 with abnormal CT scan.    Apparently, he went to the [facility] in February with coughing as well as chest pain. A chest x-ray was done, which revealed a left upper lobe lung mass. Followup CT scan was done on 02/12/2015 and that revealed posterior superior mediastinal mass adjacent and prominent enlarged upper left mediastinal lymph nodes, abutting the aortic arch as well as left subclavian artery. A  CT scan of the abdomen was done on 03/03/2015 without contrast and that revealed nonobstructive nephrolithiasis, but a 2.1 cm lytic lesion involving the left iliac wing concerning for metastatic disease given the presence of emphysema and a mediastinal soft tissue mass on the CT chest from 02/12/2015. He saw Dr. Valle after that on 03/05/2015 and underwent an IR guided biopsy of the bone lesion on 03/17/2015. 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    His EGFR/EML/ALK is negative.    His PET scan on 3/25/15 revealed Left upper lobe lung lesion with an adjacent para-aortic lymph node, right anterior rib metastasis, right iliac metastasis, and pancreatic metastasis. A pancreatic cancer primary neoplasm is less likely.  MRI brain was negative for mets.  He completed 6 cycles of Carboplatin and Alimta and was on maintenance Alimta until end of March 2016.  His bone scan from 3/16 revealed stable disease. His CT scans also from end of March 2016 revealed "Interval enlargement of left upper lobe lung mass measuring 5.9 x 3.9 cm (previously 3.3 x 2.5 cm). This mass appears to invade the mediastinum and abutting the aortic arch and left subclavian artery"     He is now on Opdivo since March " 2016             HPI Mr. Alan returns for follow up and Opdivo. He feels well and denies any new complaints    Review of Systems   Constitutional: Negative for appetite change, fatigue and unexpected weight change.   HENT: Negative for mouth sores.    Eyes: Negative for visual disturbance.   Respiratory: Negative for cough and shortness of breath.    Cardiovascular: Negative for chest pain.   Gastrointestinal: Negative for abdominal pain and diarrhea.   Genitourinary: Negative for frequency.   Musculoskeletal: Negative for back pain.   Skin: Negative for rash.   Neurological: Negative for headaches.   Hematological: Negative for adenopathy.   Psychiatric/Behavioral: The patient is not nervous/anxious.    All other systems reviewed and are negative.      PMFSH: all information reviewed and updated as relevant to today's visit  Objective:      Physical Exam   Constitutional: He is oriented to person, place, and time. He appears well-developed and well-nourished.   HENT:   Mouth/Throat: No oropharyngeal exudate.   Cardiovascular: Normal rate and normal heart sounds.    Pulmonary/Chest: Effort normal and breath sounds normal. He has no wheezes.   Abdominal: Soft. Bowel sounds are normal. There is no tenderness.   Musculoskeletal: He exhibits no edema or tenderness.   Lymphadenopathy:     He has no cervical adenopathy.   Neurological: He is alert and oriented to person, place, and time. Coordination normal.   Skin: Skin is warm and dry. No rash noted.   Psychiatric: He has a normal mood and affect. Judgment and thought content normal.   Vitals reviewed.      LABS:  WBC   Date Value Ref Range Status   04/19/2018 9.82 3.90 - 12.70 K/uL Final     Hemoglobin   Date Value Ref Range Status   04/19/2018 14.9 14.0 - 18.0 g/dL Final     Hematocrit   Date Value Ref Range Status   04/19/2018 46.2 40.0 - 54.0 % Final     Platelets   Date Value Ref Range Status   04/19/2018 256 150 - 350 K/uL Final     Gran # (ANC)   Date Value Ref  Range Status   04/19/2018 4.6 1.8 - 7.7 K/uL Final     Comment:     The ANC is based on a white cell differential from an   automated cell counter. It has not been microscopically   reviewed for the presence of abnormal cells. Clinical   correlation is required.         Chemistry        Component Value Date/Time     04/19/2018 0752    K 3.8 04/19/2018 0752     04/19/2018 0752    CO2 28 04/19/2018 0752    BUN 16 04/19/2018 0752    CREATININE 1.4 04/19/2018 0752    GLU 95 04/19/2018 0752        Component Value Date/Time    CALCIUM 10.7 (H) 04/19/2018 0752    ALKPHOS 73 04/19/2018 0752    AST 21 04/19/2018 0752    ALT 20 04/19/2018 0752    BILITOT 0.3 04/19/2018 0752    ESTGFRAFRICA >60.0 04/19/2018 0752    EGFRNONAA >60.0 04/19/2018 0752        TSH   Date Value Ref Range Status   04/19/2018 1.129 0.400 - 4.000 uIU/mL Final     Free T4   Date Value Ref Range Status   04/19/2018 1.06 0.71 - 1.51 ng/dL Final       Assessment:       1. Malignant neoplasm of upper lobe of right lung    2. Secondary malignant neoplasm of bone    3. Neoplasm related pain        Plan:        1,2. He will proceed with Opdivo and will return in 2 weeks for next cycle  3. Stable on meds.    Above care plan was discussed with patient and accompanying wife and all questions were addressed to their satisfaction

## 2018-05-03 ENCOUNTER — TELEPHONE (OUTPATIENT)
Dept: HEMATOLOGY/ONCOLOGY | Facility: CLINIC | Age: 43
End: 2018-05-03

## 2018-05-03 ENCOUNTER — LAB VISIT (OUTPATIENT)
Dept: LAB | Facility: HOSPITAL | Age: 43
End: 2018-05-03
Attending: INTERNAL MEDICINE
Payer: MEDICARE

## 2018-05-03 DIAGNOSIS — C34.11 MALIGNANT NEOPLASM OF UPPER LOBE OF RIGHT LUNG: ICD-10-CM

## 2018-05-03 LAB
ALBUMIN SERPL BCP-MCNC: 4.1 G/DL
ALP SERPL-CCNC: 73 U/L
ALT SERPL W/O P-5'-P-CCNC: 20 U/L
ANION GAP SERPL CALC-SCNC: 10 MMOL/L
AST SERPL-CCNC: 21 U/L
BILIRUB SERPL-MCNC: 0.3 MG/DL
BUN SERPL-MCNC: 14 MG/DL
CALCIUM SERPL-MCNC: 10.5 MG/DL
CHLORIDE SERPL-SCNC: 105 MMOL/L
CO2 SERPL-SCNC: 26 MMOL/L
CREAT SERPL-MCNC: 1.4 MG/DL
ERYTHROCYTE [DISTWIDTH] IN BLOOD BY AUTOMATED COUNT: 14.4 %
EST. GFR  (AFRICAN AMERICAN): >60 ML/MIN/1.73 M^2
EST. GFR  (NON AFRICAN AMERICAN): >60 ML/MIN/1.73 M^2
GLUCOSE SERPL-MCNC: 97 MG/DL
HCT VFR BLD AUTO: 47.6 %
HGB BLD-MCNC: 15.3 G/DL
IMM GRANULOCYTES # BLD AUTO: 0.02 K/UL
MCH RBC QN AUTO: 26.2 PG
MCHC RBC AUTO-ENTMCNC: 32.1 G/DL
MCV RBC AUTO: 81 FL
NEUTROPHILS # BLD AUTO: 3.5 K/UL
PLATELET # BLD AUTO: 277 K/UL
PMV BLD AUTO: 11 FL
POTASSIUM SERPL-SCNC: 4.3 MMOL/L
PROT SERPL-MCNC: 7.6 G/DL
RBC # BLD AUTO: 5.85 M/UL
SODIUM SERPL-SCNC: 141 MMOL/L
WBC # BLD AUTO: 7.74 K/UL

## 2018-05-03 PROCEDURE — 85027 COMPLETE CBC AUTOMATED: CPT

## 2018-05-03 PROCEDURE — 36415 COLL VENOUS BLD VENIPUNCTURE: CPT

## 2018-05-03 PROCEDURE — 80053 COMPREHEN METABOLIC PANEL: CPT

## 2018-05-03 NOTE — TELEPHONE ENCOUNTER
Received call from patient this afternoon, inquiring about resources for dentures and financial assistance. Will explore dental resources and follow up with patient if any available. Checked Cancer Care's website but no funds available at present for males with lung cancer in [place]. Explained that patient has already received the maximum assistance through the [employer] Fund, gas cards through the [place] and [facility], and he is getting assistance with prescription co-pays through [employer] and there are no new funds available through any of these. Informed him that [facility] should be mailing new application forms out in June for their next [place] year and he will need to re-apply. Patient stated understanding. Will continue to follow.

## 2018-05-04 ENCOUNTER — OFFICE VISIT (OUTPATIENT)
Dept: HEMATOLOGY/ONCOLOGY | Facility: CLINIC | Age: 43
End: 2018-05-04
Payer: MEDICARE

## 2018-05-04 ENCOUNTER — INFUSION (OUTPATIENT)
Dept: INFUSION THERAPY | Facility: HOSPITAL | Age: 43
End: 2018-05-04
Attending: INTERNAL MEDICINE
Payer: MEDICARE

## 2018-05-04 VITALS
HEIGHT: 66 IN | BODY MASS INDEX: 29.25 KG/M2 | BODY MASS INDEX: 29.27 KG/M2 | HEART RATE: 89 BPM | WEIGHT: 182.13 LBS | SYSTOLIC BLOOD PRESSURE: 137 MMHG | OXYGEN SATURATION: 97 % | SYSTOLIC BLOOD PRESSURE: 118 MMHG | DIASTOLIC BLOOD PRESSURE: 76 MMHG | DIASTOLIC BLOOD PRESSURE: 89 MMHG | HEART RATE: 85 BPM | HEIGHT: 66 IN | TEMPERATURE: 98 F | WEIGHT: 182 LBS | TEMPERATURE: 98 F | RESPIRATION RATE: 18 BRPM | RESPIRATION RATE: 18 BRPM

## 2018-05-04 DIAGNOSIS — D50.0 IRON DEFICIENCY ANEMIA DUE TO CHRONIC BLOOD LOSS: Primary | ICD-10-CM

## 2018-05-04 DIAGNOSIS — C34.91 LUNG CANCER, PRIMARY, WITH METASTASIS FROM LUNG TO OTHER SITE, RIGHT: ICD-10-CM

## 2018-05-04 DIAGNOSIS — C79.51 SECONDARY MALIGNANT NEOPLASM OF BONE: ICD-10-CM

## 2018-05-04 DIAGNOSIS — C34.11 MALIGNANT NEOPLASM OF UPPER LOBE OF RIGHT LUNG: Primary | ICD-10-CM

## 2018-05-04 DIAGNOSIS — C34.11 MALIGNANT NEOPLASM OF UPPER LOBE OF RIGHT LUNG: ICD-10-CM

## 2018-05-04 DIAGNOSIS — E03.2 HYPOTHYROIDISM DUE TO MEDICATION: ICD-10-CM

## 2018-05-04 PROCEDURE — 96372 THER/PROPH/DIAG INJ SC/IM: CPT

## 2018-05-04 PROCEDURE — A4216 STERILE WATER/SALINE, 10 ML: HCPCS | Performed by: INTERNAL MEDICINE

## 2018-05-04 PROCEDURE — 99999 PR PBB SHADOW E&M-EST. PATIENT-LVL IV: CPT | Mod: PBBFAC,,, | Performed by: INTERNAL MEDICINE

## 2018-05-04 PROCEDURE — 63600175 PHARM REV CODE 636 W HCPCS: Mod: JG | Performed by: INTERNAL MEDICINE

## 2018-05-04 PROCEDURE — 96413 CHEMO IV INFUSION 1 HR: CPT

## 2018-05-04 PROCEDURE — 99214 OFFICE O/P EST MOD 30 MIN: CPT | Mod: PBBFAC | Performed by: INTERNAL MEDICINE

## 2018-05-04 PROCEDURE — 99215 OFFICE O/P EST HI 40 MIN: CPT | Mod: S$PBB,,, | Performed by: INTERNAL MEDICINE

## 2018-05-04 PROCEDURE — 25000003 PHARM REV CODE 250: Performed by: INTERNAL MEDICINE

## 2018-05-04 RX ORDER — HEPARIN 100 UNIT/ML
500 SYRINGE INTRAVENOUS
Status: DISCONTINUED | OUTPATIENT
Start: 2018-05-04 | End: 2018-05-04 | Stop reason: HOSPADM

## 2018-05-04 RX ORDER — HEPARIN 100 UNIT/ML
500 SYRINGE INTRAVENOUS
Status: CANCELLED | OUTPATIENT
Start: 2018-05-04

## 2018-05-04 RX ORDER — SODIUM CHLORIDE 0.9 % (FLUSH) 0.9 %
10 SYRINGE (ML) INJECTION
Status: CANCELLED | OUTPATIENT
Start: 2018-05-04

## 2018-05-04 RX ORDER — SODIUM CHLORIDE 0.9 % (FLUSH) 0.9 %
10 SYRINGE (ML) INJECTION
Status: DISCONTINUED | OUTPATIENT
Start: 2018-05-04 | End: 2018-05-04 | Stop reason: HOSPADM

## 2018-05-04 RX ADMIN — DENOSUMAB 120 MG: 120 INJECTION SUBCUTANEOUS at 10:05

## 2018-05-04 RX ADMIN — SODIUM CHLORIDE: 9 INJECTION, SOLUTION INTRAVENOUS at 10:05

## 2018-05-04 RX ADMIN — SODIUM CHLORIDE, PRESERVATIVE FREE 10 ML: 5 INJECTION INTRAVENOUS at 10:05

## 2018-05-04 RX ADMIN — SODIUM CHLORIDE 240 MG: 9 INJECTION, SOLUTION INTRAVENOUS at 10:05

## 2018-05-04 RX ADMIN — HEPARIN 500 UNITS: 100 SYRINGE at 10:05

## 2018-05-04 NOTE — PLAN OF CARE
Problem: Chemotherapy Effects (Adult)  Goal: Signs and Symptoms of Listed Potential Problems Will be Absent or Manageable (Chemotherapy Effects)  Signs and symptoms of listed potential problems will be absent or manageable by discharge/transition of care (reference Chemotherapy Effects (Adult) CPG).   Outcome: Ongoing (interventions implemented as appropriate)  Pt here for Opdivo infusion D1C55 and Xgeca injection, labs, hx, meds, allergies reviewed, pt with no complaints at this time, reclined in chair, continue to monitor

## 2018-05-04 NOTE — PLAN OF CARE
Problem: Patient Care Overview (Adult)  Goal: Plan of Care Review  Outcome: Ongoing (interventions implemented as appropriate)  Pt tolerated opdivo infusion and xgeva injection without issue, avs given, pt to rtc 5/18/18, no distress noted upon d/c to home

## 2018-05-04 NOTE — PROGRESS NOTES
"Subjective:       Patient ID: Yao Alan is a 43 y.o. male.    Chief Complaint: Malignant neoplasm of upper lobe of right lung  Oncologic History:  Mr. Yao Alan is a 43-year-old male who has a long history of smoking and was seen in the Pulmonary Clinic by Dr. Valle on 03/05/2015 with abnormal CT scan.    Apparently, he went to the [facility] in February with coughing as well as chest pain. A chest x-ray was done, which revealed a left upper lobe lung mass. Followup CT scan was done on 02/12/2015 and that revealed posterior superior mediastinal mass adjacent and prominent enlarged upper left mediastinal lymph nodes, abutting the aortic arch as well as left subclavian artery. A  CT scan of the abdomen was done on 03/03/2015 without contrast and that revealed nonobstructive nephrolithiasis, but a 2.1 cm lytic lesion involving the left iliac wing concerning for metastatic disease given the presence of emphysema and a mediastinal soft tissue mass on the CT chest from 02/12/2015. He saw Dr. Valle after that on 03/05/2015 and underwent an IR guided biopsy of the bone lesion on 03/17/2015. 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    His EGFR/EML/ALK is negative.    His PET scan on 3/25/15 revealed Left upper lobe lung lesion with an adjacent para-aortic lymph node, right anterior rib metastasis, right iliac metastasis, and pancreatic metastasis. A pancreatic cancer primary neoplasm is less likely.  MRI brain was negative for mets.  He completed 6 cycles of Carboplatin and Alimta and was on maintenance Alimta until end of March 2016.  His bone scan from 3/16 revealed stable disease. His CT scans also from end of March 2016 revealed "Interval enlargement of left upper lobe lung mass measuring 5.9 x 3.9 cm (previously 3.3 x 2.5 cm). This mass appears to invade the mediastinum and abutting the aortic arch and left subclavian artery"     He is now on Opdivo since March " 2016               HPI Mr. Alan returns for follow up and Opdivo. He feels well and denies any new complaints. His ECOG PS is zero. He is here by himself today    Review of Systems   Constitutional: Negative for appetite change, fatigue and unexpected weight change.   HENT: Negative for mouth sores.    Eyes: Negative for visual disturbance.   Respiratory: Negative for cough and shortness of breath.    Cardiovascular: Negative for chest pain.   Gastrointestinal: Negative for abdominal pain and diarrhea.   Genitourinary: Negative for frequency.   Musculoskeletal: Negative for back pain.   Skin: Negative for rash.   Neurological: Negative for headaches.   Hematological: Negative for adenopathy.   Psychiatric/Behavioral: The patient is not nervous/anxious.    All other systems reviewed and are negative.      Objective:      Physical Exam   Constitutional: He is oriented to person, place, and time. He appears well-developed and well-nourished.   HENT:   Mouth/Throat: No oropharyngeal exudate.   Cardiovascular: Normal rate and normal heart sounds.    Pulmonary/Chest: Effort normal and breath sounds normal. He has no wheezes.   Abdominal: Soft. Bowel sounds are normal. There is no tenderness.   Musculoskeletal: He exhibits no edema or tenderness.   Lymphadenopathy:     He has no cervical adenopathy.   Neurological: He is alert and oriented to person, place, and time. Coordination normal.   Skin: Skin is warm and dry. No rash noted.   Psychiatric: He has a normal mood and affect. Judgment and thought content normal.   Vitals reviewed.        LABS:  WBC   Date Value Ref Range Status   05/03/2018 7.74 3.90 - 12.70 K/uL Final     Hemoglobin   Date Value Ref Range Status   05/03/2018 15.3 14.0 - 18.0 g/dL Final     Hematocrit   Date Value Ref Range Status   05/03/2018 47.6 40.0 - 54.0 % Final     Platelets   Date Value Ref Range Status   05/03/2018 277 150 - 350 K/uL Final     Gran # (ANC)   Date Value Ref Range Status    05/03/2018 3.5 1.8 - 7.7 K/uL Final     Comment:     The ANC is based on a white cell differential from an   automated cell counter. It has not been microscopically   reviewed for the presence of abnormal cells. Clinical   correlation is required.         Chemistry        Component Value Date/Time     05/03/2018 0951    K 4.3 05/03/2018 0951     05/03/2018 0951    CO2 26 05/03/2018 0951    BUN 14 05/03/2018 0951    CREATININE 1.4 05/03/2018 0951    GLU 97 05/03/2018 0951        Component Value Date/Time    CALCIUM 10.5 05/03/2018 0951    ALKPHOS 73 05/03/2018 0951    AST 21 05/03/2018 0951    ALT 20 05/03/2018 0951    BILITOT 0.3 05/03/2018 0951    ESTGFRAFRICA >60.0 05/03/2018 0951    EGFRNONAA >60.0 05/03/2018 0951        TSH   Date Value Ref Range Status   04/19/2018 1.129 0.400 - 4.000 uIU/mL Final     Free T4   Date Value Ref Range Status   04/19/2018 1.06 0.71 - 1.51 ng/dL Final       Assessment:       1. Malignant neoplasm of upper lobe of right lung    2. Secondary malignant neoplasm of bone    3. Hypothyroidism due to medication        Plan:          1,2. He is doing well clinically and will proceed with Opdivo and Xgeva and will return in 2 weeks for next dose.  3. Recheck in 2 weeks.    Above care plan was discussed with patient and all questions were addressed to his satisfaction

## 2018-05-10 ENCOUNTER — TELEPHONE (OUTPATIENT)
Dept: HEMATOLOGY/ONCOLOGY | Facility: CLINIC | Age: 43
End: 2018-05-10

## 2018-05-10 DIAGNOSIS — E03.9 HYPOTHYROIDISM, UNSPECIFIED TYPE: ICD-10-CM

## 2018-05-10 DIAGNOSIS — G89.3 NEOPLASM RELATED PAIN: ICD-10-CM

## 2018-05-10 DIAGNOSIS — I82.402 DEEP VEIN THROMBOSIS (DVT) OF LEFT LOWER EXTREMITY, UNSPECIFIED CHRONICITY, UNSPECIFIED VEIN: ICD-10-CM

## 2018-05-10 RX ORDER — OXYCODONE AND ACETAMINOPHEN 10; 325 MG/1; MG/1
1 TABLET ORAL EVERY 6 HOURS PRN
Qty: 120 TABLET | Refills: 0 | Status: SHIPPED | OUTPATIENT
Start: 2018-05-10 | End: 2018-06-06 | Stop reason: SDUPTHER

## 2018-05-10 RX ORDER — LEVOTHYROXINE SODIUM 100 UG/1
100 TABLET ORAL DAILY
Qty: 30 TABLET | Refills: 1 | Status: SHIPPED | OUTPATIENT
Start: 2018-05-10 | End: 2018-06-06 | Stop reason: SDUPTHER

## 2018-05-10 NOTE — TELEPHONE ENCOUNTER
Received call from patient, asking if prescriptions can be written by another physician in Dr. Garay's absence and sent to [facility]. He has to go out-of-town with his wife for her aunt's  services in [place], and he would like to make one trip to [facility] to get all of the medications at the same time. He said he had called the clinic and spoke with Yasmin, but when he spoke with the staff at [facility] they didn't receive a new pain medication prescription yet. Spoke with AMALIA Hoffman in clinic and with Veronica Roberson NP, who had just escribed the three medication prescriptions (including patient's pain medication) to [facility]. Called patient and informed him of this. Advised him to call the pharmacy staff again to make sure they contact CECIL before 4 PM to get the co-pay auth letter and that the prescriptions will be ready for  [place] [facility] [place]. Patient stated understanding. Informed him that I will notify him if any dental resources secured for a full set of dentures, which patient cannot afford. Will continue to follow and assist as needs identified.

## 2018-05-10 NOTE — TELEPHONE ENCOUNTER
----- Message from Willy Perrin sent at 5/10/2018 11:29 AM CDT -----  Contact: Pt   Pt is requesting refill on:    oxyCODONE-acetaminophen (PERCOCET)  mg per tablet  rivaroxaban (XARELTO) 20 mg Tab  levothyroxine (SYNTHROID) 100 MCG tablet    Will like Rx sent to [facility]     Pt will also like a call from Yasmin in regards to question concerning care     Pt Contact:[phone]

## 2018-05-10 NOTE — TELEPHONE ENCOUNTER
----- Message from Edwige Freedman sent at 5/10/2018  2:04 PM CDT -----  Contact: Pt  Pt calling with questions regarding medication        Pt call back number [phone]
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
## 2018-05-14 ENCOUNTER — TELEPHONE (OUTPATIENT)
Dept: HEMATOLOGY/ONCOLOGY | Facility: CLINIC | Age: 43
End: 2018-05-14
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
## 2018-05-15 ENCOUNTER — TELEPHONE (OUTPATIENT)
Dept: HEMATOLOGY/ONCOLOGY | Facility: CLINIC | Age: 43
End: 2018-05-15

## 2018-05-15 NOTE — TELEPHONE ENCOUNTER
----- Message from Edwige Freedman sent at 5/15/2018 11:35 AM CDT -----  Contact: Pt  Pt calling regarding ppwk he needs filled out by doctor        Pt call back number [phone]

## 2018-05-15 NOTE — TELEPHONE ENCOUNTER
Left vm for patient informing him dr carl will not be able to write this letter for him.  If he has any questions --nurse asked him to contact clinic at his convenience.      Reviewed upcoming appointments with patient.

## 2018-05-18 ENCOUNTER — INFUSION (OUTPATIENT)
Dept: INFUSION THERAPY | Facility: HOSPITAL | Age: 43
End: 2018-05-18
Attending: INTERNAL MEDICINE
Payer: MEDICARE

## 2018-05-18 ENCOUNTER — OFFICE VISIT (OUTPATIENT)
Dept: HEMATOLOGY/ONCOLOGY | Facility: CLINIC | Age: 43
End: 2018-05-18
Payer: MEDICARE

## 2018-05-18 VITALS
HEIGHT: 66 IN | BODY MASS INDEX: 29.3 KG/M2 | DIASTOLIC BLOOD PRESSURE: 92 MMHG | DIASTOLIC BLOOD PRESSURE: 85 MMHG | TEMPERATURE: 98 F | TEMPERATURE: 98 F | OXYGEN SATURATION: 97 % | HEART RATE: 83 BPM | SYSTOLIC BLOOD PRESSURE: 115 MMHG | HEART RATE: 88 BPM | SYSTOLIC BLOOD PRESSURE: 124 MMHG | WEIGHT: 182.31 LBS | RESPIRATION RATE: 20 BRPM | RESPIRATION RATE: 16 BRPM

## 2018-05-18 DIAGNOSIS — C34.11 MALIGNANT NEOPLASM OF UPPER LOBE OF RIGHT LUNG: Primary | ICD-10-CM

## 2018-05-18 DIAGNOSIS — C79.51 SECONDARY MALIGNANT NEOPLASM OF BONE: ICD-10-CM

## 2018-05-18 DIAGNOSIS — D50.0 IRON DEFICIENCY ANEMIA DUE TO CHRONIC BLOOD LOSS: Primary | ICD-10-CM

## 2018-05-18 DIAGNOSIS — C34.11 MALIGNANT NEOPLASM OF UPPER LOBE OF RIGHT LUNG: ICD-10-CM

## 2018-05-18 DIAGNOSIS — C34.91 LUNG CANCER, PRIMARY, WITH METASTASIS FROM LUNG TO OTHER SITE, RIGHT: ICD-10-CM

## 2018-05-18 PROCEDURE — 99999 PR PBB SHADOW E&M-EST. PATIENT-LVL IV: CPT | Mod: PBBFAC,,, | Performed by: INTERNAL MEDICINE

## 2018-05-18 PROCEDURE — 96413 CHEMO IV INFUSION 1 HR: CPT

## 2018-05-18 PROCEDURE — A4216 STERILE WATER/SALINE, 10 ML: HCPCS | Performed by: INTERNAL MEDICINE

## 2018-05-18 PROCEDURE — 99215 OFFICE O/P EST HI 40 MIN: CPT | Mod: S$PBB,,, | Performed by: INTERNAL MEDICINE

## 2018-05-18 PROCEDURE — 99214 OFFICE O/P EST MOD 30 MIN: CPT | Mod: PBBFAC,25 | Performed by: INTERNAL MEDICINE

## 2018-05-18 PROCEDURE — 25000003 PHARM REV CODE 250: Performed by: INTERNAL MEDICINE

## 2018-05-18 PROCEDURE — 63600175 PHARM REV CODE 636 W HCPCS: Performed by: INTERNAL MEDICINE

## 2018-05-18 RX ORDER — HEPARIN 100 UNIT/ML
500 SYRINGE INTRAVENOUS
Status: DISCONTINUED | OUTPATIENT
Start: 2018-05-18 | End: 2018-05-18 | Stop reason: HOSPADM

## 2018-05-18 RX ORDER — SODIUM CHLORIDE 0.9 % (FLUSH) 0.9 %
10 SYRINGE (ML) INJECTION
Status: CANCELLED | OUTPATIENT
Start: 2018-05-18

## 2018-05-18 RX ORDER — SODIUM CHLORIDE 0.9 % (FLUSH) 0.9 %
10 SYRINGE (ML) INJECTION
Status: DISCONTINUED | OUTPATIENT
Start: 2018-05-18 | End: 2018-05-18 | Stop reason: HOSPADM

## 2018-05-18 RX ORDER — HEPARIN 100 UNIT/ML
500 SYRINGE INTRAVENOUS
Status: CANCELLED | OUTPATIENT
Start: 2018-05-18

## 2018-05-18 RX ADMIN — HEPARIN 500 UNITS: 100 SYRINGE at 09:05

## 2018-05-18 RX ADMIN — SODIUM CHLORIDE 240 MG: 9 INJECTION, SOLUTION INTRAVENOUS at 09:05

## 2018-05-18 RX ADMIN — SODIUM CHLORIDE, PRESERVATIVE FREE 10 ML: 5 INJECTION INTRAVENOUS at 09:05

## 2018-05-18 RX ADMIN — SODIUM CHLORIDE: 9 INJECTION, SOLUTION INTRAVENOUS at 08:05

## 2018-05-18 NOTE — PLAN OF CARE
Problem: Patient Care Overview (Adult)  Goal: Plan of Care Review  Outcome: Ongoing (interventions implemented as appropriate)  Pt tolerated opdivo without adverse effects. VSS. Provided AVS & verbalized understanding of RTC date. DC ambulating independently.

## 2018-05-18 NOTE — PROGRESS NOTES
"Subjective:       Patient ID: Yao Alan is a 43 y.o. male.    Chief Complaint: Malignant neoplasm of upper lobe of right lung  Oncologic History:  Mr. Yao Alan is a 43-year-old male who has a long history of smoking and was seen in the Pulmonary Clinic by Dr. Valle on 03/05/2015 with abnormal CT scan.    Apparently, he went to the [facility] in February with coughing as well as chest pain. A chest x-ray was done, which revealed a left upper lobe lung mass. Followup CT scan was done on 02/12/2015 and that revealed posterior superior mediastinal mass adjacent and prominent enlarged upper left mediastinal lymph nodes, abutting the aortic arch as well as left subclavian artery. A  CT scan of the abdomen was done on 03/03/2015 without contrast and that revealed nonobstructive nephrolithiasis, but a 2.1 cm lytic lesion involving the left iliac wing concerning for metastatic disease given the presence of emphysema and a mediastinal soft tissue mass on the CT chest from 02/12/2015. He saw Dr. Valle after that on 03/05/2015 and underwent an IR guided biopsy of the bone lesion on 03/17/2015. 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    His EGFR/EML/ALK is negative.    His PET scan on 3/25/15 revealed Left upper lobe lung lesion with an adjacent para-aortic lymph node, right anterior rib metastasis, right iliac metastasis, and pancreatic metastasis. A pancreatic cancer primary neoplasm is less likely.  MRI brain was negative for mets.  He completed 6 cycles of Carboplatin and Alimta and was on maintenance Alimta until end of March 2016.  His bone scan from 3/16 revealed stable disease. His CT scans also from end of March 2016 revealed "Interval enlargement of left upper lobe lung mass measuring 5.9 x 3.9 cm (previously 3.3 x 2.5 cm). This mass appears to invade the mediastinum and abutting the aortic arch and left subclavian artery"     He is now on Opdivo since March " 2016               HPI Mr. Alan returns for follow up and Opdivo. He feels well and denies any new issues.    Review of Systems   Constitutional: Negative for appetite change, fatigue and unexpected weight change.   HENT: Negative for mouth sores.    Eyes: Negative for visual disturbance.   Respiratory: Negative for cough and shortness of breath.    Cardiovascular: Negative for chest pain.   Gastrointestinal: Negative for abdominal pain and diarrhea.   Genitourinary: Negative for frequency.   Musculoskeletal: Negative for back pain.   Skin: Negative for rash.   Neurological: Negative for headaches.   Hematological: Negative for adenopathy.   Psychiatric/Behavioral: The patient is not nervous/anxious.    All other systems reviewed and are negative.      PMFSH: all information reviewed and updated as relevant to today's visit  Objective:      Physical Exam   Constitutional: He is oriented to person, place, and time. He appears well-developed and well-nourished.   HENT:   Mouth/Throat: No oropharyngeal exudate.   Cardiovascular: Normal rate and normal heart sounds.    Pulmonary/Chest: Effort normal and breath sounds normal. He has no wheezes.   Abdominal: Soft. Bowel sounds are normal. There is no tenderness.   Musculoskeletal: He exhibits no edema or tenderness.   Lymphadenopathy:     He has no cervical adenopathy.   Neurological: He is alert and oriented to person, place, and time. Coordination normal.   Skin: Skin is warm and dry. No rash noted.   Psychiatric: He has a normal mood and affect. Judgment and thought content normal.   Vitals reviewed.        LABS:  WBC   Date Value Ref Range Status   05/17/2018 8.89 3.90 - 12.70 K/uL Final     Hemoglobin   Date Value Ref Range Status   05/17/2018 14.6 14.0 - 18.0 g/dL Final     Hematocrit   Date Value Ref Range Status   05/17/2018 45.2 40.0 - 54.0 % Final     Platelets   Date Value Ref Range Status   05/17/2018 267 150 - 350 K/uL Final     Gran # (ANC)   Date Value Ref  Range Status   05/17/2018 5.2 1.8 - 7.7 K/uL Final     Comment:     The ANC is based on a white cell differential from an   automated cell counter. It has not been microscopically   reviewed for the presence of abnormal cells. Clinical   correlation is required.         Chemistry        Component Value Date/Time     05/17/2018 0821    K 3.9 05/17/2018 0821     05/17/2018 0821    CO2 26 05/17/2018 0821    BUN 11 05/17/2018 0821    CREATININE 1.4 05/17/2018 0821    GLU 90 05/17/2018 0821        Component Value Date/Time    CALCIUM 9.8 05/17/2018 0821    ALKPHOS 71 05/17/2018 0821    AST 19 05/17/2018 0821    ALT 18 05/17/2018 0821    BILITOT 0.3 05/17/2018 0821    ESTGFRAFRICA >60.0 05/17/2018 0821    EGFRNONAA >60.0 05/17/2018 0821        TSH   Date Value Ref Range Status   05/17/2018 0.640 0.400 - 4.000 uIU/mL Final     Free T4   Date Value Ref Range Status   05/17/2018 1.10 0.71 - 1.51 ng/dL Final       Assessment:       1. Malignant neoplasm of upper lobe of right lung    2. Secondary malignant neoplasm of bone        Plan:        1,2. He is doing well overall and will proceed with Opdivo and will return in 2 weeks for next cycle for Opdivo and for Xgeva    Above care plan was discussed with patient and all questions were addressed to his satisfaction

## 2018-05-27 ENCOUNTER — HOSPITAL ENCOUNTER (EMERGENCY)
Facility: HOSPITAL | Age: 43
Discharge: HOME | End: 2018-05-27
Attending: EMERGENCY MEDICINE
Payer: MEDICARE

## 2018-05-27 VITALS
RESPIRATION RATE: 16 BRPM | DIASTOLIC BLOOD PRESSURE: 90 MMHG | WEIGHT: 184 LBS | BODY MASS INDEX: 29.7 KG/M2 | HEART RATE: 71 BPM | SYSTOLIC BLOOD PRESSURE: 136 MMHG | TEMPERATURE: 98 F | OXYGEN SATURATION: 99 %

## 2018-05-27 DIAGNOSIS — R05.9 COUGH: ICD-10-CM

## 2018-05-27 DIAGNOSIS — J30.2 SEASONAL ALLERGIC RHINITIS, UNSPECIFIED TRIGGER: ICD-10-CM

## 2018-05-27 DIAGNOSIS — R09.82 POST-NASAL DRIP: Primary | ICD-10-CM

## 2018-05-27 PROCEDURE — 94640 AIRWAY INHALATION TREATMENT: CPT

## 2018-05-27 PROCEDURE — 99284 EMERGENCY DEPT VISIT MOD MDM: CPT | Mod: 25

## 2018-05-27 PROCEDURE — 25000003 PHARM REV CODE 250: Performed by: EMERGENCY MEDICINE

## 2018-05-27 PROCEDURE — 25000242 PHARM REV CODE 250 ALT 637 W/ HCPCS: Performed by: EMERGENCY MEDICINE

## 2018-05-27 RX ORDER — BENZONATATE 100 MG/1
200 CAPSULE ORAL
Status: COMPLETED | OUTPATIENT
Start: 2018-05-27 | End: 2018-05-27

## 2018-05-27 RX ORDER — BENZONATATE 100 MG/1
100 CAPSULE ORAL 3 TIMES DAILY PRN
Qty: 20 CAPSULE | Refills: 0 | Status: SHIPPED | OUTPATIENT
Start: 2018-05-27 | End: 2018-06-06

## 2018-05-27 RX ORDER — FLUTICASONE PROPIONATE 50 MCG
1 SPRAY, SUSPENSION (ML) NASAL 2 TIMES DAILY
Qty: 15 G | Refills: 0 | Status: SHIPPED | OUTPATIENT
Start: 2018-05-27 | End: 2018-06-17

## 2018-05-27 RX ORDER — LORATADINE 10 MG/1
10 TABLET ORAL DAILY
Qty: 60 TABLET | Refills: 0 | Status: SHIPPED | OUTPATIENT
Start: 2018-05-27 | End: 2019-06-09

## 2018-05-27 RX ORDER — AZITHROMYCIN 250 MG/1
250 TABLET, FILM COATED ORAL DAILY
Qty: 6 TABLET | Refills: 0 | Status: SHIPPED | OUTPATIENT
Start: 2018-05-27 | End: 2018-06-27

## 2018-05-27 RX ORDER — IPRATROPIUM BROMIDE AND ALBUTEROL SULFATE 2.5; .5 MG/3ML; MG/3ML
3 SOLUTION RESPIRATORY (INHALATION) ONCE
Status: COMPLETED | OUTPATIENT
Start: 2018-05-27 | End: 2018-05-27

## 2018-05-27 RX ADMIN — IPRATROPIUM BROMIDE AND ALBUTEROL SULFATE 3 ML: .5; 2.5 SOLUTION RESPIRATORY (INHALATION) at 07:05

## 2018-05-27 RX ADMIN — BENZONATATE 200 MG: 100 CAPSULE ORAL at 07:05

## 2018-05-27 NOTE — ED NOTES
Pt reports coughing and wheezing for approx 1 week, reports hx of lung CA at this time.  AAOX4, NAD.

## 2018-05-27 NOTE — ED PROVIDER NOTES
"Encounter Date: 5/27/2018       History     Chief Complaint   Patient presents with    Cough     "everytime I cough, I wheeze". Pt states symtpoms started 6 days ago.      43-year-old male chief complaint nonproductive cough.  Patient reports he has been having allergies runny nose congestion and now whenever he starts wheezing he coughs.  Patient states he has been using his inhaler does help.  Patient's side coming get checked just because his runny nose and congestion are getting worse.  Patient reports a history of lung cancer for which she is on chemotherapy.  Patient denies fevers and chills.          Review of patient's allergies indicates:   Allergen Reactions    Nsaids (non-steroidal anti-inflammatory drug)      Patient says since been diagnosed with lung mass on 2-12-15, if take any NSAIDS he spikes a fever.    Tylenol [acetaminophen] Other (See Comments)     Sweating +     Past Medical History:   Diagnosis Date    Asthma     COPD (chronic obstructive pulmonary disease)     HTN (hypertension)     Hypertension     Lung cancer     Lung mass     Thyroid disease      Past Surgical History:   Procedure Laterality Date    FRACTURE SURGERY      finger    LUNG CANCER SURGERY Right March 2015    lung biopsy      Family History   Problem Relation Age of Onset    Hypertension Father     No Known Problems Mother     No Known Problems Sister     No Known Problems Brother     No Known Problems Maternal Aunt     No Known Problems Maternal Uncle     No Known Problems Paternal Aunt     No Known Problems Paternal Uncle     No Known Problems Maternal Grandmother     No Known Problems Maternal Grandfather     No Known Problems Paternal Grandmother     No Known Problems Paternal Grandfather     Amblyopia Neg Hx     Blindness Neg Hx     Cancer Neg Hx     Cataracts Neg Hx     Diabetes Neg Hx     Glaucoma Neg Hx     Macular degeneration Neg Hx     Retinal detachment Neg Hx     Strabismus Neg Hx  "    Stroke Neg Hx     Thyroid disease Neg Hx      Social History   Substance Use Topics    Smoking status: Former Smoker     Packs/day: 0.75     Years: 25.00     Types: Cigarettes     Quit date: 12/2/2014    Smokeless tobacco: Never Used      Comment: Patient is no longer smoking    Alcohol use No     Review of Systems   Constitutional: Negative for fever.   HENT: Positive for congestion. Negative for sore throat.    Respiratory: Positive for cough and wheezing. Negative for shortness of breath.    Cardiovascular: Negative for chest pain.   Gastrointestinal: Negative for nausea.   Genitourinary: Negative for dysuria.   Musculoskeletal: Negative for back pain.   Skin: Negative for rash.   Neurological: Negative for weakness.   Hematological: Does not bruise/bleed easily.   All other systems reviewed and are negative.      Physical Exam     Initial Vitals [05/27/18 0733]   BP Pulse Resp Temp SpO2   (!) 140/101 72 18 98.1 °F (36.7 °C) 99 %      MAP       114         Physical Exam    Nursing note and vitals reviewed.  Constitutional: He appears well-developed and well-nourished. He is not diaphoretic. No distress.   HENT:   Head: Normocephalic and atraumatic.   Right Ear: External ear normal.   Left Ear: External ear normal.   Nose: Mucosal edema and rhinorrhea present. Right sinus exhibits no maxillary sinus tenderness and no frontal sinus tenderness. Left sinus exhibits no maxillary sinus tenderness and no frontal sinus tenderness.   Mouth/Throat: Uvula is midline. Posterior oropharyngeal erythema present. No oropharyngeal exudate or posterior oropharyngeal edema.   Postnasal drip appreciated on exam   Eyes: EOM are normal. Pupils are equal, round, and reactive to light. Right eye exhibits no discharge. Left eye exhibits no discharge.   Neck: Normal range of motion. Neck supple.   Cardiovascular: Normal rate, regular rhythm, normal heart sounds and intact distal pulses. Exam reveals no gallop and no friction rub.     No murmur heard.  Pulmonary/Chest: No respiratory distress. He has wheezes. He has no rhonchi. He has no rales. He exhibits no tenderness.   Abdominal: Soft. Bowel sounds are normal. He exhibits no distension and no mass. There is no tenderness. There is no rebound and no guarding.   Musculoskeletal: Normal range of motion.   Neurological: He is alert and oriented to person, place, and time. No cranial nerve deficit or sensory deficit.   Skin: Skin is warm. No rash noted. No pallor.   Psychiatric: He has a normal mood and affect.         ED Course   Procedures  Labs Reviewed - No data to display     Imaging Results          X-Ray Chest PA And Lateral (Final result)  Result time 05/27/18 08:55:57    Final result by Sheila Hu MD (05/27/18 08:55:57)                 Impression:      As above.      Electronically signed by: Sheila Hu MD  Date:    05/27/2018  Time:    08:55             Narrative:    EXAMINATION:  XR CHEST PA AND LATERAL    CLINICAL HISTORY:  cough;    TECHNIQUE:  PA and lateral views of the chest were performed.    COMPARISON:  04/04/2018    FINDINGS:  Chronic lung changes are noted.  Bolus changes in the lung apices.  No consolidation is identified.  There are a few reticulonodular densities in the left lung base which could reflect a developing acute inflammatory/infectious process.  Right-sided Port-A-Cath has its tip within the proximal SVC.  The heart is normal in size.  The skeletal structures are intact.                                                   Medical decision making   Chief complaint: nonproductive cough.  Patient reports he has been having allergies runny nose congestion and now whenever he starts wheezing he coughs.   Differential diagnosis:  Seasonal allergies, pharyngitis, bronchitis, pneumonia, and pneumothorax  Treatment in the ED Physical Exam, albuterol, Tessalon Perles.  Patient reports feeling better after medication.    Discussed imaging results.    Fill and  take prescriptions as directed.  Return to the ED if symptoms worsen or do not resolve.   Answered questions and discussed discharge plan.    Patient feels much better and is ready for discharge.  Follow up with PCP in 1 days.       Clinical Impression:   The primary encounter diagnosis was Post-nasal drip. Diagnoses of Seasonal allergic rhinitis, unspecified trigger and Cough were also pertinent to this visit.                           Norma Olguin DO  05/27/18 0913

## 2018-06-01 ENCOUNTER — OFFICE VISIT (OUTPATIENT)
Dept: HEMATOLOGY/ONCOLOGY | Facility: CLINIC | Age: 43
End: 2018-06-01
Payer: MEDICARE

## 2018-06-01 ENCOUNTER — INFUSION (OUTPATIENT)
Dept: INFUSION THERAPY | Facility: HOSPITAL | Age: 43
End: 2018-06-01
Attending: INTERNAL MEDICINE
Payer: MEDICARE

## 2018-06-01 ENCOUNTER — LAB VISIT (OUTPATIENT)
Dept: LAB | Facility: HOSPITAL | Age: 43
End: 2018-06-01
Attending: INTERNAL MEDICINE
Payer: MEDICARE

## 2018-06-01 VITALS
SYSTOLIC BLOOD PRESSURE: 124 MMHG | HEIGHT: 66 IN | HEART RATE: 73 BPM | DIASTOLIC BLOOD PRESSURE: 85 MMHG | BODY MASS INDEX: 28.84 KG/M2 | OXYGEN SATURATION: 94 % | WEIGHT: 179.44 LBS | RESPIRATION RATE: 16 BRPM | TEMPERATURE: 98 F

## 2018-06-01 VITALS
SYSTOLIC BLOOD PRESSURE: 111 MMHG | RESPIRATION RATE: 16 BRPM | DIASTOLIC BLOOD PRESSURE: 77 MMHG | TEMPERATURE: 99 F | HEART RATE: 59 BPM

## 2018-06-01 DIAGNOSIS — D50.0 IRON DEFICIENCY ANEMIA DUE TO CHRONIC BLOOD LOSS: Primary | ICD-10-CM

## 2018-06-01 DIAGNOSIS — C34.91 LUNG CANCER, PRIMARY, WITH METASTASIS FROM LUNG TO OTHER SITE, RIGHT: ICD-10-CM

## 2018-06-01 DIAGNOSIS — C34.11 MALIGNANT NEOPLASM OF UPPER LOBE OF RIGHT LUNG: ICD-10-CM

## 2018-06-01 DIAGNOSIS — C34.11 MALIGNANT NEOPLASM OF UPPER LOBE OF RIGHT LUNG: Primary | ICD-10-CM

## 2018-06-01 DIAGNOSIS — E03.2 HYPOTHYROIDISM DUE TO MEDICATION: ICD-10-CM

## 2018-06-01 DIAGNOSIS — C34.90 MALIGNANT NEOPLASM OF LUNG, UNSPECIFIED LATERALITY, UNSPECIFIED PART OF LUNG: ICD-10-CM

## 2018-06-01 DIAGNOSIS — C79.51 SECONDARY MALIGNANT NEOPLASM OF BONE: ICD-10-CM

## 2018-06-01 LAB
ALBUMIN SERPL BCP-MCNC: 3.9 G/DL
ALP SERPL-CCNC: 71 U/L
ALT SERPL W/O P-5'-P-CCNC: 27 U/L
ANION GAP SERPL CALC-SCNC: 8 MMOL/L
AST SERPL-CCNC: 27 U/L
BILIRUB SERPL-MCNC: 0.4 MG/DL
BUN SERPL-MCNC: 18 MG/DL
CALCIUM SERPL-MCNC: 10 MG/DL
CHLORIDE SERPL-SCNC: 106 MMOL/L
CO2 SERPL-SCNC: 27 MMOL/L
CREAT SERPL-MCNC: 1.5 MG/DL
ERYTHROCYTE [DISTWIDTH] IN BLOOD BY AUTOMATED COUNT: 14.3 %
EST. GFR  (AFRICAN AMERICAN): >60 ML/MIN/1.73 M^2
EST. GFR  (NON AFRICAN AMERICAN): 56.2 ML/MIN/1.73 M^2
GLUCOSE SERPL-MCNC: 88 MG/DL
HCT VFR BLD AUTO: 45.3 %
HGB BLD-MCNC: 14.6 G/DL
IMM GRANULOCYTES # BLD AUTO: 0.04 K/UL
MCH RBC QN AUTO: 26.2 PG
MCHC RBC AUTO-ENTMCNC: 32.2 G/DL
MCV RBC AUTO: 81 FL
NEUTROPHILS # BLD AUTO: 4.1 K/UL
PLATELET # BLD AUTO: 312 K/UL
PMV BLD AUTO: 10.9 FL
POTASSIUM SERPL-SCNC: 3.8 MMOL/L
PROT SERPL-MCNC: 8 G/DL
RBC # BLD AUTO: 5.58 M/UL
SODIUM SERPL-SCNC: 141 MMOL/L
WBC # BLD AUTO: 8.53 K/UL

## 2018-06-01 PROCEDURE — 99999 PR PBB SHADOW E&M-EST. PATIENT-LVL V: CPT | Mod: PBBFAC,,, | Performed by: INTERNAL MEDICINE

## 2018-06-01 PROCEDURE — 99215 OFFICE O/P EST HI 40 MIN: CPT | Mod: S$PBB,,, | Performed by: INTERNAL MEDICINE

## 2018-06-01 PROCEDURE — 85027 COMPLETE CBC AUTOMATED: CPT

## 2018-06-01 PROCEDURE — 80053 COMPREHEN METABOLIC PANEL: CPT

## 2018-06-01 PROCEDURE — 96372 THER/PROPH/DIAG INJ SC/IM: CPT

## 2018-06-01 PROCEDURE — 96413 CHEMO IV INFUSION 1 HR: CPT

## 2018-06-01 PROCEDURE — 36415 COLL VENOUS BLD VENIPUNCTURE: CPT

## 2018-06-01 PROCEDURE — 25000003 PHARM REV CODE 250: Performed by: INTERNAL MEDICINE

## 2018-06-01 PROCEDURE — 99215 OFFICE O/P EST HI 40 MIN: CPT | Mod: PBBFAC,25 | Performed by: INTERNAL MEDICINE

## 2018-06-01 PROCEDURE — 63600175 PHARM REV CODE 636 W HCPCS: Mod: JG | Performed by: INTERNAL MEDICINE

## 2018-06-01 RX ORDER — SODIUM CHLORIDE 0.9 % (FLUSH) 0.9 %
10 SYRINGE (ML) INJECTION
Status: DISCONTINUED | OUTPATIENT
Start: 2018-06-01 | End: 2018-06-01 | Stop reason: HOSPADM

## 2018-06-01 RX ORDER — SODIUM CHLORIDE 0.9 % (FLUSH) 0.9 %
10 SYRINGE (ML) INJECTION
Status: CANCELLED | OUTPATIENT
Start: 2018-06-01

## 2018-06-01 RX ORDER — HEPARIN 100 UNIT/ML
500 SYRINGE INTRAVENOUS
Status: DISCONTINUED | OUTPATIENT
Start: 2018-06-01 | End: 2018-06-01 | Stop reason: HOSPADM

## 2018-06-01 RX ORDER — HEPARIN 100 UNIT/ML
500 SYRINGE INTRAVENOUS
Status: CANCELLED | OUTPATIENT
Start: 2018-06-01

## 2018-06-01 RX ADMIN — SODIUM CHLORIDE, PRESERVATIVE FREE 500 UNITS: 5 INJECTION INTRAVENOUS at 11:06

## 2018-06-01 RX ADMIN — SODIUM CHLORIDE: 9 INJECTION, SOLUTION INTRAVENOUS at 10:06

## 2018-06-01 RX ADMIN — SODIUM CHLORIDE 240 MG: 9 INJECTION, SOLUTION INTRAVENOUS at 10:06

## 2018-06-01 RX ADMIN — DENOSUMAB 120 MG: 120 INJECTION SUBCUTANEOUS at 10:06

## 2018-06-01 NOTE — PLAN OF CARE
Problem: Chemotherapy Effects (Adult)  Goal: Signs and Symptoms of Listed Potential Problems Will be Absent or Manageable (Chemotherapy Effects)  Signs and symptoms of listed potential problems will be absent or manageable by discharge/transition of care (reference Chemotherapy Effects (Adult) CPG).   Outcome: Ongoing (interventions implemented as appropriate)  Patient here for Opdivo and Xgeva.  Assessment complete and labs reviewed.  VSS.  Patient endorses taking home calcium and vitamin D.  Denies jaw pain and recent dental surgery.  Chair reclined and blanket offered.  No needs expressed at this time.  Will continue to monitor.

## 2018-06-01 NOTE — PLAN OF CARE
Problem: Patient Care Overview (Adult)  Goal: Plan of Care Review  Patient tolerated infusion well.  No reaction suspected.  AVS given to patient.  No questions or concerns.  Patient ambulated off unit unassisted.

## 2018-06-01 NOTE — PROGRESS NOTES
"Subjective:       Patient ID: Yao Alan is a 43 y.o. male.    Chief Complaint: Primary malignant neoplasm of right lung metastatic to other  Oncologic History:  Mr. Yao Alan is a 43-year-old male who has a long history of smoking and was seen in the Pulmonary Clinic by Dr. Valle on 03/05/2015 with abnormal CT scan.    Apparently, he went to the [facility] in February with coughing as well as chest pain. A chest x-ray was done, which revealed a left upper lobe lung mass. Followup CT scan was done on 02/12/2015 and that revealed posterior superior mediastinal mass adjacent and prominent enlarged upper left mediastinal lymph nodes, abutting the aortic arch as well as left subclavian artery. A  CT scan of the abdomen was done on 03/03/2015 without contrast and that revealed nonobstructive nephrolithiasis, but a 2.1 cm lytic lesion involving the left iliac wing concerning for metastatic disease given the presence of emphysema and a mediastinal soft tissue mass on the CT chest from 02/12/2015. He saw Dr. Valle after that on 03/05/2015 and underwent an IR guided biopsy of the bone lesion on 03/17/2015. 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    His EGFR/EML/ALK is negative.    His PET scan on 3/25/15 revealed Left upper lobe lung lesion with an adjacent para-aortic lymph node, right anterior rib metastasis, right iliac metastasis, and pancreatic metastasis. A pancreatic cancer primary neoplasm is less likely.  MRI brain was negative for mets.  He completed 6 cycles of Carboplatin and Alimta and was on maintenance Alimta until end of March 2016.  His bone scan from 3/16 revealed stable disease. His CT scans also from end of March 2016 revealed "Interval enlargement of left upper lobe lung mass measuring 5.9 x 3.9 cm (previously 3.3 x 2.5 cm). This mass appears to invade the mediastinum and abutting the aortic arch and left subclavian artery"     He is now on Opdivo since " March 2016               HPI Mr. Alan returns for follow up and Opdivo. He feels well and denies any new issues  His ECOG PS is zero.      Review of Systems   Constitutional: Negative for appetite change, fatigue and unexpected weight change.   HENT: Negative for mouth sores.    Eyes: Negative for visual disturbance.   Respiratory: Negative for cough and shortness of breath.    Cardiovascular: Negative for chest pain.   Gastrointestinal: Negative for abdominal pain and diarrhea.   Genitourinary: Negative for frequency.   Musculoskeletal: Negative for back pain.   Skin: Negative for rash.   Neurological: Negative for headaches.   Hematological: Negative for adenopathy.   Psychiatric/Behavioral: The patient is not nervous/anxious.    All other systems reviewed and are negative.      PMFSH: all information reviewed and updated as relevant to today's visit  Objective:      Physical Exam   Constitutional: He is oriented to person, place, and time. He appears well-developed and well-nourished.   HENT:   Mouth/Throat: No oropharyngeal exudate.   Cardiovascular: Normal rate and normal heart sounds.    Pulmonary/Chest: Effort normal and breath sounds normal. He has no wheezes.   Abdominal: Soft. Bowel sounds are normal. There is no tenderness.   Musculoskeletal: He exhibits no edema or tenderness.   Lymphadenopathy:     He has no cervical adenopathy.   Neurological: He is alert and oriented to person, place, and time. Coordination normal.   Skin: Skin is warm and dry. No rash noted.   Psychiatric: He has a normal mood and affect. Judgment and thought content normal.   Vitals reviewed.      LABS:  WBC   Date Value Ref Range Status   06/01/2018 8.53 3.90 - 12.70 K/uL Final     Hemoglobin   Date Value Ref Range Status   06/01/2018 14.6 14.0 - 18.0 g/dL Final     Hematocrit   Date Value Ref Range Status   06/01/2018 45.3 40.0 - 54.0 % Final     Platelets   Date Value Ref Range Status   06/01/2018 312 150 - 350 K/uL Final     Gran  # (ANC)   Date Value Ref Range Status   06/01/2018 4.1 1.8 - 7.7 K/uL Final     Comment:     The ANC is based on a white cell differential from an   automated cell counter. It has not been microscopically   reviewed for the presence of abnormal cells. Clinical   correlation is required.         Chemistry        Component Value Date/Time     06/01/2018 0822    K 3.8 06/01/2018 0822     06/01/2018 0822    CO2 27 06/01/2018 0822    BUN 18 06/01/2018 0822    CREATININE 1.5 (H) 06/01/2018 0822    GLU 88 06/01/2018 0822        Component Value Date/Time    CALCIUM 10.0 06/01/2018 0822    ALKPHOS 71 06/01/2018 0822    AST 27 06/01/2018 0822    ALT 27 06/01/2018 0822    BILITOT 0.4 06/01/2018 0822    ESTGFRAFRICA >60.0 06/01/2018 0822    EGFRNONAA 56.2 (A) 06/01/2018 0822        TSH   Date Value Ref Range Status   05/17/2018 0.640 0.400 - 4.000 uIU/mL Final     Free T4   Date Value Ref Range Status   05/17/2018 1.10 0.71 - 1.51 ng/dL Final       Assessment:       1. Malignant neoplasm of upper lobe of right lung    2. Secondary malignant neoplasm of bone    3. Hypothyroidism due to medication        Plan:        1,2,3. He is doing well clinically and will proceed with Opdivo and will return in 2 weeks for next cycle with restaging scans prior. He is also due for Xgeva today.    Above care plan was discussed with patient and all questions were addressed to his satisfaction

## 2018-06-01 NOTE — Clinical Note
Schedule CBC,CMP, TSH, free T4, CT chest, abdomen/pelvis and bone scan and see me in 2 weeks and for Opdivo

## 2018-06-06 ENCOUNTER — DOCUMENTATION ONLY (OUTPATIENT)
Dept: HEMATOLOGY/ONCOLOGY | Facility: CLINIC | Age: 43
End: 2018-06-06

## 2018-06-06 DIAGNOSIS — I82.402 DEEP VEIN THROMBOSIS (DVT) OF LEFT LOWER EXTREMITY, UNSPECIFIED CHRONICITY, UNSPECIFIED VEIN: ICD-10-CM

## 2018-06-06 DIAGNOSIS — E03.9 HYPOTHYROIDISM, UNSPECIFIED TYPE: ICD-10-CM

## 2018-06-06 DIAGNOSIS — G89.3 NEOPLASM RELATED PAIN: ICD-10-CM

## 2018-06-06 RX ORDER — LEVOTHYROXINE SODIUM 100 UG/1
100 TABLET ORAL DAILY
Qty: 30 TABLET | Refills: 1 | Status: SHIPPED | OUTPATIENT
Start: 2018-06-06 | End: 2018-08-09 | Stop reason: SDUPTHER

## 2018-06-06 RX ORDER — OXYCODONE AND ACETAMINOPHEN 10; 325 MG/1; MG/1
1 TABLET ORAL EVERY 6 HOURS PRN
Qty: 120 TABLET | Refills: 0 | Status: SHIPPED | OUTPATIENT
Start: 2018-06-06 | End: 2018-07-09 | Stop reason: SDUPTHER

## 2018-06-06 NOTE — TELEPHONE ENCOUNTER
----- Message from Zaida Cevallos LCSW sent at 6/6/2018  1:36 PM CDT -----  Contact: Patient  Received a call from patient - he needs refills on Oxycodone, Synthroid and Xarelto. Please send to [facility], as he gets help through [employer] for co-pays.    Thanks,           Zaida

## 2018-06-06 NOTE — PROGRESS NOTES
Received call from patient re: needing assistance with refill prescriptions for Oxycodone, Synthroid, and Xarelto. Sent an Epic message to Dr. Garay/RN Yasmin. Later spoke with Yasmin and she said that Dr. Garay sent the pain medication prescription to [facility]. Patient had refills remaining for the other two. Called [facility], ext. [phone], and confirmed with staff that the medications are in stock and the prescriptions are being processed and filled today. Asked staff to contact [facility] for the authorization letter for his co-pays. Called patient back at [phone] and informed him of the arrangements, which he stated agreement with. Patient inquired about resources for dentures. Will continue to follow and assist as needed.

## 2018-06-14 ENCOUNTER — HOSPITAL ENCOUNTER (OUTPATIENT)
Dept: RADIOLOGY | Facility: HOSPITAL | Age: 43
Discharge: HOME | End: 2018-06-14
Attending: INTERNAL MEDICINE
Payer: MEDICARE

## 2018-06-14 DIAGNOSIS — C34.11 MALIGNANT NEOPLASM OF UPPER LOBE OF RIGHT LUNG: ICD-10-CM

## 2018-06-14 PROCEDURE — A9503 TC99M MEDRONATE: HCPCS

## 2018-06-14 PROCEDURE — 74177 CT ABD & PELVIS W/CONTRAST: CPT | Mod: 26,,, | Performed by: RADIOLOGY

## 2018-06-14 PROCEDURE — 25500020 PHARM REV CODE 255: Performed by: INTERNAL MEDICINE

## 2018-06-14 PROCEDURE — 78306 BONE IMAGING WHOLE BODY: CPT | Mod: 26,,, | Performed by: RADIOLOGY

## 2018-06-14 PROCEDURE — 71260 CT THORAX DX C+: CPT | Mod: TC

## 2018-06-14 PROCEDURE — 71260 CT THORAX DX C+: CPT | Mod: 26,,, | Performed by: RADIOLOGY

## 2018-06-14 PROCEDURE — 74177 CT ABD & PELVIS W/CONTRAST: CPT | Mod: TC

## 2018-06-14 RX ADMIN — IOHEXOL 75 ML: 350 INJECTION, SOLUTION INTRAVENOUS at 01:06

## 2018-06-14 RX ADMIN — IOHEXOL 15 ML: 350 INJECTION, SOLUTION INTRAVENOUS at 01:06

## 2018-06-15 ENCOUNTER — LAB VISIT (OUTPATIENT)
Dept: LAB | Facility: HOSPITAL | Age: 43
End: 2018-06-15
Attending: INTERNAL MEDICINE
Payer: MEDICARE

## 2018-06-15 ENCOUNTER — INFUSION (OUTPATIENT)
Dept: INFUSION THERAPY | Facility: HOSPITAL | Age: 43
End: 2018-06-15
Attending: INTERNAL MEDICINE
Payer: MEDICARE

## 2018-06-15 ENCOUNTER — TELEPHONE (OUTPATIENT)
Dept: HEMATOLOGY/ONCOLOGY | Facility: CLINIC | Age: 43
End: 2018-06-15

## 2018-06-15 ENCOUNTER — OFFICE VISIT (OUTPATIENT)
Dept: HEMATOLOGY/ONCOLOGY | Facility: CLINIC | Age: 43
End: 2018-06-15
Payer: MEDICARE

## 2018-06-15 VITALS
HEIGHT: 66 IN | TEMPERATURE: 98 F | BODY MASS INDEX: 29.37 KG/M2 | DIASTOLIC BLOOD PRESSURE: 82 MMHG | RESPIRATION RATE: 18 BRPM | WEIGHT: 182.75 LBS | SYSTOLIC BLOOD PRESSURE: 119 MMHG | HEART RATE: 69 BPM

## 2018-06-15 VITALS
TEMPERATURE: 98 F | WEIGHT: 182.75 LBS | HEIGHT: 66 IN | HEART RATE: 76 BPM | SYSTOLIC BLOOD PRESSURE: 117 MMHG | RESPIRATION RATE: 16 BRPM | OXYGEN SATURATION: 96 % | DIASTOLIC BLOOD PRESSURE: 77 MMHG | BODY MASS INDEX: 29.37 KG/M2

## 2018-06-15 DIAGNOSIS — E03.2 HYPOTHYROIDISM DUE TO MEDICATION: ICD-10-CM

## 2018-06-15 DIAGNOSIS — C79.51 SECONDARY MALIGNANT NEOPLASM OF BONE: ICD-10-CM

## 2018-06-15 DIAGNOSIS — D50.0 IRON DEFICIENCY ANEMIA DUE TO CHRONIC BLOOD LOSS: Primary | ICD-10-CM

## 2018-06-15 DIAGNOSIS — C34.91 LUNG CANCER, PRIMARY, WITH METASTASIS FROM LUNG TO OTHER SITE, RIGHT: ICD-10-CM

## 2018-06-15 DIAGNOSIS — C34.11 MALIGNANT NEOPLASM OF UPPER LOBE OF RIGHT LUNG: Primary | ICD-10-CM

## 2018-06-15 DIAGNOSIS — C34.11 MALIGNANT NEOPLASM OF UPPER LOBE OF RIGHT LUNG: ICD-10-CM

## 2018-06-15 LAB
ALBUMIN SERPL BCP-MCNC: 3.8 G/DL
ALP SERPL-CCNC: 72 U/L
ALT SERPL W/O P-5'-P-CCNC: 23 U/L
ANION GAP SERPL CALC-SCNC: 9 MMOL/L
AST SERPL-CCNC: 21 U/L
BILIRUB SERPL-MCNC: 0.3 MG/DL
BUN SERPL-MCNC: 12 MG/DL
CALCIUM SERPL-MCNC: 9.5 MG/DL
CHLORIDE SERPL-SCNC: 105 MMOL/L
CO2 SERPL-SCNC: 26 MMOL/L
CREAT SERPL-MCNC: 1.4 MG/DL
ERYTHROCYTE [DISTWIDTH] IN BLOOD BY AUTOMATED COUNT: 14.6 %
EST. GFR  (AFRICAN AMERICAN): >60 ML/MIN/1.73 M^2
EST. GFR  (NON AFRICAN AMERICAN): >60 ML/MIN/1.73 M^2
GLUCOSE SERPL-MCNC: 106 MG/DL
HCT VFR BLD AUTO: 41.8 %
HGB BLD-MCNC: 13.9 G/DL
IMM GRANULOCYTES # BLD AUTO: 0.02 K/UL
MCH RBC QN AUTO: 26.9 PG
MCHC RBC AUTO-ENTMCNC: 33.3 G/DL
MCV RBC AUTO: 81 FL
NEUTROPHILS # BLD AUTO: 3.4 K/UL
PLATELET # BLD AUTO: 274 K/UL
PMV BLD AUTO: 11.2 FL
POTASSIUM SERPL-SCNC: 3.7 MMOL/L
PROT SERPL-MCNC: 7.2 G/DL
RBC # BLD AUTO: 5.16 M/UL
SODIUM SERPL-SCNC: 140 MMOL/L
T4 FREE SERPL-MCNC: 1.11 NG/DL
TSH SERPL DL<=0.005 MIU/L-ACNC: 0.46 UIU/ML
WBC # BLD AUTO: 7.58 K/UL

## 2018-06-15 PROCEDURE — 99214 OFFICE O/P EST MOD 30 MIN: CPT | Mod: PBBFAC | Performed by: INTERNAL MEDICINE

## 2018-06-15 PROCEDURE — 63600175 PHARM REV CODE 636 W HCPCS: Performed by: INTERNAL MEDICINE

## 2018-06-15 PROCEDURE — 25000003 PHARM REV CODE 250: Performed by: INTERNAL MEDICINE

## 2018-06-15 PROCEDURE — 99999 PR PBB SHADOW E&M-EST. PATIENT-LVL IV: CPT | Mod: PBBFAC,,, | Performed by: INTERNAL MEDICINE

## 2018-06-15 PROCEDURE — 84439 ASSAY OF FREE THYROXINE: CPT

## 2018-06-15 PROCEDURE — 84443 ASSAY THYROID STIM HORMONE: CPT

## 2018-06-15 PROCEDURE — 99215 OFFICE O/P EST HI 40 MIN: CPT | Mod: S$PBB,,, | Performed by: INTERNAL MEDICINE

## 2018-06-15 PROCEDURE — 36415 COLL VENOUS BLD VENIPUNCTURE: CPT

## 2018-06-15 PROCEDURE — 96413 CHEMO IV INFUSION 1 HR: CPT

## 2018-06-15 PROCEDURE — 80053 COMPREHEN METABOLIC PANEL: CPT

## 2018-06-15 PROCEDURE — 85027 COMPLETE CBC AUTOMATED: CPT

## 2018-06-15 RX ORDER — SODIUM CHLORIDE 0.9 % (FLUSH) 0.9 %
10 SYRINGE (ML) INJECTION
Status: DISCONTINUED | OUTPATIENT
Start: 2018-06-15 | End: 2018-06-15 | Stop reason: HOSPADM

## 2018-06-15 RX ORDER — HEPARIN 100 UNIT/ML
500 SYRINGE INTRAVENOUS
Status: DISCONTINUED | OUTPATIENT
Start: 2018-06-15 | End: 2018-06-15 | Stop reason: HOSPADM

## 2018-06-15 RX ORDER — HEPARIN 100 UNIT/ML
500 SYRINGE INTRAVENOUS
Status: CANCELLED | OUTPATIENT
Start: 2018-06-15

## 2018-06-15 RX ORDER — SODIUM CHLORIDE 0.9 % (FLUSH) 0.9 %
10 SYRINGE (ML) INJECTION
Status: CANCELLED | OUTPATIENT
Start: 2018-06-15

## 2018-06-15 RX ADMIN — HEPARIN 500 UNITS: 100 SYRINGE at 09:06

## 2018-06-15 RX ADMIN — SODIUM CHLORIDE 240 MG: 9 INJECTION, SOLUTION INTRAVENOUS at 09:06

## 2018-06-15 NOTE — PLAN OF CARE
Problem: Patient Care Overview (Adult)  Goal: Plan of Care Review  Outcome: Ongoing (interventions implemented as appropriate)  Pt. Tolerated infusion. Port flushed, hep-locked and de-accessed. No questions at this time.

## 2018-06-15 NOTE — PROGRESS NOTES
"Subjective:       Patient ID: Yao Alan is a 43 y.o. male.    Chief Complaint: Malignant neoplasm of upper lobe of right lung and Fatigue  Oncologic History:  Mr. Yao Alan is a 43-year-old male who has a long history of smoking and was seen in the Pulmonary Clinic by Dr. Valle on 03/05/2015 with abnormal CT scan.    Apparently, he went to the [facility] in February with coughing as well as chest pain. A chest x-ray was done, which revealed a left upper lobe lung mass. Followup CT scan was done on 02/12/2015 and that revealed posterior superior mediastinal mass adjacent and prominent enlarged upper left mediastinal lymph nodes, abutting the aortic arch as well as left subclavian artery. A  CT scan of the abdomen was done on 03/03/2015 without contrast and that revealed nonobstructive nephrolithiasis, but a 2.1 cm lytic lesion involving the left iliac wing concerning for metastatic disease given the presence of emphysema and a mediastinal soft tissue mass on the CT chest from 02/12/2015. He saw Dr. Valle after that on 03/05/2015 and underwent an IR guided biopsy of the bone lesion on 03/17/2015. 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    His EGFR/EML/ALK is negative.    His PET scan on 3/25/15 revealed Left upper lobe lung lesion with an adjacent para-aortic lymph node, right anterior rib metastasis, right iliac metastasis, and pancreatic metastasis. A pancreatic cancer primary neoplasm is less likely.  MRI brain was negative for mets.  He completed 6 cycles of Carboplatin and Alimta and was on maintenance Alimta until end of March 2016.  His bone scan from 3/16 revealed stable disease. His CT scans also from end of March 2016 revealed "Interval enlargement of left upper lobe lung mass measuring 5.9 x 3.9 cm (previously 3.3 x 2.5 cm). This mass appears to invade the mediastinum and abutting the aortic arch and left subclavian artery"     He is now on Opdivo since " "March 2016                HPI Mr. Alan returns for follow up and Opdivo. CT scans reveal "Paraseptal emphysematous changes, similar to prior exams. Patchy pulmonary ground-glass attenuation, most prominent in the left lower lobe, and irregular opacities which may reflect edema, infection, and/or atelectasis"  Bone scans reveal no evidence of disease recurrence.    Review of Systems   Constitutional: Negative for appetite change, fatigue and unexpected weight change.   HENT: Negative for mouth sores.    Eyes: Negative for visual disturbance.   Respiratory: Negative for cough and shortness of breath.    Cardiovascular: Negative for chest pain.   Gastrointestinal: Negative for abdominal pain and diarrhea.   Genitourinary: Negative for frequency.   Musculoskeletal: Negative for back pain.   Skin: Negative for rash.   Neurological: Negative for headaches.   Hematological: Negative for adenopathy.   Psychiatric/Behavioral: The patient is not nervous/anxious.    All other systems reviewed and are negative.      Objective:      Physical Exam   Constitutional: He is oriented to person, place, and time. He appears well-developed and well-nourished.   HENT:   Mouth/Throat: No oropharyngeal exudate.   Cardiovascular: Normal rate and normal heart sounds.    Pulmonary/Chest: Effort normal and breath sounds normal. He has no wheezes.   Abdominal: Soft. Bowel sounds are normal. There is no tenderness.   Musculoskeletal: He exhibits no edema or tenderness.   Lymphadenopathy:     He has no cervical adenopathy.   Neurological: He is alert and oriented to person, place, and time. Coordination normal.   Skin: Skin is warm and dry. No rash noted.   Psychiatric: He has a normal mood and affect. Judgment and thought content normal.   Vitals reviewed.      LABS:  WBC   Date Value Ref Range Status   06/15/2018 7.58 3.90 - 12.70 K/uL Final     Hemoglobin   Date Value Ref Range Status   06/15/2018 13.9 (L) 14.0 - 18.0 g/dL Final     Hematocrit "   Date Value Ref Range Status   06/15/2018 41.8 40.0 - 54.0 % Final     Platelets   Date Value Ref Range Status   06/15/2018 274 150 - 350 K/uL Final     Gran # (ANC)   Date Value Ref Range Status   06/15/2018 3.4 1.8 - 7.7 K/uL Final     Comment:     The ANC is based on a white cell differential from an   automated cell counter. It has not been microscopically   reviewed for the presence of abnormal cells. Clinical   correlation is required.         Chemistry        Component Value Date/Time     06/15/2018 0710    K 3.7 06/15/2018 0710     06/15/2018 0710    CO2 26 06/15/2018 0710    BUN 12 06/15/2018 0710    CREATININE 1.4 06/15/2018 0710     06/15/2018 0710        Component Value Date/Time    CALCIUM 9.5 06/15/2018 0710    ALKPHOS 72 06/15/2018 0710    AST 21 06/15/2018 0710    ALT 23 06/15/2018 0710    BILITOT 0.3 06/15/2018 0710    ESTGFRAFRICA >60.0 06/15/2018 0710    EGFRNONAA >60.0 06/15/2018 0710        TSH   Date Value Ref Range Status   06/15/2018 0.459 0.400 - 4.000 uIU/mL Final     Free T4   Date Value Ref Range Status   06/15/2018 1.11 0.71 - 1.51 ng/dL Final       Assessment:       1. Malignant neoplasm of upper lobe of right lung    2. Secondary malignant neoplasm of bone        Plan:        1,2. He is doing well with no evidence of recurrence and will proceed with Opdivo and will return in 2 weeks for next cycle of Opdivo and Xgeva.    Above care plan was discussed with patient  and all questions were addressed to his satisfaction

## 2018-06-15 NOTE — TELEPHONE ENCOUNTER
----- Message from Bel Garay MD sent at 6/15/2018  8:30 AM CDT -----  Schedule CBC,CMp, and see me in 2 weeks and for Xgeva and Opdivo  
We can see him on the 27th---and he can get treated on the 29th.  ~sonali    
57

## 2018-06-27 ENCOUNTER — OFFICE VISIT (OUTPATIENT)
Dept: HEMATOLOGY/ONCOLOGY | Facility: CLINIC | Age: 43
End: 2018-06-27
Payer: MEDICARE

## 2018-06-27 VITALS
HEART RATE: 70 BPM | SYSTOLIC BLOOD PRESSURE: 121 MMHG | HEIGHT: 66 IN | TEMPERATURE: 98 F | OXYGEN SATURATION: 96 % | BODY MASS INDEX: 29.62 KG/M2 | DIASTOLIC BLOOD PRESSURE: 89 MMHG | WEIGHT: 184.31 LBS | RESPIRATION RATE: 16 BRPM

## 2018-06-27 DIAGNOSIS — C79.51 SECONDARY MALIGNANT NEOPLASM OF BONE: ICD-10-CM

## 2018-06-27 DIAGNOSIS — C34.11 MALIGNANT NEOPLASM OF UPPER LOBE OF RIGHT LUNG: Primary | ICD-10-CM

## 2018-06-27 PROCEDURE — 99215 OFFICE O/P EST HI 40 MIN: CPT | Mod: S$PBB,,, | Performed by: INTERNAL MEDICINE

## 2018-06-27 PROCEDURE — 99999 PR PBB SHADOW E&M-EST. PATIENT-LVL IV: CPT | Mod: PBBFAC,,, | Performed by: INTERNAL MEDICINE

## 2018-06-27 PROCEDURE — 99214 OFFICE O/P EST MOD 30 MIN: CPT | Mod: PBBFAC | Performed by: INTERNAL MEDICINE

## 2018-06-27 RX ORDER — SODIUM CHLORIDE 0.9 % (FLUSH) 0.9 %
10 SYRINGE (ML) INJECTION
Status: CANCELLED | OUTPATIENT
Start: 2018-06-29

## 2018-06-27 RX ORDER — HEPARIN 100 UNIT/ML
500 SYRINGE INTRAVENOUS
Status: CANCELLED | OUTPATIENT
Start: 2018-06-29

## 2018-06-27 NOTE — PROGRESS NOTES
"Subjective:       Patient ID: Yao Alan is a 43 y.o. male.    Chief Complaint: Malignant neoplasm of upper lobe of right lung  Oncologic History:  Mr. Yao Alan is a 43-year-old male who has a long history of smoking and was seen in the Pulmonary Clinic by Dr. Valle on 03/05/2015 with abnormal CT scan.    Apparently, he went to the [facility] in February with coughing as well as chest pain. A chest x-ray was done, which revealed a left upper lobe lung mass. Followup CT scan was done on 02/12/2015 and that revealed posterior superior mediastinal mass adjacent and prominent enlarged upper left mediastinal lymph nodes, abutting the aortic arch as well as left subclavian artery. A  CT scan of the abdomen was done on 03/03/2015 without contrast and that revealed nonobstructive nephrolithiasis, but a 2.1 cm lytic lesion involving the left iliac wing concerning for metastatic disease given the presence of emphysema and a mediastinal soft tissue mass on the CT chest from 02/12/2015. He saw Dr. Valle after that on 03/05/2015 and underwent an IR guided biopsy of the bone lesion on 03/17/2015. 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    His EGFR/EML/ALK is negative.    His PET scan on 3/25/15 revealed Left upper lobe lung lesion with an adjacent para-aortic lymph node, right anterior rib metastasis, right iliac metastasis, and pancreatic metastasis. A pancreatic cancer primary neoplasm is less likely.  MRI brain was negative for mets.  He completed 6 cycles of Carboplatin and Alimta and was on maintenance Alimta until end of March 2016.  His bone scan from 3/16 revealed stable disease. His CT scans also from end of March 2016 revealed "Interval enlargement of left upper lobe lung mass measuring 5.9 x 3.9 cm (previously 3.3 x 2.5 cm). This mass appears to invade the mediastinum and abutting the aortic arch and left subclavian artery"     He is now on Opdivo since March " "2016             HPI Mr. Alan returns for follow up and Opdivo. CT scans reveal "Paraseptal emphysematous changes, similar to prior exams. Patchy pulmonary ground-glass attenuation, most prominent in the left lower lobe, and irregular opacities which may reflect edema, infection, and/or atelectasis"  Bone scans reveal no evidence of disease recurrence.  He feels well and denies any new issues.    Review of Systems   Constitutional: Negative for appetite change, fatigue and unexpected weight change.   HENT: Negative for mouth sores.    Eyes: Negative for visual disturbance.   Respiratory: Negative for cough and shortness of breath.    Cardiovascular: Negative for chest pain.   Gastrointestinal: Negative for abdominal pain and diarrhea.   Genitourinary: Negative for frequency.   Musculoskeletal: Negative for back pain.   Skin: Negative for rash.   Neurological: Negative for headaches.   Hematological: Negative for adenopathy.   Psychiatric/Behavioral: The patient is not nervous/anxious.    All other systems reviewed and are negative.      Objective:      Physical Exam   Constitutional: He is oriented to person, place, and time. He appears well-developed and well-nourished.   HENT:   Mouth/Throat: No oropharyngeal exudate.   Cardiovascular: Normal rate and normal heart sounds.    Pulmonary/Chest: Effort normal and breath sounds normal. He has no wheezes.   Abdominal: Soft. Bowel sounds are normal. There is no tenderness.   Musculoskeletal: He exhibits no edema or tenderness.   Lymphadenopathy:     He has no cervical adenopathy.   Neurological: He is alert and oriented to person, place, and time. Coordination normal.   Skin: Skin is warm and dry. No rash noted.   Psychiatric: He has a normal mood and affect. Judgment and thought content normal.   Vitals reviewed.      LABS:  WBC   Date Value Ref Range Status   06/27/2018 8.35 3.90 - 12.70 K/uL Final     Hemoglobin   Date Value Ref Range Status   06/27/2018 13.5 (L) 14.0 " - 18.0 g/dL Final     Hematocrit   Date Value Ref Range Status   06/27/2018 42.4 40.0 - 54.0 % Final     Platelets   Date Value Ref Range Status   06/27/2018 245 150 - 350 K/uL Final     Gran # (ANC)   Date Value Ref Range Status   06/27/2018 4.1 1.8 - 7.7 K/uL Final     Comment:     The ANC is based on a white cell differential from an   automated cell counter. It has not been microscopically   reviewed for the presence of abnormal cells. Clinical   correlation is required.         Chemistry        Component Value Date/Time     06/27/2018 0708    K 3.8 06/27/2018 0708     06/27/2018 0708    CO2 26 06/27/2018 0708    BUN 16 06/27/2018 0708    CREATININE 1.3 06/27/2018 0708     (H) 06/27/2018 0708        Component Value Date/Time    CALCIUM 9.8 06/27/2018 0708    ALKPHOS 67 06/27/2018 0708    AST 18 06/27/2018 0708    ALT 18 06/27/2018 0708    BILITOT 0.3 06/27/2018 0708    ESTGFRAFRICA >60.0 06/27/2018 0708    EGFRNONAA >60.0 06/27/2018 0708          Assessment:       1. Malignant neoplasm of upper lobe of right lung    2. Secondary malignant neoplasm of bone        Plan:        1,2. He is doing well clinically and will proceed with Opdivo and Xgeva and will return in 2 weeks for next cycle    Above care plan was discussed with patient and all questions were addressed to his satisfaction

## 2018-06-29 ENCOUNTER — INFUSION (OUTPATIENT)
Dept: INFUSION THERAPY | Facility: HOSPITAL | Age: 43
End: 2018-06-29
Attending: INTERNAL MEDICINE
Payer: MEDICARE

## 2018-06-29 VITALS
DIASTOLIC BLOOD PRESSURE: 88 MMHG | RESPIRATION RATE: 18 BRPM | SYSTOLIC BLOOD PRESSURE: 132 MMHG | HEART RATE: 85 BPM | TEMPERATURE: 98 F

## 2018-06-29 DIAGNOSIS — D50.0 IRON DEFICIENCY ANEMIA DUE TO CHRONIC BLOOD LOSS: Primary | ICD-10-CM

## 2018-06-29 DIAGNOSIS — C34.91 LUNG CANCER, PRIMARY, WITH METASTASIS FROM LUNG TO OTHER SITE, RIGHT: ICD-10-CM

## 2018-06-29 DIAGNOSIS — C34.11 MALIGNANT NEOPLASM OF UPPER LOBE OF RIGHT LUNG: ICD-10-CM

## 2018-06-29 DIAGNOSIS — C79.51 SECONDARY MALIGNANT NEOPLASM OF BONE: ICD-10-CM

## 2018-06-29 PROCEDURE — 96413 CHEMO IV INFUSION 1 HR: CPT

## 2018-06-29 PROCEDURE — 96372 THER/PROPH/DIAG INJ SC/IM: CPT | Mod: 59

## 2018-06-29 PROCEDURE — 63600175 PHARM REV CODE 636 W HCPCS: Performed by: INTERNAL MEDICINE

## 2018-06-29 PROCEDURE — A4216 STERILE WATER/SALINE, 10 ML: HCPCS | Performed by: INTERNAL MEDICINE

## 2018-06-29 PROCEDURE — 25000003 PHARM REV CODE 250: Performed by: INTERNAL MEDICINE

## 2018-06-29 RX ORDER — SODIUM CHLORIDE 0.9 % (FLUSH) 0.9 %
10 SYRINGE (ML) INJECTION
Status: DISCONTINUED | OUTPATIENT
Start: 2018-06-29 | End: 2018-06-29 | Stop reason: HOSPADM

## 2018-06-29 RX ORDER — HEPARIN 100 UNIT/ML
500 SYRINGE INTRAVENOUS
Status: DISCONTINUED | OUTPATIENT
Start: 2018-06-29 | End: 2018-06-29 | Stop reason: HOSPADM

## 2018-06-29 RX ADMIN — SODIUM CHLORIDE, PRESERVATIVE FREE 10 ML: 5 INJECTION INTRAVENOUS at 09:06

## 2018-06-29 RX ADMIN — SODIUM CHLORIDE 240 MG: 9 INJECTION, SOLUTION INTRAVENOUS at 09:06

## 2018-06-29 RX ADMIN — DENOSUMAB 120 MG: 120 INJECTION SUBCUTANEOUS at 09:06

## 2018-06-29 RX ADMIN — HEPARIN 500 UNITS: 100 SYRINGE at 09:06

## 2018-06-29 NOTE — PLAN OF CARE
Problem: Patient Care Overview (Adult)  Goal: Plan of Care Review  Outcome: Outcome(s) achieved Date Met: 06/29/18  1019 -- Patient tolerated treatment well.  Vital signs stable.  No apparent distress noted.  Discharged without S/S of adverse effects.  AVS given.  Patient instructed to call provider with any questions or concerns.

## 2018-07-09 ENCOUNTER — DOCUMENTATION ONLY (OUTPATIENT)
Dept: HEMATOLOGY/ONCOLOGY | Facility: CLINIC | Age: 43
End: 2018-07-09

## 2018-07-09 DIAGNOSIS — G89.3 NEOPLASM RELATED PAIN: ICD-10-CM

## 2018-07-09 RX ORDER — OXYCODONE AND ACETAMINOPHEN 10; 325 MG/1; MG/1
1 TABLET ORAL EVERY 6 HOURS PRN
Qty: 120 TABLET | Refills: 0 | Status: SHIPPED | OUTPATIENT
Start: 2018-07-09 | End: 2018-08-09 | Stop reason: SDUPTHER

## 2018-07-09 NOTE — TELEPHONE ENCOUNTER
----- Message from Edwige Freedman sent at 7/9/2018 11:44 AM CDT -----  Contact: Pt  Pt calling requesting refills on Synthroid, Percocet and Xarelto. Please send to [facility] pharmacy        Pt call back number [phone]

## 2018-07-09 NOTE — PROGRESS NOTES
Received missed call/VM message from patient ([phone]) and returned call to him. He is requesting assistance getting refill prescriptions. The staff at [facility] was already processing his Xarelto and Synthroid refills, but he needs a prescription for his pain medication, Percocet. Sent message via Epic to AMALIA Hoffman/Dr. Garay. Called [facility] and spoke with Ty re: these three prescriptions and provided her with the phone number for CECIL to call for authorization for his co-pays. Prescription medications being filled and ready for patient today. No other needs indicated at this time.

## 2018-07-12 ENCOUNTER — TELEPHONE (OUTPATIENT)
Dept: HEMATOLOGY/ONCOLOGY | Facility: CLINIC | Age: 43
End: 2018-07-12

## 2018-07-12 ENCOUNTER — OFFICE VISIT (OUTPATIENT)
Dept: HEMATOLOGY/ONCOLOGY | Facility: CLINIC | Age: 43
End: 2018-07-12
Payer: MEDICARE

## 2018-07-12 ENCOUNTER — LAB VISIT (OUTPATIENT)
Dept: LAB | Facility: HOSPITAL | Age: 43
End: 2018-07-12
Attending: INTERNAL MEDICINE
Payer: MEDICARE

## 2018-07-12 VITALS
WEIGHT: 184.06 LBS | OXYGEN SATURATION: 99 % | TEMPERATURE: 98 F | RESPIRATION RATE: 18 BRPM | SYSTOLIC BLOOD PRESSURE: 127 MMHG | DIASTOLIC BLOOD PRESSURE: 91 MMHG | BODY MASS INDEX: 29.58 KG/M2 | HEART RATE: 70 BPM | HEIGHT: 66 IN

## 2018-07-12 DIAGNOSIS — E03.2 HYPOTHYROIDISM DUE TO MEDICATION: ICD-10-CM

## 2018-07-12 DIAGNOSIS — C34.11 MALIGNANT NEOPLASM OF UPPER LOBE OF RIGHT LUNG: Primary | ICD-10-CM

## 2018-07-12 DIAGNOSIS — C34.11 MALIGNANT NEOPLASM OF UPPER LOBE OF RIGHT LUNG: ICD-10-CM

## 2018-07-12 DIAGNOSIS — C79.51 SECONDARY MALIGNANT NEOPLASM OF BONE: ICD-10-CM

## 2018-07-12 LAB
ALBUMIN SERPL BCP-MCNC: 3.9 G/DL
ALP SERPL-CCNC: 74 U/L
ALT SERPL W/O P-5'-P-CCNC: 22 U/L
ANION GAP SERPL CALC-SCNC: 11 MMOL/L
AST SERPL-CCNC: 23 U/L
BILIRUB SERPL-MCNC: 0.2 MG/DL
BUN SERPL-MCNC: 14 MG/DL
CALCIUM SERPL-MCNC: 10.3 MG/DL
CHLORIDE SERPL-SCNC: 106 MMOL/L
CO2 SERPL-SCNC: 25 MMOL/L
CREAT SERPL-MCNC: 1.4 MG/DL
ERYTHROCYTE [DISTWIDTH] IN BLOOD BY AUTOMATED COUNT: 14.6 %
EST. GFR  (AFRICAN AMERICAN): >60 ML/MIN/1.73 M^2
EST. GFR  (NON AFRICAN AMERICAN): >60 ML/MIN/1.73 M^2
GLUCOSE SERPL-MCNC: 97 MG/DL
HCT VFR BLD AUTO: 44.6 %
HGB BLD-MCNC: 14 G/DL
IMM GRANULOCYTES # BLD AUTO: 0.03 K/UL
MCH RBC QN AUTO: 25.8 PG
MCHC RBC AUTO-ENTMCNC: 31.4 G/DL
MCV RBC AUTO: 82 FL
NEUTROPHILS # BLD AUTO: 4.7 K/UL
PLATELET # BLD AUTO: 275 K/UL
PMV BLD AUTO: 10.4 FL
POTASSIUM SERPL-SCNC: 3.7 MMOL/L
PROT SERPL-MCNC: 7.5 G/DL
RBC # BLD AUTO: 5.43 M/UL
SODIUM SERPL-SCNC: 142 MMOL/L
WBC # BLD AUTO: 8.74 K/UL

## 2018-07-12 PROCEDURE — 99215 OFFICE O/P EST HI 40 MIN: CPT | Mod: S$PBB,,, | Performed by: INTERNAL MEDICINE

## 2018-07-12 PROCEDURE — 99214 OFFICE O/P EST MOD 30 MIN: CPT | Mod: PBBFAC | Performed by: INTERNAL MEDICINE

## 2018-07-12 PROCEDURE — 36415 COLL VENOUS BLD VENIPUNCTURE: CPT

## 2018-07-12 PROCEDURE — 80053 COMPREHEN METABOLIC PANEL: CPT

## 2018-07-12 PROCEDURE — 99999 PR PBB SHADOW E&M-EST. PATIENT-LVL IV: CPT | Mod: PBBFAC,,, | Performed by: INTERNAL MEDICINE

## 2018-07-12 PROCEDURE — 85027 COMPLETE CBC AUTOMATED: CPT

## 2018-07-12 RX ORDER — HEPARIN 100 UNIT/ML
500 SYRINGE INTRAVENOUS
Status: CANCELLED | OUTPATIENT
Start: 2018-07-13

## 2018-07-12 RX ORDER — SODIUM CHLORIDE 0.9 % (FLUSH) 0.9 %
10 SYRINGE (ML) INJECTION
Status: CANCELLED | OUTPATIENT
Start: 2018-07-13

## 2018-07-12 NOTE — PROGRESS NOTES
"Subjective:       Patient ID: Yao Alan is a 43 y.o. male.    Chief Complaint: Malignant neoplasm of upper lobe of right lung  Oncologic History:  Mr. Yao Alan is a 43-year-old male who has a long history of smoking and was seen in the Pulmonary Clinic by Dr. Valle on 03/05/2015 with abnormal CT scan.    Apparently, he went to the [facility] in February with coughing as well as chest pain. A chest x-ray was done, which revealed a left upper lobe lung mass. Followup CT scan was done on 02/12/2015 and that revealed posterior superior mediastinal mass adjacent and prominent enlarged upper left mediastinal lymph nodes, abutting the aortic arch as well as left subclavian artery. A  CT scan of the abdomen was done on 03/03/2015 without contrast and that revealed nonobstructive nephrolithiasis, but a 2.1 cm lytic lesion involving the left iliac wing concerning for metastatic disease given the presence of emphysema and a mediastinal soft tissue mass on the CT chest from 02/12/2015. He saw Dr. Valle after that on 03/05/2015 and underwent an IR guided biopsy of the bone lesion on 03/17/2015. 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    His EGFR/EML/ALK is negative.    His PET scan on 3/25/15 revealed Left upper lobe lung lesion with an adjacent para-aortic lymph node, right anterior rib metastasis, right iliac metastasis, and pancreatic metastasis. A pancreatic cancer primary neoplasm is less likely.  MRI brain was negative for mets.  He completed 6 cycles of Carboplatin and Alimta and was on maintenance Alimta until end of March 2016.  His bone scan from 3/16 revealed stable disease. His CT scans also from end of March 2016 revealed "Interval enlargement of left upper lobe lung mass measuring 5.9 x 3.9 cm (previously 3.3 x 2.5 cm). This mass appears to invade the mediastinum and abutting the aortic arch and left subclavian artery"     He is now on Opdivo since March " 2016               HPI Mr. Alan returns for follow up and Opdivo. He is doing well and denies any new issues    Review of Systems   Constitutional: Negative for appetite change, fatigue and unexpected weight change.   HENT: Negative for mouth sores.    Eyes: Negative for visual disturbance.   Respiratory: Negative for cough and shortness of breath.    Cardiovascular: Negative for chest pain.   Gastrointestinal: Negative for abdominal pain and diarrhea.   Genitourinary: Negative for frequency.   Musculoskeletal: Negative for back pain.   Skin: Negative for rash.   Neurological: Negative for headaches.   Hematological: Negative for adenopathy.   Psychiatric/Behavioral: The patient is not nervous/anxious.    All other systems reviewed and are negative.      Objective:      Physical Exam   Constitutional: He is oriented to person, place, and time. He appears well-developed and well-nourished.   HENT:   Mouth/Throat: No oropharyngeal exudate.   Cardiovascular: Normal rate and normal heart sounds.    Pulmonary/Chest: Effort normal and breath sounds normal. He has no wheezes.   Abdominal: Soft. Bowel sounds are normal. There is no tenderness.   Musculoskeletal: He exhibits no edema or tenderness.   Lymphadenopathy:     He has no cervical adenopathy.   Neurological: He is alert and oriented to person, place, and time. Coordination normal.   Skin: Skin is warm and dry. No rash noted.   Psychiatric: He has a normal mood and affect. Judgment and thought content normal.   Vitals reviewed.        LABS:  WBC   Date Value Ref Range Status   07/12/2018 8.74 3.90 - 12.70 K/uL Final     Hemoglobin   Date Value Ref Range Status   07/12/2018 14.0 14.0 - 18.0 g/dL Final     Hematocrit   Date Value Ref Range Status   07/12/2018 44.6 40.0 - 54.0 % Final     Platelets   Date Value Ref Range Status   07/12/2018 275 150 - 350 K/uL Final     Gran # (ANC)   Date Value Ref Range Status   07/12/2018 4.7 1.8 - 7.7 K/uL Final     Comment:     The  ANC is based on a white cell differential from an   automated cell counter. It has not been microscopically   reviewed for the presence of abnormal cells. Clinical   correlation is required.         Chemistry        Component Value Date/Time     07/12/2018 0727    K 3.7 07/12/2018 0727     07/12/2018 0727    CO2 25 07/12/2018 0727    BUN 14 07/12/2018 0727    CREATININE 1.4 07/12/2018 0727    GLU 97 07/12/2018 0727        Component Value Date/Time    CALCIUM 10.3 07/12/2018 0727    ALKPHOS 74 07/12/2018 0727    AST 23 07/12/2018 0727    ALT 22 07/12/2018 0727    BILITOT 0.2 07/12/2018 0727    ESTGFRAFRICA >60.0 07/12/2018 0727    EGFRNONAA >60.0 07/12/2018 0727        TSH   Date Value Ref Range Status   06/15/2018 0.459 0.400 - 4.000 uIU/mL Final     Free T4   Date Value Ref Range Status   06/15/2018 1.11 0.71 - 1.51 ng/dL Final       Assessment:       1. Malignant neoplasm of upper lobe of right lung    2. Secondary malignant neoplasm of bone    3. Hypothyroidism due to medication        Plan:        1,2. He is doing well and will proceed with Opdivo today and will return in 2 weeks for next dose of Opdivo (change dose to 480 mg) and Xgeva  3. Check labs in 2 weeks,.    Above care plan was discussed with patient and all questions were addressed to his satisfaction '

## 2018-07-12 NOTE — TELEPHONE ENCOUNTER
----- Message from Bel Garay MD sent at 7/12/2018  8:16 AM CDT -----  Schedule CBC,CMP, TSH and free T4 and see me in 2 weeks and for Opdivo and Xgeva.  Also changing dose of Opdivo to 480 mg next time, may need new auth

## 2018-07-12 NOTE — Clinical Note
Schedule CBC,CMP, TSH and free T4 and see me in 2 weeks and for Opdivo and Xgeva. Also changing dose of Opdivo to 480 mg next time, may need new auth

## 2018-07-13 ENCOUNTER — INFUSION (OUTPATIENT)
Dept: INFUSION THERAPY | Facility: HOSPITAL | Age: 43
End: 2018-07-13
Attending: INTERNAL MEDICINE
Payer: MEDICARE

## 2018-07-13 VITALS
DIASTOLIC BLOOD PRESSURE: 87 MMHG | RESPIRATION RATE: 16 BRPM | TEMPERATURE: 99 F | HEART RATE: 75 BPM | SYSTOLIC BLOOD PRESSURE: 130 MMHG

## 2018-07-13 DIAGNOSIS — C34.91 LUNG CANCER, PRIMARY, WITH METASTASIS FROM LUNG TO OTHER SITE, RIGHT: ICD-10-CM

## 2018-07-13 DIAGNOSIS — C34.11 MALIGNANT NEOPLASM OF UPPER LOBE OF RIGHT LUNG: ICD-10-CM

## 2018-07-13 DIAGNOSIS — C79.51 SECONDARY MALIGNANT NEOPLASM OF BONE: ICD-10-CM

## 2018-07-13 DIAGNOSIS — D50.0 IRON DEFICIENCY ANEMIA DUE TO CHRONIC BLOOD LOSS: Primary | ICD-10-CM

## 2018-07-13 PROCEDURE — 25000003 PHARM REV CODE 250: Performed by: INTERNAL MEDICINE

## 2018-07-13 PROCEDURE — 63600175 PHARM REV CODE 636 W HCPCS: Mod: JG | Performed by: INTERNAL MEDICINE

## 2018-07-13 PROCEDURE — 96413 CHEMO IV INFUSION 1 HR: CPT

## 2018-07-13 RX ORDER — HEPARIN 100 UNIT/ML
500 SYRINGE INTRAVENOUS
Status: DISCONTINUED | OUTPATIENT
Start: 2018-07-13 | End: 2018-07-13 | Stop reason: HOSPADM

## 2018-07-13 RX ORDER — SODIUM CHLORIDE 0.9 % (FLUSH) 0.9 %
10 SYRINGE (ML) INJECTION
Status: DISCONTINUED | OUTPATIENT
Start: 2018-07-13 | End: 2018-07-13 | Stop reason: HOSPADM

## 2018-07-13 RX ADMIN — HEPARIN 500 UNITS: 100 SYRINGE at 09:07

## 2018-07-13 RX ADMIN — SODIUM CHLORIDE: 0.9 INJECTION, SOLUTION INTRAVENOUS at 08:07

## 2018-07-13 RX ADMIN — SODIUM CHLORIDE 240 MG: 9 INJECTION, SOLUTION INTRAVENOUS at 08:07

## 2018-07-20 ENCOUNTER — HOSPITAL ENCOUNTER (EMERGENCY)
Facility: HOSPITAL | Age: 43
Discharge: HOME | End: 2018-07-20
Attending: EMERGENCY MEDICINE
Payer: MEDICARE

## 2018-07-20 VITALS
DIASTOLIC BLOOD PRESSURE: 90 MMHG | SYSTOLIC BLOOD PRESSURE: 124 MMHG | BODY MASS INDEX: 29.57 KG/M2 | HEIGHT: 66 IN | RESPIRATION RATE: 18 BRPM | HEART RATE: 71 BPM | OXYGEN SATURATION: 98 % | WEIGHT: 184 LBS | TEMPERATURE: 98 F

## 2018-07-20 DIAGNOSIS — H61.23 BILATERAL IMPACTED CERUMEN: Primary | ICD-10-CM

## 2018-07-20 PROCEDURE — 99283 EMERGENCY DEPT VISIT LOW MDM: CPT | Mod: 25

## 2018-07-20 PROCEDURE — 69209 REMOVE IMPACTED EAR WAX UNI: CPT | Mod: 50

## 2018-07-20 NOTE — ED PROVIDER NOTES
Encounter Date: 7/20/2018    SCRIBE #1 NOTE: I, Cynthia Jurado, am scribing for, and in the presence of,  Dr. Do. I have scribed the entire note.       History     Chief Complaint   Patient presents with    Otalgia     pt reports bilat earache x's 2-3 days     Chief Complaint: Ear Pain  43 y.o male complains of ear pain for 3 days. He complains of having pain around the outside of his ears. He notes the left ear hurts more than the right. He denies fever, trouble swallowing, HA, dizziness, SOB, or cold symptoms. He also denies going swimming recently.  He rates his pain as a 6/10. He did not take any medication for his pain PTA.       The history is provided by the patient.     Review of patient's allergies indicates:   Allergen Reactions    Nsaids (non-steroidal anti-inflammatory drug)      Patient says since been diagnosed with lung mass on 2-12-15, if take any NSAIDS he spikes a fever.    Tylenol [acetaminophen] Other (See Comments)     Sweating +     Past Medical History:   Diagnosis Date    Asthma     COPD (chronic obstructive pulmonary disease)     HTN (hypertension)     Hypertension     Lung cancer     Lung mass     Thyroid disease      Past Surgical History:   Procedure Laterality Date    FRACTURE SURGERY      finger    LUNG CANCER SURGERY Right March 2015    lung biopsy      Family History   Problem Relation Age of Onset    Hypertension Father     No Known Problems Mother     No Known Problems Sister     No Known Problems Brother     No Known Problems Maternal Aunt     No Known Problems Maternal Uncle     No Known Problems Paternal Aunt     No Known Problems Paternal Uncle     No Known Problems Maternal Grandmother     No Known Problems Maternal Grandfather     No Known Problems Paternal Grandmother     No Known Problems Paternal Grandfather     Amblyopia Neg Hx     Blindness Neg Hx     Cancer Neg Hx     Cataracts Neg Hx     Diabetes Neg Hx     Glaucoma Neg Hx      Macular degeneration Neg Hx     Retinal detachment Neg Hx     Strabismus Neg Hx     Stroke Neg Hx     Thyroid disease Neg Hx      Social History   Substance Use Topics    Smoking status: Former Smoker     Packs/day: 0.75     Years: 25.00     Types: Cigarettes     Quit date: 12/2/2014    Smokeless tobacco: Never Used      Comment: Patient is no longer smoking    Alcohol use No     Review of Systems   Constitutional: Negative for chills and fever.   HENT: Positive for ear pain. Negative for congestion, rhinorrhea, sore throat and trouble swallowing.    Respiratory: Negative for shortness of breath.    Neurological: Negative for dizziness and headaches.   All other systems reviewed and are negative.      Physical Exam     Initial Vitals [07/20/18 1351]   BP Pulse Resp Temp SpO2   138/86 83 18 98 °F (36.7 °C) 98 %      MAP       --         Physical Exam    Nursing note and vitals reviewed.  Constitutional: He appears well-developed and well-nourished. He is not diaphoretic. No distress.   HENT:   Head: Normocephalic and atraumatic.   Right Ear: Tympanic membrane and external ear normal.   Left Ear: External ear normal.   Mouth/Throat: Oropharynx is clear and moist. No oropharyngeal exudate.   Cerumen impaction both ears. L>R    Eyes: EOM are normal.   Neck: Normal range of motion.   Cardiovascular: Normal rate, regular rhythm and normal heart sounds. Exam reveals no gallop and no friction rub.    No murmur heard.  Pulmonary/Chest: Breath sounds normal. No respiratory distress. He has no wheezes. He has no rhonchi. He has no rales.   Neurological: He is alert and oriented to person, place, and time.   Skin: Skin is warm and dry.         ED Course   Procedures  Labs Reviewed - No data to display       Imaging Results    None          Medical Decision Making:   Initial Assessment:   This is a 43 y.o male who complains of ear pain in both ears. Physical exam findings are significant to cerumen impaction of the ears    ED Management:  The nurse was able to irrigate the ears with much improvement.  There was erythema to the tympanic membrane.  However I feel that this is secondary to the irrigation of the ears other than an infection.            Scribe Attestation:   Scribe #1: I performed the above scribed service and the documentation accurately describes the services I performed. I attest to the accuracy of the note.       I, Dr. Kathryn Do, personally performed the services described in this documentation. All medical record entries made by the scribe were at my direction and in my presence.  I have reviewed the chart and agree that the record reflects my personal performance and is accurate and complete. Kathryn Do MD.  3:00 PM 07/20/2018             Clinical Impression:     1. Bilateral impacted cerumen                             Kathryn Do MD  07/20/18 1500

## 2018-07-25 ENCOUNTER — DOCUMENTATION ONLY (OUTPATIENT)
Dept: HEMATOLOGY/ONCOLOGY | Facility: CLINIC | Age: 43
End: 2018-07-25

## 2018-07-25 NOTE — PROGRESS NOTES
Received call/VM message from patient and returned call to him at [phone]. He has received new application form from [facility]. Will meet with him tomorrow morning between his lab and MD appointments to assist in completing the form. He said [facility] also [facility], and will receive gas card once he completes and submits it. Will also complete an application for for the [facility] [place] to see if they can help him with additional gas cards to help with transportation to and from appointments.

## 2018-07-26 ENCOUNTER — LAB VISIT (OUTPATIENT)
Dept: LAB | Facility: HOSPITAL | Age: 43
End: 2018-07-26
Attending: INTERNAL MEDICINE
Payer: MEDICARE

## 2018-07-26 ENCOUNTER — OFFICE VISIT (OUTPATIENT)
Dept: HEMATOLOGY/ONCOLOGY | Facility: CLINIC | Age: 43
End: 2018-07-26
Payer: MEDICARE

## 2018-07-26 VITALS
RESPIRATION RATE: 17 BRPM | OXYGEN SATURATION: 98 % | BODY MASS INDEX: 29.51 KG/M2 | DIASTOLIC BLOOD PRESSURE: 82 MMHG | TEMPERATURE: 98 F | HEART RATE: 71 BPM | SYSTOLIC BLOOD PRESSURE: 130 MMHG | HEIGHT: 66 IN | WEIGHT: 183.63 LBS

## 2018-07-26 DIAGNOSIS — C34.11 MALIGNANT NEOPLASM OF UPPER LOBE OF RIGHT LUNG: Primary | ICD-10-CM

## 2018-07-26 DIAGNOSIS — C34.11 MALIGNANT NEOPLASM OF UPPER LOBE OF RIGHT LUNG: ICD-10-CM

## 2018-07-26 DIAGNOSIS — C79.51 SECONDARY MALIGNANT NEOPLASM OF BONE: ICD-10-CM

## 2018-07-26 DIAGNOSIS — E03.2 HYPOTHYROIDISM DUE TO MEDICATION: ICD-10-CM

## 2018-07-26 LAB
ALBUMIN SERPL BCP-MCNC: 4 G/DL
ALP SERPL-CCNC: 74 U/L
ALT SERPL W/O P-5'-P-CCNC: 26 U/L
ANION GAP SERPL CALC-SCNC: 8 MMOL/L
AST SERPL-CCNC: 24 U/L
BILIRUB SERPL-MCNC: 0.4 MG/DL
BUN SERPL-MCNC: 19 MG/DL
CALCIUM SERPL-MCNC: 10.6 MG/DL
CHLORIDE SERPL-SCNC: 106 MMOL/L
CO2 SERPL-SCNC: 28 MMOL/L
CREAT SERPL-MCNC: 1.5 MG/DL
ERYTHROCYTE [DISTWIDTH] IN BLOOD BY AUTOMATED COUNT: 14.7 %
EST. GFR  (AFRICAN AMERICAN): >60 ML/MIN/1.73 M^2
EST. GFR  (NON AFRICAN AMERICAN): 56.2 ML/MIN/1.73 M^2
GLUCOSE SERPL-MCNC: 102 MG/DL
HCT VFR BLD AUTO: 45 %
HGB BLD-MCNC: 14.4 G/DL
IMM GRANULOCYTES # BLD AUTO: 0.02 K/UL
MCH RBC QN AUTO: 26.5 PG
MCHC RBC AUTO-ENTMCNC: 32 G/DL
MCV RBC AUTO: 83 FL
NEUTROPHILS # BLD AUTO: 4.6 K/UL
PLATELET # BLD AUTO: 250 K/UL
PMV BLD AUTO: 10.8 FL
POTASSIUM SERPL-SCNC: 3.7 MMOL/L
PROT SERPL-MCNC: 7.7 G/DL
RBC # BLD AUTO: 5.43 M/UL
SODIUM SERPL-SCNC: 142 MMOL/L
T4 FREE SERPL-MCNC: 1.02 NG/DL
TSH SERPL DL<=0.005 MIU/L-ACNC: 0.62 UIU/ML
WBC # BLD AUTO: 8.45 K/UL

## 2018-07-26 PROCEDURE — 99999 PR PBB SHADOW E&M-EST. PATIENT-LVL IV: CPT | Mod: PBBFAC,,, | Performed by: INTERNAL MEDICINE

## 2018-07-26 PROCEDURE — 84439 ASSAY OF FREE THYROXINE: CPT

## 2018-07-26 PROCEDURE — 85027 COMPLETE CBC AUTOMATED: CPT

## 2018-07-26 PROCEDURE — 99215 OFFICE O/P EST HI 40 MIN: CPT | Mod: S$PBB,,, | Performed by: INTERNAL MEDICINE

## 2018-07-26 PROCEDURE — 99214 OFFICE O/P EST MOD 30 MIN: CPT | Mod: PBBFAC | Performed by: INTERNAL MEDICINE

## 2018-07-26 PROCEDURE — 36415 COLL VENOUS BLD VENIPUNCTURE: CPT

## 2018-07-26 PROCEDURE — 84443 ASSAY THYROID STIM HORMONE: CPT

## 2018-07-26 PROCEDURE — 80053 COMPREHEN METABOLIC PANEL: CPT

## 2018-07-26 RX ORDER — SODIUM CHLORIDE 0.9 % (FLUSH) 0.9 %
10 SYRINGE (ML) INJECTION
Status: CANCELLED | OUTPATIENT
Start: 2018-07-27

## 2018-07-26 RX ORDER — HEPARIN 100 UNIT/ML
500 SYRINGE INTRAVENOUS
Status: CANCELLED | OUTPATIENT
Start: 2018-07-27

## 2018-07-26 NOTE — PROGRESS NOTES
"Subjective:       Patient ID: Yao Alan is a 43 y.o. male.    Chief Complaint: Malignant neoplasm of upper lobe of right lung  Oncologic History:  Mr. Yao Alan is a 43-year-old male who has a long history of smoking and was seen in the Pulmonary Clinic by Dr. Valle on 03/05/2015 with abnormal CT scan.    Apparently, he went to the [facility] in February with coughing as well as chest pain. A chest x-ray was done, which revealed a left upper lobe lung mass. Followup CT scan was done on 02/12/2015 and that revealed posterior superior mediastinal mass adjacent and prominent enlarged upper left mediastinal lymph nodes, abutting the aortic arch as well as left subclavian artery. A  CT scan of the abdomen was done on 03/03/2015 without contrast and that revealed nonobstructive nephrolithiasis, but a 2.1 cm lytic lesion involving the left iliac wing concerning for metastatic disease given the presence of emphysema and a mediastinal soft tissue mass on the CT chest from 02/12/2015. He saw Dr. Valle after that on 03/05/2015 and underwent an IR guided biopsy of the bone lesion on 03/17/2015. 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    His EGFR/EML/ALK is negative.    His PET scan on 3/25/15 revealed Left upper lobe lung lesion with an adjacent para-aortic lymph node, right anterior rib metastasis, right iliac metastasis, and pancreatic metastasis. A pancreatic cancer primary neoplasm is less likely.  MRI brain was negative for mets.  He completed 6 cycles of Carboplatin and Alimta and was on maintenance Alimta until end of March 2016.  His bone scan from 3/16 revealed stable disease. His CT scans also from end of March 2016 revealed "Interval enlargement of left upper lobe lung mass measuring 5.9 x 3.9 cm (previously 3.3 x 2.5 cm). This mass appears to invade the mediastinum and abutting the aortic arch and left subclavian artery"     He is now on Opdivo since March " 2016               HPI Mr. Alan returns for follow up and Opdivo. He feels well and denies any new issues    Review of Systems   Constitutional: Negative for appetite change, fatigue and unexpected weight change.   HENT: Negative for mouth sores.    Eyes: Negative for visual disturbance.   Respiratory: Negative for cough and shortness of breath.    Cardiovascular: Negative for chest pain.   Gastrointestinal: Negative for abdominal pain and diarrhea.   Genitourinary: Negative for frequency.   Musculoskeletal: Negative for back pain.   Skin: Negative for rash.   Neurological: Negative for headaches.   Hematological: Negative for adenopathy.   Psychiatric/Behavioral: The patient is not nervous/anxious.    All other systems reviewed and are negative.      Objective:      Physical Exam   Constitutional: He is oriented to person, place, and time. He appears well-developed and well-nourished.   HENT:   Mouth/Throat: No oropharyngeal exudate.   Cardiovascular: Normal rate and normal heart sounds.    Pulmonary/Chest: Effort normal and breath sounds normal. He has no wheezes.   Abdominal: Soft. Bowel sounds are normal. There is no tenderness.   Musculoskeletal: He exhibits no edema or tenderness.   Lymphadenopathy:     He has no cervical adenopathy.   Neurological: He is alert and oriented to person, place, and time. Coordination normal.   Skin: Skin is warm and dry. No rash noted.   Psychiatric: He has a normal mood and affect. Judgment and thought content normal.   Vitals reviewed.      LABS:  WBC   Date Value Ref Range Status   07/26/2018 8.45 3.90 - 12.70 K/uL Final     Hemoglobin   Date Value Ref Range Status   07/26/2018 14.4 14.0 - 18.0 g/dL Final     Hematocrit   Date Value Ref Range Status   07/26/2018 45.0 40.0 - 54.0 % Final     Platelets   Date Value Ref Range Status   07/26/2018 250 150 - 350 K/uL Final     Gran # (ANC)   Date Value Ref Range Status   07/26/2018 4.6 1.8 - 7.7 K/uL Final     Comment:     The ANC is  based on a white cell differential from an   automated cell counter. It has not been microscopically   reviewed for the presence of abnormal cells. Clinical   correlation is required.         Chemistry        Component Value Date/Time     07/26/2018 0718    K 3.7 07/26/2018 0718     07/26/2018 0718    CO2 28 07/26/2018 0718    BUN 19 07/26/2018 0718    CREATININE 1.5 (H) 07/26/2018 0718     07/26/2018 0718        Component Value Date/Time    CALCIUM 10.6 (H) 07/26/2018 0718    ALKPHOS 74 07/26/2018 0718    AST 24 07/26/2018 0718    ALT 26 07/26/2018 0718    BILITOT 0.4 07/26/2018 0718    ESTGFRAFRICA >60.0 07/26/2018 0718    EGFRNONAA 56.2 (A) 07/26/2018 0718        TSH   Date Value Ref Range Status   07/26/2018 0.622 0.400 - 4.000 uIU/mL Final     Free T4   Date Value Ref Range Status   07/26/2018 1.02 0.71 - 1.51 ng/dL Final       Assessment:       1. Malignant neoplasm of upper lobe of right lung    2. Secondary malignant neoplasm of bone        Plan:        1,2. He will proceed with Opdivo and Xgeva and will return in 2 weeks for Opdivo    Above care plan was discussed with patient and all questions were addressed to his satisfaction

## 2018-07-27 ENCOUNTER — INFUSION (OUTPATIENT)
Dept: INFUSION THERAPY | Facility: HOSPITAL | Age: 43
End: 2018-07-27
Attending: INTERNAL MEDICINE
Payer: MEDICARE

## 2018-07-27 VITALS
SYSTOLIC BLOOD PRESSURE: 125 MMHG | DIASTOLIC BLOOD PRESSURE: 81 MMHG | HEART RATE: 95 BPM | OXYGEN SATURATION: 97 % | RESPIRATION RATE: 18 BRPM | TEMPERATURE: 98 F

## 2018-07-27 DIAGNOSIS — D50.0 IRON DEFICIENCY ANEMIA DUE TO CHRONIC BLOOD LOSS: Primary | ICD-10-CM

## 2018-07-27 DIAGNOSIS — C34.11 MALIGNANT NEOPLASM OF UPPER LOBE OF RIGHT LUNG: ICD-10-CM

## 2018-07-27 DIAGNOSIS — C79.51 SECONDARY MALIGNANT NEOPLASM OF BONE: ICD-10-CM

## 2018-07-27 DIAGNOSIS — C34.91 LUNG CANCER, PRIMARY, WITH METASTASIS FROM LUNG TO OTHER SITE, RIGHT: ICD-10-CM

## 2018-07-27 PROCEDURE — 25000003 PHARM REV CODE 250: Performed by: INTERNAL MEDICINE

## 2018-07-27 PROCEDURE — 96413 CHEMO IV INFUSION 1 HR: CPT

## 2018-07-27 PROCEDURE — 63600175 PHARM REV CODE 636 W HCPCS: Mod: JG | Performed by: INTERNAL MEDICINE

## 2018-07-27 PROCEDURE — 96372 THER/PROPH/DIAG INJ SC/IM: CPT | Mod: 59

## 2018-07-27 RX ORDER — SODIUM CHLORIDE 0.9 % (FLUSH) 0.9 %
10 SYRINGE (ML) INJECTION
Status: DISCONTINUED | OUTPATIENT
Start: 2018-07-27 | End: 2018-07-27 | Stop reason: HOSPADM

## 2018-07-27 RX ORDER — HEPARIN 100 UNIT/ML
500 SYRINGE INTRAVENOUS
Status: DISCONTINUED | OUTPATIENT
Start: 2018-07-27 | End: 2018-07-27 | Stop reason: HOSPADM

## 2018-07-27 RX ADMIN — SODIUM CHLORIDE: 9 INJECTION, SOLUTION INTRAVENOUS at 08:07

## 2018-07-27 RX ADMIN — SODIUM CHLORIDE 480 MG: 9 INJECTION, SOLUTION INTRAVENOUS at 08:07

## 2018-07-27 RX ADMIN — DENOSUMAB 120 MG: 120 INJECTION SUBCUTANEOUS at 08:07

## 2018-07-27 RX ADMIN — HEPARIN 500 UNITS: 100 SYRINGE at 09:07

## 2018-07-27 NOTE — PLAN OF CARE
Problem: Patient Care Overview (Adult)  Goal: Plan of Care Review  Outcome: Ongoing (interventions implemented as appropriate)  Pt received Opdivio and Xgeva, tolerated well. VSS and NAD. Pt instructed to call MD with any concerns. Pt discharged home independently.

## 2018-08-09 ENCOUNTER — LAB VISIT (OUTPATIENT)
Dept: LAB | Facility: HOSPITAL | Age: 43
End: 2018-08-09
Attending: INTERNAL MEDICINE
Payer: MEDICARE

## 2018-08-09 ENCOUNTER — OFFICE VISIT (OUTPATIENT)
Dept: HEMATOLOGY/ONCOLOGY | Facility: CLINIC | Age: 43
End: 2018-08-09
Payer: MEDICARE

## 2018-08-09 VITALS
HEIGHT: 66 IN | TEMPERATURE: 98 F | WEIGHT: 187.38 LBS | BODY MASS INDEX: 30.11 KG/M2 | SYSTOLIC BLOOD PRESSURE: 124 MMHG | DIASTOLIC BLOOD PRESSURE: 83 MMHG | RESPIRATION RATE: 18 BRPM | HEART RATE: 62 BPM | OXYGEN SATURATION: 99 %

## 2018-08-09 DIAGNOSIS — E03.9 HYPOTHYROIDISM, UNSPECIFIED TYPE: ICD-10-CM

## 2018-08-09 DIAGNOSIS — C34.11 MALIGNANT NEOPLASM OF UPPER LOBE OF RIGHT LUNG: Primary | ICD-10-CM

## 2018-08-09 DIAGNOSIS — C79.51 SECONDARY MALIGNANT NEOPLASM OF BONE: ICD-10-CM

## 2018-08-09 DIAGNOSIS — C34.91 PRIMARY MALIGNANT NEOPLASM OF RIGHT LUNG METASTATIC TO OTHER SITE: ICD-10-CM

## 2018-08-09 DIAGNOSIS — G89.3 NEOPLASM RELATED PAIN: ICD-10-CM

## 2018-08-09 DIAGNOSIS — I82.402 DEEP VEIN THROMBOSIS (DVT) OF LEFT LOWER EXTREMITY, UNSPECIFIED CHRONICITY, UNSPECIFIED VEIN: ICD-10-CM

## 2018-08-09 DIAGNOSIS — C34.11 MALIGNANT NEOPLASM OF UPPER LOBE OF RIGHT LUNG: ICD-10-CM

## 2018-08-09 LAB
ALBUMIN SERPL BCP-MCNC: 3.6 G/DL
ALP SERPL-CCNC: 67 U/L
ALT SERPL W/O P-5'-P-CCNC: 26 U/L
ANION GAP SERPL CALC-SCNC: 8 MMOL/L
AST SERPL-CCNC: 26 U/L
BILIRUB SERPL-MCNC: 0.3 MG/DL
BUN SERPL-MCNC: 17 MG/DL
CALCIUM SERPL-MCNC: 9.3 MG/DL
CHLORIDE SERPL-SCNC: 106 MMOL/L
CO2 SERPL-SCNC: 25 MMOL/L
CREAT SERPL-MCNC: 1.3 MG/DL
ERYTHROCYTE [DISTWIDTH] IN BLOOD BY AUTOMATED COUNT: 14.5 %
EST. GFR  (AFRICAN AMERICAN): >60 ML/MIN/1.73 M^2
EST. GFR  (NON AFRICAN AMERICAN): >60 ML/MIN/1.73 M^2
GLUCOSE SERPL-MCNC: 102 MG/DL
HCT VFR BLD AUTO: 42.9 %
HGB BLD-MCNC: 13.6 G/DL
IMM GRANULOCYTES # BLD AUTO: 0.02 K/UL
MCH RBC QN AUTO: 26.3 PG
MCHC RBC AUTO-ENTMCNC: 31.7 G/DL
MCV RBC AUTO: 83 FL
NEUTROPHILS # BLD AUTO: 4 K/UL
PLATELET # BLD AUTO: 238 K/UL
PMV BLD AUTO: 10.5 FL
POTASSIUM SERPL-SCNC: 3.8 MMOL/L
PROT SERPL-MCNC: 6.8 G/DL
RBC # BLD AUTO: 5.18 M/UL
SODIUM SERPL-SCNC: 139 MMOL/L
WBC # BLD AUTO: 7.83 K/UL

## 2018-08-09 PROCEDURE — 85027 COMPLETE CBC AUTOMATED: CPT

## 2018-08-09 PROCEDURE — 36415 COLL VENOUS BLD VENIPUNCTURE: CPT

## 2018-08-09 PROCEDURE — 99999 PR PBB SHADOW E&M-EST. PATIENT-LVL IV: CPT | Mod: PBBFAC,,, | Performed by: INTERNAL MEDICINE

## 2018-08-09 PROCEDURE — 80053 COMPREHEN METABOLIC PANEL: CPT

## 2018-08-09 PROCEDURE — 99215 OFFICE O/P EST HI 40 MIN: CPT | Mod: S$PBB,,, | Performed by: INTERNAL MEDICINE

## 2018-08-09 PROCEDURE — 99214 OFFICE O/P EST MOD 30 MIN: CPT | Mod: PBBFAC | Performed by: INTERNAL MEDICINE

## 2018-08-09 RX ORDER — LEVOTHYROXINE SODIUM 100 UG/1
100 TABLET ORAL DAILY
Qty: 30 TABLET | Refills: 1 | Status: SHIPPED | OUTPATIENT
Start: 2018-08-09 | End: 2018-09-10 | Stop reason: SDUPTHER

## 2018-08-09 RX ORDER — HEPARIN 100 UNIT/ML
500 SYRINGE INTRAVENOUS
Status: CANCELLED | OUTPATIENT
Start: 2018-08-10

## 2018-08-09 RX ORDER — SODIUM CHLORIDE 0.9 % (FLUSH) 0.9 %
10 SYRINGE (ML) INJECTION
Status: CANCELLED | OUTPATIENT
Start: 2018-08-10

## 2018-08-09 RX ORDER — OXYCODONE AND ACETAMINOPHEN 10; 325 MG/1; MG/1
1 TABLET ORAL EVERY 6 HOURS PRN
Qty: 120 TABLET | Refills: 0 | Status: SHIPPED | OUTPATIENT
Start: 2018-08-09 | End: 2018-09-10 | Stop reason: SDUPTHER

## 2018-08-09 NOTE — PROGRESS NOTES
"Subjective:       Patient ID: Yao Alan is a 43 y.o. male.    Chief Complaint: Malignant neoplasm of upper lobe of right lung  Oncologic History:  Mr. Yao Alan is a 43-year-old male who has a long history of smoking and was seen in the Pulmonary Clinic by Dr. Valle on 03/05/2015 with abnormal CT scan.    Apparently, he went to the [facility] in February with coughing as well as chest pain. A chest x-ray was done, which revealed a left upper lobe lung mass. Followup CT scan was done on 02/12/2015 and that revealed posterior superior mediastinal mass adjacent and prominent enlarged upper left mediastinal lymph nodes, abutting the aortic arch as well as left subclavian artery. A  CT scan of the abdomen was done on 03/03/2015 without contrast and that revealed nonobstructive nephrolithiasis, but a 2.1 cm lytic lesion involving the left iliac wing concerning for metastatic disease given the presence of emphysema and a mediastinal soft tissue mass on the CT chest from 02/12/2015. He saw Dr. Valle after that on 03/05/2015 and underwent an IR guided biopsy of the bone lesion on 03/17/2015. 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    His EGFR/EML/ALK is negative.    His PET scan on 3/25/15 revealed Left upper lobe lung lesion with an adjacent para-aortic lymph node, right anterior rib metastasis, right iliac metastasis, and pancreatic metastasis. A pancreatic cancer primary neoplasm is less likely.  MRI brain was negative for mets.  He completed 6 cycles of Carboplatin and Alimta and was on maintenance Alimta until end of March 2016.  His bone scan from 3/16 revealed stable disease. His CT scans also from end of March 2016 revealed "Interval enlargement of left upper lobe lung mass measuring 5.9 x 3.9 cm (previously 3.3 x 2.5 cm). This mass appears to invade the mediastinum and abutting the aortic arch and left subclavian artery"     He is now on Opdivo since March " 2016                HPI Mr. Alan returns for follow up and Opdivo. He feels well and denies any new complaints    Review of Systems   Constitutional: Negative for appetite change, fatigue and unexpected weight change.   HENT: Negative for mouth sores.    Eyes: Negative for visual disturbance.   Respiratory: Negative for cough and shortness of breath.    Cardiovascular: Negative for chest pain.   Gastrointestinal: Negative for abdominal pain and diarrhea.   Genitourinary: Negative for frequency.   Musculoskeletal: Negative for back pain.   Skin: Negative for rash.   Neurological: Negative for headaches.   Hematological: Negative for adenopathy.   Psychiatric/Behavioral: The patient is not nervous/anxious.    All other systems reviewed and are negative.      Objective:      Physical Exam   Constitutional: He is oriented to person, place, and time. He appears well-developed and well-nourished.   HENT:   Mouth/Throat: No oropharyngeal exudate.   Cardiovascular: Normal rate and normal heart sounds.    Pulmonary/Chest: Effort normal and breath sounds normal. He has no wheezes.   Abdominal: Soft. Bowel sounds are normal. There is no tenderness.   Musculoskeletal: He exhibits no edema or tenderness.   Lymphadenopathy:     He has no cervical adenopathy.   Neurological: He is alert and oriented to person, place, and time. Coordination normal.   Skin: Skin is warm and dry. No rash noted.   Psychiatric: He has a normal mood and affect. Judgment and thought content normal.   Vitals reviewed.      LABS:  WBC   Date Value Ref Range Status   08/09/2018 7.83 3.90 - 12.70 K/uL Final     Hemoglobin   Date Value Ref Range Status   08/09/2018 13.6 (L) 14.0 - 18.0 g/dL Final     Hematocrit   Date Value Ref Range Status   08/09/2018 42.9 40.0 - 54.0 % Final     Platelets   Date Value Ref Range Status   08/09/2018 238 150 - 350 K/uL Final     Gran # (ANC)   Date Value Ref Range Status   08/09/2018 4.0 1.8 - 7.7 K/uL Final     Comment:      The ANC is based on a white cell differential from an   automated cell counter. It has not been microscopically   reviewed for the presence of abnormal cells. Clinical   correlation is required.         Chemistry        Component Value Date/Time     07/26/2018 0718    K 3.7 07/26/2018 0718     07/26/2018 0718    CO2 28 07/26/2018 0718    BUN 19 07/26/2018 0718    CREATININE 1.5 (H) 07/26/2018 0718     07/26/2018 0718        Component Value Date/Time    CALCIUM 10.6 (H) 07/26/2018 0718    ALKPHOS 74 07/26/2018 0718    AST 24 07/26/2018 0718    ALT 26 07/26/2018 0718    BILITOT 0.4 07/26/2018 0718    ESTGFRAFRICA >60.0 07/26/2018 0718    EGFRNONAA 56.2 (A) 07/26/2018 0718          Assessment:       1. Malignant neoplasm of upper lobe of right lung    2. Secondary malignant neoplasm of bone    3. Primary malignant neoplasm of right lung metastatic to other site    4. Neoplasm related pain    5. Hypothyroidism, unspecified type    6. Deep vein thrombosis (DVT) of left lower extremity, unspecified chronicity, unspecified vein        Plan:         1,2,3. He will proceed with Opdivo and will return in 2 weeks and for Opdivo.  4. Refilled med.  5. Continue with synthroid  6. Continue Xarelto.    Above care plan was discussed with patient and accompanying wife and all questions were addressed to their satisfaction

## 2018-08-10 ENCOUNTER — INFUSION (OUTPATIENT)
Dept: INFUSION THERAPY | Facility: HOSPITAL | Age: 43
End: 2018-08-10
Attending: INTERNAL MEDICINE
Payer: MEDICARE

## 2018-08-10 VITALS
DIASTOLIC BLOOD PRESSURE: 89 MMHG | HEART RATE: 80 BPM | TEMPERATURE: 98 F | RESPIRATION RATE: 16 BRPM | SYSTOLIC BLOOD PRESSURE: 131 MMHG

## 2018-08-10 DIAGNOSIS — C79.51 SECONDARY MALIGNANT NEOPLASM OF BONE: ICD-10-CM

## 2018-08-10 DIAGNOSIS — C34.11 MALIGNANT NEOPLASM OF UPPER LOBE OF RIGHT LUNG: ICD-10-CM

## 2018-08-10 DIAGNOSIS — D50.0 IRON DEFICIENCY ANEMIA DUE TO CHRONIC BLOOD LOSS: Primary | ICD-10-CM

## 2018-08-10 DIAGNOSIS — C34.91 LUNG CANCER, PRIMARY, WITH METASTASIS FROM LUNG TO OTHER SITE, RIGHT: ICD-10-CM

## 2018-08-10 PROCEDURE — 63600175 PHARM REV CODE 636 W HCPCS: Performed by: INTERNAL MEDICINE

## 2018-08-10 PROCEDURE — 25000003 PHARM REV CODE 250: Performed by: INTERNAL MEDICINE

## 2018-08-10 PROCEDURE — 96413 CHEMO IV INFUSION 1 HR: CPT

## 2018-08-10 RX ORDER — HEPARIN 100 UNIT/ML
500 SYRINGE INTRAVENOUS
Status: DISCONTINUED | OUTPATIENT
Start: 2018-08-10 | End: 2018-08-10 | Stop reason: HOSPADM

## 2018-08-10 RX ORDER — SODIUM CHLORIDE 0.9 % (FLUSH) 0.9 %
10 SYRINGE (ML) INJECTION
Status: DISCONTINUED | OUTPATIENT
Start: 2018-08-10 | End: 2018-08-10 | Stop reason: HOSPADM

## 2018-08-10 RX ADMIN — SODIUM CHLORIDE 480 MG: 9 INJECTION, SOLUTION INTRAVENOUS at 08:08

## 2018-08-10 RX ADMIN — HEPARIN 500 UNITS: 100 SYRINGE at 09:08

## 2018-08-10 RX ADMIN — SODIUM CHLORIDE: 0.9 INJECTION, SOLUTION INTRAVENOUS at 08:08

## 2018-08-22 ENCOUNTER — DOCUMENTATION ONLY (OUTPATIENT)
Dept: HEMATOLOGY/ONCOLOGY | Facility: CLINIC | Age: 43
End: 2018-08-22

## 2018-08-22 NOTE — PROGRESS NOTES
Patient's application form for additional assistance for the cost of gas/gas cards, and for grocery cards, was scanned and emailed to the [place] [facility]. Agency's staff processes the applications and contacts patient's directly thereafter. Will continue to follow and assist as needs indicated.

## 2018-08-23 ENCOUNTER — OFFICE VISIT (OUTPATIENT)
Dept: HEMATOLOGY/ONCOLOGY | Facility: CLINIC | Age: 43
End: 2018-08-23
Payer: MEDICARE

## 2018-08-23 VITALS
OXYGEN SATURATION: 96 % | HEIGHT: 66 IN | DIASTOLIC BLOOD PRESSURE: 95 MMHG | BODY MASS INDEX: 29.44 KG/M2 | HEART RATE: 70 BPM | TEMPERATURE: 98 F | WEIGHT: 183.19 LBS | RESPIRATION RATE: 18 BRPM | SYSTOLIC BLOOD PRESSURE: 136 MMHG

## 2018-08-23 DIAGNOSIS — C34.11 MALIGNANT NEOPLASM OF UPPER LOBE OF RIGHT LUNG: Primary | ICD-10-CM

## 2018-08-23 DIAGNOSIS — C79.51 SECONDARY MALIGNANT NEOPLASM OF BONE: ICD-10-CM

## 2018-08-23 PROCEDURE — 99999 PR PBB SHADOW E&M-EST. PATIENT-LVL IV: CPT | Mod: PBBFAC,,, | Performed by: INTERNAL MEDICINE

## 2018-08-23 PROCEDURE — 99214 OFFICE O/P EST MOD 30 MIN: CPT | Mod: PBBFAC | Performed by: INTERNAL MEDICINE

## 2018-08-23 PROCEDURE — 99215 OFFICE O/P EST HI 40 MIN: CPT | Mod: S$PBB,,, | Performed by: INTERNAL MEDICINE

## 2018-08-23 RX ORDER — HEPARIN 100 UNIT/ML
500 SYRINGE INTRAVENOUS
Status: CANCELLED | OUTPATIENT
Start: 2018-08-24

## 2018-08-23 RX ORDER — SODIUM CHLORIDE 0.9 % (FLUSH) 0.9 %
10 SYRINGE (ML) INJECTION
Status: CANCELLED | OUTPATIENT
Start: 2018-08-24

## 2018-08-23 NOTE — PROGRESS NOTES
"Subjective:       Patient ID: Yao Alan is a 43 y.o. male.    Chief Complaint: Malignant neoplasm of upper lobe of right lung  Oncologic History:  Mr. Yao Alan is a 43-year-old male who has a long history of smoking and was seen in the Pulmonary Clinic by Dr. Valle on 03/05/2015 with abnormal CT scan.    Apparently, he went to the [facility] in February with coughing as well as chest pain. A chest x-ray was done, which revealed a left upper lobe lung mass. Followup CT scan was done on 02/12/2015 and that revealed posterior superior mediastinal mass adjacent and prominent enlarged upper left mediastinal lymph nodes, abutting the aortic arch as well as left subclavian artery. A  CT scan of the abdomen was done on 03/03/2015 without contrast and that revealed nonobstructive nephrolithiasis, but a 2.1 cm lytic lesion involving the left iliac wing concerning for metastatic disease given the presence of emphysema and a mediastinal soft tissue mass on the CT chest from 02/12/2015. He saw Dr. Valle after that on 03/05/2015 and underwent an IR guided biopsy of the bone lesion on 03/17/2015. 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    His EGFR/EML/ALK is negative.    His PET scan on 3/25/15 revealed Left upper lobe lung lesion with an adjacent para-aortic lymph node, right anterior rib metastasis, right iliac metastasis, and pancreatic metastasis. A pancreatic cancer primary neoplasm is less likely.  MRI brain was negative for mets.  He completed 6 cycles of Carboplatin and Alimta and was on maintenance Alimta until end of March 2016.  His bone scan from 3/16 revealed stable disease. His CT scans also from end of March 2016 revealed "Interval enlargement of left upper lobe lung mass measuring 5.9 x 3.9 cm (previously 3.3 x 2.5 cm). This mass appears to invade the mediastinum and abutting the aortic arch and left subclavian artery"     He is now on Opdivo since March " 2016                    HPI Mr. Alan returns for follow up and Opdivo  He denies any nausea, vomiting, diarrhea, constipation, abdominal pain, weight loss or loss of appetite, chest pain, shortness of breath, leg swelling, fatigue, pain, headache, dizziness, or mood changes. His ECOG PS is zero. He is by himself.  Review of Systems   Constitutional: Negative for appetite change, fatigue and unexpected weight change.   HENT: Negative for mouth sores.    Eyes: Negative for visual disturbance.   Respiratory: Negative for cough and shortness of breath.    Cardiovascular: Negative for chest pain.   Gastrointestinal: Negative for abdominal pain and diarrhea.   Genitourinary: Negative for frequency.   Musculoskeletal: Negative for back pain.   Skin: Negative for rash.   Neurological: Negative for headaches.   Hematological: Negative for adenopathy.   Psychiatric/Behavioral: The patient is not nervous/anxious.    All other systems reviewed and are negative.      PMFSH: all information reviewed and updated as relevant to today's visit  Objective:      Physical Exam   Constitutional: He is oriented to person, place, and time. He appears well-developed and well-nourished.   HENT:   Mouth/Throat: No oropharyngeal exudate.   Cardiovascular: Normal rate and normal heart sounds.   Pulmonary/Chest: Effort normal and breath sounds normal. He has no wheezes.   Abdominal: Soft. Bowel sounds are normal. There is no tenderness.   Musculoskeletal: He exhibits no edema or tenderness.   Lymphadenopathy:     He has no cervical adenopathy.   Neurological: He is alert and oriented to person, place, and time. Coordination normal.   Skin: Skin is warm and dry. No rash noted.   Psychiatric: He has a normal mood and affect. Judgment and thought content normal.   Vitals reviewed.      LABS:  WBC   Date Value Ref Range Status   08/23/2018 8.28 3.90 - 12.70 K/uL Final     Hemoglobin   Date Value Ref Range Status   08/23/2018 14.0 14.0 - 18.0 g/dL  Final     Hematocrit   Date Value Ref Range Status   08/23/2018 43.2 40.0 - 54.0 % Final     Platelets   Date Value Ref Range Status   08/23/2018 277 150 - 350 K/uL Final     Gran # (ANC)   Date Value Ref Range Status   08/23/2018 3.7 1.8 - 7.7 K/uL Final     Comment:     The ANC is based on a white cell differential from an   automated cell counter. It has not been microscopically   reviewed for the presence of abnormal cells. Clinical   correlation is required.         Chemistry        Component Value Date/Time     08/23/2018 0730    K 3.7 08/23/2018 0730     08/23/2018 0730    CO2 26 08/23/2018 0730    BUN 12 08/23/2018 0730    CREATININE 1.3 08/23/2018 0730    GLU 97 08/23/2018 0730        Component Value Date/Time    CALCIUM 9.7 08/23/2018 0730    ALKPHOS 71 08/23/2018 0730    AST 21 08/23/2018 0730    ALT 20 08/23/2018 0730    BILITOT 0.2 08/23/2018 0730    ESTGFRAFRICA >60.0 08/23/2018 0730    EGFRNONAA >60.0 08/23/2018 0730          Assessment:       1. Malignant neoplasm of upper lobe of right lung    2. Secondary malignant neoplasm of bone        Plan:        1,2. He is doing very well and will return in 2 weeks for next cycle and for Opdivo    Above care plan was discussed with patient and all questions were addressed to his satisfaction. He will start seeing me on the [facility]

## 2018-08-24 ENCOUNTER — INFUSION (OUTPATIENT)
Dept: INFUSION THERAPY | Facility: HOSPITAL | Age: 43
End: 2018-08-24
Attending: INTERNAL MEDICINE
Payer: MEDICARE

## 2018-08-24 VITALS
DIASTOLIC BLOOD PRESSURE: 77 MMHG | SYSTOLIC BLOOD PRESSURE: 117 MMHG | TEMPERATURE: 99 F | RESPIRATION RATE: 18 BRPM | HEART RATE: 73 BPM

## 2018-08-24 DIAGNOSIS — D50.0 IRON DEFICIENCY ANEMIA DUE TO CHRONIC BLOOD LOSS: Primary | ICD-10-CM

## 2018-08-24 DIAGNOSIS — C79.51 SECONDARY MALIGNANT NEOPLASM OF BONE: ICD-10-CM

## 2018-08-24 DIAGNOSIS — C34.91 LUNG CANCER, PRIMARY, WITH METASTASIS FROM LUNG TO OTHER SITE, RIGHT: ICD-10-CM

## 2018-08-24 DIAGNOSIS — C34.11 MALIGNANT NEOPLASM OF UPPER LOBE OF RIGHT LUNG: ICD-10-CM

## 2018-08-24 PROCEDURE — 96372 THER/PROPH/DIAG INJ SC/IM: CPT | Mod: 59

## 2018-08-24 PROCEDURE — 25000003 PHARM REV CODE 250: Performed by: INTERNAL MEDICINE

## 2018-08-24 PROCEDURE — 96413 CHEMO IV INFUSION 1 HR: CPT

## 2018-08-24 PROCEDURE — 63600175 PHARM REV CODE 636 W HCPCS: Mod: JG | Performed by: INTERNAL MEDICINE

## 2018-08-24 RX ORDER — SODIUM CHLORIDE 0.9 % (FLUSH) 0.9 %
10 SYRINGE (ML) INJECTION
Status: DISCONTINUED | OUTPATIENT
Start: 2018-08-24 | End: 2018-08-24 | Stop reason: HOSPADM

## 2018-08-24 RX ORDER — HEPARIN 100 UNIT/ML
500 SYRINGE INTRAVENOUS
Status: DISCONTINUED | OUTPATIENT
Start: 2018-08-24 | End: 2018-08-24 | Stop reason: HOSPADM

## 2018-08-24 RX ADMIN — SODIUM CHLORIDE: 9 INJECTION, SOLUTION INTRAVENOUS at 08:08

## 2018-08-24 RX ADMIN — DENOSUMAB 120 MG: 120 INJECTION SUBCUTANEOUS at 08:08

## 2018-08-24 RX ADMIN — HEPARIN 500 UNITS: 100 SYRINGE at 08:08

## 2018-08-24 RX ADMIN — SODIUM CHLORIDE 480 MG: 9 INJECTION, SOLUTION INTRAVENOUS at 08:08

## 2018-08-24 NOTE — PLAN OF CARE
Problem: Patient Care Overview (Adult)  Goal: Plan of Care Review  Outcome: Ongoing (interventions implemented as appropriate)  Pt tolerated Xgeva and Opdivo with no complications. Pt denies jaw pain or recent dental work. Pt endorses taking Vit D and calcium supplements. Pt instructed to call MD with any problems. NAD. Pt discharged home independently.

## 2018-09-10 ENCOUNTER — INFUSION (OUTPATIENT)
Dept: INFUSION THERAPY | Facility: HOSPITAL | Age: 43
End: 2018-09-10
Attending: INTERNAL MEDICINE
Payer: MEDICARE

## 2018-09-10 ENCOUNTER — DOCUMENTATION ONLY (OUTPATIENT)
Dept: HEMATOLOGY/ONCOLOGY | Facility: CLINIC | Age: 43
End: 2018-09-10

## 2018-09-10 ENCOUNTER — OFFICE VISIT (OUTPATIENT)
Dept: HEMATOLOGY/ONCOLOGY | Facility: CLINIC | Age: 43
End: 2018-09-10
Payer: MEDICARE

## 2018-09-10 ENCOUNTER — LAB VISIT (OUTPATIENT)
Dept: LAB | Facility: HOSPITAL | Age: 43
End: 2018-09-10
Attending: INTERNAL MEDICINE
Payer: MEDICARE

## 2018-09-10 VITALS
TEMPERATURE: 98 F | HEIGHT: 66 IN | BODY MASS INDEX: 29.72 KG/M2 | WEIGHT: 184.94 LBS | RESPIRATION RATE: 17 BRPM | DIASTOLIC BLOOD PRESSURE: 85 MMHG | OXYGEN SATURATION: 99 % | HEART RATE: 80 BPM | SYSTOLIC BLOOD PRESSURE: 136 MMHG

## 2018-09-10 VITALS
SYSTOLIC BLOOD PRESSURE: 123 MMHG | DIASTOLIC BLOOD PRESSURE: 82 MMHG | OXYGEN SATURATION: 99 % | RESPIRATION RATE: 16 BRPM | HEART RATE: 56 BPM | TEMPERATURE: 98 F

## 2018-09-10 DIAGNOSIS — C79.51 SECONDARY MALIGNANT NEOPLASM OF BONE: ICD-10-CM

## 2018-09-10 DIAGNOSIS — D50.0 IRON DEFICIENCY ANEMIA DUE TO CHRONIC BLOOD LOSS: Primary | ICD-10-CM

## 2018-09-10 DIAGNOSIS — C34.11 MALIGNANT NEOPLASM OF UPPER LOBE OF RIGHT LUNG: Primary | ICD-10-CM

## 2018-09-10 DIAGNOSIS — G89.3 NEOPLASM RELATED PAIN: ICD-10-CM

## 2018-09-10 DIAGNOSIS — C34.90 MALIGNANT NEOPLASM OF LUNG, UNSPECIFIED LATERALITY, UNSPECIFIED PART OF LUNG: ICD-10-CM

## 2018-09-10 DIAGNOSIS — I82.402 DEEP VEIN THROMBOSIS (DVT) OF LEFT LOWER EXTREMITY, UNSPECIFIED CHRONICITY, UNSPECIFIED VEIN: ICD-10-CM

## 2018-09-10 DIAGNOSIS — C34.11 MALIGNANT NEOPLASM OF UPPER LOBE OF RIGHT LUNG: ICD-10-CM

## 2018-09-10 DIAGNOSIS — C34.91 LUNG CANCER, PRIMARY, WITH METASTASIS FROM LUNG TO OTHER SITE, RIGHT: ICD-10-CM

## 2018-09-10 DIAGNOSIS — E03.9 HYPOTHYROIDISM, UNSPECIFIED TYPE: ICD-10-CM

## 2018-09-10 LAB
ALBUMIN SERPL BCP-MCNC: 4.1 G/DL
ALP SERPL-CCNC: 75 U/L
ALT SERPL W/O P-5'-P-CCNC: 29 U/L
ANION GAP SERPL CALC-SCNC: 9 MMOL/L
AST SERPL-CCNC: 27 U/L
BILIRUB SERPL-MCNC: 0.3 MG/DL
BUN SERPL-MCNC: 16 MG/DL
CALCIUM SERPL-MCNC: 10.3 MG/DL
CHLORIDE SERPL-SCNC: 106 MMOL/L
CO2 SERPL-SCNC: 25 MMOL/L
CREAT SERPL-MCNC: 1.5 MG/DL
ERYTHROCYTE [DISTWIDTH] IN BLOOD BY AUTOMATED COUNT: 14.6 %
EST. GFR  (AFRICAN AMERICAN): >60 ML/MIN/1.73 M^2
EST. GFR  (NON AFRICAN AMERICAN): 56 ML/MIN/1.73 M^2
GLUCOSE SERPL-MCNC: 120 MG/DL
HCT VFR BLD AUTO: 42.5 %
HGB BLD-MCNC: 13.9 G/DL
MCH RBC QN AUTO: 26.6 PG
MCHC RBC AUTO-ENTMCNC: 32.7 G/DL
MCV RBC AUTO: 81 FL
NEUTROPHILS # BLD AUTO: 3.4 K/UL
PLATELET # BLD AUTO: 231 K/UL
PMV BLD AUTO: 10.8 FL
POTASSIUM SERPL-SCNC: 4.1 MMOL/L
PROT SERPL-MCNC: 7.5 G/DL
RBC # BLD AUTO: 5.22 M/UL
SODIUM SERPL-SCNC: 140 MMOL/L
WBC # BLD AUTO: 6.95 K/UL

## 2018-09-10 PROCEDURE — A4216 STERILE WATER/SALINE, 10 ML: HCPCS | Performed by: INTERNAL MEDICINE

## 2018-09-10 PROCEDURE — 25000003 PHARM REV CODE 250: Performed by: INTERNAL MEDICINE

## 2018-09-10 PROCEDURE — 99999 PR PBB SHADOW E&M-EST. PATIENT-LVL IV: CPT | Mod: PBBFAC,,, | Performed by: INTERNAL MEDICINE

## 2018-09-10 PROCEDURE — 85027 COMPLETE CBC AUTOMATED: CPT

## 2018-09-10 PROCEDURE — 80053 COMPREHEN METABOLIC PANEL: CPT

## 2018-09-10 PROCEDURE — 36415 COLL VENOUS BLD VENIPUNCTURE: CPT

## 2018-09-10 PROCEDURE — 99215 OFFICE O/P EST HI 40 MIN: CPT | Mod: S$PBB,,, | Performed by: INTERNAL MEDICINE

## 2018-09-10 PROCEDURE — 96413 CHEMO IV INFUSION 1 HR: CPT

## 2018-09-10 PROCEDURE — 99214 OFFICE O/P EST MOD 30 MIN: CPT | Mod: PBBFAC,25 | Performed by: INTERNAL MEDICINE

## 2018-09-10 PROCEDURE — 63600175 PHARM REV CODE 636 W HCPCS: Mod: JG | Performed by: INTERNAL MEDICINE

## 2018-09-10 RX ORDER — SODIUM CHLORIDE 0.9 % (FLUSH) 0.9 %
10 SYRINGE (ML) INJECTION
Status: CANCELLED | OUTPATIENT
Start: 2018-09-10

## 2018-09-10 RX ORDER — SODIUM CHLORIDE 0.9 % (FLUSH) 0.9 %
10 SYRINGE (ML) INJECTION
Status: DISCONTINUED | OUTPATIENT
Start: 2018-09-10 | End: 2018-09-10 | Stop reason: HOSPADM

## 2018-09-10 RX ORDER — HEPARIN 100 UNIT/ML
500 SYRINGE INTRAVENOUS
Status: DISCONTINUED | OUTPATIENT
Start: 2018-09-10 | End: 2018-09-10 | Stop reason: HOSPADM

## 2018-09-10 RX ORDER — HEPARIN 100 UNIT/ML
500 SYRINGE INTRAVENOUS
Status: CANCELLED | OUTPATIENT
Start: 2018-09-10

## 2018-09-10 RX ORDER — LEVOTHYROXINE SODIUM 100 UG/1
100 TABLET ORAL DAILY
Qty: 30 TABLET | Refills: 1 | Status: SHIPPED | OUTPATIENT
Start: 2018-09-10 | End: 2018-10-08 | Stop reason: SDUPTHER

## 2018-09-10 RX ORDER — OXYCODONE AND ACETAMINOPHEN 10; 325 MG/1; MG/1
1 TABLET ORAL EVERY 6 HOURS PRN
Qty: 120 TABLET | Refills: 0 | Status: SHIPPED | OUTPATIENT
Start: 2018-09-10 | End: 2018-10-08 | Stop reason: SDUPTHER

## 2018-09-10 RX ADMIN — HEPARIN 500 UNITS: 100 SYRINGE at 11:09

## 2018-09-10 RX ADMIN — SODIUM CHLORIDE, PRESERVATIVE FREE 10 ML: 5 INJECTION INTRAVENOUS at 11:09

## 2018-09-10 RX ADMIN — SODIUM CHLORIDE 480 MG: 9 INJECTION, SOLUTION INTRAVENOUS at 10:09

## 2018-09-10 NOTE — PROGRESS NOTES
"Subjective:       Patient ID: Yao Alan is a 43 y.o. male.    Chief Complaint: Lung Cancer  Oncologic History:  Mr. Yao Alan is a 43-year-old male who has a long history of smoking and was seen in the Pulmonary Clinic by Dr. Valle on 03/05/2015 with abnormal CT scan.    Apparently, he went to the [facility] in February with coughing as well as chest pain. A chest x-ray was done, which revealed a left upper lobe lung mass. Followup CT scan was done on 02/12/2015 and that revealed posterior superior mediastinal mass adjacent and prominent enlarged upper left mediastinal lymph nodes, abutting the aortic arch as well as left subclavian artery. A  CT scan of the abdomen was done on 03/03/2015 without contrast and that revealed nonobstructive nephrolithiasis, but a 2.1 cm lytic lesion involving the left iliac wing concerning for metastatic disease given the presence of emphysema and a mediastinal soft tissue mass on the CT chest from 02/12/2015. He saw Dr. Valle after that on 03/05/2015 and underwent an IR guided biopsy of the bone lesion on 03/17/2015. 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    His EGFR/EML/ALK is negative.    His PET scan on 3/25/15 revealed Left upper lobe lung lesion with an adjacent para-aortic lymph node, right anterior rib metastasis, right iliac metastasis, and pancreatic metastasis. A pancreatic cancer primary neoplasm is less likely.  MRI brain was negative for mets.  He completed 6 cycles of Carboplatin and Alimta and was on maintenance Alimta until end of March 2016.  His bone scan from 3/16 revealed stable disease. His CT scans also from end of March 2016 revealed "Interval enlargement of left upper lobe lung mass measuring 5.9 x 3.9 cm (previously 3.3 x 2.5 cm). This mass appears to invade the mediastinum and abutting the aortic arch and left subclavian artery"     He is now on Opdivo since March 2016                        HPI " Mr. Alan returns for follow up and Opdivo  He feels well and denies any new complaints      Review of Systems   Constitutional: Negative for appetite change, fatigue and unexpected weight change.   HENT: Negative for mouth sores.    Eyes: Negative for visual disturbance.   Respiratory: Negative for cough and shortness of breath.    Cardiovascular: Negative for chest pain.   Gastrointestinal: Negative for abdominal pain and diarrhea.   Genitourinary: Negative for frequency.   Musculoskeletal: Negative for back pain.   Skin: Negative for rash.   Neurological: Negative for headaches.   Hematological: Negative for adenopathy.   Psychiatric/Behavioral: The patient is not nervous/anxious.    All other systems reviewed and are negative.      Objective:      Physical Exam   Constitutional: He is oriented to person, place, and time. He appears well-developed and well-nourished.   HENT:   Mouth/Throat: No oropharyngeal exudate.   Cardiovascular: Normal rate and normal heart sounds.   Pulmonary/Chest: Effort normal and breath sounds normal. He has no wheezes.   Abdominal: Soft. Bowel sounds are normal. There is no tenderness.   Musculoskeletal: He exhibits no edema or tenderness.   Lymphadenopathy:     He has no cervical adenopathy.   Neurological: He is alert and oriented to person, place, and time. Coordination normal.   Skin: Skin is warm and dry. No rash noted.   Psychiatric: He has a normal mood and affect. Judgment and thought content normal.   Vitals reviewed.      LABS:  WBC   Date Value Ref Range Status   09/10/2018 6.95 3.90 - 12.70 K/uL Final     Hemoglobin   Date Value Ref Range Status   09/10/2018 13.9 (L) 14.0 - 18.0 g/dL Final     Hematocrit   Date Value Ref Range Status   09/10/2018 42.5 40.0 - 54.0 % Final     Platelets   Date Value Ref Range Status   09/10/2018 231 150 - 350 K/uL Final     Gran # (ANC)   Date Value Ref Range Status   09/10/2018 3.4 1.8 - 7.7 K/uL Final     Comment:     The ANC is based on a  white cell differential from an   automated cell counter. It has not been microscopically   reviewed for the presence of abnormal cells. Clinical   correlation is required.         Chemistry        Component Value Date/Time     09/10/2018 0805    K 4.1 09/10/2018 0805     09/10/2018 0805    CO2 25 09/10/2018 0805    BUN 16 09/10/2018 0805    CREATININE 1.5 (H) 09/10/2018 0805     (H) 09/10/2018 0805        Component Value Date/Time    CALCIUM 10.3 09/10/2018 0805    ALKPHOS 75 09/10/2018 0805    AST 27 09/10/2018 0805    ALT 29 09/10/2018 0805    BILITOT 0.3 09/10/2018 0805    ESTGFRAFRICA >60 09/10/2018 0805    EGFRNONAA 56 (A) 09/10/2018 0805        TSH   Date Value Ref Range Status   07/26/2018 0.622 0.400 - 4.000 uIU/mL Final     Free T4   Date Value Ref Range Status   07/26/2018 1.02 0.71 - 1.51 ng/dL Final       Assessment:       1. Malignant neoplasm of upper lobe of right lung    2. Secondary malignant neoplasm of bone    3. Deep vein thrombosis (DVT) of left lower extremity, unspecified chronicity, unspecified vein    4. Neoplasm related pain    5. Hypothyroidism, unspecified type        Plan:        1,2. He is doing well clinically and will proceed with Opdivo and will return in 2 weeks for next cycle with restaging CT scans and bone scan  3. Stable, refilled Xarelto  4. Stable, refilled med.  5. Doing well, continue to monitor thyroid labs and will recheck in 2 weeks    Above care plan was discussed with patient and all questions were addressed to his satisfaction

## 2018-09-10 NOTE — PROGRESS NOTES
Received call from patient, who needs assistance with prescription co-pays through [employer]. Called [facility], ext. [phone], and spoke with Shama who said that they have 3 prescriptions ready for patient/ Explained to her about [employer] and provided her with the agency's phone number, [phone], to call and speak with Chey to get the authorization. Patient was on his way to [facility] to  his medications. No other needs indicated at this time.

## 2018-09-10 NOTE — Clinical Note
Schedule CBC,CMP, TSH, free T4, CT chest, abdomen/pelvis and bone scan and see me in 2 weeks and for OpdivoAll of the above on [facility]

## 2018-09-10 NOTE — PLAN OF CARE
Problem: Patient Care Overview (Adult)  Goal: Plan of Care Review  Outcome: Ongoing (interventions implemented as appropriate)  Pt presented for opdivo infusion. Pt oriented to unit, as it was his first visit to this facility. VSS. Pt tolerated infusion well. Blood return noted from port. Pt provided with a copy of AVS printout. Distress score of 0. No changes in medical history or medications.

## 2018-09-11 ENCOUNTER — TELEPHONE (OUTPATIENT)
Dept: HEMATOLOGY/ONCOLOGY | Facility: CLINIC | Age: 43
End: 2018-09-11

## 2018-09-11 NOTE — TELEPHONE ENCOUNTER
----- Message from Bel Garay MD sent at 9/10/2018  9:18 AM CDT -----  Schedule CBC,CMP, TSH, free T4, CT chest, abdomen/pelvis and bone scan and see me in 2 weeks and for Opdivo  All of the above on [facility]

## 2018-09-20 ENCOUNTER — HOSPITAL ENCOUNTER (OUTPATIENT)
Dept: RADIOLOGY | Facility: HOSPITAL | Age: 43
Discharge: HOME | End: 2018-09-20
Attending: INTERNAL MEDICINE
Payer: MEDICARE

## 2018-09-20 DIAGNOSIS — C34.11 MALIGNANT NEOPLASM OF UPPER LOBE OF RIGHT LUNG: ICD-10-CM

## 2018-09-20 PROCEDURE — 78306 BONE IMAGING WHOLE BODY: CPT | Mod: 26,,, | Performed by: RADIOLOGY

## 2018-09-20 PROCEDURE — 74177 CT ABD & PELVIS W/CONTRAST: CPT | Mod: TC

## 2018-09-20 PROCEDURE — 71260 CT THORAX DX C+: CPT | Mod: 26,,, | Performed by: RADIOLOGY

## 2018-09-20 PROCEDURE — 71260 CT THORAX DX C+: CPT | Mod: TC

## 2018-09-20 PROCEDURE — 74177 CT ABD & PELVIS W/CONTRAST: CPT | Mod: 26,,, | Performed by: RADIOLOGY

## 2018-09-20 PROCEDURE — 25500020 PHARM REV CODE 255: Performed by: INTERNAL MEDICINE

## 2018-09-20 PROCEDURE — A9503 TC99M MEDRONATE: HCPCS

## 2018-09-20 RX ADMIN — IOHEXOL 15 ML: 300 INJECTION, SOLUTION INTRAVENOUS at 12:09

## 2018-09-20 RX ADMIN — IOHEXOL 100 ML: 350 INJECTION, SOLUTION INTRAVENOUS at 12:09

## 2018-09-24 ENCOUNTER — OFFICE VISIT (OUTPATIENT)
Dept: HEMATOLOGY/ONCOLOGY | Facility: CLINIC | Age: 43
End: 2018-09-24
Payer: MEDICARE

## 2018-09-24 VITALS
OXYGEN SATURATION: 99 % | HEIGHT: 67 IN | SYSTOLIC BLOOD PRESSURE: 140 MMHG | HEART RATE: 69 BPM | DIASTOLIC BLOOD PRESSURE: 87 MMHG | WEIGHT: 188.25 LBS | TEMPERATURE: 98 F | BODY MASS INDEX: 29.55 KG/M2

## 2018-09-24 DIAGNOSIS — E05.90 HYPERTHYROIDISM: ICD-10-CM

## 2018-09-24 DIAGNOSIS — C34.11 MALIGNANT NEOPLASM OF UPPER LOBE OF RIGHT LUNG: Primary | ICD-10-CM

## 2018-09-24 DIAGNOSIS — C79.51 SECONDARY MALIGNANT NEOPLASM OF BONE: ICD-10-CM

## 2018-09-24 PROCEDURE — 99999 PR PBB SHADOW E&M-EST. PATIENT-LVL IV: CPT | Mod: PBBFAC,,, | Performed by: INTERNAL MEDICINE

## 2018-09-24 PROCEDURE — 99214 OFFICE O/P EST MOD 30 MIN: CPT | Mod: PBBFAC | Performed by: INTERNAL MEDICINE

## 2018-09-24 PROCEDURE — 99215 OFFICE O/P EST HI 40 MIN: CPT | Mod: S$PBB,,, | Performed by: INTERNAL MEDICINE

## 2018-09-24 RX ORDER — SODIUM CHLORIDE 0.9 % (FLUSH) 0.9 %
10 SYRINGE (ML) INJECTION
Status: CANCELLED | OUTPATIENT
Start: 2018-09-24

## 2018-09-24 RX ORDER — HEPARIN 100 UNIT/ML
500 SYRINGE INTRAVENOUS
Status: CANCELLED | OUTPATIENT
Start: 2018-09-24

## 2018-09-24 NOTE — PROGRESS NOTES
"Subjective:       Patient ID: Yao Alan is a 43 y.o. male.    Chief Complaint: Lung Cancer  Oncologic History:  Mr. Yao Alan is a 43-year-old male who has a long history of smoking and was seen in the Pulmonary Clinic by Dr. Valle on 03/05/2015 with abnormal CT scan.    Apparently, he went to the [facility] in February with coughing as well as chest pain. A chest x-ray was done, which revealed a left upper lobe lung mass. Followup CT scan was done on 02/12/2015 and that revealed posterior superior mediastinal mass adjacent and prominent enlarged upper left mediastinal lymph nodes, abutting the aortic arch as well as left subclavian artery. A  CT scan of the abdomen was done on 03/03/2015 without contrast and that revealed nonobstructive nephrolithiasis, but a 2.1 cm lytic lesion involving the left iliac wing concerning for metastatic disease given the presence of emphysema and a mediastinal soft tissue mass on the CT chest from 02/12/2015. He saw Dr. Valle after that on 03/05/2015 and underwent an IR guided biopsy of the bone lesion on 03/17/2015. 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    His EGFR/EML/ALK is negative.    His PET scan on 3/25/15 revealed Left upper lobe lung lesion with an adjacent para-aortic lymph node, right anterior rib metastasis, right iliac metastasis, and pancreatic metastasis. A pancreatic cancer primary neoplasm is less likely.  MRI brain was negative for mets.  He completed 6 cycles of Carboplatin and Alimta and was on maintenance Alimta until end of March 2016.  His bone scan from 3/16 revealed stable disease. His CT scans also from end of March 2016 revealed "Interval enlargement of left upper lobe lung mass measuring 5.9 x 3.9 cm (previously 3.3 x 2.5 cm). This mass appears to invade the mediastinum and abutting the aortic arch and left subclavian artery"     He is now on Opdivo since March 2016                         HPI " Mr. Alan returns for follow up and Opdivo. He feels well and denies any new issues.  CT scans and bone scan reveal stable findings.    Review of Systems   Constitutional: Negative for appetite change, fatigue and unexpected weight change.   HENT: Negative for mouth sores.    Eyes: Negative for visual disturbance.   Respiratory: Negative for cough and shortness of breath.    Cardiovascular: Negative for chest pain.   Gastrointestinal: Negative for abdominal pain and diarrhea.   Genitourinary: Negative for frequency.   Musculoskeletal: Negative for back pain.   Skin: Negative for rash.   Neurological: Negative for headaches.   Hematological: Negative for adenopathy.   Psychiatric/Behavioral: The patient is not nervous/anxious.    All other systems reviewed and are negative.      Objective:      Physical Exam   Constitutional: He is oriented to person, place, and time. He appears well-developed and well-nourished.   HENT:   Mouth/Throat: No oropharyngeal exudate.   Cardiovascular: Normal rate and normal heart sounds.   Pulmonary/Chest: Effort normal and breath sounds normal. He has no wheezes.   Abdominal: Soft. Bowel sounds are normal. There is no tenderness.   Musculoskeletal: He exhibits no edema or tenderness.   Lymphadenopathy:     He has no cervical adenopathy.   Neurological: He is alert and oriented to person, place, and time. Coordination normal.   Skin: Skin is warm and dry. No rash noted.   Psychiatric: He has a normal mood and affect. Judgment and thought content normal.   Vitals reviewed.        LABS:  WBC   Date Value Ref Range Status   09/24/2018 7.94 3.90 - 12.70 K/uL Final     Hemoglobin   Date Value Ref Range Status   09/24/2018 14.2 14.0 - 18.0 g/dL Final     Hematocrit   Date Value Ref Range Status   09/24/2018 42.9 40.0 - 54.0 % Final     Platelets   Date Value Ref Range Status   09/24/2018 241 150 - 350 K/uL Final     Gran # (ANC)   Date Value Ref Range Status   09/24/2018 4.1 1.8 - 7.7 K/uL Final      Comment:     The ANC is based on a white cell differential from an   automated cell counter. It has not been microscopically   reviewed for the presence of abnormal cells. Clinical   correlation is required.         Chemistry        Component Value Date/Time     09/24/2018 0749    K 3.9 09/24/2018 0749     09/24/2018 0749    CO2 22 (L) 09/24/2018 0749    BUN 12 09/24/2018 0749    CREATININE 1.4 09/24/2018 0749    GLU 92 09/24/2018 0749        Component Value Date/Time    CALCIUM 9.8 09/24/2018 0749    ALKPHOS 66 09/24/2018 0749    AST 21 09/24/2018 0749    ALT 20 09/24/2018 0749    BILITOT 0.4 09/24/2018 0749    ESTGFRAFRICA >60 09/24/2018 0749    EGFRNONAA >60 09/24/2018 0749        TSH   Date Value Ref Range Status   09/24/2018 0.261 (L) 0.400 - 4.000 uIU/mL Final     Free T4   Date Value Ref Range Status   09/24/2018 1.05 0.71 - 1.51 ng/dL Final       Assessment:       1. Malignant neoplasm of upper lobe of right lung    2. Secondary malignant neoplasm of bone    3. Hyperthyroidism        Plan:        1,2. He will proceed with Opdivo and will return in 2 weeks for next cycle  3. Monitor closely, Will check labs in 2 weeks  Above care plan was discussed with patient and all questions were addressed to his satisfaction

## 2018-09-25 ENCOUNTER — INFUSION (OUTPATIENT)
Dept: INFUSION THERAPY | Facility: HOSPITAL | Age: 43
End: 2018-09-25
Attending: INTERNAL MEDICINE
Payer: MEDICARE

## 2018-09-25 VITALS
OXYGEN SATURATION: 95 % | RESPIRATION RATE: 16 BRPM | SYSTOLIC BLOOD PRESSURE: 120 MMHG | DIASTOLIC BLOOD PRESSURE: 82 MMHG | TEMPERATURE: 98 F | HEART RATE: 67 BPM

## 2018-09-25 DIAGNOSIS — C79.51 SECONDARY MALIGNANT NEOPLASM OF BONE: ICD-10-CM

## 2018-09-25 DIAGNOSIS — C34.91 LUNG CANCER, PRIMARY, WITH METASTASIS FROM LUNG TO OTHER SITE, RIGHT: ICD-10-CM

## 2018-09-25 DIAGNOSIS — C34.11 MALIGNANT NEOPLASM OF UPPER LOBE OF RIGHT LUNG: ICD-10-CM

## 2018-09-25 DIAGNOSIS — D50.0 IRON DEFICIENCY ANEMIA DUE TO CHRONIC BLOOD LOSS: Primary | ICD-10-CM

## 2018-09-25 PROCEDURE — 96413 CHEMO IV INFUSION 1 HR: CPT

## 2018-09-25 PROCEDURE — 63600175 PHARM REV CODE 636 W HCPCS: Mod: JG | Performed by: INTERNAL MEDICINE

## 2018-09-25 PROCEDURE — A4216 STERILE WATER/SALINE, 10 ML: HCPCS | Performed by: INTERNAL MEDICINE

## 2018-09-25 PROCEDURE — 25000003 PHARM REV CODE 250: Performed by: INTERNAL MEDICINE

## 2018-09-25 RX ORDER — SODIUM CHLORIDE 0.9 % (FLUSH) 0.9 %
10 SYRINGE (ML) INJECTION
Status: DISCONTINUED | OUTPATIENT
Start: 2018-09-25 | End: 2018-09-25 | Stop reason: HOSPADM

## 2018-09-25 RX ORDER — HEPARIN 100 UNIT/ML
500 SYRINGE INTRAVENOUS
Status: DISCONTINUED | OUTPATIENT
Start: 2018-09-25 | End: 2018-09-25 | Stop reason: HOSPADM

## 2018-09-25 RX ADMIN — SODIUM CHLORIDE 480 MG: 9 INJECTION, SOLUTION INTRAVENOUS at 08:09

## 2018-09-25 RX ADMIN — HEPARIN 500 UNITS: 100 SYRINGE at 09:09

## 2018-09-25 RX ADMIN — Medication 10 ML: at 09:09

## 2018-09-25 NOTE — PLAN OF CARE
"Problem: Patient Care Overview (Adult)  Goal: Plan of Care Review  Outcome: Ongoing (interventions implemented as appropriate)  Pt presented for Opdivo infusion. VSS. Afebrile. Pt tolerated infusion well. Blood return noted on port access and de-access. Next appt scheduled for pt. Pt reports no changes in medical history or medications. Reports that "everything has been good."      "

## 2018-10-08 ENCOUNTER — OFFICE VISIT (OUTPATIENT)
Dept: HEMATOLOGY/ONCOLOGY | Facility: CLINIC | Age: 43
End: 2018-10-08
Payer: MEDICARE

## 2018-10-08 ENCOUNTER — INFUSION (OUTPATIENT)
Dept: INFUSION THERAPY | Facility: HOSPITAL | Age: 43
End: 2018-10-08
Attending: INTERNAL MEDICINE
Payer: MEDICARE

## 2018-10-08 VITALS
HEART RATE: 73 BPM | TEMPERATURE: 99 F | DIASTOLIC BLOOD PRESSURE: 95 MMHG | BODY MASS INDEX: 29.15 KG/M2 | OXYGEN SATURATION: 97 % | HEIGHT: 67 IN | SYSTOLIC BLOOD PRESSURE: 143 MMHG | WEIGHT: 185.75 LBS

## 2018-10-08 VITALS
TEMPERATURE: 98 F | OXYGEN SATURATION: 99 % | DIASTOLIC BLOOD PRESSURE: 88 MMHG | SYSTOLIC BLOOD PRESSURE: 121 MMHG | RESPIRATION RATE: 16 BRPM | HEART RATE: 63 BPM

## 2018-10-08 DIAGNOSIS — I82.402 DEEP VEIN THROMBOSIS (DVT) OF LEFT LOWER EXTREMITY, UNSPECIFIED CHRONICITY, UNSPECIFIED VEIN: ICD-10-CM

## 2018-10-08 DIAGNOSIS — E03.2 HYPOTHYROIDISM DUE TO MEDICATION: ICD-10-CM

## 2018-10-08 DIAGNOSIS — C34.11 MALIGNANT NEOPLASM OF UPPER LOBE OF RIGHT LUNG: Primary | ICD-10-CM

## 2018-10-08 DIAGNOSIS — E03.9 HYPOTHYROIDISM, UNSPECIFIED TYPE: ICD-10-CM

## 2018-10-08 DIAGNOSIS — E05.90 HYPERTHYROIDISM: ICD-10-CM

## 2018-10-08 DIAGNOSIS — G89.3 NEOPLASM RELATED PAIN: ICD-10-CM

## 2018-10-08 DIAGNOSIS — C79.51 SECONDARY MALIGNANT NEOPLASM OF BONE: ICD-10-CM

## 2018-10-08 DIAGNOSIS — C34.91 LUNG CANCER, PRIMARY, WITH METASTASIS FROM LUNG TO OTHER SITE, RIGHT: ICD-10-CM

## 2018-10-08 DIAGNOSIS — D50.0 IRON DEFICIENCY ANEMIA DUE TO CHRONIC BLOOD LOSS: Primary | ICD-10-CM

## 2018-10-08 DIAGNOSIS — C34.11 MALIGNANT NEOPLASM OF UPPER LOBE OF RIGHT LUNG: ICD-10-CM

## 2018-10-08 PROCEDURE — 25000003 PHARM REV CODE 250: Performed by: INTERNAL MEDICINE

## 2018-10-08 PROCEDURE — A4216 STERILE WATER/SALINE, 10 ML: HCPCS | Performed by: INTERNAL MEDICINE

## 2018-10-08 PROCEDURE — 99999 PR PBB SHADOW E&M-EST. PATIENT-LVL IV: CPT | Mod: PBBFAC,,, | Performed by: INTERNAL MEDICINE

## 2018-10-08 PROCEDURE — 96413 CHEMO IV INFUSION 1 HR: CPT

## 2018-10-08 PROCEDURE — 99214 OFFICE O/P EST MOD 30 MIN: CPT | Mod: PBBFAC,25 | Performed by: INTERNAL MEDICINE

## 2018-10-08 PROCEDURE — 63600175 PHARM REV CODE 636 W HCPCS: Mod: JG | Performed by: INTERNAL MEDICINE

## 2018-10-08 PROCEDURE — 99215 OFFICE O/P EST HI 40 MIN: CPT | Mod: S$PBB,,, | Performed by: INTERNAL MEDICINE

## 2018-10-08 RX ORDER — OXYCODONE AND ACETAMINOPHEN 10; 325 MG/1; MG/1
1 TABLET ORAL EVERY 6 HOURS PRN
Qty: 120 TABLET | Refills: 0 | Status: SHIPPED | OUTPATIENT
Start: 2018-10-08 | End: 2018-10-08 | Stop reason: SDUPTHER

## 2018-10-08 RX ORDER — HEPARIN 100 UNIT/ML
500 SYRINGE INTRAVENOUS
Status: DISCONTINUED | OUTPATIENT
Start: 2018-10-08 | End: 2018-10-08 | Stop reason: HOSPADM

## 2018-10-08 RX ORDER — SODIUM CHLORIDE 0.9 % (FLUSH) 0.9 %
10 SYRINGE (ML) INJECTION
Status: CANCELLED | OUTPATIENT
Start: 2018-10-08

## 2018-10-08 RX ORDER — SODIUM CHLORIDE 0.9 % (FLUSH) 0.9 %
10 SYRINGE (ML) INJECTION
Status: DISCONTINUED | OUTPATIENT
Start: 2018-10-08 | End: 2018-10-08 | Stop reason: HOSPADM

## 2018-10-08 RX ORDER — LEVOTHYROXINE SODIUM 100 UG/1
100 TABLET ORAL DAILY
Qty: 30 TABLET | Refills: 1 | Status: SHIPPED | OUTPATIENT
Start: 2018-10-08 | End: 2018-10-08 | Stop reason: DRUGHIGH

## 2018-10-08 RX ORDER — HEPARIN 100 UNIT/ML
500 SYRINGE INTRAVENOUS
Status: CANCELLED | OUTPATIENT
Start: 2018-10-08

## 2018-10-08 RX ORDER — LEVOTHYROXINE SODIUM 100 UG/1
100 TABLET ORAL DAILY
Qty: 30 TABLET | Refills: 1 | Status: SHIPPED | OUTPATIENT
Start: 2018-10-08 | End: 2018-10-08 | Stop reason: SDUPTHER

## 2018-10-08 RX ORDER — LEVOTHYROXINE SODIUM 75 UG/1
75 TABLET ORAL DAILY
Qty: 30 TABLET | Refills: 11 | Status: SHIPPED | OUTPATIENT
Start: 2018-10-08 | End: 2018-11-05 | Stop reason: SDUPTHER

## 2018-10-08 RX ORDER — OXYCODONE AND ACETAMINOPHEN 10; 325 MG/1; MG/1
1 TABLET ORAL EVERY 6 HOURS PRN
Qty: 120 TABLET | Refills: 0 | Status: SHIPPED | OUTPATIENT
Start: 2018-10-08 | End: 2018-11-05 | Stop reason: SDUPTHER

## 2018-10-08 RX ADMIN — HEPARIN 500 UNITS: 100 SYRINGE at 09:10

## 2018-10-08 RX ADMIN — Medication 10 ML: at 09:10

## 2018-10-08 RX ADMIN — SODIUM CHLORIDE 480 MG: 9 INJECTION, SOLUTION INTRAVENOUS at 08:10

## 2018-10-08 NOTE — Clinical Note
Please tell [facility] pharmacy to dispense the 75 mcg of Synthroid. I adjusted dose after Synthroid came in.I had sent the 100 mcg to the pharmacy by then

## 2018-10-08 NOTE — PLAN OF CARE
Problem: Patient Care Overview (Adult)  Goal: Plan of Care Review  Outcome: Ongoing (interventions implemented as appropriate)  Pt instructed to contact MD with any further concerns or questions, Pt verbalized understanding. Pt discharged home, ambulated off unit unassisted.

## 2018-10-08 NOTE — PROGRESS NOTES
"Subjective:       Patient ID: Yao Alna is a 43 y.o. male.    Chief Complaint: Lung Cancer  Oncologic History:  Mr. Yao Alan is a 43-year-old male who has a long history of smoking and was seen in the Pulmonary Clinic by Dr. Valle on 03/05/2015 with abnormal CT scan.    Apparently, he went to the [facility] in February with coughing as well as chest pain. A chest x-ray was done, which revealed a left upper lobe lung mass. Followup CT scan was done on 02/12/2015 and that revealed posterior superior mediastinal mass adjacent and prominent enlarged upper left mediastinal lymph nodes, abutting the aortic arch as well as left subclavian artery. A  CT scan of the abdomen was done on 03/03/2015 without contrast and that revealed nonobstructive nephrolithiasis, but a 2.1 cm lytic lesion involving the left iliac wing concerning for metastatic disease given the presence of emphysema and a mediastinal soft tissue mass on the CT chest from 02/12/2015. He saw Dr. Valle after that on 03/05/2015 and underwent an IR guided biopsy of the bone lesion on 03/17/2015. 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    His EGFR/EML/ALK is negative.    His PET scan on 3/25/15 revealed Left upper lobe lung lesion with an adjacent para-aortic lymph node, right anterior rib metastasis, right iliac metastasis, and pancreatic metastasis. A pancreatic cancer primary neoplasm is less likely.  MRI brain was negative for mets.  He completed 6 cycles of Carboplatin and Alimta and was on maintenance Alimta until end of March 2016.  His bone scan from 3/16 revealed stable disease. His CT scans also from end of March 2016 revealed "Interval enlargement of left upper lobe lung mass measuring 5.9 x 3.9 cm (previously 3.3 x 2.5 cm). This mass appears to invade the mediastinum and abutting the aortic arch and left subclavian artery"     He is now on Opdivo since March 2016                            HPI " Mr. Alan returns for follow up and Opdivo. He feels well and denies any new complaints.    Review of Systems   Constitutional: Negative for appetite change, fatigue and unexpected weight change.   HENT: Negative for mouth sores.    Eyes: Negative for visual disturbance.   Respiratory: Negative for cough and shortness of breath.    Cardiovascular: Negative for chest pain.   Gastrointestinal: Negative for abdominal pain and diarrhea.   Genitourinary: Negative for frequency.   Musculoskeletal: Negative for back pain.   Skin: Negative for rash.   Neurological: Negative for headaches.   Hematological: Negative for adenopathy.   Psychiatric/Behavioral: The patient is not nervous/anxious.    All other systems reviewed and are negative.      Objective:      Physical Exam   Constitutional: He is oriented to person, place, and time. He appears well-developed and well-nourished.   HENT:   Mouth/Throat: No oropharyngeal exudate.   Cardiovascular: Normal rate and normal heart sounds.   Pulmonary/Chest: Effort normal and breath sounds normal. He has no wheezes.   Abdominal: Soft. Bowel sounds are normal. There is no tenderness.   Musculoskeletal: He exhibits no edema or tenderness.   Lymphadenopathy:     He has no cervical adenopathy.   Neurological: He is alert and oriented to person, place, and time. Coordination normal.   Skin: Skin is warm and dry. No rash noted.   Psychiatric: He has a normal mood and affect. Judgment and thought content normal.   Vitals reviewed.        LABS:  WBC   Date Value Ref Range Status   10/08/2018 7.30 3.90 - 12.70 K/uL Final     Hemoglobin   Date Value Ref Range Status   10/08/2018 14.2 14.0 - 18.0 g/dL Final     Hematocrit   Date Value Ref Range Status   10/08/2018 43.1 40.0 - 54.0 % Final     Platelets   Date Value Ref Range Status   10/08/2018 250 150 - 350 K/uL Final     Gran # (ANC)   Date Value Ref Range Status   10/08/2018 3.8 1.8 - 7.7 K/uL Final     Comment:     The ANC is based on a white  cell differential from an   automated cell counter. It has not been microscopically   reviewed for the presence of abnormal cells. Clinical   correlation is required.         Chemistry        Component Value Date/Time     10/08/2018 0736    K 4.1 10/08/2018 0736     10/08/2018 0736    CO2 26 10/08/2018 0736    BUN 16 10/08/2018 0736    CREATININE 1.5 (H) 10/08/2018 0736    GLU 85 10/08/2018 0736        Component Value Date/Time    CALCIUM 9.7 10/08/2018 0736    ALKPHOS 69 10/08/2018 0736    AST 26 10/08/2018 0736    ALT 27 10/08/2018 0736    BILITOT 0.3 10/08/2018 0736    ESTGFRAFRICA >60 10/08/2018 0736    EGFRNONAA 56 (A) 10/08/2018 0736        TSH   Date Value Ref Range Status   10/08/2018 0.076 (L) 0.400 - 4.000 uIU/mL Final     Free T4   Date Value Ref Range Status   10/08/2018 1.10 0.71 - 1.51 ng/dL Final       Assessment:       1. Malignant neoplasm of upper lobe of right lung    2. Secondary malignant neoplasm of bone    3. Hypothyroidism due to medication        Plan:        1,2,3. He is doing well clinically and will proceed with Opdivo and will return in 2 weeks for next cycle.    3. Recheck in 2 weeks, Reduce the dose of Synthroid to 75 mcg.    Above care plan was discussed with patient and all questions were addressed to his satisfaction

## 2018-10-08 NOTE — PLAN OF CARE
Problem: Chemotherapy Effects (Adult)  Goal: Signs and Symptoms of Listed Potential Problems Will be Absent or Manageable (Chemotherapy Effects)  Signs and symptoms of listed potential problems will be absent or manageable by discharge/transition of care (reference Chemotherapy Effects (Adult) CPG).   Pt arrived for chemo, ambulated unassisted. Side effects and self care tips reviewed,Pt tolerating tx well,

## 2018-10-08 NOTE — Clinical Note
Schedule CBC,cMP, TSH and free T4 and [place] me in 2 weeks [place] [facility] [place]

## 2018-10-22 ENCOUNTER — OFFICE VISIT (OUTPATIENT)
Dept: HEMATOLOGY/ONCOLOGY | Facility: CLINIC | Age: 43
End: 2018-10-22
Payer: MEDICARE

## 2018-10-22 ENCOUNTER — TELEPHONE (OUTPATIENT)
Dept: HEMATOLOGY/ONCOLOGY | Facility: CLINIC | Age: 43
End: 2018-10-22

## 2018-10-22 ENCOUNTER — INFUSION (OUTPATIENT)
Dept: INFUSION THERAPY | Facility: HOSPITAL | Age: 43
End: 2018-10-22
Attending: INTERNAL MEDICINE
Payer: MEDICARE

## 2018-10-22 VITALS
SYSTOLIC BLOOD PRESSURE: 132 MMHG | TEMPERATURE: 98 F | RESPIRATION RATE: 16 BRPM | HEART RATE: 62 BPM | HEART RATE: 73 BPM | BODY MASS INDEX: 29.31 KG/M2 | TEMPERATURE: 98 F | SYSTOLIC BLOOD PRESSURE: 122 MMHG | DIASTOLIC BLOOD PRESSURE: 60 MMHG | WEIGHT: 186.75 LBS | DIASTOLIC BLOOD PRESSURE: 86 MMHG | OXYGEN SATURATION: 98 % | OXYGEN SATURATION: 99 % | HEIGHT: 67 IN

## 2018-10-22 DIAGNOSIS — C34.11 MALIGNANT NEOPLASM OF UPPER LOBE OF RIGHT LUNG: Primary | ICD-10-CM

## 2018-10-22 DIAGNOSIS — D50.0 IRON DEFICIENCY ANEMIA DUE TO CHRONIC BLOOD LOSS: Primary | ICD-10-CM

## 2018-10-22 DIAGNOSIS — E05.90 HYPERTHYROIDISM: ICD-10-CM

## 2018-10-22 DIAGNOSIS — C34.91 LUNG CANCER, PRIMARY, WITH METASTASIS FROM LUNG TO OTHER SITE, RIGHT: ICD-10-CM

## 2018-10-22 DIAGNOSIS — C79.51 SECONDARY MALIGNANT NEOPLASM OF BONE: ICD-10-CM

## 2018-10-22 DIAGNOSIS — C34.10: ICD-10-CM

## 2018-10-22 DIAGNOSIS — C34.11 MALIGNANT NEOPLASM OF UPPER LOBE OF RIGHT LUNG: ICD-10-CM

## 2018-10-22 DIAGNOSIS — E03.2 HYPOTHYROIDISM DUE TO MEDICATION: ICD-10-CM

## 2018-10-22 PROCEDURE — A4216 STERILE WATER/SALINE, 10 ML: HCPCS | Performed by: INTERNAL MEDICINE

## 2018-10-22 PROCEDURE — 96372 THER/PROPH/DIAG INJ SC/IM: CPT

## 2018-10-22 PROCEDURE — 99214 OFFICE O/P EST MOD 30 MIN: CPT | Mod: PBBFAC,25 | Performed by: INTERNAL MEDICINE

## 2018-10-22 PROCEDURE — 25000003 PHARM REV CODE 250: Performed by: INTERNAL MEDICINE

## 2018-10-22 PROCEDURE — 99215 OFFICE O/P EST HI 40 MIN: CPT | Mod: S$PBB,,, | Performed by: INTERNAL MEDICINE

## 2018-10-22 PROCEDURE — 96413 CHEMO IV INFUSION 1 HR: CPT

## 2018-10-22 PROCEDURE — 63600175 PHARM REV CODE 636 W HCPCS: Performed by: INTERNAL MEDICINE

## 2018-10-22 PROCEDURE — 99999 PR PBB SHADOW E&M-EST. PATIENT-LVL IV: CPT | Mod: PBBFAC,,, | Performed by: INTERNAL MEDICINE

## 2018-10-22 RX ORDER — HEPARIN 100 UNIT/ML
500 SYRINGE INTRAVENOUS
Status: DISCONTINUED | OUTPATIENT
Start: 2018-10-22 | End: 2018-10-22 | Stop reason: HOSPADM

## 2018-10-22 RX ORDER — SODIUM CHLORIDE 0.9 % (FLUSH) 0.9 %
10 SYRINGE (ML) INJECTION
Status: CANCELLED | OUTPATIENT
Start: 2018-10-22

## 2018-10-22 RX ORDER — SODIUM CHLORIDE 0.9 % (FLUSH) 0.9 %
10 SYRINGE (ML) INJECTION
Status: DISCONTINUED | OUTPATIENT
Start: 2018-10-22 | End: 2018-10-22 | Stop reason: HOSPADM

## 2018-10-22 RX ORDER — HEPARIN 100 UNIT/ML
500 SYRINGE INTRAVENOUS
Status: CANCELLED | OUTPATIENT
Start: 2018-10-22

## 2018-10-22 RX ADMIN — Medication 10 ML: at 09:10

## 2018-10-22 RX ADMIN — SODIUM CHLORIDE 480 MG: 9 INJECTION, SOLUTION INTRAVENOUS at 08:10

## 2018-10-22 RX ADMIN — HEPARIN 500 UNITS: 100 SYRINGE at 09:10

## 2018-10-22 RX ADMIN — DENOSUMAB 120 MG: 120 INJECTION SUBCUTANEOUS at 09:10

## 2018-10-22 NOTE — TELEPHONE ENCOUNTER
----- Message from Bel Garay MD sent at 10/22/2018  9:03 AM CDT -----  Please let him know to stay on Synthroid 75 mcg daily

## 2018-10-22 NOTE — PLAN OF CARE
Problem: Patient Care Overview (Adult)  Goal: Plan of Care Review  Outcome: Ongoing (interventions implemented as appropriate)  Patient received Opdivo and Xgeva. Tolerated well. VSS. Received discharge instructions and verbalized understanding.

## 2018-10-22 NOTE — PROGRESS NOTES
"Subjective:       Patient ID: Yao Alan is a 43 y.o. male.    Chief Complaint: Lung Cancer  Oncologic History:  Mr. Yao Alan is a 43-year-old male who has a long history of smoking and was seen in the Pulmonary Clinic by Dr. Valle on 03/05/2015 with abnormal CT scan.    Apparently, he went to the [facility] in February with coughing as well as chest pain. A chest x-ray was done, which revealed a left upper lobe lung mass. Followup CT scan was done on 02/12/2015 and that revealed posterior superior mediastinal mass adjacent and prominent enlarged upper left mediastinal lymph nodes, abutting the aortic arch as well as left subclavian artery. A  CT scan of the abdomen was done on 03/03/2015 without contrast and that revealed nonobstructive nephrolithiasis, but a 2.1 cm lytic lesion involving the left iliac wing concerning for metastatic disease given the presence of emphysema and a mediastinal soft tissue mass on the CT chest from 02/12/2015. He saw Dr. Valle after that on 03/05/2015 and underwent an IR guided biopsy of the bone lesion on 03/17/2015. 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    His EGFR/EML/ALK is negative.    His PET scan on 3/25/15 revealed Left upper lobe lung lesion with an adjacent para-aortic lymph node, right anterior rib metastasis, right iliac metastasis, and pancreatic metastasis. A pancreatic cancer primary neoplasm is less likely.  MRI brain was negative for mets.  He completed 6 cycles of Carboplatin and Alimta and was on maintenance Alimta until end of March 2016.  His bone scan from 3/16 revealed stable disease. His CT scans also from end of March 2016 revealed "Interval enlargement of left upper lobe lung mass measuring 5.9 x 3.9 cm (previously 3.3 x 2.5 cm). This mass appears to invade the mediastinum and abutting the aortic arch and left subclavian artery"     He is now on Opdivo since March 2016                               HPI " Mr. Alan returns for follow up and Opdivo  He feels well and denies any new issues.    Review of Systems   Constitutional: Negative for appetite change, fatigue and unexpected weight change.   HENT: Negative for mouth sores.    Eyes: Negative for visual disturbance.   Respiratory: Negative for cough and shortness of breath.    Cardiovascular: Negative for chest pain.   Gastrointestinal: Negative for abdominal pain and diarrhea.   Genitourinary: Negative for frequency.   Musculoskeletal: Negative for back pain.   Skin: Negative for rash.   Neurological: Negative for headaches.   Hematological: Negative for adenopathy.   Psychiatric/Behavioral: The patient is not nervous/anxious.    All other systems reviewed and are negative.      Objective:      Physical Exam   Constitutional: He is oriented to person, place, and time. He appears well-developed and well-nourished.   HENT:   Mouth/Throat: No oropharyngeal exudate.   Cardiovascular: Normal rate and normal heart sounds.   Pulmonary/Chest: Effort normal and breath sounds normal. He has no wheezes.   Abdominal: Soft. Bowel sounds are normal. There is no tenderness.   Musculoskeletal: He exhibits no edema or tenderness.   Lymphadenopathy:     He has no cervical adenopathy.   Neurological: He is alert and oriented to person, place, and time. Coordination normal.   Skin: Skin is warm and dry. No rash noted.   Psychiatric: He has a normal mood and affect. Judgment and thought content normal.   Vitals reviewed.        LABS:  WBC   Date Value Ref Range Status   10/22/2018 8.23 3.90 - 12.70 K/uL Final     Hemoglobin   Date Value Ref Range Status   10/22/2018 14.6 14.0 - 18.0 g/dL Final     Hematocrit   Date Value Ref Range Status   10/22/2018 44.2 40.0 - 54.0 % Final     Platelets   Date Value Ref Range Status   10/22/2018 241 150 - 350 K/uL Final     Gran # (ANC)   Date Value Ref Range Status   10/22/2018 4.0 1.8 - 7.7 K/uL Final     Comment:     The ANC is based on a white  cell differential from an   automated cell counter. It has not been microscopically   reviewed for the presence of abnormal cells. Clinical   correlation is required.         Chemistry        Component Value Date/Time     10/22/2018 0739    K 3.9 10/22/2018 0739     10/22/2018 0739    CO2 27 10/22/2018 0739    BUN 15 10/22/2018 0739    CREATININE 1.5 (H) 10/22/2018 0739    GLU 99 10/22/2018 0739        Component Value Date/Time    CALCIUM 10.2 10/22/2018 0739    ALKPHOS 72 10/22/2018 0739    AST 21 10/22/2018 0739    ALT 24 10/22/2018 0739    BILITOT 0.3 10/22/2018 0739    ESTGFRAFRICA >60 10/22/2018 0739    EGFRNONAA 56 (A) 10/22/2018 0739        TSH   Date Value Ref Range Status   10/22/2018 0.271 (L) 0.400 - 4.000 uIU/mL Final     Free T4   Date Value Ref Range Status   10/22/2018 1.13 0.71 - 1.51 ng/dL Final         Assessment:       1. Malignant neoplasm of upper lobe of right lung    2. Secondary malignant neoplasm of bone    3. Hyperthyroidism    4. Hypothyroidism due to medication        Plan:        1,2,3,4. He is doing well clinically on Opdivo and will return in 2 weeks for next cycle  3,4. He will continue on Synthroid 75 mcg daily    Above care plan was discussed with patient and all questions were addressed to his satisfaction

## 2018-10-22 NOTE — TELEPHONE ENCOUNTER
Left vm for patient to stay on synthroid 75mcg daily.  Requested him to contact clinic with any questions.

## 2018-10-25 ENCOUNTER — DOCUMENTATION ONLY (OUTPATIENT)
Dept: HEMATOLOGY/ONCOLOGY | Facility: CLINIC | Age: 43
End: 2018-10-25

## 2018-10-25 NOTE — PROGRESS NOTES
Received call/voice mail message from patient, and returned call to him re: assistance, food basket.

## 2018-11-02 ENCOUNTER — LAB VISIT (OUTPATIENT)
Dept: LAB | Facility: HOSPITAL | Age: 43
End: 2018-11-02
Attending: INTERNAL MEDICINE
Payer: MEDICARE

## 2018-11-02 DIAGNOSIS — C34.90 MALIGNANT NEOPLASM OF LUNG, UNSPECIFIED LATERALITY, UNSPECIFIED PART OF LUNG: ICD-10-CM

## 2018-11-02 DIAGNOSIS — R53.83 OTHER FATIGUE: ICD-10-CM

## 2018-11-02 LAB
ALBUMIN SERPL BCP-MCNC: 4.2 G/DL
ALP SERPL-CCNC: 71 U/L
ALT SERPL W/O P-5'-P-CCNC: 22 U/L
ANION GAP SERPL CALC-SCNC: 11 MMOL/L
AST SERPL-CCNC: 23 U/L
BILIRUB SERPL-MCNC: 0.4 MG/DL
BUN SERPL-MCNC: 12 MG/DL
CALCIUM SERPL-MCNC: 9.9 MG/DL
CHLORIDE SERPL-SCNC: 106 MMOL/L
CO2 SERPL-SCNC: 25 MMOL/L
CREAT SERPL-MCNC: 1.5 MG/DL
ERYTHROCYTE [DISTWIDTH] IN BLOOD BY AUTOMATED COUNT: 14.5 %
EST. GFR  (AFRICAN AMERICAN): >60 ML/MIN/1.73 M^2
EST. GFR  (NON AFRICAN AMERICAN): 56 ML/MIN/1.73 M^2
GLUCOSE SERPL-MCNC: 93 MG/DL
HCT VFR BLD AUTO: 45.5 %
HGB BLD-MCNC: 14.9 G/DL
MCH RBC QN AUTO: 26.7 PG
MCHC RBC AUTO-ENTMCNC: 32.7 G/DL
MCV RBC AUTO: 82 FL
NEUTROPHILS # BLD AUTO: 3.7 K/UL
PLATELET # BLD AUTO: 235 K/UL
PMV BLD AUTO: 10.1 FL
POTASSIUM SERPL-SCNC: 4 MMOL/L
PROT SERPL-MCNC: 7.7 G/DL
RBC # BLD AUTO: 5.58 M/UL
SODIUM SERPL-SCNC: 142 MMOL/L
T4 FREE SERPL-MCNC: 0.94 NG/DL
TSH SERPL DL<=0.005 MIU/L-ACNC: 1.32 UIU/ML
WBC # BLD AUTO: 6.82 K/UL

## 2018-11-02 PROCEDURE — 84439 ASSAY OF FREE THYROXINE: CPT

## 2018-11-02 PROCEDURE — 85027 COMPLETE CBC AUTOMATED: CPT

## 2018-11-02 PROCEDURE — 80053 COMPREHEN METABOLIC PANEL: CPT

## 2018-11-02 PROCEDURE — 84443 ASSAY THYROID STIM HORMONE: CPT

## 2018-11-02 PROCEDURE — 36415 COLL VENOUS BLD VENIPUNCTURE: CPT

## 2018-11-05 ENCOUNTER — OFFICE VISIT (OUTPATIENT)
Dept: HEMATOLOGY/ONCOLOGY | Facility: CLINIC | Age: 43
End: 2018-11-05
Payer: MEDICARE

## 2018-11-05 ENCOUNTER — INFUSION (OUTPATIENT)
Dept: INFUSION THERAPY | Facility: HOSPITAL | Age: 43
End: 2018-11-05
Attending: INTERNAL MEDICINE
Payer: MEDICARE

## 2018-11-05 VITALS
HEIGHT: 67 IN | TEMPERATURE: 98 F | DIASTOLIC BLOOD PRESSURE: 88 MMHG | OXYGEN SATURATION: 99 % | BODY MASS INDEX: 29.55 KG/M2 | HEART RATE: 72 BPM | SYSTOLIC BLOOD PRESSURE: 128 MMHG | HEART RATE: 64 BPM | HEIGHT: 67 IN | RESPIRATION RATE: 17 BRPM | WEIGHT: 188.25 LBS | DIASTOLIC BLOOD PRESSURE: 81 MMHG | SYSTOLIC BLOOD PRESSURE: 133 MMHG | BODY MASS INDEX: 29.55 KG/M2 | TEMPERATURE: 98 F | WEIGHT: 188.25 LBS | RESPIRATION RATE: 18 BRPM

## 2018-11-05 DIAGNOSIS — G89.3 NEOPLASM RELATED PAIN: ICD-10-CM

## 2018-11-05 DIAGNOSIS — C34.11 MALIGNANT NEOPLASM OF UPPER LOBE OF RIGHT LUNG: ICD-10-CM

## 2018-11-05 DIAGNOSIS — E03.9 HYPOTHYROIDISM, UNSPECIFIED TYPE: ICD-10-CM

## 2018-11-05 DIAGNOSIS — D50.0 IRON DEFICIENCY ANEMIA DUE TO CHRONIC BLOOD LOSS: Primary | ICD-10-CM

## 2018-11-05 DIAGNOSIS — C79.51 SECONDARY MALIGNANT NEOPLASM OF BONE: ICD-10-CM

## 2018-11-05 DIAGNOSIS — C34.91 LUNG CANCER, PRIMARY, WITH METASTASIS FROM LUNG TO OTHER SITE, RIGHT: ICD-10-CM

## 2018-11-05 DIAGNOSIS — I82.402 DEEP VEIN THROMBOSIS (DVT) OF LEFT LOWER EXTREMITY, UNSPECIFIED CHRONICITY, UNSPECIFIED VEIN: ICD-10-CM

## 2018-11-05 DIAGNOSIS — C34.11 MALIGNANT NEOPLASM OF UPPER LOBE OF RIGHT LUNG: Primary | ICD-10-CM

## 2018-11-05 PROCEDURE — 99215 OFFICE O/P EST HI 40 MIN: CPT | Mod: S$PBB,,, | Performed by: INTERNAL MEDICINE

## 2018-11-05 PROCEDURE — 99999 PR PBB SHADOW E&M-EST. PATIENT-LVL IV: CPT | Mod: PBBFAC,,, | Performed by: INTERNAL MEDICINE

## 2018-11-05 PROCEDURE — 96413 CHEMO IV INFUSION 1 HR: CPT

## 2018-11-05 PROCEDURE — A4216 STERILE WATER/SALINE, 10 ML: HCPCS | Performed by: INTERNAL MEDICINE

## 2018-11-05 PROCEDURE — 25000003 PHARM REV CODE 250: Performed by: INTERNAL MEDICINE

## 2018-11-05 PROCEDURE — 63600175 PHARM REV CODE 636 W HCPCS: Mod: JG | Performed by: INTERNAL MEDICINE

## 2018-11-05 PROCEDURE — 99214 OFFICE O/P EST MOD 30 MIN: CPT | Mod: PBBFAC,25 | Performed by: INTERNAL MEDICINE

## 2018-11-05 RX ORDER — HEPARIN 100 UNIT/ML
500 SYRINGE INTRAVENOUS
Status: DISCONTINUED | OUTPATIENT
Start: 2018-11-05 | End: 2018-11-05 | Stop reason: HOSPADM

## 2018-11-05 RX ORDER — OXYCODONE AND ACETAMINOPHEN 10; 325 MG/1; MG/1
1 TABLET ORAL EVERY 6 HOURS PRN
Qty: 120 TABLET | Refills: 0 | Status: CANCELLED | OUTPATIENT
Start: 2018-11-05 | End: 2019-11-05

## 2018-11-05 RX ORDER — OXYCODONE AND ACETAMINOPHEN 10; 325 MG/1; MG/1
1 TABLET ORAL EVERY 6 HOURS PRN
Qty: 120 TABLET | Refills: 0 | Status: SHIPPED | OUTPATIENT
Start: 2018-11-05 | End: 2018-12-04 | Stop reason: SDUPTHER

## 2018-11-05 RX ORDER — LEVOTHYROXINE SODIUM 75 UG/1
75 TABLET ORAL
COMMUNITY
End: 2018-11-07 | Stop reason: SDUPTHER

## 2018-11-05 RX ORDER — HEPARIN 100 UNIT/ML
500 SYRINGE INTRAVENOUS
Status: CANCELLED | OUTPATIENT
Start: 2018-11-08

## 2018-11-05 RX ORDER — SODIUM CHLORIDE 0.9 % (FLUSH) 0.9 %
10 SYRINGE (ML) INJECTION
Status: CANCELLED | OUTPATIENT
Start: 2018-11-08

## 2018-11-05 RX ORDER — SODIUM CHLORIDE 0.9 % (FLUSH) 0.9 %
10 SYRINGE (ML) INJECTION
Status: DISCONTINUED | OUTPATIENT
Start: 2018-11-05 | End: 2018-11-05 | Stop reason: HOSPADM

## 2018-11-05 RX ORDER — LEVOTHYROXINE SODIUM 75 UG/1
75 TABLET ORAL DAILY
Qty: 30 TABLET | Refills: 11 | Status: SHIPPED | OUTPATIENT
Start: 2018-11-05 | End: 2018-11-05

## 2018-11-05 RX ADMIN — Medication 10 ML: at 08:11

## 2018-11-05 RX ADMIN — SODIUM CHLORIDE 480 MG: 9 INJECTION, SOLUTION INTRAVENOUS at 08:11

## 2018-11-05 RX ADMIN — HEPARIN 500 UNITS: 100 SYRINGE at 08:11

## 2018-11-05 NOTE — PLAN OF CARE
Problem: Patient Care Overview (Adult)  Goal: Plan of Care Review  Outcome: Ongoing (interventions implemented as appropriate)  Tolerated infusion well.  No reactions noted.  No questions or concerns at this time.  Ambulated off unit unassisted.

## 2018-11-05 NOTE — PLAN OF CARE
Problem: Chemotherapy Effects (Adult)  Goal: Signs and Symptoms of Listed Potential Problems Will be Absent or Manageable (Chemotherapy Effects)  Signs and symptoms of listed potential problems will be absent or manageable by discharge/transition of care (reference Chemotherapy Effects (Adult) CPG).   Outcome: Ongoing (interventions implemented as appropriate)  Patient here for Opdivo.  Assessment completed and labs reviewed.  VSS.  Xgeva due 11/19/18, patient notified.  No needs at this time.  Chair reclined and blanket offered.  Will continue to monitor.

## 2018-11-05 NOTE — PROGRESS NOTES
"Subjective:       Patient ID: Yao Alan is a 43 y.o. male.    Chief Complaint: Lung Cancer  Oncologic History:  Mr. Yao Alan is a 43-year-old male who has a long history of smoking and was seen in the Pulmonary Clinic by Dr. Valle on 03/05/2015 with abnormal CT scan.    Apparently, he went to the [facility] in February with coughing as well as chest pain. A chest x-ray was done, which revealed a left upper lobe lung mass. Followup CT scan was done on 02/12/2015 and that revealed posterior superior mediastinal mass adjacent and prominent enlarged upper left mediastinal lymph nodes, abutting the aortic arch as well as left subclavian artery. A  CT scan of the abdomen was done on 03/03/2015 without contrast and that revealed nonobstructive nephrolithiasis, but a 2.1 cm lytic lesion involving the left iliac wing concerning for metastatic disease given the presence of emphysema and a mediastinal soft tissue mass on the CT chest from 02/12/2015. He saw Dr. Valle after that on 03/05/2015 and underwent an IR guided biopsy of the bone lesion on 03/17/2015. 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    His EGFR/EML/ALK is negative.    His PET scan on 3/25/15 revealed Left upper lobe lung lesion with an adjacent para-aortic lymph node, right anterior rib metastasis, right iliac metastasis, and pancreatic metastasis. A pancreatic cancer primary neoplasm is less likely.  MRI brain was negative for mets.  He completed 6 cycles of Carboplatin and Alimta and was on maintenance Alimta until end of March 2016.  His bone scan from 3/16 revealed stable disease. His CT scans also from end of March 2016 revealed "Interval enlargement of left upper lobe lung mass measuring 5.9 x 3.9 cm (previously 3.3 x 2.5 cm). This mass appears to invade the mediastinum and abutting the aortic arch and left subclavian artery"     He is now on Opdivo since March 2016        HPI Mr. Alan returns for follow up " and Opdivo. He feels well and denies any new issues.      Review of Systems   Constitutional: Negative for appetite change, fatigue and unexpected weight change.   HENT: Negative for mouth sores.    Eyes: Negative for visual disturbance.   Respiratory: Negative for cough and shortness of breath.    Cardiovascular: Negative for chest pain.   Gastrointestinal: Negative for abdominal pain and diarrhea.   Genitourinary: Negative for frequency.   Musculoskeletal: Negative for back pain.   Skin: Negative for rash.   Neurological: Negative for headaches.   Hematological: Negative for adenopathy.   Psychiatric/Behavioral: The patient is not nervous/anxious.    All other systems reviewed and are negative.      Objective:      Physical Exam   Constitutional: He is oriented to person, place, and time. He appears well-developed and well-nourished.   HENT:   Mouth/Throat: No oropharyngeal exudate.   Cardiovascular: Normal rate and normal heart sounds.   Pulmonary/Chest: Effort normal and breath sounds normal. He has no wheezes.   Abdominal: Soft. Bowel sounds are normal. There is no tenderness.   Musculoskeletal: He exhibits no edema or tenderness.   Lymphadenopathy:     He has no cervical adenopathy.   Neurological: He is alert and oriented to person, place, and time. Coordination normal.   Skin: Skin is warm and dry. No rash noted.   Psychiatric: He has a normal mood and affect. Judgment and thought content normal.   Vitals reviewed.      LABS:  WBC   Date Value Ref Range Status   11/02/2018 6.82 3.90 - 12.70 K/uL Final     Hemoglobin   Date Value Ref Range Status   11/02/2018 14.9 14.0 - 18.0 g/dL Final     Hematocrit   Date Value Ref Range Status   11/02/2018 45.5 40.0 - 54.0 % Final     Platelets   Date Value Ref Range Status   11/02/2018 235 150 - 350 K/uL Final     Gran # (ANC)   Date Value Ref Range Status   11/02/2018 3.7 1.8 - 7.7 K/uL Final     Comment:     The ANC is based on a white cell differential from an    automated cell counter. It has not been microscopically   reviewed for the presence of abnormal cells. Clinical   correlation is required.         Chemistry        Component Value Date/Time     11/02/2018 0848    K 4.0 11/02/2018 0848     11/02/2018 0848    CO2 25 11/02/2018 0848    BUN 12 11/02/2018 0848    CREATININE 1.5 (H) 11/02/2018 0848    GLU 93 11/02/2018 0848        Component Value Date/Time    CALCIUM 9.9 11/02/2018 0848    ALKPHOS 71 11/02/2018 0848    AST 23 11/02/2018 0848    ALT 22 11/02/2018 0848    BILITOT 0.4 11/02/2018 0848    ESTGFRAFRICA >60 11/02/2018 0848    EGFRNONAA 56 (A) 11/02/2018 0848        TSH   Date Value Ref Range Status   11/02/2018 1.319 0.400 - 4.000 uIU/mL Final     Free T4   Date Value Ref Range Status   11/02/2018 0.94 0.71 - 1.51 ng/dL Final        Assessment:       1. Malignant neoplasm of upper lobe of right lung    2. Secondary malignant neoplasm of bone    3. Hypothyroidism, unspecified type    4. Deep vein thrombosis (DVT) of left lower extremity, unspecified chronicity, unspecified vein    5. Neoplasm related pain        Plan:        1,2,3.  He is doing well clinically and will proceed with Opdivo and will return in 2 weeks for next cycle  3,4,5 Refills done for Xarelto, Percocet, and Synthroid.    Above care plan was discussed with patient and all questions were addressed to his satisfaction

## 2018-11-07 DIAGNOSIS — E03.9 HYPOTHYROIDISM, UNSPECIFIED TYPE: Primary | ICD-10-CM

## 2018-11-07 RX ORDER — LEVOTHYROXINE SODIUM 75 UG/1
75 TABLET ORAL
Qty: 30 TABLET | Refills: 11 | Status: SHIPPED | OUTPATIENT
Start: 2018-11-07 | End: 2018-12-04 | Stop reason: SDUPTHER

## 2018-11-20 ENCOUNTER — INFUSION (OUTPATIENT)
Dept: INFUSION THERAPY | Facility: HOSPITAL | Age: 43
End: 2018-11-20
Attending: INTERNAL MEDICINE
Payer: MEDICARE

## 2018-11-20 ENCOUNTER — OFFICE VISIT (OUTPATIENT)
Dept: HEMATOLOGY/ONCOLOGY | Facility: CLINIC | Age: 43
End: 2018-11-20
Payer: MEDICARE

## 2018-11-20 VITALS
DIASTOLIC BLOOD PRESSURE: 89 MMHG | SYSTOLIC BLOOD PRESSURE: 129 MMHG | OXYGEN SATURATION: 98 % | SYSTOLIC BLOOD PRESSURE: 157 MMHG | HEART RATE: 70 BPM | OXYGEN SATURATION: 99 % | TEMPERATURE: 98 F | RESPIRATION RATE: 16 BRPM | HEART RATE: 68 BPM | HEIGHT: 67 IN | TEMPERATURE: 98 F | DIASTOLIC BLOOD PRESSURE: 80 MMHG | WEIGHT: 190.06 LBS | BODY MASS INDEX: 29.83 KG/M2

## 2018-11-20 DIAGNOSIS — C79.51 SECONDARY MALIGNANT NEOPLASM OF BONE: ICD-10-CM

## 2018-11-20 DIAGNOSIS — C34.91 LUNG CANCER, PRIMARY, WITH METASTASIS FROM LUNG TO OTHER SITE, RIGHT: ICD-10-CM

## 2018-11-20 DIAGNOSIS — C34.11 MALIGNANT NEOPLASM OF UPPER LOBE OF RIGHT LUNG: Primary | ICD-10-CM

## 2018-11-20 DIAGNOSIS — D50.0 IRON DEFICIENCY ANEMIA DUE TO CHRONIC BLOOD LOSS: Primary | ICD-10-CM

## 2018-11-20 DIAGNOSIS — E03.2 HYPOTHYROIDISM DUE TO MEDICATION: ICD-10-CM

## 2018-11-20 DIAGNOSIS — C34.11 MALIGNANT NEOPLASM OF UPPER LOBE OF RIGHT LUNG: ICD-10-CM

## 2018-11-20 DIAGNOSIS — C34.10: ICD-10-CM

## 2018-11-20 PROCEDURE — 96372 THER/PROPH/DIAG INJ SC/IM: CPT

## 2018-11-20 PROCEDURE — 99215 OFFICE O/P EST HI 40 MIN: CPT | Mod: S$PBB,,, | Performed by: INTERNAL MEDICINE

## 2018-11-20 PROCEDURE — 25000003 PHARM REV CODE 250: Performed by: INTERNAL MEDICINE

## 2018-11-20 PROCEDURE — 99214 OFFICE O/P EST MOD 30 MIN: CPT | Mod: PBBFAC,25 | Performed by: INTERNAL MEDICINE

## 2018-11-20 PROCEDURE — 63600175 PHARM REV CODE 636 W HCPCS: Mod: JG | Performed by: INTERNAL MEDICINE

## 2018-11-20 PROCEDURE — 99999 PR PBB SHADOW E&M-EST. PATIENT-LVL IV: CPT | Mod: PBBFAC,,, | Performed by: INTERNAL MEDICINE

## 2018-11-20 PROCEDURE — A4216 STERILE WATER/SALINE, 10 ML: HCPCS | Performed by: INTERNAL MEDICINE

## 2018-11-20 PROCEDURE — 96413 CHEMO IV INFUSION 1 HR: CPT

## 2018-11-20 RX ORDER — HEPARIN 100 UNIT/ML
500 SYRINGE INTRAVENOUS
Status: CANCELLED | OUTPATIENT
Start: 2018-11-20

## 2018-11-20 RX ORDER — HEPARIN 100 UNIT/ML
500 SYRINGE INTRAVENOUS
Status: DISCONTINUED | OUTPATIENT
Start: 2018-11-20 | End: 2018-11-20 | Stop reason: HOSPADM

## 2018-11-20 RX ORDER — SODIUM CHLORIDE 0.9 % (FLUSH) 0.9 %
10 SYRINGE (ML) INJECTION
Status: DISCONTINUED | OUTPATIENT
Start: 2018-11-20 | End: 2018-11-20 | Stop reason: HOSPADM

## 2018-11-20 RX ORDER — SODIUM CHLORIDE 0.9 % (FLUSH) 0.9 %
10 SYRINGE (ML) INJECTION
Status: CANCELLED | OUTPATIENT
Start: 2018-11-20

## 2018-11-20 RX ADMIN — DENOSUMAB 120 MG: 120 INJECTION SUBCUTANEOUS at 09:11

## 2018-11-20 RX ADMIN — HEPARIN 500 UNITS: 100 SYRINGE at 09:11

## 2018-11-20 RX ADMIN — Medication 10 ML: at 09:11

## 2018-11-20 RX ADMIN — SODIUM CHLORIDE 480 MG: 9 INJECTION, SOLUTION INTRAVENOUS at 09:11

## 2018-11-20 NOTE — Clinical Note
Schedule CBC, CMP, TSH and free T4 and see me in 2 weeks and for Opdivo and Xgeva on [facility]

## 2018-11-20 NOTE — PROGRESS NOTES
"Subjective:       Patient ID: Yao Alan is a 43 y.o. male.    Chief Complaint: Follow-up  Oncologic History:  Mr. Yao Alan is a 43-year-old male who has a long history of smoking and was seen in the Pulmonary Clinic by Dr. Valle on 03/05/2015 with abnormal CT scan.    Apparently, he went to the [facility] in February with coughing as well as chest pain. A chest x-ray was done, which revealed a left upper lobe lung mass. Followup CT scan was done on 02/12/2015 and that revealed posterior superior mediastinal mass adjacent and prominent enlarged upper left mediastinal lymph nodes, abutting the aortic arch as well as left subclavian artery. A  CT scan of the abdomen was done on 03/03/2015 without contrast and that revealed nonobstructive nephrolithiasis, but a 2.1 cm lytic lesion involving the left iliac wing concerning for metastatic disease given the presence of emphysema and a mediastinal soft tissue mass on the CT chest from 02/12/2015. He saw Dr. Valle after that on 03/05/2015 and underwent an IR guided biopsy of the bone lesion on 03/17/2015. 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    His EGFR/EML/ALK is negative.    His PET scan on 3/25/15 revealed Left upper lobe lung lesion with an adjacent para-aortic lymph node, right anterior rib metastasis, right iliac metastasis, and pancreatic metastasis. A pancreatic cancer primary neoplasm is less likely.  MRI brain was negative for mets.  He completed 6 cycles of Carboplatin and Alimta and was on maintenance Alimta until end of March 2016.  His bone scan from 3/16 revealed stable disease. His CT scans also from end of March 2016 revealed "Interval enlargement of left upper lobe lung mass measuring 5.9 x 3.9 cm (previously 3.3 x 2.5 cm). This mass appears to invade the mediastinum and abutting the aortic arch and left subclavian artery"     He is now on Opdivo since March 2016        HPI Mr. Alan returns for follow up " and Opdivo. He feels well and denies any new issues    Review of Systems   Constitutional: Negative for appetite change, fatigue and unexpected weight change.   HENT: Negative for mouth sores.    Eyes: Negative for visual disturbance.   Respiratory: Negative for cough and shortness of breath.    Cardiovascular: Negative for chest pain.   Gastrointestinal: Negative for abdominal pain and diarrhea.   Genitourinary: Negative for frequency.   Musculoskeletal: Negative for back pain.   Skin: Negative for rash.   Neurological: Negative for headaches.   Hematological: Negative for adenopathy.   Psychiatric/Behavioral: The patient is not nervous/anxious.    All other systems reviewed and are negative.      Objective:      Physical Exam   Constitutional: He is oriented to person, place, and time. He appears well-developed and well-nourished.   HENT:   Mouth/Throat: No oropharyngeal exudate.   Cardiovascular: Normal rate and normal heart sounds.   Pulmonary/Chest: Effort normal and breath sounds normal. He has no wheezes.   Abdominal: Soft. Bowel sounds are normal. There is no tenderness.   Musculoskeletal: He exhibits no edema or tenderness.   Lymphadenopathy:     He has no cervical adenopathy.   Neurological: He is alert and oriented to person, place, and time. Coordination normal.   Skin: Skin is warm and dry. No rash noted.   Psychiatric: He has a normal mood and affect. Judgment and thought content normal.   Vitals reviewed.      LABS:  WBC   Date Value Ref Range Status   11/20/2018 7.33 3.90 - 12.70 K/uL Final     Hemoglobin   Date Value Ref Range Status   11/20/2018 14.6 14.0 - 18.0 g/dL Final     Hematocrit   Date Value Ref Range Status   11/20/2018 45.1 40.0 - 54.0 % Final     Platelets   Date Value Ref Range Status   11/20/2018 227 150 - 350 K/uL Final     Gran # (ANC)   Date Value Ref Range Status   11/20/2018 3.7 1.8 - 7.7 K/uL Final     Comment:     The ANC is based on a white cell differential from an    automated cell counter. It has not been microscopically   reviewed for the presence of abnormal cells. Clinical   correlation is required.         Chemistry        Component Value Date/Time     11/20/2018 0838    K 4.0 11/20/2018 0838     11/20/2018 0838    CO2 31 (H) 11/20/2018 0838    BUN 15 11/20/2018 0838    CREATININE 1.6 (H) 11/20/2018 0838    GLU 90 11/20/2018 0838        Component Value Date/Time    CALCIUM 10.1 11/20/2018 0838    ALKPHOS 68 11/20/2018 0838    AST 25 11/20/2018 0838    ALT 24 11/20/2018 0838    BILITOT 0.4 11/20/2018 0838    ESTGFRAFRICA >60 11/20/2018 0838    EGFRNONAA 52 (A) 11/20/2018 0838          Assessment:       1. Malignant neoplasm of upper lobe of right lung    2. Secondary malignant neoplasm of bone    3. Hypothyroidism due to medication        Plan:        1,2,3. He is doing well clinically and will proceed with Opdivo and will return in 2 weeks for next cycle.    Above care plan was discussed with patient and all questions were addressed to his satisfaction

## 2018-11-20 NOTE — PLAN OF CARE
Problem: Patient Care Overview (Adult)  Goal: Plan of Care Review  Outcome: Ongoing (interventions implemented as appropriate)  Cleared for chemo per Dr. Garay. No complaints voiced. No reported side effects. Tolerated Opdivo. AVS given to pt. Discharged in NAD.

## 2018-12-04 ENCOUNTER — DOCUMENTATION ONLY (OUTPATIENT)
Dept: HEMATOLOGY/ONCOLOGY | Facility: CLINIC | Age: 43
End: 2018-12-04

## 2018-12-04 ENCOUNTER — INFUSION (OUTPATIENT)
Dept: INFUSION THERAPY | Facility: HOSPITAL | Age: 43
End: 2018-12-04
Attending: INTERNAL MEDICINE
Payer: MEDICARE

## 2018-12-04 ENCOUNTER — OFFICE VISIT (OUTPATIENT)
Dept: HEMATOLOGY/ONCOLOGY | Facility: CLINIC | Age: 43
End: 2018-12-04
Payer: MEDICARE

## 2018-12-04 VITALS
WEIGHT: 188.69 LBS | BODY MASS INDEX: 30.33 KG/M2 | SYSTOLIC BLOOD PRESSURE: 131 MMHG | HEIGHT: 66 IN | OXYGEN SATURATION: 97 % | TEMPERATURE: 98 F | HEART RATE: 68 BPM | DIASTOLIC BLOOD PRESSURE: 84 MMHG

## 2018-12-04 VITALS
OXYGEN SATURATION: 98 % | HEART RATE: 61 BPM | SYSTOLIC BLOOD PRESSURE: 119 MMHG | TEMPERATURE: 98 F | DIASTOLIC BLOOD PRESSURE: 82 MMHG | RESPIRATION RATE: 16 BRPM

## 2018-12-04 DIAGNOSIS — E03.9 HYPOTHYROIDISM, UNSPECIFIED TYPE: ICD-10-CM

## 2018-12-04 DIAGNOSIS — C34.91 LUNG CANCER, PRIMARY, WITH METASTASIS FROM LUNG TO OTHER SITE, RIGHT: ICD-10-CM

## 2018-12-04 DIAGNOSIS — C79.51 SECONDARY MALIGNANT NEOPLASM OF BONE: ICD-10-CM

## 2018-12-04 DIAGNOSIS — C34.11 MALIGNANT NEOPLASM OF UPPER LOBE OF RIGHT LUNG: ICD-10-CM

## 2018-12-04 DIAGNOSIS — G89.3 NEOPLASM RELATED PAIN: ICD-10-CM

## 2018-12-04 DIAGNOSIS — D50.0 IRON DEFICIENCY ANEMIA DUE TO CHRONIC BLOOD LOSS: Primary | ICD-10-CM

## 2018-12-04 DIAGNOSIS — I82.402 DEEP VEIN THROMBOSIS (DVT) OF LEFT LOWER EXTREMITY, UNSPECIFIED CHRONICITY, UNSPECIFIED VEIN: ICD-10-CM

## 2018-12-04 DIAGNOSIS — C34.11 MALIGNANT NEOPLASM OF UPPER LOBE OF RIGHT LUNG: Primary | ICD-10-CM

## 2018-12-04 PROCEDURE — A4216 STERILE WATER/SALINE, 10 ML: HCPCS | Performed by: INTERNAL MEDICINE

## 2018-12-04 PROCEDURE — 99215 OFFICE O/P EST HI 40 MIN: CPT | Mod: S$PBB,,, | Performed by: INTERNAL MEDICINE

## 2018-12-04 PROCEDURE — 25000003 PHARM REV CODE 250: Performed by: INTERNAL MEDICINE

## 2018-12-04 PROCEDURE — 63600175 PHARM REV CODE 636 W HCPCS: Mod: JG | Performed by: INTERNAL MEDICINE

## 2018-12-04 PROCEDURE — 99214 OFFICE O/P EST MOD 30 MIN: CPT | Mod: PBBFAC | Performed by: INTERNAL MEDICINE

## 2018-12-04 PROCEDURE — 99999 PR PBB SHADOW E&M-EST. PATIENT-LVL IV: CPT | Mod: PBBFAC,,, | Performed by: INTERNAL MEDICINE

## 2018-12-04 PROCEDURE — 96413 CHEMO IV INFUSION 1 HR: CPT

## 2018-12-04 RX ORDER — OXYCODONE AND ACETAMINOPHEN 10; 325 MG/1; MG/1
1 TABLET ORAL EVERY 6 HOURS PRN
Qty: 120 TABLET | Refills: 0 | Status: SHIPPED | OUTPATIENT
Start: 2018-12-04 | End: 2018-12-31 | Stop reason: SDUPTHER

## 2018-12-04 RX ORDER — LEVOTHYROXINE SODIUM 75 UG/1
75 TABLET ORAL
Qty: 30 TABLET | Refills: 11 | Status: SHIPPED | OUTPATIENT
Start: 2018-12-04 | End: 2018-12-31 | Stop reason: SDUPTHER

## 2018-12-04 RX ORDER — SODIUM CHLORIDE 0.9 % (FLUSH) 0.9 %
10 SYRINGE (ML) INJECTION
Status: DISCONTINUED | OUTPATIENT
Start: 2018-12-04 | End: 2018-12-04 | Stop reason: HOSPADM

## 2018-12-04 RX ORDER — HEPARIN 100 UNIT/ML
500 SYRINGE INTRAVENOUS
Status: DISCONTINUED | OUTPATIENT
Start: 2018-12-04 | End: 2018-12-04 | Stop reason: HOSPADM

## 2018-12-04 RX ORDER — SODIUM CHLORIDE 0.9 % (FLUSH) 0.9 %
10 SYRINGE (ML) INJECTION
Status: CANCELLED | OUTPATIENT
Start: 2018-12-04

## 2018-12-04 RX ORDER — HEPARIN 100 UNIT/ML
500 SYRINGE INTRAVENOUS
Status: CANCELLED | OUTPATIENT
Start: 2018-12-04

## 2018-12-04 RX ORDER — LEVOTHYROXINE SODIUM 75 UG/1
75 TABLET ORAL
Qty: 30 TABLET | Refills: 11 | Status: SHIPPED | OUTPATIENT
Start: 2018-12-04 | End: 2018-12-04 | Stop reason: SDUPTHER

## 2018-12-04 RX ORDER — OXYCODONE AND ACETAMINOPHEN 10; 325 MG/1; MG/1
1 TABLET ORAL EVERY 6 HOURS PRN
Qty: 120 TABLET | Refills: 0 | Status: SHIPPED | OUTPATIENT
Start: 2018-12-04 | End: 2018-12-04 | Stop reason: SDUPTHER

## 2018-12-04 RX ADMIN — SODIUM CHLORIDE 480 MG: 9 INJECTION, SOLUTION INTRAVENOUS at 10:12

## 2018-12-04 RX ADMIN — Medication 10 ML: at 11:12

## 2018-12-04 RX ADMIN — HEPARIN 500 UNITS: 100 SYRINGE at 11:12

## 2018-12-04 NOTE — PROGRESS NOTES
Received call from patient re: prescription medication assistance. His prescriptions had been sent to [facility] but need to be sent to [facility] instead, as [facility] is assisting with co-pays. Sent Epic message to AMALIA Daniel and Dr. Garay. Spoke with Ziada at [facility], ext. [phone]; she received the prescriptions and called [facility] for authorization; she had just contacted patient as well. Called patient to let him know that all is resolved and his prescriptions are ready to . Patient stated appreciation for assistance given He reported no other needs or concerns at this time. Will continue to follow and assist as needs identified.

## 2018-12-04 NOTE — PLAN OF CARE
Problem: Patient Care Overview (Adult)  Goal: Plan of Care Review  Outcome: Ongoing (interventions implemented as appropriate)  Pt cleared for chemotherapy per Dr. Garay. No complaints voiced. Tolerated Opdivo. No reactions noted. AVS given to pt. Pt discharged, NAD.

## 2018-12-04 NOTE — Clinical Note
Schedule CBC,CMp and see me in 2 weeks on [facility] and for Xgeva and Opdivo

## 2018-12-04 NOTE — PROGRESS NOTES
"Subjective:       Patient ID: Yao Alan is a 43 y.o. male.    Chief Complaint: Follow-up  Oncologic History:  Mr. Yao Alan is a 43-year-old male who has a long history of smoking and was seen in the Pulmonary Clinic by Dr. Valle on 03/05/2015 with abnormal CT scan.    Apparently, he went to the [facility] in February with coughing as well as chest pain. A chest x-ray was done, which revealed a left upper lobe lung mass. Followup CT scan was done on 02/12/2015 and that revealed posterior superior mediastinal mass adjacent and prominent enlarged upper left mediastinal lymph nodes, abutting the aortic arch as well as left subclavian artery. A  CT scan of the abdomen was done on 03/03/2015 without contrast and that revealed nonobstructive nephrolithiasis, but a 2.1 cm lytic lesion involving the left iliac wing concerning for metastatic disease given the presence of emphysema and a mediastinal soft tissue mass on the CT chest from 02/12/2015. He saw Dr. Valle after that on 03/05/2015 and underwent an IR guided biopsy of the bone lesion on 03/17/2015. 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    His EGFR/EML/ALK is negative.    His PET scan on 3/25/15 revealed Left upper lobe lung lesion with an adjacent para-aortic lymph node, right anterior rib metastasis, right iliac metastasis, and pancreatic metastasis. A pancreatic cancer primary neoplasm is less likely.  MRI brain was negative for mets.  He completed 6 cycles of Carboplatin and Alimta and was on maintenance Alimta until end of March 2016.  His bone scan from 3/16 revealed stable disease. His CT scans also from end of March 2016 revealed "Interval enlargement of left upper lobe lung mass measuring 5.9 x 3.9 cm (previously 3.3 x 2.5 cm). This mass appears to invade the mediastinum and abutting the aortic arch and left subclavian artery"     He is now on Opdivo since March 2016         HPI Mr. Alan returns for follow up " and Opdivo  He feels well and denies any new issues    Review of Systems   Constitutional: Negative for appetite change, fatigue and unexpected weight change.   HENT: Negative for mouth sores.    Eyes: Negative for visual disturbance.   Respiratory: Negative for cough and shortness of breath.    Cardiovascular: Negative for chest pain.   Gastrointestinal: Negative for abdominal pain and diarrhea.   Genitourinary: Negative for frequency.   Musculoskeletal: Negative for back pain.   Skin: Negative for rash.   Neurological: Negative for headaches.   Hematological: Negative for adenopathy.   Psychiatric/Behavioral: The patient is not nervous/anxious.    All other systems reviewed and are negative.      Objective:      Physical Exam   Constitutional: He is oriented to person, place, and time. He appears well-developed and well-nourished.   HENT:   Mouth/Throat: No oropharyngeal exudate.   Cardiovascular: Normal rate and normal heart sounds.   Pulmonary/Chest: Effort normal and breath sounds normal. He has no wheezes.   Abdominal: Soft. Bowel sounds are normal. There is no tenderness.   Musculoskeletal: He exhibits no edema or tenderness.   Lymphadenopathy:     He has no cervical adenopathy.   Neurological: He is alert and oriented to person, place, and time. Coordination normal.   Skin: Skin is warm and dry. No rash noted.   Psychiatric: He has a normal mood and affect. Judgment and thought content normal.   Vitals reviewed.        LABS:  WBC   Date Value Ref Range Status   12/04/2018 6.94 3.90 - 12.70 K/uL Final     Hemoglobin   Date Value Ref Range Status   12/04/2018 14.8 14.0 - 18.0 g/dL Final     Hematocrit   Date Value Ref Range Status   12/04/2018 44.3 40.0 - 54.0 % Final     Platelets   Date Value Ref Range Status   12/04/2018 226 150 - 350 K/uL Final     Gran # (ANC)   Date Value Ref Range Status   12/04/2018 3.4 1.8 - 7.7 K/uL Final     Comment:     The ANC is based on a white cell differential from an    automated cell counter. It has not been microscopically   reviewed for the presence of abnormal cells. Clinical   correlation is required.         Chemistry        Component Value Date/Time     12/04/2018 0729    K 3.3 (L) 12/04/2018 0729     12/04/2018 0729    CO2 22 (L) 12/04/2018 0729    BUN 16 12/04/2018 0729    CREATININE 1.6 (H) 12/04/2018 0729     12/04/2018 0729        Component Value Date/Time    CALCIUM 9.4 12/04/2018 0729    ALKPHOS 67 12/04/2018 0729    AST 31 12/04/2018 0729    ALT 22 12/04/2018 0729    BILITOT 0.5 12/04/2018 0729    ESTGFRAFRICA >60 12/04/2018 0729    EGFRNONAA 52 (A) 12/04/2018 0729           Assessment:       1. Malignant neoplasm of upper lobe of right lung    2. Secondary malignant neoplasm of bone        Plan:        1,2/ He is doing well clinically and will proceed with Opdivo and will return in 2 weeks for next cycle and also for Xgeva    Above care plan was discussed with patient and all questions were addressed to his satisfaction

## 2018-12-13 ENCOUNTER — OFFICE VISIT (OUTPATIENT)
Dept: URGENT CARE | Facility: CLINIC | Age: 43
End: 2018-12-13
Payer: MEDICARE

## 2018-12-13 ENCOUNTER — HOSPITAL ENCOUNTER (EMERGENCY)
Facility: HOSPITAL | Age: 43
Discharge: HOME | End: 2018-12-13
Attending: EMERGENCY MEDICINE
Payer: MEDICARE

## 2018-12-13 ENCOUNTER — NURSE TRIAGE (OUTPATIENT)
Dept: ADMINISTRATIVE | Facility: CLINIC | Age: 43
End: 2018-12-13

## 2018-12-13 VITALS
WEIGHT: 187 LBS | SYSTOLIC BLOOD PRESSURE: 119 MMHG | DIASTOLIC BLOOD PRESSURE: 74 MMHG | OXYGEN SATURATION: 98 % | RESPIRATION RATE: 18 BRPM | HEART RATE: 75 BPM | BODY MASS INDEX: 30.05 KG/M2 | HEIGHT: 66 IN | TEMPERATURE: 98 F

## 2018-12-13 VITALS
RESPIRATION RATE: 22 BRPM | HEART RATE: 65 BPM | BODY MASS INDEX: 30.05 KG/M2 | DIASTOLIC BLOOD PRESSURE: 91 MMHG | SYSTOLIC BLOOD PRESSURE: 154 MMHG | TEMPERATURE: 97 F | WEIGHT: 187 LBS | OXYGEN SATURATION: 99 % | HEIGHT: 66 IN

## 2018-12-13 DIAGNOSIS — R03.0 ELEVATED BLOOD-PRESSURE READING WITHOUT DIAGNOSIS OF HYPERTENSION: ICD-10-CM

## 2018-12-13 DIAGNOSIS — R51.9 ACUTE NONINTRACTABLE HEADACHE, UNSPECIFIED HEADACHE TYPE: ICD-10-CM

## 2018-12-13 DIAGNOSIS — R20.0 NUMBNESS AND TINGLING: ICD-10-CM

## 2018-12-13 DIAGNOSIS — R68.84 PAIN IN LOWER JAW: Primary | ICD-10-CM

## 2018-12-13 DIAGNOSIS — R20.2 INTERMITTENT TINGLING SENSATION OF LEFT HAND AND FOOT: ICD-10-CM

## 2018-12-13 DIAGNOSIS — R20.2 NUMBNESS AND TINGLING: ICD-10-CM

## 2018-12-13 DIAGNOSIS — R51.9 ACUTE INTRACTABLE HEADACHE, UNSPECIFIED HEADACHE TYPE: Primary | ICD-10-CM

## 2018-12-13 LAB
ALBUMIN SERPL BCP-MCNC: 4.1 G/DL
ALP SERPL-CCNC: 60 U/L
ALT SERPL W/O P-5'-P-CCNC: 21 U/L
ANION GAP SERPL CALC-SCNC: 8 MMOL/L
AST SERPL-CCNC: 19 U/L
BASOPHILS # BLD AUTO: 0.05 K/UL
BASOPHILS NFR BLD: 0.5 %
BILIRUB SERPL-MCNC: 0.2 MG/DL
BUN SERPL-MCNC: 16 MG/DL
CALCIUM SERPL-MCNC: 9.6 MG/DL
CHLORIDE SERPL-SCNC: 107 MMOL/L
CO2 SERPL-SCNC: 26 MMOL/L
CREAT SERPL-MCNC: 1.6 MG/DL
DIFFERENTIAL METHOD: ABNORMAL
EOSINOPHIL # BLD AUTO: 0.2 K/UL
EOSINOPHIL NFR BLD: 1.9 %
ERYTHROCYTE [DISTWIDTH] IN BLOOD BY AUTOMATED COUNT: 14.4 %
EST. GFR  (AFRICAN AMERICAN): >60 ML/MIN/1.73 M^2
EST. GFR  (NON AFRICAN AMERICAN): 52 ML/MIN/1.73 M^2
GLUCOSE SERPL-MCNC: 85 MG/DL
HCT VFR BLD AUTO: 42.6 %
HGB BLD-MCNC: 14.1 G/DL
LYMPHOCYTES # BLD AUTO: 3.1 K/UL
LYMPHOCYTES NFR BLD: 32.3 %
MCH RBC QN AUTO: 26.9 PG
MCHC RBC AUTO-ENTMCNC: 33.1 G/DL
MCV RBC AUTO: 81 FL
MONOCYTES # BLD AUTO: 0.9 K/UL
MONOCYTES NFR BLD: 9.2 %
NEUTROPHILS # BLD AUTO: 5.4 K/UL
NEUTROPHILS NFR BLD: 56.1 %
PLATELET # BLD AUTO: 233 K/UL
PMV BLD AUTO: 10.6 FL
POCT GLUCOSE: 98 MG/DL (ref 70–110)
POTASSIUM SERPL-SCNC: 4.3 MMOL/L
PROT SERPL-MCNC: 7.4 G/DL
RBC # BLD AUTO: 5.25 M/UL
SODIUM SERPL-SCNC: 141 MMOL/L
WBC # BLD AUTO: 9.54 K/UL

## 2018-12-13 PROCEDURE — 85025 COMPLETE CBC W/AUTO DIFF WBC: CPT

## 2018-12-13 PROCEDURE — 80053 COMPREHEN METABOLIC PANEL: CPT

## 2018-12-13 PROCEDURE — 93010 ELECTROCARDIOGRAM REPORT: CPT | Mod: ,,, | Performed by: INTERNAL MEDICINE

## 2018-12-13 PROCEDURE — 99285 EMERGENCY DEPT VISIT HI MDM: CPT | Mod: 25

## 2018-12-13 PROCEDURE — 99215 OFFICE O/P EST HI 40 MIN: CPT | Mod: S$GLB,,, | Performed by: NURSE PRACTITIONER

## 2018-12-13 PROCEDURE — 82962 GLUCOSE BLOOD TEST: CPT

## 2018-12-13 PROCEDURE — 93005 ELECTROCARDIOGRAM TRACING: CPT

## 2018-12-13 NOTE — TELEPHONE ENCOUNTER
Reason for Disposition   Headache (with neurologic deficit)    Protocols used: [facility] NEUROLOGIC DEFICIT-A-[place]    Tingling in arms and legs on left side. Also has headache, both started yesterday. ED recommended.

## 2018-12-13 NOTE — ED PROVIDER NOTES
"Encounter Date: 12/13/2018    This is a SORT/MSE of a 43 y.o. male presenting to the ED with c/o headache with left sided numbness/tingling. Also reports right jaw pain. Patient with hx of lung CA with mets to hip. Currently on chemo, last tx was last week. Sent from clinic for evaluation. Care will be transferred to an alternate provider when patient is roomed for a full evaluation and final disposition. JOSE Hooper, FNP-C 12/13/2018 12:05 PM       History     Chief Complaint   Patient presents with    Tingling     " I have tingling in my left foot and right jaw with headaches since yesterday."      The patient complains of right jaw pain that started yesterday.  The pain is located in the mental region.  He is unable to characterize the pain.  The pain is severe.  There are no exacerbating or alleviating factors.  He attempted treatment by taking Percocet 10/325 which he takes as needed for chronic pain. He also complains of tingling to his left hand and left foot that started yesterday.  He denies trauma to these regions.  He denies weakness and loss of function to these reasons.  He denies difficulty walking.  He denies difficulty using his arms.  He also complains of a right frontotemporal headache that began yesterday.  This headache is moderate.  The onset was gradual.  He denies changes in his vision.  He denies hearing loss, tinnitus, and otalgia.  He denies fever, chills, nausea and vomiting. He is currently receiving chemotherapy for treatment of lung cancer.  His last treatment was on 12/3.      The history is provided by the patient. No  was used.     Review of patient's allergies indicates:   Allergen Reactions    Nsaids (non-steroidal anti-inflammatory drug)      Patient says since been diagnosed with lung mass on 2-12-15, if take any NSAIDS he spikes a fever.    Tylenol [acetaminophen] Other (See Comments)     Sweating +     Past Medical History:   Diagnosis Date    " Asthma     COPD (chronic obstructive pulmonary disease)     HTN (hypertension)     Hypertension     Lung cancer     Lung mass     Thyroid disease      Past Surgical History:   Procedure Laterality Date    COLONOSCOPY N/A 2015    Performed by FRANSISCO Nur MD at [facility] ENDO (4TH FLR)    ESOPHAGOGASTRODUODENOSCOPY (EGD) N/A 2015    Performed by Young Cain MD at [facility] ENDO (4TH FLR)    FRACTURE SURGERY      finger    [id number]-[id number]-P-CATH-neck or chest Fluoro equipment needed  **PT MUST BE FIRST CASE Right 2015    Performed by Miguel Jacobo MD at [facility] OR 1ST FLR    LUNG CANCER SURGERY Right 2015    lung biopsy      Family History   Problem Relation Age of Onset    Hypertension Father     No Known Problems Mother     No Known Problems Sister     No Known Problems Brother     No Known Problems Maternal Aunt     No Known Problems Maternal Uncle     No Known Problems Paternal Aunt     No Known Problems Paternal Uncle     No Known Problems Maternal Grandmother     No Known Problems Maternal Grandfather     No Known Problems Paternal Grandmother     No Known Problems Paternal Grandfather     Amblyopia Neg Hx     Blindness Neg Hx     Cancer Neg Hx     Cataracts Neg Hx     Diabetes Neg Hx     Glaucoma Neg Hx     Macular degeneration Neg Hx     Retinal detachment Neg Hx     Strabismus Neg Hx     Stroke Neg Hx     Thyroid disease Neg Hx      Social History     Tobacco Use    Smoking status: Former Smoker     Packs/day: 0.75     Years: 25.00     Pack years: 18.75     Types: Cigarettes     Last attempt to quit: 2014     Years since quittin.0    Smokeless tobacco: Never Used    Tobacco comment: Patient is no longer smoking   Substance Use Topics    Alcohol use: No    Drug use: Yes     Types: Marijuana     Review of Systems   Constitutional: Negative for chills, diaphoresis, fatigue and fever.   HENT: Negative for ear pain, hearing loss and tinnitus.     Eyes: Negative for visual disturbance.   Respiratory: Negative for shortness of breath.    Cardiovascular: Negative for chest pain.   Gastrointestinal: Negative for abdominal pain, nausea and vomiting.   Genitourinary: Negative for difficulty urinating, dysuria and frequency.   Musculoskeletal:        + right jaw pain   Skin: Negative for rash.   Neurological: Positive for numbness (Left hand and foot) and headaches (Right frontotemporal). Negative for dizziness and light-headedness.       Physical Exam     Initial Vitals [12/13/18 1207]   BP Pulse Resp Temp SpO2   (!) 152/106 74 18 98.4 °F (36.9 °C) 100 %      MAP       --         Physical Exam    Constitutional: He appears well-developed and well-nourished. He is not diaphoretic. No distress.   HENT:   Head: Normocephalic and atraumatic.   Mouth/Throat: Uvula is midline, oropharynx is clear and moist and mucous membranes are normal. No oral lesions. No trismus in the jaw. Abnormal dentition (edentulous). No dental abscesses or lacerations. No posterior oropharyngeal erythema.   Small area of mild point tenderness to the right mandible in the mental region intraorally. Normal overlying gums.   Neck: No neck rigidity.   Cardiovascular: Normal rate and regular rhythm. Exam reveals no gallop and no friction rub.    No murmur heard.  Pulses:       Radial pulses are 2+ on the right side, and 2+ on the left side.        Dorsalis pedis pulses are 2+ on the right side, and 2+ on the left side.   No peripheral edema.   Pulmonary/Chest: Effort normal and breath sounds normal. No respiratory distress. He has no decreased breath sounds. He has no wheezes. He has no rhonchi. He has no rales.   Abdominal: Soft. There is no tenderness.   Musculoskeletal:   Face: No swelling or tenderness.   Neurological: He is alert and oriented to person, place, and time. He has normal strength. No cranial nerve deficit or sensory deficit. Coordination normal. GCS eye subscore is 4. GCS verbal  subscore is 5. GCS motor subscore is 6.   Skin: Skin is warm and dry. No pallor.         ED Course   Procedures  Labs Reviewed   CBC W/ AUTO DIFFERENTIAL - Abnormal; Notable for the following components:       Result Value    MCV 81 (*)     MCH 26.9 (*)     All other components within normal limits   COMPREHENSIVE METABOLIC PANEL - Abnormal; Notable for the following components:    Creatinine 1.6 (*)     eGFR if non  52 (*)     All other components within normal limits   POCT GLUCOSE   POCT GLUCOSE MONITORING CONTINUOUS     EKG Readings: (Independently Interpreted)   Initial Reading: No STEMI. Rhythm: Normal Sinus Rhythm. Heart Rate: 72. Ectopy: No Ectopy. Conduction: Normal. ST Segments: Normal ST Segments. T Waves: Normal. Axis: Normal. Other Impression: Normal study.       Imaging Results          CT Head Without Contrast (Final result)  Result time 12/13/18 13:07:21    Final result by Satish Rosario MD (12/13/18 13:07:21)                 Impression:      1. No acute intracranial abnormalities.  2. Nonspecific focus of soft tissue induration within the posterior occipital soft tissues, correlation with any history of injury in the region.      Electronically signed by: Satish Rsoario MD  Date:    12/13/2018  Time:    13:07             Narrative:    EXAMINATION:  CT HEAD WITHOUT CONTRAST    CLINICAL HISTORY:  Headache, acute, norm neuro exam;    TECHNIQUE:  Low dose axial images were obtained through the head.  Coronal and sagittal reformations were also performed. Contrast was not administered.    COMPARISON:  MRI 03/03/2017    FINDINGS:  Please note, examination is limited secondary to patient motion.    There is no evidence of acute major vascular territory infarct, hemorrhage, or mass.  There is no hydrocephalus.  There are no abnormal extra-axial fluid collections.  The paranasal sinuses and mastoid air cells are clear, and there is no evidence of calvarial fracture.  The visualized soft  tissues are remarkable for focus of soft tissue induration within the left posterior occipital soft tissues..                                 Medical Decision Making:   Independently Interpreted Test(s):   I have ordered and independently interpreted EKG Reading(s) - see prior notes  Clinical Tests:   Lab Tests: Ordered and Reviewed  Radiological Study: Ordered and Reviewed  Medical Tests: Ordered and Reviewed  Medical decision making:  This patient was evaluated for acute headache, jaw pain, and tingling sensation to his left hand and foot.  He was afebrile.  His blood pressure was mildly elevated.  He had no complaints of chest pain or shortness of breath.  He had no focal neurological deficits on examination. CT of the brain was negative for acute intracranial processes.  He had no leukocytosis, renal insufficiency, electrolyte anomalies, or other concerning findings on lab review.  There were no intraoral lesions or signs of infection on extensive examination of the oral cavity.  There were no findings on workup that warranted admission or emergent specialist consultation.  The patient was instructed on supportive home management instructed to follow up with his PCP within the next few days for a recheck.                      Clinical Impression:   The primary encounter diagnosis was Pain in lower jaw. Diagnoses of Intermittent tingling sensation of left hand and foot, Elevated blood-pressure reading without diagnosis of hypertension, and Acute nonintractable headache, unspecified headache type were also pertinent to this visit.      Disposition:   Disposition: Discharged  Condition: Stable                        Waqas Andrea III, MD  12/13/18 8741

## 2018-12-13 NOTE — PROGRESS NOTES
"Subjective:       Patient ID: Yao Alan is a 43 y.o. male.    Vitals:  height is 5' 6" (1.676 m) and weight is 84.8 kg (187 lb). His temperature is 97.8 °F (36.6 °C). His blood pressure is 119/74 and his pulse is 75. His respiration is 18 and oxygen saturation is 98%.     Chief Complaint: Headache    Pt presents to clinic with c/o severe right sided headache with left upper and lower extremity numbness onset yesterday. Pt contacted PCP who told him to go to ER for further evaluation, pt misunderstood and came to . + hx of lung CA with METS to right hip bone which he states has resolved. Last had chemo last week. VSS. No neuro deficits, drove himself here, + pin point pupils on exam. No slurred speech.     AMA form signed, pt choosing to go by private auto.       Headache    This is a new problem. The current episode started yesterday. The problem occurs constantly. The problem has been unchanged. The pain is located in the right unilateral region. The pain does not radiate. The pain quality is not similar to prior headaches. The quality of the pain is described as aching. The pain is at a severity of 6/10. The pain is mild. Associated symptoms include numbness and tingling. Pertinent negatives include no abnormal behavior, blurred vision, coughing, dizziness, fever, loss of balance, muscle aches, nausea, neck pain, sore throat, visual change, vomiting or weakness. Nothing aggravates the symptoms. He has tried nothing for the symptoms. The treatment provided no relief. His past medical history is significant for cancer.       Constitution: Negative for chills, fatigue and fever.   HENT: Negative for congestion and sore throat.    Neck: Negative for neck pain and painful lymph nodes.   Cardiovascular: Negative for chest pain and leg swelling.   Eyes: Negative for double vision and blurred vision.   Respiratory: Negative for cough and shortness of breath.    Gastrointestinal: Negative for nausea, vomiting and " diarrhea.   Genitourinary: Negative for dysuria, frequency and urgency.   Musculoskeletal: Negative for joint pain, joint swelling, muscle cramps and muscle ache.   Skin: Negative for color change, pale and rash.   Allergic/Immunologic: Negative for seasonal allergies.   Neurological: Positive for headaches, numbness and tingling. Negative for dizziness, history of vertigo, light-headedness, passing out and loss of balance.   Hematologic/Lymphatic: Negative for swollen lymph nodes, easy bruising/bleeding and history of blood clots. Does not bruise/bleed easily.   Psychiatric/Behavioral: Negative for nervous/anxious, sleep disturbance and depression. The patient is not nervous/anxious.        Objective:      Physical Exam   Constitutional: He is oriented to person, place, and time. He appears well-developed and well-nourished. He is cooperative.  Non-toxic appearance. He does not appear ill. No distress.   HENT:   Head: Normocephalic and atraumatic.   Right Ear: Hearing, tympanic membrane, external ear and ear canal normal.   Left Ear: Hearing, tympanic membrane, external ear and ear canal normal.   Nose: Nose normal. No mucosal edema, rhinorrhea or nasal deformity. No epistaxis. Right sinus exhibits no maxillary sinus tenderness and no frontal sinus tenderness. Left sinus exhibits no maxillary sinus tenderness and no frontal sinus tenderness.   Mouth/Throat: Uvula is midline, oropharynx is clear and moist and mucous membranes are normal. No trismus in the jaw. Normal dentition. No uvula swelling. No posterior oropharyngeal erythema.   No temporal TTP   Eyes: Conjunctivae, EOM and lids are normal. Pupils are equal, round, and reactive to light. No scleral icterus.   Sclera clear bilat    Pinpoint pupils noted on exam, equal and reactive to light   Neck: Trachea normal, normal range of motion, full passive range of motion without pain and phonation normal. Neck supple. No neck rigidity.   Cardiovascular: Normal rate,  regular rhythm, normal heart sounds, intact distal pulses and normal pulses.   Pulmonary/Chest: Effort normal and breath sounds normal. No respiratory distress.   Abdominal: Soft. Normal appearance and bowel sounds are normal. He exhibits no distension. There is no tenderness.   Musculoskeletal: Normal range of motion. He exhibits no edema or deformity.   Neurological: He is alert and oriented to person, place, and time. He has normal strength. No cranial nerve deficit or sensory deficit. He exhibits normal muscle tone. He displays a negative Romberg sign. Coordination normal. GCS eye subscore is 4. GCS verbal subscore is 5. GCS motor subscore is 6.   Skin: Skin is warm, dry and intact. He is not diaphoretic. No pallor.   Psychiatric: He has a normal mood and affect. His speech is normal and behavior is normal. Judgment and thought content normal. Cognition and memory are normal.   Nursing note and vitals reviewed.      Assessment:       1. Acute intractable headache, unspecified headache type    2. Numbness and tingling        Plan:     Go to ER for further evaluation of headache and left side numbness.   AMA form signed, pt choosing to go by private auto.     Acute intractable headache, unspecified headache type  -     Refer to Emergency Dept.    Numbness and tingling  -     Refer to Emergency Dept.

## 2018-12-18 ENCOUNTER — OFFICE VISIT (OUTPATIENT)
Dept: HEMATOLOGY/ONCOLOGY | Facility: CLINIC | Age: 43
End: 2018-12-18
Payer: MEDICARE

## 2018-12-18 ENCOUNTER — INFUSION (OUTPATIENT)
Dept: INFUSION THERAPY | Facility: HOSPITAL | Age: 43
End: 2018-12-18
Attending: INTERNAL MEDICINE
Payer: MEDICARE

## 2018-12-18 VITALS
DIASTOLIC BLOOD PRESSURE: 80 MMHG | HEART RATE: 69 BPM | RESPIRATION RATE: 16 BRPM | SYSTOLIC BLOOD PRESSURE: 120 MMHG | TEMPERATURE: 98 F | OXYGEN SATURATION: 98 %

## 2018-12-18 VITALS
SYSTOLIC BLOOD PRESSURE: 112 MMHG | BODY MASS INDEX: 30 KG/M2 | OXYGEN SATURATION: 96 % | DIASTOLIC BLOOD PRESSURE: 84 MMHG | HEART RATE: 82 BPM | TEMPERATURE: 99 F | WEIGHT: 191.13 LBS | HEIGHT: 67 IN

## 2018-12-18 DIAGNOSIS — C34.91 LUNG CANCER, PRIMARY, WITH METASTASIS FROM LUNG TO OTHER SITE, RIGHT: ICD-10-CM

## 2018-12-18 DIAGNOSIS — J06.9 UPPER RESPIRATORY TRACT INFECTION, UNSPECIFIED TYPE: ICD-10-CM

## 2018-12-18 DIAGNOSIS — C79.51 SECONDARY MALIGNANT NEOPLASM OF BONE: ICD-10-CM

## 2018-12-18 DIAGNOSIS — C34.11 MALIGNANT NEOPLASM OF UPPER LOBE OF RIGHT LUNG: Primary | ICD-10-CM

## 2018-12-18 DIAGNOSIS — D50.0 IRON DEFICIENCY ANEMIA DUE TO CHRONIC BLOOD LOSS: Primary | ICD-10-CM

## 2018-12-18 DIAGNOSIS — E03.2 HYPOTHYROIDISM DUE TO MEDICATION: ICD-10-CM

## 2018-12-18 DIAGNOSIS — C34.11 MALIGNANT NEOPLASM OF UPPER LOBE OF RIGHT LUNG: ICD-10-CM

## 2018-12-18 PROCEDURE — 25000003 PHARM REV CODE 250: Performed by: INTERNAL MEDICINE

## 2018-12-18 PROCEDURE — 99214 OFFICE O/P EST MOD 30 MIN: CPT | Mod: PBBFAC | Performed by: INTERNAL MEDICINE

## 2018-12-18 PROCEDURE — 99999 PR PBB SHADOW E&M-EST. PATIENT-LVL IV: CPT | Mod: PBBFAC,,, | Performed by: INTERNAL MEDICINE

## 2018-12-18 PROCEDURE — A4216 STERILE WATER/SALINE, 10 ML: HCPCS | Performed by: INTERNAL MEDICINE

## 2018-12-18 PROCEDURE — 63600175 PHARM REV CODE 636 W HCPCS: Mod: JG | Performed by: INTERNAL MEDICINE

## 2018-12-18 PROCEDURE — 96372 THER/PROPH/DIAG INJ SC/IM: CPT | Mod: 59

## 2018-12-18 PROCEDURE — 96413 CHEMO IV INFUSION 1 HR: CPT

## 2018-12-18 PROCEDURE — 99215 OFFICE O/P EST HI 40 MIN: CPT | Mod: S$PBB,,, | Performed by: INTERNAL MEDICINE

## 2018-12-18 RX ORDER — HEPARIN 100 UNIT/ML
500 SYRINGE INTRAVENOUS
Status: CANCELLED | OUTPATIENT
Start: 2019-01-02

## 2018-12-18 RX ORDER — SODIUM CHLORIDE 0.9 % (FLUSH) 0.9 %
10 SYRINGE (ML) INJECTION
Status: DISCONTINUED | OUTPATIENT
Start: 2018-12-18 | End: 2018-12-18 | Stop reason: HOSPADM

## 2018-12-18 RX ORDER — HEPARIN 100 UNIT/ML
500 SYRINGE INTRAVENOUS
Status: CANCELLED | OUTPATIENT
Start: 2018-12-18

## 2018-12-18 RX ORDER — SODIUM CHLORIDE 0.9 % (FLUSH) 0.9 %
10 SYRINGE (ML) INJECTION
Status: CANCELLED | OUTPATIENT
Start: 2019-01-02

## 2018-12-18 RX ORDER — AMOXICILLIN AND CLAVULANATE POTASSIUM 875; 125 MG/1; MG/1
1 TABLET, FILM COATED ORAL 2 TIMES DAILY
Qty: 20 TABLET | Refills: 0 | Status: SHIPPED | OUTPATIENT
Start: 2018-12-18 | End: 2018-12-28

## 2018-12-18 RX ORDER — SODIUM CHLORIDE 0.9 % (FLUSH) 0.9 %
10 SYRINGE (ML) INJECTION
Status: CANCELLED | OUTPATIENT
Start: 2018-12-18

## 2018-12-18 RX ORDER — HEPARIN 100 UNIT/ML
500 SYRINGE INTRAVENOUS
Status: DISCONTINUED | OUTPATIENT
Start: 2018-12-18 | End: 2018-12-18 | Stop reason: HOSPADM

## 2018-12-18 RX ADMIN — DENOSUMAB 120 MG: 120 INJECTION SUBCUTANEOUS at 09:12

## 2018-12-18 RX ADMIN — SODIUM CHLORIDE 480 MG: 9 INJECTION, SOLUTION INTRAVENOUS at 08:12

## 2018-12-18 RX ADMIN — HEPARIN 500 UNITS: 100 SYRINGE at 09:12

## 2018-12-18 RX ADMIN — Medication 10 ML: at 09:12

## 2018-12-18 NOTE — Clinical Note
Schedule CBC, CMP and Opdivo  on [facility] on 1/2/19 (no doctor appointement)Also schedule CBC,CMp, TSH and free T4 and see me in 4 weeks from now around 1/15/19 on main campus (as I am not on [facility] that week) and for Xgeva and Opdivo

## 2018-12-18 NOTE — PROGRESS NOTES
"Subjective:       Patient ID: Yao Alan is a 43 y.o. male.    Chief Complaint: Follow-up  Oncologic History:  Mr. Yao Alan is a 43-year-old male who has a long history of smoking and was seen in the Pulmonary Clinic by Dr. Valle on 03/05/2015 with abnormal CT scan.    Apparently, he went to the [facility] in February with coughing as well as chest pain. A chest x-ray was done, which revealed a left upper lobe lung mass. Followup CT scan was done on 02/12/2015 and that revealed posterior superior mediastinal mass adjacent and prominent enlarged upper left mediastinal lymph nodes, abutting the aortic arch as well as left subclavian artery. A  CT scan of the abdomen was done on 03/03/2015 without contrast and that revealed nonobstructive nephrolithiasis, but a 2.1 cm lytic lesion involving the left iliac wing concerning for metastatic disease given the presence of emphysema and a mediastinal soft tissue mass on the CT chest from 02/12/2015. He saw Dr. Valle after that on 03/05/2015 and underwent an IR guided biopsy of the bone lesion on 03/17/2015. 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    His EGFR/EML/ALK is negative.    His PET scan on 3/25/15 revealed Left upper lobe lung lesion with an adjacent para-aortic lymph node, right anterior rib metastasis, right iliac metastasis, and pancreatic metastasis. A pancreatic cancer primary neoplasm is less likely.  MRI brain was negative for mets.  He completed 6 cycles of Carboplatin and Alimta and was on maintenance Alimta until end of March 2016.  His bone scan from 3/16 revealed stable disease. His CT scans also from end of March 2016 revealed "Interval enlargement of left upper lobe lung mass measuring 5.9 x 3.9 cm (previously 3.3 x 2.5 cm). This mass appears to invade the mediastinum and abutting the aortic arch and left subclavian artery"     He is now on Opdivo since March 2016         HPI  Mr. Alan returns for follow up " and Opdivo  He went to the ED with jaw pain attributed to a tooth. He noted that he was drinking cold drink on the weekend and the next think he knew his tongue was quivering, and cough with green sputum production. Occasionally feels feverish but has not checked temperatures. Also notes headache.      Review of Systems   Constitutional: Negative for appetite change, fatigue and unexpected weight change.   HENT: Positive for sneezing and sore throat. Negative for mouth sores and trouble swallowing.    Eyes: Negative for visual disturbance.   Respiratory: Positive for cough. Negative for shortness of breath.    Cardiovascular: Negative for chest pain.   Gastrointestinal: Negative for abdominal pain and diarrhea.   Genitourinary: Negative for frequency.   Musculoskeletal: Negative for back pain.   Skin: Negative for rash.   Neurological: Negative for headaches.   Hematological: Negative for adenopathy.   Psychiatric/Behavioral: The patient is not nervous/anxious.    All other systems reviewed and are negative.      Objective:      Physical Exam   Constitutional: He is oriented to person, place, and time. He appears well-developed and well-nourished.   HENT:   Mouth/Throat: No oropharyngeal exudate.   Cardiovascular: Normal rate and normal heart sounds.   Pulmonary/Chest: Effort normal and breath sounds normal. He has no wheezes.   Abdominal: Soft. Bowel sounds are normal. There is no tenderness.   Musculoskeletal: He exhibits no edema or tenderness.   Lymphadenopathy:     He has no cervical adenopathy.   Neurological: He is alert and oriented to person, place, and time. Coordination normal.   Skin: Skin is warm and dry. No rash noted.   Psychiatric: He has a normal mood and affect. Judgment and thought content normal.   Vitals reviewed.        LABS:  WBC   Date Value Ref Range Status   12/18/2018 7.89 3.90 - 12.70 K/uL Final     Hemoglobin   Date Value Ref Range Status   12/18/2018 13.9 (L) 14.0 - 18.0 g/dL Final      Hematocrit   Date Value Ref Range Status   12/18/2018 43.2 40.0 - 54.0 % Final     Platelets   Date Value Ref Range Status   12/18/2018 248 150 - 350 K/uL Final     Gran # (ANC)   Date Value Ref Range Status   12/18/2018 4.4 1.8 - 7.7 K/uL Final     Comment:     The ANC is based on a white cell differential from an   automated cell counter. It has not been microscopically   reviewed for the presence of abnormal cells. Clinical   correlation is required.         Chemistry        Component Value Date/Time     12/18/2018 0735    K 3.9 12/18/2018 0735     12/18/2018 0735    CO2 27 12/18/2018 0735    BUN 12 12/18/2018 0735    CREATININE 1.4 12/18/2018 0735    GLU 99 12/18/2018 0735        Component Value Date/Time    CALCIUM 9.4 12/18/2018 0735    ALKPHOS 61 12/18/2018 0735    AST 24 12/18/2018 0735    ALT 24 12/18/2018 0735    BILITOT 0.4 12/18/2018 0735    ESTGFRAFRICA >60 12/18/2018 0735    EGFRNONAA >60 12/18/2018 0735          Assessment:       1. Malignant neoplasm of upper lobe of right lung    2. Secondary malignant neoplasm of bone    3. Hypothyroidism due to medication    4. Upper respiratory tract infection, unspecified type        Plan:        1,2. He will proceed with Xgeva and Opdivo and will return in 2 weeks for next cycle  3. Check labs in 2 weeks  4. Escribed Augmentin.    Above care plan was discussed with patient and all questions were addressed to his satisfaction

## 2018-12-18 NOTE — NURSING
Arrived to unit, cleared for chemo. No complaints voiced. Tolerated Opdivo. No reactions noted. AVS given to pt.

## 2018-12-19 ENCOUNTER — HOSPITAL ENCOUNTER (OUTPATIENT)
Dept: RADIOLOGY | Facility: HOSPITAL | Age: 43
Discharge: HOME | End: 2018-12-19
Attending: INTERNAL MEDICINE
Payer: MEDICARE

## 2018-12-19 DIAGNOSIS — C34.11 MALIGNANT NEOPLASM OF UPPER LOBE OF RIGHT LUNG: ICD-10-CM

## 2018-12-19 PROCEDURE — 70553 MRI BRAIN STEM W/O & W/DYE: CPT | Mod: TC

## 2018-12-19 PROCEDURE — 25500020 PHARM REV CODE 255: Performed by: INTERNAL MEDICINE

## 2018-12-19 PROCEDURE — A9585 GADOBUTROL INJECTION: HCPCS | Performed by: INTERNAL MEDICINE

## 2018-12-19 PROCEDURE — 70553 MRI BRAIN STEM W/O & W/DYE: CPT | Mod: 26,,, | Performed by: RADIOLOGY

## 2018-12-19 RX ORDER — GADOBUTROL 604.72 MG/ML
10 INJECTION INTRAVENOUS
Status: COMPLETED | OUTPATIENT
Start: 2018-12-19 | End: 2018-12-19

## 2018-12-19 RX ADMIN — GADOBUTROL 10 ML: 604.72 INJECTION INTRAVENOUS at 06:12

## 2018-12-20 ENCOUNTER — TELEPHONE (OUTPATIENT)
Dept: HEMATOLOGY/ONCOLOGY | Facility: CLINIC | Age: 43
End: 2018-12-20

## 2018-12-20 NOTE — TELEPHONE ENCOUNTER
"Called to review MRI brain which reveals "Lobulated enhancing nasopharyngeal mass.  Further evaluation to rule out a neoplastic process is suggested as above" Needs to see ENT ASAP  "

## 2018-12-31 DIAGNOSIS — I82.402 DEEP VEIN THROMBOSIS (DVT) OF LEFT LOWER EXTREMITY, UNSPECIFIED CHRONICITY, UNSPECIFIED VEIN: ICD-10-CM

## 2018-12-31 DIAGNOSIS — G89.3 NEOPLASM RELATED PAIN: ICD-10-CM

## 2018-12-31 DIAGNOSIS — E03.9 HYPOTHYROIDISM, UNSPECIFIED TYPE: ICD-10-CM

## 2018-12-31 RX ORDER — LEVOTHYROXINE SODIUM 75 UG/1
75 TABLET ORAL
Qty: 30 TABLET | Refills: 11 | Status: SHIPPED | OUTPATIENT
Start: 2018-12-31 | End: 2019-03-11 | Stop reason: DRUGHIGH

## 2018-12-31 RX ORDER — OXYCODONE AND ACETAMINOPHEN 10; 325 MG/1; MG/1
1 TABLET ORAL EVERY 6 HOURS PRN
Qty: 120 TABLET | Refills: 0 | Status: SHIPPED | OUTPATIENT
Start: 2018-12-31 | End: 2019-01-26 | Stop reason: SDUPTHER

## 2018-12-31 NOTE — TELEPHONE ENCOUNTER
----- Message from Edith Pierson sent at 12/31/2018  9:24 AM CST -----  Contact: Pt   Pt called requesting refill for listed medications:    oxyCODONE-acetaminophen (PERCOCET)  mg per tablet  rivaroxaban (XARELTO) 20 mg Tab  levothyroxine (SYNTHROID) 75 MCG tablet    Pharmacy: [facility], [phone] (Phone)  [phone] (Fax)      Contact preference:  [phone]

## 2019-01-02 ENCOUNTER — INFUSION (OUTPATIENT)
Dept: INFUSION THERAPY | Facility: HOSPITAL | Age: 44
End: 2019-01-02
Attending: INTERNAL MEDICINE
Payer: MEDICARE

## 2019-01-02 VITALS
RESPIRATION RATE: 16 BRPM | SYSTOLIC BLOOD PRESSURE: 113 MMHG | HEART RATE: 75 BPM | TEMPERATURE: 98 F | OXYGEN SATURATION: 98 % | DIASTOLIC BLOOD PRESSURE: 81 MMHG

## 2019-01-02 DIAGNOSIS — C79.51 SECONDARY MALIGNANT NEOPLASM OF BONE: ICD-10-CM

## 2019-01-02 DIAGNOSIS — C34.91 LUNG CANCER, PRIMARY, WITH METASTASIS FROM LUNG TO OTHER SITE, RIGHT: ICD-10-CM

## 2019-01-02 DIAGNOSIS — C34.11 MALIGNANT NEOPLASM OF UPPER LOBE OF RIGHT LUNG: ICD-10-CM

## 2019-01-02 DIAGNOSIS — D50.0 IRON DEFICIENCY ANEMIA DUE TO CHRONIC BLOOD LOSS: Primary | ICD-10-CM

## 2019-01-02 PROCEDURE — 96413 CHEMO IV INFUSION 1 HR: CPT

## 2019-01-02 PROCEDURE — 25000003 PHARM REV CODE 250: Performed by: INTERNAL MEDICINE

## 2019-01-02 PROCEDURE — 63600175 PHARM REV CODE 636 W HCPCS: Performed by: INTERNAL MEDICINE

## 2019-01-02 PROCEDURE — A4216 STERILE WATER/SALINE, 10 ML: HCPCS | Performed by: INTERNAL MEDICINE

## 2019-01-02 RX ORDER — HEPARIN 100 UNIT/ML
500 SYRINGE INTRAVENOUS
Status: DISCONTINUED | OUTPATIENT
Start: 2019-01-02 | End: 2019-01-02 | Stop reason: HOSPADM

## 2019-01-02 RX ORDER — SODIUM CHLORIDE 0.9 % (FLUSH) 0.9 %
10 SYRINGE (ML) INJECTION
Status: DISCONTINUED | OUTPATIENT
Start: 2019-01-02 | End: 2019-01-02 | Stop reason: HOSPADM

## 2019-01-02 RX ADMIN — SODIUM CHLORIDE 480 MG: 9 INJECTION, SOLUTION INTRAVENOUS at 09:01

## 2019-01-02 RX ADMIN — HEPARIN 500 UNITS: 100 SYRINGE at 09:01

## 2019-01-02 RX ADMIN — Medication 10 ML: at 09:01

## 2019-01-02 NOTE — PLAN OF CARE
Problem: Adult Inpatient Plan of Care  Goal: Plan of Care Review  Outcome: Ongoing (interventions implemented as appropriate)  Pt presented for Opdivo infusion. Pt tolerated infusion well. Blood return noted when port accessed/deaccessed. Pt walked in and out of unit without difficulty. Future appt information reviewed with pt. Distress screening tool score of 0.

## 2019-01-09 ENCOUNTER — OFFICE VISIT (OUTPATIENT)
Dept: FAMILY MEDICINE | Facility: CLINIC | Age: 44
End: 2019-01-09
Payer: MEDICARE

## 2019-01-09 VITALS
BODY MASS INDEX: 29.52 KG/M2 | OXYGEN SATURATION: 97 % | RESPIRATION RATE: 18 BRPM | SYSTOLIC BLOOD PRESSURE: 124 MMHG | TEMPERATURE: 98 F | HEART RATE: 76 BPM | HEIGHT: 67 IN | DIASTOLIC BLOOD PRESSURE: 80 MMHG | WEIGHT: 188.06 LBS

## 2019-01-09 DIAGNOSIS — Z13.6 ENCOUNTER FOR LIPID SCREENING FOR CARDIOVASCULAR DISEASE: Primary | ICD-10-CM

## 2019-01-09 DIAGNOSIS — C34.91 PRIMARY MALIGNANT NEOPLASM OF RIGHT LUNG METASTATIC TO OTHER SITE: ICD-10-CM

## 2019-01-09 DIAGNOSIS — Z79.01 CURRENT USE OF LONG TERM ANTICOAGULATION: ICD-10-CM

## 2019-01-09 DIAGNOSIS — C79.51 SECONDARY MALIGNANT NEOPLASM OF BONE: ICD-10-CM

## 2019-01-09 DIAGNOSIS — Z13.220 ENCOUNTER FOR LIPID SCREENING FOR CARDIOVASCULAR DISEASE: Primary | ICD-10-CM

## 2019-01-09 DIAGNOSIS — E05.90 HYPERTHYROIDISM: ICD-10-CM

## 2019-01-09 DIAGNOSIS — I10 ESSENTIAL HYPERTENSION: ICD-10-CM

## 2019-01-09 PROCEDURE — 99999 PR PBB SHADOW E&M-EST. PATIENT-LVL III: CPT | Mod: PBBFAC,,, | Performed by: FAMILY MEDICINE

## 2019-01-09 PROCEDURE — 99214 PR OFFICE/OUTPT VISIT, EST, LEVL IV, 30-39 MIN: ICD-10-PCS | Mod: S$PBB,,, | Performed by: FAMILY MEDICINE

## 2019-01-09 PROCEDURE — 99999 PR PBB SHADOW E&M-EST. PATIENT-LVL III: ICD-10-PCS | Mod: PBBFAC,,, | Performed by: FAMILY MEDICINE

## 2019-01-09 PROCEDURE — 99214 OFFICE O/P EST MOD 30 MIN: CPT | Mod: S$PBB,,, | Performed by: FAMILY MEDICINE

## 2019-01-09 PROCEDURE — 99213 OFFICE O/P EST LOW 20 MIN: CPT | Mod: PBBFAC,PO | Performed by: FAMILY MEDICINE

## 2019-01-09 NOTE — PROGRESS NOTES
Chief Complaint   Patient presents with    Annual Exam       HPI  [facility] Jose Alan is a 43 y.o. male with multiple medical diagnoses as listed in the medical history and problem list that presents for annual exam.    Hx of metastatic lung CA - followed by Heme/onc.     Recently presented to ER for tongue numbness, jaw pain, full workup negative, states all symptoms resolved.     Otherwise pt denies pain, or further neuro deficits.     Pt motivated to lose weight, exercise.     Pt is known to me and was last seen by me on 10/9/2017.    PAST MEDICAL HISTORY:  Past Medical History:   Diagnosis Date    Asthma     COPD (chronic obstructive pulmonary disease)     HTN (hypertension)     Hypertension     Lung cancer     Lung mass     Thyroid disease        PAST SURGICAL HISTORY:  Past Surgical History:   Procedure Laterality Date    COLONOSCOPY N/A 4/22/2015    Performed by FRANSISCO Nur MD at [facility] ENDO (4TH FLR)    ESOPHAGOGASTRODUODENOSCOPY (EGD) N/A 5/8/2015    Performed by Young Cain MD at [facility] END[facility] (4TH FLR)    FRACTURE SURGERY      finger    [id number]-CATH-neck or chest Fluoro equipment needed  **PT MUST BE FIRST CASE Right 7/20/2015    Performed by Miguel Jacobo MD at [facility] OR 1ST FLR    LUNG CANCER SURGERY Right March 2015    lung biopsy        SOCIAL HISTORY:  Social History     Socioeconomic History    Marital status:      Spouse name: Not on file    Number of children: Not on file    Years of education: Not on file    Highest education level: Not on file   Social Needs    Financial resource strain: Not on file    Food insecurity - worry: Not on file    Food insecurity - inability: Not on file    Transportation needs - medical: Not on file    Transportation needs - non-medical: Not on file   Occupational History    Not on file   Tobacco Use    Smoking status: Former Smoker     Packs/day: 0.75     Years: 25.00     Pack years: 18.75     Types: Cigarettes     Last  attempt to quit: 2014     Years since quittin.1    Smokeless tobacco: Never Used    Tobacco comment: Patient is no longer smoking   Substance and Sexual Activity    Alcohol use: No    Drug use: Yes     Types: Marijuana    Sexual activity: Yes     Partners: Female   Other Topics Concern    Not on file   Social History Narrative    Not on file       FAMILY HISTORY:  Family History   Problem Relation Age of Onset    Hypertension Father     No Known Problems Mother     No Known Problems Sister     No Known Problems Brother     No Known Problems Maternal Aunt     No Known Problems Maternal Uncle     No Known Problems Paternal Aunt     No Known Problems Paternal Uncle     No Known Problems Maternal Grandmother     No Known Problems Maternal Grandfather     No Known Problems Paternal Grandmother     No Known Problems Paternal Grandfather     Amblyopia Neg Hx     Blindness Neg Hx     Cancer Neg Hx     Cataracts Neg Hx     Diabetes Neg Hx     Glaucoma Neg Hx     Macular degeneration Neg Hx     Retinal detachment Neg Hx     Strabismus Neg Hx     Stroke Neg Hx     Thyroid disease Neg Hx        ALLERGIES AND MEDICATIONS: updated and reviewed.  Review of patient's allergies indicates:   Allergen Reactions    Nsaids (non-steroidal anti-inflammatory drug)      Patient says since been diagnosed with lung mass on 2-12-15, if take any NSAIDS he spikes a fever.    Tylenol [acetaminophen] Other (See Comments)     Sweating +     Current Outpatient Medications   Medication Sig Dispense Refill    albuterol 90 mcg/actuation inhaler Inhale 2 puffs into the lungs every 6 (six) hours as needed for Wheezing. 1 each 11    diphenhydrAMINE (BENADRYL) 25 mg capsule Take 1 each (25 mg total) by mouth every 6 (six) hours as needed for Itching or Allergies. 12 capsule 0    docusate sodium (COLACE) 100 MG capsule Take 1 capsule (100 mg total) by mouth 2 (two) times daily. 60 capsule 1    lactulose  (CHRONULAC) 10 gram/15 mL solution Take 30 mLs (20 g total) by mouth 3 (three) times daily. 946 mL 2    levothyroxine (SYNTHROID) 75 MCG tablet Take 1 tablet (75 mcg total) by mouth daily before breakfast. 30 tablet 11    loratadine (CLARITIN) 10 mg tablet Take 1 tablet (10 mg total) by mouth once daily. 60 tablet 0    metoclopramide HCl (REGLAN) 10 MG tablet Take 1 tablet (10 mg total) by mouth every 6 (six) hours as needed (nausea/vomiting and/or abdominal cramps). 15 tablet 0    naloxegol 25 mg Tab Take 25 mg by mouth once daily. 30 tablet 1    oxyCODONE-acetaminophen (PERCOCET)  mg per tablet Take 1 tablet by mouth every 6 (six) hours as needed for Pain. 120 tablet 0    polyethylene glycol (GLYCOLAX) 17 gram/dose powder Take 17 g by mouth once daily. 850 g 0    PROAIR HFA 90 mcg/actuation inhaler INHALE TWO PUFFS BY MOUTH EVERY 6 HOURS AS NEEDED FOR WHEEZING 9 each 0    rivaroxaban (XARELTO) 20 mg Tab Take 1 tablet (20 mg total) by mouth once daily. 30 tablet 3    amlodipine (NORVASC) 10 MG tablet Take 1 tablet (10 mg total) by mouth once daily. 30 tablet 3    clotrimazole (LOTRIMIN) 1 % cream Apply to affected area 2 times daily 30 g 1    omeprazole (PRILOSEC) 20 MG capsule Take 1 capsule (20 mg total) by mouth once daily. 30 capsule 11    triamcinolone acetonide 0.1% (KENALOG) 0.1 % ointment Apply topically 2 (two) times daily. 80 g 1     No current facility-administered medications for this visit.        ROS  Review of Systems   Constitutional: Positive for fatigue. Negative for activity change and fever.   HENT: Negative for congestion, rhinorrhea and sore throat.    Eyes: Negative for visual disturbance.   Respiratory: Negative for cough, chest tightness and shortness of breath.    Cardiovascular: Negative for chest pain.   Gastrointestinal: Negative for abdominal pain, diarrhea, nausea and vomiting.   Genitourinary: Negative for dysuria, frequency and urgency.   Musculoskeletal: Negative  "for back pain and myalgias.   Neurological: Negative for dizziness, syncope and numbness.   Psychiatric/Behavioral: Negative for agitation.       Physical Exam  Vitals:    01/09/19 1020   BP: 124/80   Pulse: 76   Resp: 18   Temp: 98.2 °F (36.8 °C)    Body mass index is 29.9 kg/m².  Weight: 85.3 kg (188 lb 0.8 oz)   Height: 5' 6.5" (168.9 cm)     Physical Exam   Constitutional: He is oriented to person, place, and time. He appears well-developed and well-nourished.   HENT:   Head: Normocephalic and atraumatic.   Eyes: Conjunctivae and EOM are normal. Pupils are equal, round, and reactive to light.   Neck: Normal range of motion. Neck supple.   Cardiovascular: Normal rate, regular rhythm and normal heart sounds.   Pulmonary/Chest: Effort normal and breath sounds normal.   Abdominal: Soft. Bowel sounds are normal.   Musculoskeletal: Normal range of motion.   Neurological: He is alert and oriented to person, place, and time. He has normal reflexes.   Skin: Skin is warm and dry.   Psychiatric: He has a normal mood and affect. His behavior is normal. Judgment and thought content normal.       Health Maintenance       Date Due Completion Date    Lipid Panel 1975 ---    TETANUS VACCINE 02/02/1993 ---    Pneumococcal Vaccine (Highest Risk) (1 of 3 - PCV13) 02/02/1994 ---    Influenza Vaccine 08/01/2018 ---          Assessment & Plan    Encounter for lipid screening for cardiovascular disease  -     Lipid panel; Future; Expected date: 01/09/2019  - Assess labs today    Essential hypertension  - Continue current medication regimen as prescribed  - Overall doing well    Current use of long term anticoagulation  - Continue current medication regimen as prescribed    Primary malignant neoplasm of right lung metastatic to other site, Secondary malignant neoplasm of bone  - Continue current medication regimen as prescribed  - Cont full therapy and follow up    Hyperthyroidism  - Continue current medication regimen as " prescribed            Follow-up in about 3 months (around 4/9/2019).

## 2019-01-16 ENCOUNTER — OFFICE VISIT (OUTPATIENT)
Dept: HEMATOLOGY/ONCOLOGY | Facility: CLINIC | Age: 44
End: 2019-01-16
Payer: MEDICARE

## 2019-01-16 ENCOUNTER — CLINICAL SUPPORT (OUTPATIENT)
Dept: INFECTIOUS DISEASES | Facility: CLINIC | Age: 44
End: 2019-01-16
Payer: MEDICARE

## 2019-01-16 ENCOUNTER — INFUSION (OUTPATIENT)
Dept: INFUSION THERAPY | Facility: HOSPITAL | Age: 44
End: 2019-01-16
Attending: INTERNAL MEDICINE
Payer: MEDICARE

## 2019-01-16 VITALS
TEMPERATURE: 98 F | SYSTOLIC BLOOD PRESSURE: 137 MMHG | OXYGEN SATURATION: 97 % | HEIGHT: 67 IN | RESPIRATION RATE: 20 BRPM | WEIGHT: 190.94 LBS | BODY MASS INDEX: 29.97 KG/M2 | HEART RATE: 77 BPM | DIASTOLIC BLOOD PRESSURE: 87 MMHG

## 2019-01-16 VITALS
TEMPERATURE: 98 F | HEART RATE: 76 BPM | RESPIRATION RATE: 18 BRPM | DIASTOLIC BLOOD PRESSURE: 78 MMHG | SYSTOLIC BLOOD PRESSURE: 115 MMHG

## 2019-01-16 DIAGNOSIS — D50.0 IRON DEFICIENCY ANEMIA DUE TO CHRONIC BLOOD LOSS: Primary | ICD-10-CM

## 2019-01-16 DIAGNOSIS — D84.81 IMMUNODEFICIENCY SECONDARY TO NEOPLASM: Primary | ICD-10-CM

## 2019-01-16 DIAGNOSIS — C34.11 MALIGNANT NEOPLASM OF UPPER LOBE OF RIGHT LUNG: ICD-10-CM

## 2019-01-16 DIAGNOSIS — J43.9 PULMONARY EMPHYSEMA, UNSPECIFIED EMPHYSEMA TYPE: ICD-10-CM

## 2019-01-16 DIAGNOSIS — C34.91 LUNG CANCER, PRIMARY, WITH METASTASIS FROM LUNG TO OTHER SITE, RIGHT: ICD-10-CM

## 2019-01-16 DIAGNOSIS — C79.51 SECONDARY MALIGNANT NEOPLASM OF BONE: ICD-10-CM

## 2019-01-16 DIAGNOSIS — C34.11 MALIGNANT NEOPLASM OF UPPER LOBE OF RIGHT LUNG: Primary | ICD-10-CM

## 2019-01-16 DIAGNOSIS — D49.9 IMMUNODEFICIENCY SECONDARY TO NEOPLASM: Primary | ICD-10-CM

## 2019-01-16 PROCEDURE — 96413 CHEMO IV INFUSION 1 HR: CPT

## 2019-01-16 PROCEDURE — 90686 IIV4 VACC NO PRSV 0.5 ML IM: CPT | Mod: PBBFAC

## 2019-01-16 PROCEDURE — 99999 PR PBB SHADOW E&M-EST. PATIENT-LVL IV: CPT | Mod: PBBFAC,,, | Performed by: INTERNAL MEDICINE

## 2019-01-16 PROCEDURE — 25000003 PHARM REV CODE 250: Performed by: INTERNAL MEDICINE

## 2019-01-16 PROCEDURE — 99999 PR PBB SHADOW E&M-EST. PATIENT-LVL IV: ICD-10-PCS | Mod: PBBFAC,,, | Performed by: INTERNAL MEDICINE

## 2019-01-16 PROCEDURE — 99215 OFFICE O/P EST HI 40 MIN: CPT | Mod: S$PBB,,, | Performed by: INTERNAL MEDICINE

## 2019-01-16 PROCEDURE — G0009 ADMIN PNEUMOCOCCAL VACCINE: HCPCS | Mod: PBBFAC

## 2019-01-16 PROCEDURE — 96372 THER/PROPH/DIAG INJ SC/IM: CPT

## 2019-01-16 PROCEDURE — 63600175 PHARM REV CODE 636 W HCPCS: Mod: JG | Performed by: INTERNAL MEDICINE

## 2019-01-16 PROCEDURE — 99215 PR OFFICE/OUTPT VISIT, EST, LEVL V, 40-54 MIN: ICD-10-PCS | Mod: S$PBB,,, | Performed by: INTERNAL MEDICINE

## 2019-01-16 PROCEDURE — 99214 OFFICE O/P EST MOD 30 MIN: CPT | Mod: PBBFAC,25 | Performed by: INTERNAL MEDICINE

## 2019-01-16 RX ORDER — HEPARIN 100 UNIT/ML
500 SYRINGE INTRAVENOUS
Status: CANCELLED | OUTPATIENT
Start: 2019-01-16

## 2019-01-16 RX ORDER — SODIUM CHLORIDE 0.9 % (FLUSH) 0.9 %
10 SYRINGE (ML) INJECTION
Status: CANCELLED | OUTPATIENT
Start: 2019-01-16

## 2019-01-16 RX ORDER — HEPARIN 100 UNIT/ML
500 SYRINGE INTRAVENOUS
Status: DISCONTINUED | OUTPATIENT
Start: 2019-01-16 | End: 2019-01-16 | Stop reason: HOSPADM

## 2019-01-16 RX ORDER — SODIUM CHLORIDE 0.9 % (FLUSH) 0.9 %
10 SYRINGE (ML) INJECTION
Status: DISCONTINUED | OUTPATIENT
Start: 2019-01-16 | End: 2019-01-16 | Stop reason: HOSPADM

## 2019-01-16 RX ADMIN — HEPARIN SODIUM (PORCINE) LOCK FLUSH IV SOLN 100 UNIT/ML 500 UNITS: 100 SOLUTION at 10:01

## 2019-01-16 RX ADMIN — SODIUM CHLORIDE 480 MG: 9 INJECTION, SOLUTION INTRAVENOUS at 09:01

## 2019-01-16 RX ADMIN — DENOSUMAB 120 MG: 120 INJECTION SUBCUTANEOUS at 09:01

## 2019-01-16 NOTE — PLAN OF CARE
Problem: Adult Inpatient Plan of Care  Goal: Plan of Care Review  Outcome: Ongoing (interventions implemented as appropriate)  Pt here for monthly opdivo. Tolerated well. Received xgeva in back of L. Arm. Tolerated well. Port hep-locked and de-accessed. Refused AVS. No questions at this time.

## 2019-01-23 ENCOUNTER — DOCUMENTATION ONLY (OUTPATIENT)
Dept: HEMATOLOGY/ONCOLOGY | Facility: CLINIC | Age: 44
End: 2019-01-23

## 2019-01-23 NOTE — PROGRESS NOTES
Received call/voice mail message yesterday from patient and returned call to him. Also spoke with patient today to follow up. He asked about any additional financial assistance resources. He has exhausted assistance through [employer]. He is already getting help through [facility] with several prescriptions monthly (Percocet, Xarelto, Synthroid at [facility]). He was referred previously to [facility] and received gas cards several times; patient said that he called this agency about a week ago and they were out of gas cards at the time but would see if he could receive more. Sent an email to Madison at [facility], and she replied to me; patient has exceeded their $200 gas card limit last year (9/4/2018 $30, 9/24/2018 $45, 10/22/2018 $50, 11/15/2018 $50, 12/17/18 $40); she doesn't need a new application form but asked for a descriptive of his appts schedule/treatment schedule and his situation, which I provided; she will follow up with her supervisor after she returns to the office on Thursday and she will let patient and me know if they can provide further assistance or not. Checked [facility]'s website but there are no open funds for male patients with lung cancer at present. Provided patient with [facility]'s phone number and website address so he can check periodically to see if funds become available and he can apply. Called [facility] at ext. [phone] and spoke with Prakash, who said that Monday is the earliest that patient's prescriptions can be refilled. Sent message via [employer] to Dr. Garay/Staff requesting that Dr. Garay send a prescription for Percocet on Monday; Xarelto and Synthroid have refill prescriptions on file at the pharmacy; pharmacy staff will need to contact [facility] for authorization for patient's co-pays. Patient expressed understanding of information and appreciation for assistance given to him. Will continue to follow and assist as needs identified.

## 2019-01-26 DIAGNOSIS — G89.3 NEOPLASM RELATED PAIN: ICD-10-CM

## 2019-01-28 RX ORDER — OXYCODONE AND ACETAMINOPHEN 10; 325 MG/1; MG/1
1 TABLET ORAL EVERY 6 HOURS PRN
Qty: 120 TABLET | Refills: 0 | Status: SHIPPED | OUTPATIENT
Start: 2019-01-28 | End: 2019-02-20 | Stop reason: SDUPTHER

## 2019-01-29 ENCOUNTER — INFUSION (OUTPATIENT)
Dept: INFUSION THERAPY | Facility: HOSPITAL | Age: 44
End: 2019-01-29
Attending: INTERNAL MEDICINE
Payer: MEDICARE

## 2019-01-29 ENCOUNTER — DOCUMENTATION ONLY (OUTPATIENT)
Dept: HEMATOLOGY/ONCOLOGY | Facility: CLINIC | Age: 44
End: 2019-01-29

## 2019-01-29 ENCOUNTER — OFFICE VISIT (OUTPATIENT)
Dept: HEMATOLOGY/ONCOLOGY | Facility: CLINIC | Age: 44
End: 2019-01-29
Payer: MEDICARE

## 2019-01-29 VITALS
SYSTOLIC BLOOD PRESSURE: 151 MMHG | WEIGHT: 181 LBS | DIASTOLIC BLOOD PRESSURE: 92 MMHG | OXYGEN SATURATION: 99 % | HEIGHT: 67 IN | BODY MASS INDEX: 28.41 KG/M2 | HEART RATE: 67 BPM | TEMPERATURE: 99 F

## 2019-01-29 VITALS
RESPIRATION RATE: 17 BRPM | DIASTOLIC BLOOD PRESSURE: 82 MMHG | HEART RATE: 64 BPM | OXYGEN SATURATION: 96 % | TEMPERATURE: 98 F | SYSTOLIC BLOOD PRESSURE: 120 MMHG

## 2019-01-29 DIAGNOSIS — C79.51 SECONDARY MALIGNANT NEOPLASM OF BONE: ICD-10-CM

## 2019-01-29 DIAGNOSIS — C34.91 LUNG CANCER, PRIMARY, WITH METASTASIS FROM LUNG TO OTHER SITE, RIGHT: ICD-10-CM

## 2019-01-29 DIAGNOSIS — C34.11 MALIGNANT NEOPLASM OF UPPER LOBE OF RIGHT LUNG: Primary | ICD-10-CM

## 2019-01-29 DIAGNOSIS — E03.9 HYPOTHYROIDISM, UNSPECIFIED TYPE: ICD-10-CM

## 2019-01-29 DIAGNOSIS — C34.91 PRIMARY MALIGNANT NEOPLASM OF RIGHT LUNG METASTATIC TO OTHER SITE: ICD-10-CM

## 2019-01-29 DIAGNOSIS — D50.0 IRON DEFICIENCY ANEMIA DUE TO CHRONIC BLOOD LOSS: Primary | ICD-10-CM

## 2019-01-29 DIAGNOSIS — C34.11 MALIGNANT NEOPLASM OF UPPER LOBE OF RIGHT LUNG: ICD-10-CM

## 2019-01-29 PROCEDURE — 99999 PR PBB SHADOW E&M-EST. PATIENT-LVL IV: ICD-10-PCS | Mod: PBBFAC,,, | Performed by: INTERNAL MEDICINE

## 2019-01-29 PROCEDURE — A4216 STERILE WATER/SALINE, 10 ML: HCPCS | Performed by: INTERNAL MEDICINE

## 2019-01-29 PROCEDURE — 99999 PR PBB SHADOW E&M-EST. PATIENT-LVL IV: CPT | Mod: PBBFAC,,, | Performed by: INTERNAL MEDICINE

## 2019-01-29 PROCEDURE — 99215 PR OFFICE/OUTPT VISIT, EST, LEVL V, 40-54 MIN: ICD-10-PCS | Mod: S$PBB,,, | Performed by: INTERNAL MEDICINE

## 2019-01-29 PROCEDURE — 63600175 PHARM REV CODE 636 W HCPCS: Performed by: INTERNAL MEDICINE

## 2019-01-29 PROCEDURE — 99214 OFFICE O/P EST MOD 30 MIN: CPT | Mod: PBBFAC | Performed by: INTERNAL MEDICINE

## 2019-01-29 PROCEDURE — 96413 CHEMO IV INFUSION 1 HR: CPT

## 2019-01-29 PROCEDURE — 99215 OFFICE O/P EST HI 40 MIN: CPT | Mod: S$PBB,,, | Performed by: INTERNAL MEDICINE

## 2019-01-29 PROCEDURE — 25000003 PHARM REV CODE 250: Performed by: INTERNAL MEDICINE

## 2019-01-29 RX ORDER — SODIUM CHLORIDE 0.9 % (FLUSH) 0.9 %
10 SYRINGE (ML) INJECTION
Status: DISCONTINUED | OUTPATIENT
Start: 2019-01-29 | End: 2019-01-29 | Stop reason: HOSPADM

## 2019-01-29 RX ORDER — HEPARIN 100 UNIT/ML
500 SYRINGE INTRAVENOUS
Status: DISCONTINUED | OUTPATIENT
Start: 2019-01-29 | End: 2019-01-29 | Stop reason: HOSPADM

## 2019-01-29 RX ORDER — HEPARIN 100 UNIT/ML
500 SYRINGE INTRAVENOUS
Status: CANCELLED | OUTPATIENT
Start: 2019-01-30

## 2019-01-29 RX ORDER — SODIUM CHLORIDE 0.9 % (FLUSH) 0.9 %
10 SYRINGE (ML) INJECTION
Status: CANCELLED | OUTPATIENT
Start: 2019-01-30

## 2019-01-29 RX ADMIN — HEPARIN SODIUM (PORCINE) LOCK FLUSH IV SOLN 100 UNIT/ML 500 UNITS: 100 SOLUTION at 10:01

## 2019-01-29 RX ADMIN — SODIUM CHLORIDE 240 MG: 9 INJECTION, SOLUTION INTRAVENOUS at 09:01

## 2019-01-29 RX ADMIN — Medication 10 ML: at 10:01

## 2019-01-29 NOTE — PROGRESS NOTES
"Subjective:       Patient ID: Yao Alan is a 43 y.o. male.    Chief Complaint: Follow-up  Oncologic History:  Mr. Yao Alan is a 43-year-old male who has a long history of smoking and was seen in the Pulmonary Clinic by Dr. Valle on 03/05/2015 with abnormal CT scan.    Apparently, he went to the [facility] in February with coughing as well as chest pain. A chest x-ray was done, which revealed a left upper lobe lung mass. Followup CT scan was done on 02/12/2015 and that revealed posterior superior mediastinal mass adjacent and prominent enlarged upper left mediastinal lymph nodes, abutting the aortic arch as well as left subclavian artery. A  CT scan of the abdomen was done on 03/03/2015 without contrast and that revealed nonobstructive nephrolithiasis, but a 2.1 cm lytic lesion involving the left iliac wing concerning for metastatic disease given the presence of emphysema and a mediastinal soft tissue mass on the CT chest from 02/12/2015. He saw Dr. Valle after that on 03/05/2015 and underwent an IR guided biopsy of the bone lesion on 03/17/2015. 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    His EGFR/EML/ALK is negative.    His PET scan on 3/25/15 revealed Left upper lobe lung lesion with an adjacent para-aortic lymph node, right anterior rib metastasis, right iliac metastasis, and pancreatic metastasis. A pancreatic cancer primary neoplasm is less likely.  MRI brain was negative for mets.  He completed 6 cycles of Carboplatin and Alimta and was on maintenance Alimta until end of March 2016.  His bone scan from 3/16 revealed stable disease. His CT scans also from end of March 2016 revealed "Interval enlargement of left upper lobe lung mass measuring 5.9 x 3.9 cm (previously 3.3 x 2.5 cm). This mass appears to invade the mediastinum and abutting the aortic arch and left subclavian artery"     He is now on Opdivo since March 2016           HPI Mr. Alan returns for follow " up and Opdivo. He feels well and denies any new issues    Review of Systems   Constitutional: Negative for appetite change, fatigue and unexpected weight change.   HENT: Negative for mouth sores.    Eyes: Negative for visual disturbance.   Respiratory: Negative for cough and shortness of breath.    Cardiovascular: Negative for chest pain.   Gastrointestinal: Negative for abdominal pain and diarrhea.   Genitourinary: Negative for frequency.   Musculoskeletal: Negative for back pain.   Skin: Negative for rash.   Neurological: Negative for headaches.   Hematological: Negative for adenopathy.   Psychiatric/Behavioral: The patient is not nervous/anxious.    All other systems reviewed and are negative.      PMFSH: all information reviewed and updated as relevant to today's visit  Objective:      Physical Exam   Constitutional: He is oriented to person, place, and time. He appears well-developed and well-nourished.   HENT:   Mouth/Throat: No oropharyngeal exudate.   Cardiovascular: Normal rate and normal heart sounds.   Pulmonary/Chest: Effort normal and breath sounds normal. He has no wheezes.   Abdominal: Soft. Bowel sounds are normal. There is no tenderness.   Musculoskeletal: He exhibits no edema or tenderness.   Lymphadenopathy:     He has no cervical adenopathy.   Neurological: He is alert and oriented to person, place, and time. Coordination normal.   Skin: Skin is warm and dry. No rash noted.   Psychiatric: He has a normal mood and affect. Judgment and thought content normal.   Vitals reviewed.      LABS:  WBC   Date Value Ref Range Status   01/29/2019 6.16 3.90 - 12.70 K/uL Final     Hemoglobin   Date Value Ref Range Status   01/29/2019 14.3 14.0 - 18.0 g/dL Final     Hematocrit   Date Value Ref Range Status   01/29/2019 43.2 40.0 - 54.0 % Final     Platelets   Date Value Ref Range Status   01/29/2019 226 150 - 350 K/uL Final     Gran # (ANC)   Date Value Ref Range Status   01/29/2019 3.1 1.8 - 7.7 K/uL Final      Comment:     The ANC is based on a white cell differential from an   automated cell counter. It has not been microscopically   reviewed for the presence of abnormal cells. Clinical   correlation is required.         Chemistry        Component Value Date/Time     01/29/2019 0739    K 4.1 01/29/2019 0739     01/29/2019 0739    CO2 26 01/29/2019 0739    BUN 11 01/29/2019 0739    CREATININE 1.5 (H) 01/29/2019 0739    GLU 96 01/29/2019 0739        Component Value Date/Time    CALCIUM 9.7 01/29/2019 0739    ALKPHOS 73 01/29/2019 0739    AST 28 01/29/2019 0739    ALT 20 01/29/2019 0739    BILITOT 0.3 01/29/2019 0739    ESTGFRAFRICA >60 01/29/2019 0739    EGFRNONAA 56 (A) 01/29/2019 0739        TSH   Date Value Ref Range Status   01/16/2019 4.167 (H) 0.400 - 4.000 uIU/mL Final     Free T4   Date Value Ref Range Status   01/16/2019 0.81 0.71 - 1.51 ng/dL Final       Assessment:       1. Malignant neoplasm of upper lobe of right lung    2. Secondary malignant neoplasm of bone    3. Primary malignant neoplasm of right lung metastatic to other site    4. Hypothyroidism, unspecified type        Plan:        1,2,3. He is doing very well clinically and will continue on Opdivo and will return in 2 weeks for next cycle  4. Stable continue Synthroid. Will recheck in 2 weeks    Above care plan was discussed with patient and all questions were addressed to his satisfaction

## 2019-01-29 NOTE — Clinical Note
Schedule CBC,CMP, TSH and free T4 and see me in 2 weeks and for Opdivo on [facility]

## 2019-01-29 NOTE — PLAN OF CARE
Problem: Adult Inpatient Plan of Care  Goal: Plan of Care Review  Outcome: Ongoing (interventions implemented as appropriate)  Arrived to unit. Cleared for chemo per Dr. Garay. No reported side effects. Tolerated Opdivo. No reactions noted. AVS given to pt. Pt discharged off unit, ambulatory.

## 2019-01-30 NOTE — PROGRESS NOTES
Received call from patient during lunch time today and returned call to him this afternoon. He said that he's tried calling the [facility] to speak with Madison about the gas cards but he hasn't heard back from her. Offered to send an email to her and request that she call patient; patient agreeable and email was sent. Patient confirmed picking up his medications at [facility] yesterday. Encouraged patient to call [facility] every week or two to see if funds become available that he can apply for. Will continue to follow and assist as needs identified.

## 2019-02-12 ENCOUNTER — OFFICE VISIT (OUTPATIENT)
Dept: HEMATOLOGY/ONCOLOGY | Facility: CLINIC | Age: 44
End: 2019-02-12
Payer: MEDICARE

## 2019-02-12 ENCOUNTER — INFUSION (OUTPATIENT)
Dept: INFUSION THERAPY | Facility: HOSPITAL | Age: 44
End: 2019-02-12
Attending: INTERNAL MEDICINE
Payer: MEDICARE

## 2019-02-12 VITALS
BODY MASS INDEX: 28.65 KG/M2 | OXYGEN SATURATION: 97 % | SYSTOLIC BLOOD PRESSURE: 124 MMHG | HEIGHT: 67 IN | HEART RATE: 81 BPM | DIASTOLIC BLOOD PRESSURE: 82 MMHG | SYSTOLIC BLOOD PRESSURE: 124 MMHG | OXYGEN SATURATION: 98 % | DIASTOLIC BLOOD PRESSURE: 82 MMHG | HEART RATE: 71 BPM | WEIGHT: 182.56 LBS | RESPIRATION RATE: 17 BRPM | TEMPERATURE: 98 F | TEMPERATURE: 99 F

## 2019-02-12 DIAGNOSIS — C34.91 LUNG CANCER, PRIMARY, WITH METASTASIS FROM LUNG TO OTHER SITE, RIGHT: ICD-10-CM

## 2019-02-12 DIAGNOSIS — C34.11 MALIGNANT NEOPLASM OF UPPER LOBE OF RIGHT LUNG: Primary | ICD-10-CM

## 2019-02-12 DIAGNOSIS — D50.0 IRON DEFICIENCY ANEMIA DUE TO CHRONIC BLOOD LOSS: Primary | ICD-10-CM

## 2019-02-12 DIAGNOSIS — C34.11 MALIGNANT NEOPLASM OF UPPER LOBE OF RIGHT LUNG: ICD-10-CM

## 2019-02-12 DIAGNOSIS — C79.51 SECONDARY MALIGNANT NEOPLASM OF BONE: ICD-10-CM

## 2019-02-12 DIAGNOSIS — J39.2 MASS OF NASOPHARYNX: ICD-10-CM

## 2019-02-12 PROCEDURE — 99215 PR OFFICE/OUTPT VISIT, EST, LEVL V, 40-54 MIN: ICD-10-PCS | Mod: S$PBB,,, | Performed by: INTERNAL MEDICINE

## 2019-02-12 PROCEDURE — 63600175 PHARM REV CODE 636 W HCPCS: Mod: JG | Performed by: INTERNAL MEDICINE

## 2019-02-12 PROCEDURE — 99999 PR PBB SHADOW E&M-EST. PATIENT-LVL V: ICD-10-PCS | Mod: PBBFAC,,, | Performed by: INTERNAL MEDICINE

## 2019-02-12 PROCEDURE — 96372 THER/PROPH/DIAG INJ SC/IM: CPT | Mod: 59

## 2019-02-12 PROCEDURE — 96413 CHEMO IV INFUSION 1 HR: CPT

## 2019-02-12 PROCEDURE — A4216 STERILE WATER/SALINE, 10 ML: HCPCS | Performed by: INTERNAL MEDICINE

## 2019-02-12 PROCEDURE — 25000003 PHARM REV CODE 250: Performed by: INTERNAL MEDICINE

## 2019-02-12 PROCEDURE — 99215 OFFICE O/P EST HI 40 MIN: CPT | Mod: PBBFAC,25 | Performed by: INTERNAL MEDICINE

## 2019-02-12 PROCEDURE — 99999 PR PBB SHADOW E&M-EST. PATIENT-LVL V: CPT | Mod: PBBFAC,,, | Performed by: INTERNAL MEDICINE

## 2019-02-12 PROCEDURE — 99215 OFFICE O/P EST HI 40 MIN: CPT | Mod: S$PBB,,, | Performed by: INTERNAL MEDICINE

## 2019-02-12 RX ORDER — HEPARIN 100 UNIT/ML
500 SYRINGE INTRAVENOUS
Status: CANCELLED | OUTPATIENT
Start: 2019-02-12

## 2019-02-12 RX ORDER — HEPARIN 100 UNIT/ML
500 SYRINGE INTRAVENOUS
Status: DISCONTINUED | OUTPATIENT
Start: 2019-02-12 | End: 2019-02-12 | Stop reason: HOSPADM

## 2019-02-12 RX ORDER — SODIUM CHLORIDE 0.9 % (FLUSH) 0.9 %
10 SYRINGE (ML) INJECTION
Status: DISCONTINUED | OUTPATIENT
Start: 2019-02-12 | End: 2019-02-12 | Stop reason: HOSPADM

## 2019-02-12 RX ORDER — SODIUM CHLORIDE 0.9 % (FLUSH) 0.9 %
10 SYRINGE (ML) INJECTION
Status: CANCELLED | OUTPATIENT
Start: 2019-02-12

## 2019-02-12 RX ADMIN — DENOSUMAB 120 MG: 120 INJECTION SUBCUTANEOUS at 10:02

## 2019-02-12 RX ADMIN — Medication 10 ML: at 10:02

## 2019-02-12 RX ADMIN — SODIUM CHLORIDE 240 MG: 9 INJECTION, SOLUTION INTRAVENOUS at 10:02

## 2019-02-12 RX ADMIN — HEPARIN SODIUM (PORCINE) LOCK FLUSH IV SOLN 100 UNIT/ML 500 UNITS: 100 SOLUTION at 10:02

## 2019-02-12 NOTE — PLAN OF CARE
Problem: Adult Inpatient Plan of Care  Goal: Plan of Care Review  Outcome: Ongoing (interventions implemented as appropriate)  Arrived to unit. Has nasal congestion. Will see ENT tomorrow for mass in nasopharynx. Tolerated Opdivo. No reactions noted. AVS given to pt. Pt discharged, ambulatory off unit with family.

## 2019-02-12 NOTE — PROGRESS NOTES
"Subjective:       Patient ID: Yao Alan is a 44 y.o. male.    Chief Complaint: Follow-up  Oncologic History:  Mr. Yao Alan is a 43-year-old male who has a long history of smoking and was seen in the Pulmonary Clinic by Dr. Valle on 03/05/2015 with abnormal CT scan.    Apparently, he went to the [facility] in February with coughing as well as chest pain. A chest x-ray was done, which revealed a left upper lobe lung mass. Followup CT scan was done on 02/12/2015 and that revealed posterior superior mediastinal mass adjacent and prominent enlarged upper left mediastinal lymph nodes, abutting the aortic arch as well as left subclavian artery. A  CT scan of the abdomen was done on 03/03/2015 without contrast and that revealed nonobstructive nephrolithiasis, but a 2.1 cm lytic lesion involving the left iliac wing concerning for metastatic disease given the presence of emphysema and a mediastinal soft tissue mass on the CT chest from 02/12/2015. He saw Dr. Valle after that on 03/05/2015 and underwent an IR guided biopsy of the bone lesion on 03/17/2015. 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    His EGFR/EML/ALK is negative.    His PET scan on 3/25/15 revealed Left upper lobe lung lesion with an adjacent para-aortic lymph node, right anterior rib metastasis, right iliac metastasis, and pancreatic metastasis. A pancreatic cancer primary neoplasm is less likely.  MRI brain was negative for mets.  He completed 6 cycles of Carboplatin and Alimta and was on maintenance Alimta until end of March 2016.  His bone scan from 3/16 revealed stable disease. His CT scans also from end of March 2016 revealed "Interval enlargement of left upper lobe lung mass measuring 5.9 x 3.9 cm (previously 3.3 x 2.5 cm). This mass appears to invade the mediastinum and abutting the aortic arch and left subclavian artery"     He is now on Opdivo since March 2016             HPI Mr. Alan returns for follow " "up and Opdivo. He feels well overall, continues to have headaches. MRI brain in 12/.18 revealed "No acute intracranial process. Lobulated enhancing nasopharyngeal mass.  Further evaluation to rule out a neoplastic process is suggested as above"      Review of Systems   Constitutional: Negative for appetite change, fatigue and unexpected weight change.   HENT: Negative for mouth sores.    Eyes: Negative for visual disturbance.   Respiratory: Negative for cough and shortness of breath.    Cardiovascular: Negative for chest pain.   Gastrointestinal: Negative for abdominal pain and diarrhea.   Genitourinary: Negative for frequency.   Musculoskeletal: Negative for back pain.   Skin: Negative for rash.   Neurological: Negative for headaches.   Hematological: Negative for adenopathy.   Psychiatric/Behavioral: The patient is not nervous/anxious.    All other systems reviewed and are negative.      Objective:      Physical Exam   Constitutional: He is oriented to person, place, and time. He appears well-developed and well-nourished.   HENT:   Mouth/Throat: No oropharyngeal exudate.   Cardiovascular: Normal rate and normal heart sounds.   Pulmonary/Chest: Effort normal and breath sounds normal. He has no wheezes.   Abdominal: Soft. Bowel sounds are normal. There is no tenderness.   Musculoskeletal: He exhibits no edema or tenderness.   Lymphadenopathy:     He has no cervical adenopathy.   Neurological: He is alert and oriented to person, place, and time. Coordination normal.   Skin: Skin is warm and dry. No rash noted.   Psychiatric: He has a normal mood and affect. Judgment and thought content normal.   Vitals reviewed.        LABS:  WBC   Date Value Ref Range Status   02/12/2019 6.64 3.90 - 12.70 K/uL Final     Hemoglobin   Date Value Ref Range Status   02/12/2019 13.5 (L) 14.0 - 18.0 g/dL Final     Hematocrit   Date Value Ref Range Status   02/12/2019 42.4 40.0 - 54.0 % Final     Platelets   Date Value Ref Range Status "   02/12/2019 284 150 - 350 K/uL Final     Gran # (ANC)   Date Value Ref Range Status   02/12/2019 3.7 1.8 - 7.7 K/uL Final     Comment:     The ANC is based on a white cell differential from an   automated cell counter. It has not been microscopically   reviewed for the presence of abnormal cells. Clinical   correlation is required.         Chemistry        Component Value Date/Time     02/12/2019 0905    K 4.2 02/12/2019 0905     02/12/2019 0905    CO2 29 02/12/2019 0905    BUN 13 02/12/2019 0905    CREATININE 1.4 02/12/2019 0905    GLU 94 02/12/2019 0905        Component Value Date/Time    CALCIUM 9.4 02/12/2019 0905    ALKPHOS 68 02/12/2019 0905    AST 21 02/12/2019 0905    ALT 16 02/12/2019 0905    BILITOT 0.4 02/12/2019 0905    ESTGFRAFRICA >60 02/12/2019 0905    EGFRNONAA >60 02/12/2019 0905        TSH   Date Value Ref Range Status   02/12/2019 5.152 (H) 0.400 - 4.000 uIU/mL Final     Free T4   Date Value Ref Range Status   02/12/2019 0.93 0.71 - 1.51 ng/dL Final       Assessment:       1. Malignant neoplasm of upper lobe of right lung    2. Secondary malignant neoplasm of bone    3. Mass of nasopharynx        Plan:        1,2. He will proceed with Opdivo and will return in 2 weeks for next cycle with restaging CT and bone scan. He will also receive Xgeva today   3. ENT evaluation, appointment made for tomorrow    Above care plan was discussed with patient and accompanying wife and all questions were addressed to their satisfaction

## 2019-02-12 NOTE — Clinical Note
Please schedule with ENT ASAP due to mass in nasopharynx.Schedule CBC,CMP, CT chest, abdomen/pelvis and bone scan and see me in 2 weeks and for Opdivo on main campus

## 2019-02-13 ENCOUNTER — OFFICE VISIT (OUTPATIENT)
Dept: OTOLARYNGOLOGY | Facility: CLINIC | Age: 44
End: 2019-02-13
Payer: MEDICARE

## 2019-02-13 VITALS
BODY MASS INDEX: 31.26 KG/M2 | DIASTOLIC BLOOD PRESSURE: 84 MMHG | SYSTOLIC BLOOD PRESSURE: 122 MMHG | HEIGHT: 65 IN | WEIGHT: 187.63 LBS

## 2019-02-13 DIAGNOSIS — H61.23 BILATERAL IMPACTED CERUMEN: ICD-10-CM

## 2019-02-13 DIAGNOSIS — J39.2 NASOPHARYNGEAL MASS: Primary | ICD-10-CM

## 2019-02-13 DIAGNOSIS — E78.5 HYPERLIPIDEMIA, UNSPECIFIED HYPERLIPIDEMIA TYPE: Primary | ICD-10-CM

## 2019-02-13 DIAGNOSIS — C34.90 MALIGNANT NEOPLASM OF LUNG, UNSPECIFIED LATERALITY, UNSPECIFIED PART OF LUNG: ICD-10-CM

## 2019-02-13 PROCEDURE — 69209 REMOVE IMPACTED EAR WAX UNI: CPT | Mod: 50,S$GLB,, | Performed by: OTOLARYNGOLOGY

## 2019-02-13 PROCEDURE — 99203 OFFICE O/P NEW LOW 30 MIN: CPT | Mod: 25,S$GLB,, | Performed by: OTOLARYNGOLOGY

## 2019-02-13 PROCEDURE — 99203 PR OFFICE/OUTPT VISIT, NEW, LEVL III, 30-44 MIN: ICD-10-PCS | Mod: 25,S$GLB,, | Performed by: OTOLARYNGOLOGY

## 2019-02-13 PROCEDURE — 69209 PR REMOVAL IMPACTED CERUMEN USING IRRIGATION/LAVAGE, UNILATERAL: ICD-10-PCS | Mod: 50,S$GLB,, | Performed by: OTOLARYNGOLOGY

## 2019-02-13 RX ORDER — ATORVASTATIN CALCIUM 20 MG/1
20 TABLET, FILM COATED ORAL DAILY
Qty: 90 TABLET | Refills: 3 | Status: SHIPPED | OUTPATIENT
Start: 2019-02-13 | End: 2019-06-12

## 2019-02-13 NOTE — PATIENT INSTRUCTIONS
I have scheduled you to come back next week to see another physician to examine your nose and the area behind your nose. There may be a tumor behind your nose. We can see it on the MRI scan.

## 2019-02-13 NOTE — PROGRESS NOTES
History of Present Illness:  [facility] Jose Alan presents to the clinic today for Mass (consult for nasophoryngeal mass)  .  He is a 44-year-old male who has adenocarcinoma lung cancer with metastatic disease to bone.  He had a recent MRI scan and a multilobulated nasopharyngeal mass was reported.  He was referred here for evaluation.  He is asymptomatic and unaware of the mass.    I will discuss this with him today and schedule him to return in 1 week to see Dr. Bansal who specializes in ENT head neck cancer.  She can see him in this office on the [facility] next Friday.    Past Medical History:   Diagnosis Date    Asthma     COPD (chronic obstructive pulmonary disease)     HTN (hypertension)     Hypertension     Lung cancer     Lung mass     Thyroid disease      Past Surgical History:   Procedure Laterality Date    COLONOSCOPY N/A 4/22/2015    Performed by FRANSISCO Nur MD at [facility] ENDO (4TH FLR)    ESOPHAGOGASTRODUODENOSCOPY (EGD) N/A 5/8/2015    Performed by Young Cain MD at [facility] ENDO (4TH FLR)    FRACTURE SURGERY      finger    [id number]-CATH-neck or chest Fluoro equipment needed  **PT MUST BE FIRST CASE Right 7/20/2015    Performed by Miguel Jacobo MD at [facility] OR 1ST FLR    LUNG CANCER SURGERY Right March 2015    lung biopsy      Family History   Problem Relation Age of Onset    Hypertension Father     No Known Problems Mother     No Known Problems Sister     No Known Problems Brother     No Known Problems Maternal Aunt     No Known Problems Maternal Uncle     No Known Problems Paternal Aunt     No Known Problems Paternal Uncle     No Known Problems Maternal Grandmother     No Known Problems Maternal Grandfather     No Known Problems Paternal Grandmother     No Known Problems Paternal Grandfather     Amblyopia Neg Hx     Blindness Neg Hx     Cancer Neg Hx     Cataracts Neg Hx     Diabetes Neg Hx     Glaucoma Neg Hx     Macular degeneration Neg Hx     Retinal  detachment Neg Hx     Strabismus Neg Hx     Stroke Neg Hx     Thyroid disease Neg Hx        Social History     Tobacco Use    Smoking status: Former Smoker     Packs/day: 0.75     Years: 25.00     Pack years: 18.75     Types: Cigarettes     Last attempt to quit: 2014     Years since quittin.2    Smokeless tobacco: Never Used    Tobacco comment: Patient is no longer smoking   Substance Use Topics    Alcohol use: No    Drug use: Yes     Types: Marijuana           REVIEW OF SYSTEMS:    General -he appears chronically ill and his attention span is short     Ears     - no  tinnitus and chronic hearing loss.               - no  infection, pressure, drainage, congestion                 Nose       - no  nasal obstruction      - no  post nasal drip      -    Throat    - no  throat pain      - no  hoarseness    Neck    - no  neck mass    Eyes       - no  recent changes in vision     Cardiovascular - he has  hypertension      no  history of cardiovascular disease    Endocrine  - no  diabetes        - he has a history of  thyroid problems      Gastrointestinal        - no  gastrointestinal chronic disease      - no  GERD      - no  dysphagia      - no  odynophagia    Genitourinary -       - no  kidney or bladder problems      - no  CKD      - no  prostate problems     Heme/Lymph       - no  bleeding disorder      - no  anemia       - no  adenopathy        Musculoskeletal     - no  arthritis     -  no unusual muscle weakness    Neuro     -no   unexplained weakness or slurred speech      - no  focal neurologic symptoms       - no  seizures    Psych    -no chronic psychiatric illness    Respiratory -   - he has had  chronic cough or shortness of breath   -he has a history of COPD and  asthma   He has adenocarcinoma of the lung with metastatic disease.       Physical Exam:    Gen    - he i appears chronically ill, weakened, but in no acute distress.   Voice - clear, speech intelligible   Head  - normocephalic  without evidence of trauma, face symmetric with good tone   Salivary glands             - Parotid glands : no asymmetry, no lesions or masses    - Submaxillary (submandibular) glands: no asymmetry, no lesions or masses  Skin   - no rashes or lesions of the skin of the head and neck   Ears   - both tympanic membranes, external canals, and auricles are normal; he had cerumen in both ears that I removed with irrigation.               Hearing is grossly intact.   Nose  - there is no external deformity. There is no rhinorrhea. Septum is midline.  He has no nasal obstruction to breathing             The inferior turbinates are unremarkable. The mucosa is healthy.              No polyps were noted.  Oral cavity    - there are no lesions of the lips, nor of the buccal, lingual, gingival, or               palatal mucosal surfaces. The dentition is adequate.   Oropharynx   - The posterior oropharyngeal wall is clear.               The base of tongue has no lesions.    Neck  - palpation of the neck reveals no lymphadenopathy or masses.               The thyroid is unremarkable.              No supraclavicular lymphadenopathy.                  Assessment:   [facility] was seen today for mass.    Diagnoses and all orders for this visit:    Nasopharyngeal mass  -     Nasal/sinus endoscopy; Future  -     Ambulatory referral to ENT    Malignant neoplasm of lung, unspecified laterality, unspecified part of lung  -     Nasal/sinus endoscopy; Future  -     Ambulatory referral to ENT    Bilateral impacted cerumen      Plan:   I removed the cerumen from his ears today.  I explained him that his MRI scan demonstrated nasal pharyngeal mass.  I am referring him to Dr. Berry who is ENT head and neck cancer physician.  She can see him here in the [facility] office next Friday to evaluate the nasopharyngeal mass seen on MRI scan.  I have scheduled him for a fiberoptic nasopharyngoscope examination at that time.    Follow-up in about 1 week  (around 2/20/2019), or with Dr. Berry.

## 2019-02-13 NOTE — LETTER
February 13, 2019      Bel Garay MD  [address]  [facility] [address]           [facility]  [address] [facility] [address]  [place] [address]  Phone: [phone]          Patient: Yao Alan   MR Number: [id number]   YOB: 1975   Date of Visit: 2/13/2019       Dear Dr. Bel Garay:    Thank you for referring Yao Alan to me for evaluation. Attached you will find relevant portions of my assessment and plan of care.    If you have questions, please do not hesitate to call me. I look forward to following Yao Alan along with you.    Sincerely,    Skip Sanchez MD    Enclosure  CC:  No Recipients    If you would like to receive this communication electronically, please contact [email or web address] or [phone] to request more information on [employer] access.    For providers and/or their staff who would like to refer a patient to [facility], please contact us through our one-stop-shop provider referral line, [facility] , at [phone].    If you feel you have received this communication in error or would no longer like to receive these types of communications, please e-mail [email or web address]

## 2019-02-20 ENCOUNTER — DOCUMENTATION ONLY (OUTPATIENT)
Dept: HEMATOLOGY/ONCOLOGY | Facility: CLINIC | Age: 44
End: 2019-02-20

## 2019-02-20 DIAGNOSIS — G89.3 NEOPLASM RELATED PAIN: ICD-10-CM

## 2019-02-20 RX ORDER — OXYCODONE AND ACETAMINOPHEN 10; 325 MG/1; MG/1
1 TABLET ORAL EVERY 6 HOURS PRN
Qty: 120 TABLET | Refills: 0 | OUTPATIENT
Start: 2019-02-20 | End: 2019-03-08

## 2019-02-21 ENCOUNTER — HOSPITAL ENCOUNTER (OUTPATIENT)
Dept: RADIOLOGY | Facility: HOSPITAL | Age: 44
Discharge: HOME | End: 2019-02-21
Attending: INTERNAL MEDICINE
Payer: MEDICARE

## 2019-02-21 DIAGNOSIS — C34.11 MALIGNANT NEOPLASM OF UPPER LOBE OF RIGHT LUNG: ICD-10-CM

## 2019-02-21 PROCEDURE — 78306 BONE IMAGING WHOLE BODY: CPT | Mod: 26,,, | Performed by: RADIOLOGY

## 2019-02-21 PROCEDURE — 25500020 PHARM REV CODE 255: Performed by: INTERNAL MEDICINE

## 2019-02-21 PROCEDURE — 71260 CT THORAX DX C+: CPT | Mod: 26,,, | Performed by: RADIOLOGY

## 2019-02-21 PROCEDURE — 74177 CT ABD & PELVIS W/CONTRAST: CPT | Mod: 26,,, | Performed by: RADIOLOGY

## 2019-02-21 PROCEDURE — 71260 CT CHEST ABDOMEN PELVIS WITH CONTRAST (XPD): ICD-10-PCS | Mod: 26,,, | Performed by: RADIOLOGY

## 2019-02-21 PROCEDURE — A9503 TC99M MEDRONATE: HCPCS

## 2019-02-21 PROCEDURE — 74177 CT CHEST ABDOMEN PELVIS WITH CONTRAST (XPD): ICD-10-PCS | Mod: 26,,, | Performed by: RADIOLOGY

## 2019-02-21 PROCEDURE — 74177 CT ABD & PELVIS W/CONTRAST: CPT | Mod: TC

## 2019-02-21 PROCEDURE — 78306 NM BONE SCAN WHOLE BODY: ICD-10-PCS | Mod: 26,,, | Performed by: RADIOLOGY

## 2019-02-21 RX ADMIN — IOHEXOL 75 ML: 350 INJECTION, SOLUTION INTRAVENOUS at 10:02

## 2019-02-21 NOTE — PROGRESS NOTES
Received call from patient, requesting assistance with his prescription medication refills - Percocet, Synthroid, Xarelto. Spoke with AMALIA Daniel and she will ask Dr. Garay to send a Percocet prescription to [facility]. The other medications have remaining refills. Called [facility] and spoke with Jodi toro: prescriptions needed and assistance with co-pays through [facility]. Pharmacy staff to contact [facility] to obtain authorization letter for the copays. Medications should be ready for  later today. Called patient back to inform him of this. He said that he received a $25 gas card a couple of weeks ago from the [employer] [facility], and he's tried calling the agency this week and left messages but hasn't heard back. Informed him that I will send an email and ask that Madison contact him. He has called [facility]  But no funds available yet; the staff gave him a couple of other agency contact numbers and he's called and is awaiting return calls. Advised him to call [facility] periodically or check their website as they can get funds in at any time during the year. Will continue to follow and assist as needs identified.

## 2019-02-22 ENCOUNTER — DOCUMENTATION ONLY (OUTPATIENT)
Dept: HEMATOLOGY/ONCOLOGY | Facility: CLINIC | Age: 44
End: 2019-02-22

## 2019-02-22 NOTE — PROGRESS NOTES
Received reply email from Madison with the [facility] - patient has received the maximum assistance in gas cards and is not eligible for any more; she has contacted him twice and told him, and the president of the foundation has mailed him a letter explaining this as well.

## 2019-02-25 ENCOUNTER — OFFICE VISIT (OUTPATIENT)
Dept: HEMATOLOGY/ONCOLOGY | Facility: CLINIC | Age: 44
End: 2019-02-25
Payer: MEDICARE

## 2019-02-25 ENCOUNTER — LAB VISIT (OUTPATIENT)
Dept: LAB | Facility: HOSPITAL | Age: 44
End: 2019-02-25
Attending: INTERNAL MEDICINE
Payer: MEDICARE

## 2019-02-25 ENCOUNTER — SOCIAL WORK (OUTPATIENT)
Dept: HEMATOLOGY/ONCOLOGY | Facility: CLINIC | Age: 44
End: 2019-02-25

## 2019-02-25 VITALS
OXYGEN SATURATION: 99 % | HEIGHT: 67 IN | SYSTOLIC BLOOD PRESSURE: 147 MMHG | HEART RATE: 67 BPM | WEIGHT: 186.31 LBS | DIASTOLIC BLOOD PRESSURE: 100 MMHG | TEMPERATURE: 98 F | RESPIRATION RATE: 20 BRPM | BODY MASS INDEX: 29.24 KG/M2

## 2019-02-25 DIAGNOSIS — C79.51 SECONDARY MALIGNANT NEOPLASM OF BONE: ICD-10-CM

## 2019-02-25 DIAGNOSIS — C34.90 MALIGNANT NEOPLASM OF LUNG, UNSPECIFIED LATERALITY, UNSPECIFIED PART OF LUNG: Primary | ICD-10-CM

## 2019-02-25 DIAGNOSIS — C34.11 MALIGNANT NEOPLASM OF UPPER LOBE OF RIGHT LUNG: ICD-10-CM

## 2019-02-25 DIAGNOSIS — J39.2 MASS OF NASOPHARYNX: ICD-10-CM

## 2019-02-25 DIAGNOSIS — C34.11 MALIGNANT NEOPLASM OF UPPER LOBE OF RIGHT LUNG: Primary | ICD-10-CM

## 2019-02-25 LAB
ALBUMIN SERPL BCP-MCNC: 4.1 G/DL
ALP SERPL-CCNC: 75 U/L
ALT SERPL W/O P-5'-P-CCNC: 13 U/L
ANION GAP SERPL CALC-SCNC: 9 MMOL/L
AST SERPL-CCNC: 22 U/L
BILIRUB SERPL-MCNC: 0.3 MG/DL
BUN SERPL-MCNC: 14 MG/DL
CALCIUM SERPL-MCNC: 9.3 MG/DL
CHLORIDE SERPL-SCNC: 108 MMOL/L
CO2 SERPL-SCNC: 23 MMOL/L
CREAT SERPL-MCNC: 1.5 MG/DL
ERYTHROCYTE [DISTWIDTH] IN BLOOD BY AUTOMATED COUNT: 14.2 %
EST. GFR  (AFRICAN AMERICAN): >60 ML/MIN/1.73 M^2
EST. GFR  (NON AFRICAN AMERICAN): 55.8 ML/MIN/1.73 M^2
GLUCOSE SERPL-MCNC: 95 MG/DL
HCT VFR BLD AUTO: 46.5 %
HGB BLD-MCNC: 14.5 G/DL
IMM GRANULOCYTES # BLD AUTO: 0.03 K/UL
MCH RBC QN AUTO: 26 PG
MCHC RBC AUTO-ENTMCNC: 31.2 G/DL
MCV RBC AUTO: 83 FL
NEUTROPHILS # BLD AUTO: 3.7 K/UL
PLATELET # BLD AUTO: 313 K/UL
PMV BLD AUTO: 10.7 FL
POTASSIUM SERPL-SCNC: 4.4 MMOL/L
PROT SERPL-MCNC: 8 G/DL
RBC # BLD AUTO: 5.58 M/UL
SODIUM SERPL-SCNC: 140 MMOL/L
WBC # BLD AUTO: 7.67 K/UL

## 2019-02-25 PROCEDURE — 36415 COLL VENOUS BLD VENIPUNCTURE: CPT

## 2019-02-25 PROCEDURE — 85027 COMPLETE CBC AUTOMATED: CPT

## 2019-02-25 PROCEDURE — 80053 COMPREHEN METABOLIC PANEL: CPT

## 2019-02-25 PROCEDURE — 99215 OFFICE O/P EST HI 40 MIN: CPT | Mod: S$PBB,,, | Performed by: INTERNAL MEDICINE

## 2019-02-25 PROCEDURE — 99215 PR OFFICE/OUTPT VISIT, EST, LEVL V, 40-54 MIN: ICD-10-PCS | Mod: S$PBB,,, | Performed by: INTERNAL MEDICINE

## 2019-02-25 PROCEDURE — 99999 PR PBB SHADOW E&M-EST. PATIENT-LVL IV: ICD-10-PCS | Mod: PBBFAC,,, | Performed by: INTERNAL MEDICINE

## 2019-02-25 PROCEDURE — 99214 OFFICE O/P EST MOD 30 MIN: CPT | Mod: PBBFAC | Performed by: INTERNAL MEDICINE

## 2019-02-25 PROCEDURE — 99999 PR PBB SHADOW E&M-EST. PATIENT-LVL IV: CPT | Mod: PBBFAC,,, | Performed by: INTERNAL MEDICINE

## 2019-02-25 RX ORDER — SODIUM CHLORIDE 0.9 % (FLUSH) 0.9 %
10 SYRINGE (ML) INJECTION
Status: CANCELLED | OUTPATIENT
Start: 2019-02-25

## 2019-02-25 RX ORDER — HEPARIN 100 UNIT/ML
500 SYRINGE INTRAVENOUS
Status: CANCELLED | OUTPATIENT
Start: 2019-02-25

## 2019-02-25 NOTE — Clinical Note
Schedule CBC, CMP and TSH and free T4 and see me in 2 weeks and for Opdivo and Xgexva.Please schedule on main campus if I am here

## 2019-02-25 NOTE — PROGRESS NOTES
"Subjective:       Patient ID: Yao Alan is a 44 y.o. male.    Chief Complaint: Malignant neoplasm of upper lobe of right lung  Oncologic History:  Mr. Yao Alan is a 43-year-old male who has a long history of smoking and was seen in the Pulmonary Clinic by Dr. Valle on 03/05/2015 with abnormal CT scan.    Apparently, he went to the [facility] in February with coughing as well as chest pain. A chest x-ray was done, which revealed a left upper lobe lung mass. Followup CT scan was done on 02/12/2015 and that revealed posterior superior mediastinal mass adjacent and prominent enlarged upper left mediastinal lymph nodes, abutting the aortic arch as well as left subclavian artery. A  CT scan of the abdomen was done on 03/03/2015 without contrast and that revealed nonobstructive nephrolithiasis, but a 2.1 cm lytic lesion involving the left iliac wing concerning for metastatic disease given the presence of emphysema and a mediastinal soft tissue mass on the CT chest from 02/12/2015. He saw Dr. Valle after that on 03/05/2015 and underwent an IR guided biopsy of the bone lesion on 03/17/2015. 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    His EGFR/EML/ALK is negative.    His PET scan on 3/25/15 revealed Left upper lobe lung lesion with an adjacent para-aortic lymph node, right anterior rib metastasis, right iliac metastasis, and pancreatic metastasis. A pancreatic cancer primary neoplasm is less likely.  MRI brain was negative for mets.  He completed 6 cycles of Carboplatin and Alimta and was on maintenance Alimta until end of March 2016.  His bone scan from 3/16 revealed stable disease. His CT scans also from end of March 2016 revealed "Interval enlargement of left upper lobe lung mass measuring 5.9 x 3.9 cm (previously 3.3 x 2.5 cm). This mass appears to invade the mediastinum and abutting the aortic arch and left subclavian artery"     He is now on Opdivo since March " 2016              HPI Mr. Alan returns for follow up and Opdivo  Bone scan from 2/12/19 reveals There is no scintigraphic evidence of metastatic disease.  Ct scan from 2/12/19 reveals No mediastinal, hilar, or axillary lymphadenopathy.  The thoracic aorta tapers normally.  No pericardial or pleural effusion.  Heart size is within normal limits.  There is prominent paraseptal emphysematous lung changes with upper lobe predominance, unchanged..  Trace amount interstitial thickening in the lung bases.  No lung consolidation or concerning lung nodules.   He feels well. He is scheduled to see ENT for evaluation of abnormality in nasopharynx    Review of Systems   Constitutional: Negative for appetite change, fatigue and unexpected weight change.   HENT: Negative for mouth sores.    Eyes: Negative for visual disturbance.   Respiratory: Negative for cough and shortness of breath.    Cardiovascular: Negative for chest pain.   Gastrointestinal: Negative for abdominal pain and diarrhea.   Genitourinary: Negative for frequency.   Musculoskeletal: Negative for back pain.   Skin: Negative for rash.   Neurological: Negative for headaches.   Hematological: Negative for adenopathy.   Psychiatric/Behavioral: The patient is not nervous/anxious.    All other systems reviewed and are negative.      Objective:      Physical Exam   Constitutional: He is oriented to person, place, and time. He appears well-developed and well-nourished.   HENT:   Mouth/Throat: No oropharyngeal exudate.   Cardiovascular: Normal rate and normal heart sounds.   Pulmonary/Chest: Effort normal and breath sounds normal. He has no wheezes.   Abdominal: Soft. Bowel sounds are normal. There is no tenderness.   Musculoskeletal: He exhibits no edema or tenderness.   Lymphadenopathy:     He has no cervical adenopathy.   Neurological: He is alert and oriented to person, place, and time. Coordination normal.   Skin: Skin is warm and dry. No rash noted.   Psychiatric: He  has a normal mood and affect. Judgment and thought content normal.   Vitals reviewed.        LABS:  WBC   Date Value Ref Range Status   02/25/2019 7.67 3.90 - 12.70 K/uL Final     Hemoglobin   Date Value Ref Range Status   02/25/2019 14.5 14.0 - 18.0 g/dL Final     Hematocrit   Date Value Ref Range Status   02/25/2019 46.5 40.0 - 54.0 % Final     Platelets   Date Value Ref Range Status   02/25/2019 313 150 - 350 K/uL Final     Gran # (ANC)   Date Value Ref Range Status   02/25/2019 3.7 1.8 - 7.7 K/uL Final     Comment:     The ANC is based on a white cell differential from an   automated cell counter. It has not been microscopically   reviewed for the presence of abnormal cells. Clinical   correlation is required.         Chemistry        Component Value Date/Time     02/25/2019 0802    K 4.4 02/25/2019 0802     02/25/2019 0802    CO2 23 02/25/2019 0802    BUN 14 02/25/2019 0802    CREATININE 1.5 (H) 02/25/2019 0802    GLU 95 02/25/2019 0802        Component Value Date/Time    CALCIUM 9.3 02/25/2019 0802    ALKPHOS 75 02/25/2019 0802    AST 22 02/25/2019 0802    ALT 13 02/25/2019 0802    BILITOT 0.3 02/25/2019 0802    ESTGFRAFRICA >60.0 02/25/2019 0802    EGFRNONAA 55.8 (A) 02/25/2019 0802           TSH   Date Value Ref Range Status   02/12/2019 5.152 (H) 0.400 - 4.000 uIU/mL Final     Free T4   Date Value Ref Range Status   02/12/2019 0.93 0.71 - 1.51 ng/dL Final       Assessment:       1. Malignant neoplasm of upper lobe of right lung    2. Secondary malignant neoplasm of bone    3. Mass of nasopharynx        Plan:        1,2 He is doing very well clinically. His scans reveal complete response. He will proceed with Opdivo and will return in 2 weeks for next cycle and also for Xgeva  3. He is seeing Dr. Overton on 3/1/19  Above care plan was discussed with patient and all questions were addressed to his satisfaction

## 2019-02-26 ENCOUNTER — INFUSION (OUTPATIENT)
Dept: INFUSION THERAPY | Facility: HOSPITAL | Age: 44
End: 2019-02-26
Attending: INTERNAL MEDICINE
Payer: MEDICARE

## 2019-02-26 VITALS
TEMPERATURE: 98 F | RESPIRATION RATE: 16 BRPM | DIASTOLIC BLOOD PRESSURE: 84 MMHG | HEART RATE: 71 BPM | OXYGEN SATURATION: 99 % | SYSTOLIC BLOOD PRESSURE: 126 MMHG

## 2019-02-26 DIAGNOSIS — D50.0 IRON DEFICIENCY ANEMIA DUE TO CHRONIC BLOOD LOSS: Primary | ICD-10-CM

## 2019-02-26 DIAGNOSIS — C34.11 MALIGNANT NEOPLASM OF UPPER LOBE OF RIGHT LUNG: ICD-10-CM

## 2019-02-26 DIAGNOSIS — C79.51 SECONDARY MALIGNANT NEOPLASM OF BONE: ICD-10-CM

## 2019-02-26 DIAGNOSIS — C34.91 LUNG CANCER, PRIMARY, WITH METASTASIS FROM LUNG TO OTHER SITE, RIGHT: ICD-10-CM

## 2019-02-26 PROCEDURE — 63600175 PHARM REV CODE 636 W HCPCS: Performed by: INTERNAL MEDICINE

## 2019-02-26 PROCEDURE — 25000003 PHARM REV CODE 250: Performed by: INTERNAL MEDICINE

## 2019-02-26 PROCEDURE — 96413 CHEMO IV INFUSION 1 HR: CPT

## 2019-02-26 PROCEDURE — A4216 STERILE WATER/SALINE, 10 ML: HCPCS | Performed by: INTERNAL MEDICINE

## 2019-02-26 RX ORDER — SODIUM CHLORIDE 0.9 % (FLUSH) 0.9 %
10 SYRINGE (ML) INJECTION
Status: DISCONTINUED | OUTPATIENT
Start: 2019-02-26 | End: 2019-02-26 | Stop reason: HOSPADM

## 2019-02-26 RX ORDER — HEPARIN 100 UNIT/ML
500 SYRINGE INTRAVENOUS
Status: DISCONTINUED | OUTPATIENT
Start: 2019-02-26 | End: 2019-02-26 | Stop reason: HOSPADM

## 2019-02-26 RX ADMIN — Medication 10 ML: at 09:02

## 2019-02-26 RX ADMIN — HEPARIN SODIUM (PORCINE) LOCK FLUSH IV SOLN 100 UNIT/ML 500 UNITS: 100 SOLUTION at 09:02

## 2019-02-26 RX ADMIN — SODIUM CHLORIDE 240 MG: 9 INJECTION, SOLUTION INTRAVENOUS at 09:02

## 2019-02-26 NOTE — PLAN OF CARE
Problem: Adult Inpatient Plan of Care  Goal: Plan of Care Review  Outcome: Ongoing (interventions implemented as appropriate)  Pt presented for Opdivo infusion. No complaints/concerns voiced. VSS. Pt tolerated treatment well. Ambulatory into and out of unit. Distress screening tool completed. Future appt information reviewed with pt.

## 2019-03-01 ENCOUNTER — OFFICE VISIT (OUTPATIENT)
Dept: OTOLARYNGOLOGY | Facility: CLINIC | Age: 44
End: 2019-03-01
Payer: MEDICARE

## 2019-03-01 VITALS
HEIGHT: 66 IN | BODY MASS INDEX: 30.31 KG/M2 | WEIGHT: 188.63 LBS | SYSTOLIC BLOOD PRESSURE: 110 MMHG | DIASTOLIC BLOOD PRESSURE: 80 MMHG

## 2019-03-01 DIAGNOSIS — R93.0 ABNORMAL MRI OF HEAD: ICD-10-CM

## 2019-03-01 DIAGNOSIS — J39.2 PHARYNGEAL OR NASOPHARYNGEAL CYST: Primary | ICD-10-CM

## 2019-03-01 PROCEDURE — 92511 NASOPHARYNGOSCOPY: CPT | Mod: S$GLB,,, | Performed by: OTOLARYNGOLOGY

## 2019-03-01 PROCEDURE — 99214 OFFICE O/P EST MOD 30 MIN: CPT | Mod: 25,S$GLB,, | Performed by: OTOLARYNGOLOGY

## 2019-03-01 PROCEDURE — 92511 PR NASOPHARYNGOSCOPY: ICD-10-PCS | Mod: S$GLB,,, | Performed by: OTOLARYNGOLOGY

## 2019-03-01 PROCEDURE — 99214 PR OFFICE/OUTPT VISIT, EST, LEVL IV, 30-39 MIN: ICD-10-PCS | Mod: 25,S$GLB,, | Performed by: OTOLARYNGOLOGY

## 2019-03-01 NOTE — PROGRESS NOTES
Chief Complaint   Patient presents with    Other     Nasal Mass Referred by Dr. Sanchez         44 y.o. male presents with has adenocarcinoma lung cancer with metastatic disease to bone.  He had a recent MRI scan and a multilobulated nasopharyngeal mass was reported.  He was referred here for evaluation.  He is asymptomatic and unaware of the mass. Denies nasal pain, or drainage.       Review of Systems     Constitutional: Negative for fatigue and unexpected weight change.   HENT: per HPI.  Eyes: Negative for visual disturbance.   Respiratory: Negative for shortness of breath, hemoptysis  Cardiovascular: Negative for chest pain and palpitations.   Genitourinary: Negative for dysuria and difficulty urinating.   Musculoskeletal: Negative for decreased ROM, back pain.   Skin: Negative for rash, sunburn, itching.   Neurological: Negative for dizziness and seizures.   Hematological: Negative for adenopathy. Does not bruise/bleed easily.   Psychiatric/Behavioral: Negative for agitation. The patient is not nervous/anxious.   Endocrine: Negative for rapid weight loss/weight gain, heat/cold intolerance.     Past Medical History:   Diagnosis Date    Asthma     COPD (chronic obstructive pulmonary disease)     HTN (hypertension)     Hypertension     Lung cancer     Lung mass     Thyroid disease        Past Surgical History:   Procedure Laterality Date    COLONOSCOPY N/A 4/22/2015    [facility]CO [facility]ur [facility] ENDO (4TH FLR)    ESOPHAGOGASTRODUODENOSCOPY (EGD) N/A 5/8/2015    Performed by Young Cain MD at [facility] ENDO (4TH FLR)    FRACTURE SURGERY      finger    [id number]-neck or chest Fluoro equipment needed  **PT MUST BE FIRST CASE Right 7/20/2015    Performed by Miguel Jacobo MD at [facility] OR 1ST FLR    LUNG CANCER SURGERY Right March 2015    lung biopsy        family history includes Hypertension in his father; No Known Problems in his brother, maternal aunt, maternal grandfather,  maternal grandmother, maternal uncle, mother, paternal aunt, paternal grandfather, paternal grandmother, paternal uncle, and sister.    Pt  reports that he quit smoking about 4 years ago. His smoking use included cigarettes. He has a 18.75 pack-year smoking history. he has never used smokeless tobacco. He reports that he uses drugs. Drug: Marijuana. He reports that he does not drink alcohol.    Review of patient's allergies indicates:   Allergen Reactions    Nsaids (non-steroidal anti-inflammatory drug)      Patient says since been diagnosed with lung mass on 2-12-15, if take any NSAIDS he spikes a fever.    Tylenol [acetaminophen] Other (See Comments)     Sweating +        Physical Exam    Vitals:    03/01/19 0943   BP: 110/80     Body mass index is 30.44 kg/m².    Physical Exam   Constitutional: He is oriented to person, place, and time. He appears well-developed and well-nourished. No distress.   HENT:   Head: Normocephalic and atraumatic.   Right Ear: Hearing, tympanic membrane, external ear and ear canal normal. Tympanic membrane mobility is normal. No middle ear effusion. No decreased hearing is noted.   Left Ear: Hearing, tympanic membrane, external ear and ear canal normal. Tympanic membrane mobility is normal.  No middle ear effusion. No decreased hearing is noted.   Nose: Nose normal.   Mouth/Throat: Uvula is midline, oropharynx is clear and moist and mucous membranes are normal.   Eyes: Conjunctivae, EOM and lids are normal. Pupils are equal, round, and reactive to light. Right eye exhibits no discharge. Left eye exhibits no discharge.   Neck: Trachea normal, normal range of motion and phonation normal. Neck supple. No tracheal tenderness present. No tracheal deviation, no edema and no erythema present. No thyroid mass and no thyromegaly present.   Cardiovascular: Normal heart sounds.   Pulmonary/Chest: Breath sounds normal. No stridor.   Abdominal: Soft.   Lymphadenopathy:     He has no cervical  adenopathy.   Neurological: He is alert and oriented to person, place, and time.   Skin: Skin is warm and dry. No rash noted. He is not diaphoretic. No erythema. No pallor.   Psychiatric: He has a normal mood and affect.   Nursing note and vitals reviewed.    Procedure: Flexible nasopharyngoscopy  In order to fully examine the upper aerodigestive tract, including the nasopharynx, flexible endoscopy is required.  After explaining the procedure and obtaining verbal consent, a timeout was performed with the patient's participation according to the universal protocol. Both nasal cavities were anesthetized with 4% Xylocaine spray mixed with Santos-Synephrine. The flexible scope was inserted into the nasal cavity and advanced to visualize the nasal cavity, and nasopharynx with the findings noted. The scope was removed and the procedure terminated. The patient tolerated this procedure well without apparent complication.     Nasopharynx - the torus is clear. Cyst with clear fluid in right nasopharynx. No exophytic mass seen.      Studies reviewed:  MRI Brain 12/19/18:  Lobulated enhancing nasopharyngeal mass.    MRI Brain 3/3/17:  Multiple cysts in the nasopharynx.    MRIs from 3/15 and 10/15 also reviewed which demonstrate cyst in the right nasopharynx.    Assessment     1. Pharyngeal or nasopharyngeal cyst    2. Abnormal MRI of head          Plan  In summary, Mr. Alan is a 44 year old male with adenocarcinoma. Multiple MRIs with cystic lesion in the right nasopharynx, stable, flexible nasopharyngoscopy done in clinic today which confirms a benign cystic lesion. Reassurance given. Asymptomatic. Return to clinic if symptoms occur.

## 2019-03-01 NOTE — LETTER
March 1, 2019      Skip Sanchez MD  [address] [facility] [address]  [address]  [place] [address]           [facility]  [address] [facility] [address]  [place] [address]  Phone: [phone]          Patient: Yao Alan   MR Number: [id number]   YOB: 1975   Date of Visit: 3/1/2019       Dear Dr. Skip Sanchez:    Thank you for referring Yao Alan to me for evaluation. Attached you will find relevant portions of my assessment and plan of care.    If you have questions, please do not hesitate to call me. I look forward to following Yao Alan along with you.    Sincerely,    Michelle Overton MD    Enclosure  CC:  No Recipients    If you would like to receive this communication electronically, please contact [email or web address] or [phone] to request more information on [employer] access.    For providers and/or their staff who would like to refer a patient to [facility], please contact us through our one-stop-shop provider referral line, [facility] , at [phone].    If you feel you have received this communication in error or would no longer like to receive these types of communications, please e-mail [email or web address]

## 2019-03-02 NOTE — PROGRESS NOTES
Patient came to see Oncology Social Worker before going to the pharmacy to  his medications. His pain medication prescription couldn't be filled until today so he waited to come today to  all three refills. Called the staff in [facility] and also left a voice mail message at [facility] ([phone]), as did the pharmacy staff re: request for authorization for his co-pays. Patient inquired again about additional gas cards through the [employer] [facility]. He said he called and left message, but hadn't heard back from Madison. Informed patient that Madison sent me a reply email and they cannot provide any further gas cards, that patient has exceeded the maximum that they can provide. Checked [facility]'s website to see if financial assistance funds available for patients with lung cancer but none listed. Again advised patient to check periodically to see if funds become available (agency's website address and phone number have already been provided to patient). Patient stated understanding. Will continue to follow and assist as needs identified.

## 2019-03-05 ENCOUNTER — HOSPITAL ENCOUNTER (EMERGENCY)
Facility: HOSPITAL | Age: 44
Discharge: HOME | End: 2019-03-05
Attending: EMERGENCY MEDICINE
Payer: MEDICARE

## 2019-03-05 VITALS
WEIGHT: 185 LBS | SYSTOLIC BLOOD PRESSURE: 118 MMHG | HEIGHT: 66 IN | TEMPERATURE: 98 F | DIASTOLIC BLOOD PRESSURE: 84 MMHG | RESPIRATION RATE: 20 BRPM | BODY MASS INDEX: 29.73 KG/M2 | OXYGEN SATURATION: 99 % | HEART RATE: 63 BPM

## 2019-03-05 DIAGNOSIS — K62.5 RECTAL BLEEDING: Primary | ICD-10-CM

## 2019-03-05 LAB
ALBUMIN SERPL-MCNC: 3.9 G/DL (ref 3.3–5.5)
ALP SERPL-CCNC: 66 U/L (ref 42–141)
BILIRUB SERPL-MCNC: 0.5 MG/DL (ref 0.2–1.6)
BUN SERPL-MCNC: 12 MG/DL (ref 7–22)
CALCIUM SERPL-MCNC: 9.7 MG/DL (ref 8–10.3)
CHLORIDE SERPL-SCNC: 105 MMOL/L (ref 98–108)
CREAT SERPL-MCNC: 1.5 MG/DL (ref 0.6–1.2)
CTP QC/QA: YES
FECAL OCCULT BLOOD, POC: POSITIVE
GLUCOSE SERPL-MCNC: 79 MG/DL (ref 73–118)
POC ALT (SGPT): 19 U/L (ref 10–47)
POC AST (SGOT): 26 U/L (ref 11–38)
POC PTINR: 2 (ref 0.9–1.2)
POC PTWBT: 23 SEC (ref 9.7–14.3)
POC TCO2: 30 MMOL/L (ref 18–33)
POTASSIUM BLD-SCNC: 3.7 MMOL/L (ref 3.6–5.1)
PROTEIN, POC: 7.3 G/DL (ref 6.4–8.1)
SAMPLE: ABNORMAL
SODIUM BLD-SCNC: 146 MMOL/L (ref 128–145)

## 2019-03-05 PROCEDURE — 82270 OCCULT BLOOD FECES: CPT | Mod: ER

## 2019-03-05 PROCEDURE — 85025 COMPLETE CBC W/AUTO DIFF WBC: CPT | Mod: ER

## 2019-03-05 PROCEDURE — 85610 PROTHROMBIN TIME: CPT | Mod: ER

## 2019-03-05 PROCEDURE — 99283 EMERGENCY DEPT VISIT LOW MDM: CPT | Mod: ER

## 2019-03-05 PROCEDURE — 80053 COMPREHEN METABOLIC PANEL: CPT | Mod: ER

## 2019-03-05 RX ORDER — HYDROCORTISONE ACETATE 25 MG/1
25 SUPPOSITORY RECTAL 2 TIMES DAILY
Qty: 20 SUPPOSITORY | Refills: 0 | Status: SHIPPED | OUTPATIENT
Start: 2019-03-05 | End: 2019-03-06 | Stop reason: SDUPTHER

## 2019-03-05 NOTE — LETTER
[address]  [place] [address]  Phone: [phone]  Fax: [phone]   Dr. Garay,  I evaluated Mr. Alan in  the emergency department on Tuesday.  He reports intermittent blood in his stool for the last for 5 days.  On my exam, the  patient's abdomen was soft.  He had occult blood positive stool but no gross blood.  H&H and platelet counts were stable. INR was 2.0.  Patient is on Xarelto.  I did not feel the need to stop the Xarelto at this time.  He was not orthostatic.  I wanted to let you know this abnormality and see if he could follow him up in your clinic this week.  Thanks, Kathryn

## 2019-03-05 NOTE — ED PROVIDER NOTES
"Encounter Date: 3/5/2019    SCRIBE #1 NOTE: I, Cynthia Jurado, am scribing for, and in the presence of,  Dr. Do. I have scribed the following portions of the note - Other sections scribed: HPI, ROS, PE.       History     Chief Complaint   Patient presents with    Abdominal Pain     Pt states," Diarrhea and stomach pain for four days."     CC: Abdominal Pain; blood in the stool  44 y.o male presents to the ED with intermittent lower abdominal pain that is cramping for 3 days. No execerbating or alleviating factors. He reports eating seafood prior to his symptoms starting. He notes the pain would last 4 to 5 minutes. He reports seeing blood in his stool once yesterday and once a day before that. He describes the blood as a cherry color. He sometimes has to strain to have a BM, but notes a normal BM today. He notes vomiting and diarrhea 2 days ago, but none since then. He denies fever, cold symptoms, chest pain, leg swelling, SOB, nausea, dysuria, hematuria, back pain, or HA today. Patient has a hx of lung cancer and is currently receiving chemotherapy. His next chemo appointment is next week. He currently takes pain medication. He also has prescription stool softeners but has not taken them.  He also takes xeralto for a blood clot. No tobacco or EtOH use. He had a normal CT of the abdomen on 2/21/19. 6/10 pain rating.       The history is provided by the patient.     Review of patient's allergies indicates:   Allergen Reactions    Nsaids (non-steroidal anti-inflammatory drug)      Patient says since been diagnosed with lung mass on 2-12-15, if take any NSAIDS he spikes a fever.    Tylenol [acetaminophen] Other (See Comments)     Sweating +     Past Medical History:   Diagnosis Date    Asthma     COPD (chronic obstructive pulmonary disease)     HTN (hypertension)     Hypertension     Lung cancer     Lung mass     Thyroid disease      Past Surgical History:   Procedure Laterality Date    COLONOSCOPY N/A " 2015    Performed by FRANSISCO [facility]ur MD at [facility] ENDO (4TH FLR)    ESOPHAGOGASTRODUODENOSCOPY (EGD) N/A 2015    Performed by Young Cain MD at [facility] ENDO (4TH FLR)    FRACTURE SURGERY      finger    PBYYSKEXL-YNWC-G-CATH-neck or chest Fluoro equipment needed  **PT MUST BE FIRST CASE Right 2015    Performed by Miguel Jacobo MD at [facility] OR 1ST FLR    LUNG CANCER SURGERY Right 2015    lung biopsy      Family History   Problem Relation Age of Onset    Hypertension Father     No Known Problems Mother     No Known Problems Sister     No Known Problems Brother     No Known Problems Maternal Aunt     No Known Problems Maternal Uncle     No Known Problems Paternal Aunt     No Known Problems Paternal Uncle     No Known Problems Maternal Grandmother     No Known Problems Maternal Grandfather     No Known Problems Paternal Grandmother     No Known Problems Paternal Grandfather     Amblyopia Neg Hx     Blindness Neg Hx     Cancer Neg Hx     Cataracts Neg Hx     Diabetes Neg Hx     Glaucoma Neg Hx     Macular degeneration Neg Hx     Retinal detachment Neg Hx     Strabismus Neg Hx     Stroke Neg Hx     Thyroid disease Neg Hx      Social History     Tobacco Use    Smoking status: Former Smoker     Packs/day: 0.75     Years: 25.00     Pack years: 18.75     Types: Cigarettes     Last attempt to quit: 2014     Years since quittin.2    Smokeless tobacco: Never Used    Tobacco comment: Patient is no longer smoking   Substance Use Topics    Alcohol use: No    Drug use: Yes     Types: Marijuana     Review of Systems   Constitutional: Negative for fever.   HENT: Negative for congestion and sore throat.    Respiratory: Negative for cough and shortness of breath.    Cardiovascular: Negative for chest pain and leg swelling.   Gastrointestinal: Positive for abdominal pain (epigastric cramping) and blood in stool. Negative for diarrhea, nausea and vomiting (vomited 2 days  ago).   Genitourinary: Negative for dysuria and hematuria.   Musculoskeletal: Negative for back pain.   Neurological: Negative for headaches.       Physical Exam     Initial Vitals [03/05/19 1049]   BP Pulse Resp Temp SpO2   (!) 145/91 87 16 98 °F (36.7 °C) 100 %      MAP       --         Physical Exam    Nursing note and vitals reviewed.  Constitutional: He appears well-developed and well-nourished. He is not diaphoretic. No distress.   HENT:   Head: Normocephalic and atraumatic.   Eyes: EOM are normal.   Neck: Normal range of motion.   Pulmonary/Chest: No respiratory distress.   Abdominal: Soft. Bowel sounds are normal. He exhibits no distension. There is tenderness (mild) in the suprapubic area. There is no rigidity, no rebound and no guarding.   Slight bloating   Genitourinary: Rectal exam shows guaiac positive stool. Guaiac positive stool. : Acceptable.  Musculoskeletal: Normal range of motion.   Neurological: He is alert and oriented to person, place, and time. No cranial nerve deficit or sensory deficit.   Skin: Skin is warm and dry. Capillary refill takes less than 2 seconds.   Psychiatric: He has a normal mood and affect. His behavior is normal.         ED Course   Procedures  Labs Reviewed   POCT OCCULT BLOOD STOOL - Abnormal; Notable for the following components:       Result Value    Fecal Occult Blood Positive (*)     All other components within normal limits   ISTAT PROCEDURE - Abnormal; Notable for the following components:    POC PTWBT 23.0 (*)     POC PTINR 2.0 (*)     All other components within normal limits   POCT CMP - Abnormal; Notable for the following components:    POC Creatinine 1.5 (*)     POC Sodium 146 (*)     All other components within normal limits   POCT CBC   POCT CMP   POCT PROTIME-INR          Imaging Results    None          Medical Decision Making:   Initial Assessment:   44 y.o male presents to the ED with intermittent lower pain and blood in the stool for 4  days. Physical exam findings are significant for positive guaiac result, no gross blood mild lower abdominal tenderness, and slight abdominal bloating. No distention. Bowl sounds are normal.    Clinical Tests:   Lab Tests: Ordered and Reviewed  ED Management:  I will order POCT CBC, CMP, INR, stool culture, and orthostatics.  Patient was not orthostatic.  Platelet count, H&H were normal.  INR is 2.  Did not feel the patient should stop Xarelto for this minor bleeding.  I did send a note to Dr. Garay regarding the patient's ER visit.  He appears well and will  be discharged on Anusol HC suppositories            Scribe Attestation:   Scribe #1: I performed the above scribed service and the documentation accurately describes the services I performed. I attest to the accuracy of the note.       I, Dr. Kathryn Do, personally performed the services described in this documentation. All medical record entries made by the scribe were at my direction and in my presence.  I have reviewed the chart and agree that the record reflects my personal performance and is accurate and complete. Kathryn Do MD.  3:36 PM 03/05/2019             Clinical Impression:     1. Rectal bleeding                                 Kathryn Do MD  03/05/19 1536

## 2019-03-05 NOTE — DISCHARGE INSTRUCTIONS
Call your doctor on Wednesday.  Go to [facility] on [facility] if your symptoms worsen

## 2019-03-06 ENCOUNTER — DOCUMENTATION ONLY (OUTPATIENT)
Dept: HEMATOLOGY/ONCOLOGY | Facility: CLINIC | Age: 44
End: 2019-03-06

## 2019-03-06 ENCOUNTER — TELEPHONE (OUTPATIENT)
Dept: HEMATOLOGY/ONCOLOGY | Facility: CLINIC | Age: 44
End: 2019-03-06

## 2019-03-06 DIAGNOSIS — K64.9 HEMORRHOIDS, UNSPECIFIED HEMORRHOID TYPE: Primary | ICD-10-CM

## 2019-03-06 RX ORDER — HYDROCORTISONE ACETATE 25 MG/1
25 SUPPOSITORY RECTAL 2 TIMES DAILY
Qty: 20 SUPPOSITORY | Refills: 0 | OUTPATIENT
Start: 2019-03-06 | End: 2019-03-08

## 2019-03-06 NOTE — PROGRESS NOTES
Received call from patient re: a prescription he received at discharge from the ED yesterday, and he couldn't fill it secondary to cost ($300 +). Patient having bad diarrhea and asked that Yasmin call him. Message sent via Epic to Dr. Garay/staff. Uncertain if the pharmacy staff was able to verify his prescription coverage and run it through his insurance plan yesterday. Asked Dr. Garay to re-prescribe it to [facility]. Spoke with Sasha in [facility] and she said that the Annusol HC suppository prescription is not covered under patient's insurance and the cost would be close to $400; she said that there's no  assistance programs for this medication used to treat hemorrhoids. This prescription is not something that [facility] could assist with. Patient has already received more than the maximum assistance through the [employer] Patient Assistance Fund. Requested that Yasmin check with Dr. Garay re: OTC medication or something else that would be covered under patient's prescription insurance, and discuss alternate medication with patient.

## 2019-03-06 NOTE — TELEPHONE ENCOUNTER
Spoke with patient. He notes eating some bad seafood on Saturday---and having 2-3 loose stools daily as a result. Yesterday he ended up going to the ED, as he had a bunch of dark red blood in his stool too. His loose stools subsided yesterday. He was prescribed anusol-hc, which he couldn't afford (copay $300). Zaida is requesting dr carl to send this script over to [facility], so she can assist with payment.      Message routed to dr carl

## 2019-03-06 NOTE — TELEPHONE ENCOUNTER
----- Message from Kami Vidal sent at 3/6/2019  3:21 PM CST -----  Contact: Pt   Pt called to speak with nurse sonali have some questions   Callback#[phone]  Thank You  CHAR Vidal

## 2019-03-06 NOTE — TELEPHONE ENCOUNTER
----- Message from Zaida Cevallos LCSW sent at 3/6/2019 11:25 AM CST -----  Received a call from patient.  He's having bad diarrhea, went to the ED and they gave him a prescription which he took to [facility] and cost was $300 +. It's sounds like the staff wasn't able to verify his insurance. Is it possible that you can re-send the prescription to [facility]?     Patient would like you to call him - [phone].

## 2019-03-06 NOTE — TELEPHONE ENCOUNTER
Left vm for patient informing him to try otc preparation H ointment/cream to see if that provides him any relief.  Requested him to stay in touch with nurse.

## 2019-03-08 ENCOUNTER — TELEPHONE (OUTPATIENT)
Dept: HEMATOLOGY/ONCOLOGY | Facility: CLINIC | Age: 44
End: 2019-03-08

## 2019-03-08 ENCOUNTER — HOSPITAL ENCOUNTER (EMERGENCY)
Facility: HOSPITAL | Age: 44
Discharge: HOME | End: 2019-03-08
Attending: EMERGENCY MEDICINE
Payer: MEDICARE

## 2019-03-08 VITALS
HEART RATE: 91 BPM | BODY MASS INDEX: 29.73 KG/M2 | SYSTOLIC BLOOD PRESSURE: 119 MMHG | WEIGHT: 185 LBS | DIASTOLIC BLOOD PRESSURE: 78 MMHG | TEMPERATURE: 98 F | OXYGEN SATURATION: 98 % | RESPIRATION RATE: 18 BRPM | HEIGHT: 66 IN

## 2019-03-08 DIAGNOSIS — K64.4 EXTERNAL HEMORRHOID: Primary | ICD-10-CM

## 2019-03-08 DIAGNOSIS — R53.1 WEAKNESS: ICD-10-CM

## 2019-03-08 DIAGNOSIS — R19.7 DIARRHEA, UNSPECIFIED TYPE: ICD-10-CM

## 2019-03-08 DIAGNOSIS — C34.90 MALIGNANT NEOPLASM OF LUNG, UNSPECIFIED LATERALITY, UNSPECIFIED PART OF LUNG: Primary | ICD-10-CM

## 2019-03-08 LAB
ALBUMIN SERPL BCP-MCNC: 4.2 G/DL
ALP SERPL-CCNC: 66 U/L
ALT SERPL W/O P-5'-P-CCNC: 12 U/L
ANION GAP SERPL CALC-SCNC: 11 MMOL/L
AST SERPL-CCNC: 19 U/L
BASOPHILS # BLD AUTO: 0.05 K/UL
BASOPHILS NFR BLD: 0.4 %
BILIRUB SERPL-MCNC: 0.4 MG/DL
BUN SERPL-MCNC: 12 MG/DL
CALCIUM SERPL-MCNC: 9.2 MG/DL
CHLORIDE SERPL-SCNC: 106 MMOL/L
CO2 SERPL-SCNC: 23 MMOL/L
CREAT SERPL-MCNC: 1.5 MG/DL
DIFFERENTIAL METHOD: ABNORMAL
EOSINOPHIL # BLD AUTO: 0.2 K/UL
EOSINOPHIL NFR BLD: 1.4 %
ERYTHROCYTE [DISTWIDTH] IN BLOOD BY AUTOMATED COUNT: 14.1 %
EST. GFR  (AFRICAN AMERICAN): >60 ML/MIN/1.73 M^2
EST. GFR  (NON AFRICAN AMERICAN): 56 ML/MIN/1.73 M^2
GLUCOSE SERPL-MCNC: 95 MG/DL
HCT VFR BLD AUTO: 42.3 %
HGB BLD-MCNC: 13.7 G/DL
LYMPHOCYTES # BLD AUTO: 3.5 K/UL
LYMPHOCYTES NFR BLD: 30.9 %
MCH RBC QN AUTO: 26.6 PG
MCHC RBC AUTO-ENTMCNC: 32.4 G/DL
MCV RBC AUTO: 82 FL
MONOCYTES # BLD AUTO: 0.9 K/UL
MONOCYTES NFR BLD: 8.2 %
NEUTROPHILS # BLD AUTO: 6.6 K/UL
NEUTROPHILS NFR BLD: 59.1 %
PLATELET # BLD AUTO: 274 K/UL
PMV BLD AUTO: 10.4 FL
POTASSIUM SERPL-SCNC: 3.8 MMOL/L
PROT SERPL-MCNC: 7.6 G/DL
RBC # BLD AUTO: 5.16 M/UL
SODIUM SERPL-SCNC: 140 MMOL/L
WBC # BLD AUTO: 11.25 K/UL

## 2019-03-08 PROCEDURE — 80053 COMPREHEN METABOLIC PANEL: CPT

## 2019-03-08 PROCEDURE — 85025 COMPLETE CBC W/AUTO DIFF WBC: CPT

## 2019-03-08 PROCEDURE — 99284 EMERGENCY DEPT VISIT MOD MDM: CPT

## 2019-03-08 PROCEDURE — 25000003 PHARM REV CODE 250: Performed by: PHYSICIAN ASSISTANT

## 2019-03-08 RX ORDER — HYDROCORTISONE ACETATE 25 MG/1
25 SUPPOSITORY RECTAL
Status: COMPLETED | OUTPATIENT
Start: 2019-03-08 | End: 2019-03-08

## 2019-03-08 RX ORDER — LOPERAMIDE HYDROCHLORIDE 2 MG/1
2 CAPSULE ORAL EVERY 4 HOURS PRN
Qty: 20 CAPSULE | Refills: 0 | Status: SHIPPED | OUTPATIENT
Start: 2019-03-08 | End: 2019-03-13

## 2019-03-08 RX ORDER — OXYCODONE AND ACETAMINOPHEN 5; 325 MG/1; MG/1
1 TABLET ORAL
Status: COMPLETED | OUTPATIENT
Start: 2019-03-08 | End: 2019-03-08

## 2019-03-08 RX ORDER — HYDROCORTISONE 25 MG/G
CREAM TOPICAL 2 TIMES DAILY
Status: DISCONTINUED | OUTPATIENT
Start: 2019-03-08 | End: 2019-03-08 | Stop reason: HOSPADM

## 2019-03-08 RX ORDER — OXYCODONE AND ACETAMINOPHEN 5; 325 MG/1; MG/1
1 TABLET ORAL EVERY 6 HOURS PRN
Qty: 8 TABLET | Refills: 0 | Status: SHIPPED | OUTPATIENT
Start: 2019-03-08 | End: 2019-03-11

## 2019-03-08 RX ORDER — HYDROCORTISONE ACETATE 25 MG/1
25 SUPPOSITORY RECTAL 2 TIMES DAILY
Qty: 20 SUPPOSITORY | Refills: 0 | Status: SHIPPED | OUTPATIENT
Start: 2019-03-08 | End: 2019-03-18

## 2019-03-08 RX ORDER — HYDROCORTISONE 25 MG/G
CREAM TOPICAL 2 TIMES DAILY
Qty: 20 G | Refills: 0 | Status: SHIPPED | OUTPATIENT
Start: 2019-03-08 | End: 2019-09-24

## 2019-03-08 RX ADMIN — HYDROCORTISONE 2.5%: 25 CREAM TOPICAL at 04:03

## 2019-03-08 RX ADMIN — HYDROCORTISONE ACETATE 25 MG: 25 SUPPOSITORY RECTAL at 04:03

## 2019-03-08 RX ADMIN — OXYCODONE AND ACETAMINOPHEN 1 TABLET: 5; 325 TABLET ORAL at 04:03

## 2019-03-08 NOTE — TELEPHONE ENCOUNTER
----- Message from Kami Vidal sent at 3/8/2019 10:51 AM CST -----  Contact: pt   Pt called to speak with Dr Garay have some questions   Callback#[phone]  Thank You  [place]

## 2019-03-08 NOTE — TELEPHONE ENCOUNTER
Spoke with patient.  She is calling to state he went to ED this morning for rectal bleeding. Cbc/cmp drawn in ED.  Nurse called lab---tsh and free t4 added.

## 2019-03-08 NOTE — ED PROVIDER NOTES
"Encounter Date: 3/8/2019       History     Chief Complaint   Patient presents with    Diarrhea     pt complains of diarrhea x 7 days. states he has loose stool every time he eats. states he took Imodium twice yesterday. pt states his rectum is "burning" .     CC:  Diarrhea    HPI:  44-year-old male with history of hypertension, DVT on xarelto and lung cancer (Dr. Garay Oncologist) receiving chemotherapy (approx 3 years) presenting for evaluation of 6 day history of diarrhea 4-5 episodes per day with associated constant rectal pain 7/10, worse with bowel movements.  Patient reports he initially had hematochezia on the 1st day but that resolved.  He had ER visit 3 days ago at another [facility] facility and had labs and evaluation performed at that time. He reports he has not used Anusol due to being too costly. He has not had rectal bleeding since that last visit. He denies any history of hemorrhoids, recent antibiotic use, recent travel, sick contacts.  Last colonoscopy 1 year ago.  He attempted Imodium starting yesterday without relief of his diarrhea.  Denies nausea, vomiting, fever, decreased urine output.          Review of patient's allergies indicates:   Allergen Reactions    Nsaids (non-steroidal anti-inflammatory drug)      Patient says since been diagnosed with lung mass on 2-12-15, if take any NSAIDS he spikes a fever.    Tylenol [acetaminophen] Other (See Comments)     Sweating +     Past Medical History:   Diagnosis Date    Asthma     COPD (chronic obstructive pulmonary disease)     HTN (hypertension)     Hypertension     Lung cancer     Lung mass     Thyroid disease      Past Surgical History:   Procedure Laterality Date    COLONOSCOPY N/A 4/22/2015    Performed by [facility] at [facility] END[facility] (4TH FLR)    ESOPHAGOGASTRODUODENOSCOPY (EGD) N/A 5/8/2015    Performed by Young Cain MD at [facility] ENDO (4TH FLR)    FRACTURE SURGERY      finger    CKGDJBBMY-LOIP-X-CATH-neck or chest Fluoro " equipment needed  **PT MUST BE FIRST CASE Right 2015    Performed by Miguel [facility]o MD at [facility] OR [address] [place] [address]    LUNG CANCER SURGERY Right 2015    lung biopsy      Family History   Problem Relation Age of Onset    Hypertension Father     No Known Problems Mother     No Known Problems Sister     No Known Problems Brother     No Known Problems Maternal Aunt     No Known Problems Maternal Uncle     No Known Problems Paternal Aunt     No Known Problems Paternal Uncle     No Known Problems Maternal Grandmother     No Known Problems Maternal Grandfather     No Known Problems Paternal Grandmother     No Known Problems Paternal Grandfather     Amblyopia Neg Hx     Blindness Neg Hx     Cancer Neg Hx     Cataracts Neg Hx     Diabetes Neg Hx     Glaucoma Neg Hx     Macular degeneration Neg Hx     Retinal detachment Neg Hx     Strabismus Neg Hx     Stroke Neg Hx     Thyroid disease Neg Hx      Social History     Tobacco Use    Smoking status: Former Smoker     Packs/day: 0.75     Years: 25.00     Pack years: 18.75     Types: Cigarettes     Last attempt to quit: 2014     Years since quittin.2    Smokeless tobacco: Never Used    Tobacco comment: Patient is no longer smoking   Substance Use Topics    Alcohol use: No    Drug use: Yes     Types: Marijuana     Review of Systems   Constitutional: Negative for fever.   HENT: Negative for trouble swallowing.    Eyes: Negative for redness.   Gastrointestinal: Positive for abdominal pain, anal bleeding, diarrhea and rectal pain. Negative for nausea and vomiting.   Genitourinary: Negative for decreased urine volume.   Musculoskeletal: Negative for back pain and neck pain.   Skin: Negative for rash.   Neurological: Negative for speech difficulty.   Psychiatric/Behavioral: Negative for confusion.       Physical Exam     Initial Vitals [19 0156]   BP Pulse Resp Temp SpO2   (!) 128/90 90 16 98.6 °F (37 °C) 98 %      MAP       --          Physical Exam    Nursing note and vitals reviewed.  Constitutional: He appears well-developed and well-nourished. No distress.   HENT:   Head: Normocephalic.   Right Ear: External ear normal.   Left Ear: External ear normal.   Nose: Nose normal.   Mouth/Throat: Oropharynx is clear and moist. No oropharyngeal exudate.   Eyes: Conjunctivae are normal.   Neck: Neck supple.   Cardiovascular: Normal rate and regular rhythm. Exam reveals no gallop and no friction rub.    No murmur heard.  Pulmonary/Chest: Breath sounds normal. No respiratory distress. He has no wheezes. He has no rhonchi. He has no rales.   Abdominal: Soft. Bowel sounds are normal. He exhibits no distension. There is no tenderness. There is no rebound and no guarding.   Genitourinary:   Genitourinary Comments: Chaperoned by Maria A Daniels RN:   Small nonthrombosed external hemorrhoid. Light brown stool in rectal vault. FOBT +. No melena. No hematochezia.    Neurological: He is alert.   Skin: Skin is warm and dry. No rash noted.   Psychiatric: He has a normal mood and affect.         ED Course   Procedures  Labs Reviewed   CBC W/ AUTO DIFFERENTIAL - Abnormal; Notable for the following components:       Result Value    Hemoglobin 13.7 (*)     MCH 26.6 (*)     All other components within normal limits   COMPREHENSIVE METABOLIC PANEL - Abnormal; Notable for the following components:    Creatinine 1.5 (*)     eGFR if non  56 (*)     All other components within normal limits          Imaging Results    None          Medical Decision Making:   Initial Assessment:   44-year-old male with history of hypertension, DVT on xarelto and lung cancer on chemo presenting for evaluation of diarrhea x6 days and rectal pain with hematochezia initially.  Exam above.  Patient is well-appearing in no distress.  No abdominal tenderness to palpation.  Patient does not appear dehydrated.  Offered IV fluids due to significant diarrhea.  Patient refused.  Rectal  exam remarkable for nonthrombosed external hemorrhoid.   FOBT positive. Will give Anusol suppository and cream for hemorrhoid and Percocet for pain. CBC without significant change from 14/43 3 days ago, today 13.7/42.3. INR 2 when drawn 3 days ago. Pt left AMA prior to CMP results.  Instructed to contact Dr. Garay  today regarding his CMP. Discharged in stable condition with instructions to follow up for further evaluation management with his oncologist and with his primary care and GI.  Patient denies recent antibiotic use, recent travel.  Abdomen soft and nontender. He has been undergoing chemotherapy for 3 years Unsure of etiology of patient's diarrhea however think this is likely viral syndrome.  Return to ER for worsening symptoms or as needed.                      Clinical Impression:       ICD-10-CM ICD-9-CM   1. External hemorrhoid K64.4 455.3   2. Diarrhea, unspecified type R19.7 787.91         Disposition:   Disposition: AMA  Condition: Stable                        Lisa Hoyt PA-C  03/08/19 0810

## 2019-03-08 NOTE — DISCHARGE INSTRUCTIONS
You left before receiving your results of your CMP/electrolytes.   You should contact your primary care today and ask them to check the results of your labs because you have been having diarrhea for awhile. Tell them you had some rectal bleeding too.     Use Anusol for hemorrhoid, take Percocet for pain, Immodium for diarrhea.   Follow up with primary care in 2 days and GI and colon rectal surgery for further evaluation and management.   Return to ER for worsening symptoms, dizziness, lightheadedness, fainting, chest pain, shortness of breath, heavier bleeding or as needed.

## 2019-03-11 ENCOUNTER — TELEPHONE (OUTPATIENT)
Dept: HEMATOLOGY/ONCOLOGY | Facility: CLINIC | Age: 44
End: 2019-03-11

## 2019-03-11 ENCOUNTER — OFFICE VISIT (OUTPATIENT)
Dept: HEMATOLOGY/ONCOLOGY | Facility: CLINIC | Age: 44
End: 2019-03-11
Payer: MEDICARE

## 2019-03-11 ENCOUNTER — TELEPHONE (OUTPATIENT)
Dept: ENDOSCOPY | Facility: HOSPITAL | Age: 44
End: 2019-03-11

## 2019-03-11 VITALS
WEIGHT: 181.69 LBS | HEART RATE: 83 BPM | TEMPERATURE: 98 F | HEIGHT: 67 IN | SYSTOLIC BLOOD PRESSURE: 123 MMHG | RESPIRATION RATE: 17 BRPM | OXYGEN SATURATION: 98 % | DIASTOLIC BLOOD PRESSURE: 85 MMHG | BODY MASS INDEX: 28.52 KG/M2

## 2019-03-11 DIAGNOSIS — E03.9 HYPOTHYROIDISM, UNSPECIFIED TYPE: ICD-10-CM

## 2019-03-11 DIAGNOSIS — D50.0 IRON DEFICIENCY ANEMIA DUE TO CHRONIC BLOOD LOSS: ICD-10-CM

## 2019-03-11 DIAGNOSIS — K92.1 BLOOD IN STOOL: Primary | ICD-10-CM

## 2019-03-11 DIAGNOSIS — C34.91 PRIMARY MALIGNANT NEOPLASM OF RIGHT LUNG METASTATIC TO OTHER SITE: ICD-10-CM

## 2019-03-11 DIAGNOSIS — C79.51 SECONDARY MALIGNANT NEOPLASM OF BONE: ICD-10-CM

## 2019-03-11 DIAGNOSIS — Z12.11 SPECIAL SCREENING FOR MALIGNANT NEOPLASMS, COLON: Primary | ICD-10-CM

## 2019-03-11 PROCEDURE — 99999 PR PBB SHADOW E&M-EST. PATIENT-LVL V: ICD-10-PCS | Mod: PBBFAC,,, | Performed by: INTERNAL MEDICINE

## 2019-03-11 PROCEDURE — 99215 PR OFFICE/OUTPT VISIT, EST, LEVL V, 40-54 MIN: ICD-10-PCS | Mod: S$PBB,,, | Performed by: INTERNAL MEDICINE

## 2019-03-11 PROCEDURE — 99215 OFFICE O/P EST HI 40 MIN: CPT | Mod: S$PBB,,, | Performed by: INTERNAL MEDICINE

## 2019-03-11 PROCEDURE — 99999 PR PBB SHADOW E&M-EST. PATIENT-LVL V: CPT | Mod: PBBFAC,,, | Performed by: INTERNAL MEDICINE

## 2019-03-11 PROCEDURE — 99215 OFFICE O/P EST HI 40 MIN: CPT | Mod: PBBFAC | Performed by: INTERNAL MEDICINE

## 2019-03-11 RX ORDER — LEVOTHYROXINE SODIUM 100 UG/1
100 TABLET ORAL DAILY
Qty: 30 TABLET | Refills: 11 | Status: SHIPPED | OUTPATIENT
Start: 2019-03-11 | End: 2019-06-04 | Stop reason: SDUPTHER

## 2019-03-11 RX ORDER — POLYETHYLENE GLYCOL 3350, SODIUM SULFATE ANHYDROUS, SODIUM BICARBONATE, SODIUM CHLORIDE, POTASSIUM CHLORIDE 236; 22.74; 6.74; 5.86; 2.97 G/4L; G/4L; G/4L; G/4L; G/4L
4 POWDER, FOR SOLUTION ORAL ONCE
Qty: 4000 ML | Refills: 0 | Status: SHIPPED | OUTPATIENT
Start: 2019-03-11 | End: 2019-03-11

## 2019-03-11 RX ORDER — OXYCODONE AND ACETAMINOPHEN 10; 325 MG/1; MG/1
1 TABLET ORAL EVERY 4 HOURS PRN
COMMUNITY
End: 2019-03-26 | Stop reason: SDUPTHER

## 2019-03-11 NOTE — TELEPHONE ENCOUNTER
Pt needs to be scheduled for an EGD & colonoscopy.  Pt is currently taking Xarelto.  Per endoscopy protocol it should be held x 2 days.  Ok to hold?  Please advise.  Thanks

## 2019-03-11 NOTE — H&P (VIEW-ONLY)
"Subjective:       Patient ID: Yao Alan is a 44 y.o. male.    Chief Complaint: Lung Cancer; blood in stool x bright and dark blood; rectal pain 5/10; and ED 3/5 and 3/8  Oncologic History:  Mr. Yao Alan is a 43-year-old male who has a long history of smoking and was seen in the Pulmonary Clinic by Dr. Valle on 03/05/2015 with abnormal CT scan.    Apparently, he went to the [facility] in February with coughing as well as chest pain. A chest x-ray was done, which revealed a left upper lobe lung mass. Followup CT scan was done on 02/12/2015 and that revealed posterior superior mediastinal mass adjacent and prominent enlarged upper left mediastinal lymph nodes, abutting the aortic arch as well as left subclavian artery. A  CT scan of the abdomen was done on 03/03/2015 without contrast and that revealed nonobstructive nephrolithiasis, but a 2.1 cm lytic lesion involving the left iliac wing concerning for metastatic disease given the presence of emphysema and a mediastinal soft tissue mass on the CT chest from 02/12/2015. He saw Dr. Valle after that on 03/05/2015 and underwent an IR guided biopsy of the bone lesion on 03/17/2015. 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    His EGFR/EML/ALK is negative.    His PET scan on 3/25/15 revealed Left upper lobe lung lesion with an adjacent para-aortic lymph node, right anterior rib metastasis, right iliac metastasis, and pancreatic metastasis. A pancreatic cancer primary neoplasm is less likely.  MRI brain was negative for mets.  He completed 6 cycles of Carboplatin and Alimta and was on maintenance Alimta until end of March 2016.  His bone scan from 3/16 revealed stable disease. His CT scans also from end of March 2016 revealed "Interval enlargement of left upper lobe lung mass measuring 5.9 x 3.9 cm (previously 3.3 x 2.5 cm). This mass appears to invade the mediastinum and abutting the aortic arch and left subclavian " "artery"     He is now on Opdivo since March 2016              HPI Mr. Alan returns for follow up and Opdivo  He went to the ED twice in the last 10 days with bright and also dark blood in the stool. He thinks it is hemorrhoids.  He notes rectal pain with bowel movements. He denies abdomen pain    Review of Systems   Constitutional: Negative for appetite change, fatigue and unexpected weight change.   HENT: Negative for mouth sores.    Eyes: Negative for visual disturbance.   Respiratory: Negative for cough and shortness of breath.    Cardiovascular: Negative for chest pain.   Gastrointestinal: Positive for blood in stool and rectal pain. Negative for abdominal pain and diarrhea.   Genitourinary: Negative for frequency.   Musculoskeletal: Negative for back pain.   Skin: Negative for rash.   Neurological: Negative for headaches.   Hematological: Negative for adenopathy.   Psychiatric/Behavioral: The patient is not nervous/anxious.    All other systems reviewed and are negative.      Objective:      Physical Exam   Constitutional: He is oriented to person, place, and time. He appears well-developed and well-nourished.   HENT:   Mouth/Throat: No oropharyngeal exudate.   Cardiovascular: Normal rate and normal heart sounds.   Pulmonary/Chest: Effort normal and breath sounds normal. He has no wheezes.   Abdominal: Soft. Bowel sounds are normal. There is no tenderness.   Musculoskeletal: He exhibits no edema or tenderness.   Lymphadenopathy:     He has no cervical adenopathy.   Neurological: He is alert and oriented to person, place, and time. Coordination normal.   Skin: Skin is warm and dry. No rash noted.   Psychiatric: He has a normal mood and affect. Judgment and thought content normal.   Vitals reviewed.      LABS:  WBC   Date Value Ref Range Status   03/08/2019 11.25 3.90 - 12.70 K/uL Final     Hemoglobin   Date Value Ref Range Status   03/08/2019 13.7 (L) 14.0 - 18.0 g/dL Final     Hematocrit   Date Value Ref Range " Status   03/08/2019 42.3 40.0 - 54.0 % Final     Platelets   Date Value Ref Range Status   03/08/2019 274 150 - 350 K/uL Final     Gran # (ANC)   Date Value Ref Range Status   03/08/2019 6.6 1.8 - 7.7 K/uL Final     Gran%   Date Value Ref Range Status   03/08/2019 59.1 38.0 - 73.0 % Final       Chemistry        Component Value Date/Time     03/08/2019 0442    K 3.8 03/08/2019 0442     03/08/2019 0442    CO2 23 03/08/2019 0442    BUN 12 03/08/2019 0442    CREATININE 1.5 (H) 03/08/2019 0442    GLU 95 03/08/2019 0442        Component Value Date/Time    CALCIUM 9.2 03/08/2019 0442    ALKPHOS 66 03/08/2019 0442    AST 19 03/08/2019 0442    ALT 12 03/08/2019 0442    BILITOT 0.4 03/08/2019 0442    ESTGFRAFRICA >60 03/08/2019 0442    EGFRNONAA 56 (A) 03/08/2019 0442        TSH   Date Value Ref Range Status   03/08/2019 11.075 (H) 0.400 - 4.000 uIU/mL Final     Free T4   Date Value Ref Range Status   03/08/2019 0.75 0.71 - 1.51 ng/dL Final        Assessment:       1. Blood in stool    2. Primary malignant neoplasm of right lung metastatic to other site    3. Secondary malignant neoplasm of bone    4. Iron deficiency anemia due to chronic blood loss    5. Hypothyroidism, unspecified type        Plan:        1,2,3,4,5. Hold Treatment. He will undergo EGD and colonoscopy (especially for colitis) and then will resume after that.  5., Increase dose of Synthroid.    Above care plan was discussed with patient and all questions were addressed to his satisfaction

## 2019-03-11 NOTE — TELEPHONE ENCOUNTER
----- Message from Bel Garay MD sent at 3/11/2019  7:33 AM CDT -----  Cancel Opdivo. He will undergo EGD and colonoscopy this week or ASAP and then will need to see me with CBC,CMP and for Opdivo

## 2019-03-11 NOTE — TELEPHONE ENCOUNTER
Bel Garay MD routed this conversation to Me    Bel Garay MD          3/11/19 11:08 AM   Note      YEs OK to hold                 3/11/19 8:51 AM   You routed this conversation to Bel Garay MD    Me          3/11/19 8:50 AM   Note      Pt needs to be scheduled for an EGD & colonoscopy.  Pt is currently taking Xarelto.  Per endoscopy protocol it should be held x 2 days.  Ok to hold?  Please advise.  Thanks

## 2019-03-11 NOTE — Clinical Note
Cancel Opdivo. He will undergo EGD and colonoscopy this week or ASAP and then will need to see me with CBC,CMP and for Opdivo

## 2019-03-11 NOTE — PROGRESS NOTES
"Subjective:       Patient ID: Yao Alan is a 44 y.o. male.    Chief Complaint: Lung Cancer; blood in stool x bright and dark blood; rectal pain 5/10; and ED 3/5 and 3/8  Oncologic History:  Mr. Yao Alan is a 43-year-old male who has a long history of smoking and was seen in the Pulmonary Clinic by Dr. Valle on 03/05/2015 with abnormal CT scan.    Apparently, he went to the [facility] in February with coughing as well as chest pain. A chest x-ray was done, which revealed a left upper lobe lung mass. Followup CT scan was done on 02/12/2015 and that revealed posterior superior mediastinal mass adjacent and prominent enlarged upper left mediastinal lymph nodes, abutting the aortic arch as well as left subclavian artery. A  CT scan of the abdomen was done on 03/03/2015 without contrast and that revealed nonobstructive nephrolithiasis, but a 2.1 cm lytic lesion involving the left iliac wing concerning for metastatic disease given the presence of emphysema and a mediastinal soft tissue mass on the CT chest from 02/12/2015. He saw Dr. Valle after that on 03/05/2015 and underwent an IR guided biopsy of the bone lesion on 03/17/2015. 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    His EGFR/EML/ALK is negative.    His PET scan on 3/25/15 revealed Left upper lobe lung lesion with an adjacent para-aortic lymph node, right anterior rib metastasis, right iliac metastasis, and pancreatic metastasis. A pancreatic cancer primary neoplasm is less likely.  MRI brain was negative for mets.  He completed 6 cycles of Carboplatin and Alimta and was on maintenance Alimta until end of March 2016.  His bone scan from 3/16 revealed stable disease. His CT scans also from end of March 2016 revealed "Interval enlargement of left upper lobe lung mass measuring 5.9 x 3.9 cm (previously 3.3 x 2.5 cm). This mass appears to invade the mediastinum and abutting the aortic arch and left subclavian " "artery"     He is now on Opdivo since March 2016              HPI Mr. Alan returns for follow up and Opdivo  He went to the ED twice in the last 10 days with bright and also dark blood in the stool. He thinks it is hemorrhoids.  He notes rectal pain with bowel movements. He denies abdomen pain    Review of Systems   Constitutional: Negative for appetite change, fatigue and unexpected weight change.   HENT: Negative for mouth sores.    Eyes: Negative for visual disturbance.   Respiratory: Negative for cough and shortness of breath.    Cardiovascular: Negative for chest pain.   Gastrointestinal: Positive for blood in stool and rectal pain. Negative for abdominal pain and diarrhea.   Genitourinary: Negative for frequency.   Musculoskeletal: Negative for back pain.   Skin: Negative for rash.   Neurological: Negative for headaches.   Hematological: Negative for adenopathy.   Psychiatric/Behavioral: The patient is not nervous/anxious.    All other systems reviewed and are negative.      Objective:      Physical Exam   Constitutional: He is oriented to person, place, and time. He appears well-developed and well-nourished.   HENT:   Mouth/Throat: No oropharyngeal exudate.   Cardiovascular: Normal rate and normal heart sounds.   Pulmonary/Chest: Effort normal and breath sounds normal. He has no wheezes.   Abdominal: Soft. Bowel sounds are normal. There is no tenderness.   Musculoskeletal: He exhibits no edema or tenderness.   Lymphadenopathy:     He has no cervical adenopathy.   Neurological: He is alert and oriented to person, place, and time. Coordination normal.   Skin: Skin is warm and dry. No rash noted.   Psychiatric: He has a normal mood and affect. Judgment and thought content normal.   Vitals reviewed.      LABS:  WBC   Date Value Ref Range Status   03/08/2019 11.25 3.90 - 12.70 K/uL Final     Hemoglobin   Date Value Ref Range Status   03/08/2019 13.7 (L) 14.0 - 18.0 g/dL Final     Hematocrit   Date Value Ref Range " Status   03/08/2019 42.3 40.0 - 54.0 % Final     Platelets   Date Value Ref Range Status   03/08/2019 274 150 - 350 K/uL Final     Gran # (ANC)   Date Value Ref Range Status   03/08/2019 6.6 1.8 - 7.7 K/uL Final     Gran%   Date Value Ref Range Status   03/08/2019 59.1 38.0 - 73.0 % Final       Chemistry        Component Value Date/Time     03/08/2019 0442    K 3.8 03/08/2019 0442     03/08/2019 0442    CO2 23 03/08/2019 0442    BUN 12 03/08/2019 0442    CREATININE 1.5 (H) 03/08/2019 0442    GLU 95 03/08/2019 0442        Component Value Date/Time    CALCIUM 9.2 03/08/2019 0442    ALKPHOS 66 03/08/2019 0442    AST 19 03/08/2019 0442    ALT 12 03/08/2019 0442    BILITOT 0.4 03/08/2019 0442    ESTGFRAFRICA >60 03/08/2019 0442    EGFRNONAA 56 (A) 03/08/2019 0442        TSH   Date Value Ref Range Status   03/08/2019 11.075 (H) 0.400 - 4.000 uIU/mL Final     Free T4   Date Value Ref Range Status   03/08/2019 0.75 0.71 - 1.51 ng/dL Final        Assessment:       1. Blood in stool    2. Primary malignant neoplasm of right lung metastatic to other site    3. Secondary malignant neoplasm of bone    4. Iron deficiency anemia due to chronic blood loss    5. Hypothyroidism, unspecified type        Plan:        1,2,3,4,5. Hold Treatment. He will undergo EGD and colonoscopy (especially for colitis) and then will resume after that.  5., Increase dose of Synthroid.    Above care plan was discussed with patient and all questions were addressed to his satisfaction

## 2019-03-18 ENCOUNTER — TELEPHONE (OUTPATIENT)
Dept: HEMATOLOGY/ONCOLOGY | Facility: CLINIC | Age: 44
End: 2019-03-18

## 2019-03-18 ENCOUNTER — HOSPITAL ENCOUNTER (OUTPATIENT)
Facility: HOSPITAL | Age: 44
Discharge: HOME | End: 2019-03-18
Attending: INTERNAL MEDICINE | Admitting: INTERNAL MEDICINE
Payer: MEDICARE

## 2019-03-18 ENCOUNTER — ANESTHESIA EVENT (OUTPATIENT)
Dept: ENDOSCOPY | Facility: HOSPITAL | Age: 44
End: 2019-03-18
Payer: MEDICARE

## 2019-03-18 ENCOUNTER — ANESTHESIA (OUTPATIENT)
Dept: ENDOSCOPY | Facility: HOSPITAL | Age: 44
End: 2019-03-18
Payer: MEDICARE

## 2019-03-18 VITALS
RESPIRATION RATE: 20 BRPM | DIASTOLIC BLOOD PRESSURE: 100 MMHG | SYSTOLIC BLOOD PRESSURE: 141 MMHG | HEIGHT: 66 IN | BODY MASS INDEX: 28.77 KG/M2 | OXYGEN SATURATION: 100 % | WEIGHT: 179 LBS | TEMPERATURE: 98 F | HEART RATE: 63 BPM

## 2019-03-18 DIAGNOSIS — R91.8 LUNG MASS: Primary | ICD-10-CM

## 2019-03-18 DIAGNOSIS — D64.9 ANEMIA, UNSPECIFIED TYPE: ICD-10-CM

## 2019-03-18 DIAGNOSIS — Z12.11 COLON CANCER SCREENING: ICD-10-CM

## 2019-03-18 PROCEDURE — 88305 TISSUE EXAM BY PATHOLOGIST: CPT | Mod: 26,,, | Performed by: PATHOLOGY

## 2019-03-18 PROCEDURE — 27201012 HC FORCEPS, HOT/COLD, DISP: Performed by: INTERNAL MEDICINE

## 2019-03-18 PROCEDURE — 63600175 PHARM REV CODE 636 W HCPCS: Performed by: NURSE ANESTHETIST, CERTIFIED REGISTERED

## 2019-03-18 PROCEDURE — 37000009 HC ANESTHESIA EA ADD 15 MINS: Performed by: INTERNAL MEDICINE

## 2019-03-18 PROCEDURE — 88342 IMHCHEM/IMCYTCHM 1ST ANTB: CPT | Mod: 26,,, | Performed by: PATHOLOGY

## 2019-03-18 PROCEDURE — 88342 TISSUE SPECIMEN TO PATHOLOGY - SURGERY: ICD-10-PCS | Mod: 26,,, | Performed by: PATHOLOGY

## 2019-03-18 PROCEDURE — 88342 IMHCHEM/IMCYTCHM 1ST ANTB: CPT | Performed by: PATHOLOGY

## 2019-03-18 PROCEDURE — 37000008 HC ANESTHESIA 1ST 15 MINUTES: Performed by: INTERNAL MEDICINE

## 2019-03-18 PROCEDURE — 25000003 PHARM REV CODE 250: Performed by: INTERNAL MEDICINE

## 2019-03-18 PROCEDURE — 45378 PR COLONOSCOPY,DIAGNOSTIC: ICD-10-PCS | Mod: ,,, | Performed by: INTERNAL MEDICINE

## 2019-03-18 PROCEDURE — 43239 EGD BIOPSY SINGLE/MULTIPLE: CPT | Mod: 51,,, | Performed by: INTERNAL MEDICINE

## 2019-03-18 PROCEDURE — 43239 PR EGD, FLEX, W/BIOPSY, SGL/MULTI: ICD-10-PCS | Mod: 51,,, | Performed by: INTERNAL MEDICINE

## 2019-03-18 PROCEDURE — 43239 EGD BIOPSY SINGLE/MULTIPLE: CPT | Performed by: INTERNAL MEDICINE

## 2019-03-18 PROCEDURE — 88305 TISSUE SPECIMEN TO PATHOLOGY - SURGERY: ICD-10-PCS | Mod: 26,,, | Performed by: PATHOLOGY

## 2019-03-18 PROCEDURE — E9220 PRA ENDO ANESTHESIA: HCPCS | Mod: ,,, | Performed by: NURSE ANESTHETIST, CERTIFIED REGISTERED

## 2019-03-18 PROCEDURE — E9220 PRA ENDO ANESTHESIA: ICD-10-PCS | Mod: ,,, | Performed by: NURSE ANESTHETIST, CERTIFIED REGISTERED

## 2019-03-18 PROCEDURE — 45378 DIAGNOSTIC COLONOSCOPY: CPT | Mod: ,,, | Performed by: INTERNAL MEDICINE

## 2019-03-18 PROCEDURE — 45378 DIAGNOSTIC COLONOSCOPY: CPT | Performed by: INTERNAL MEDICINE

## 2019-03-18 PROCEDURE — 25000003 PHARM REV CODE 250: Performed by: NURSE ANESTHETIST, CERTIFIED REGISTERED

## 2019-03-18 PROCEDURE — 88305 TISSUE EXAM BY PATHOLOGIST: CPT | Performed by: PATHOLOGY

## 2019-03-18 RX ORDER — SODIUM CHLORIDE 0.9 % (FLUSH) 0.9 %
3 SYRINGE (ML) INJECTION
Status: DISCONTINUED | OUTPATIENT
Start: 2019-03-18 | End: 2019-03-18 | Stop reason: HOSPADM

## 2019-03-18 RX ORDER — GLYCOPYRROLATE 0.2 MG/ML
INJECTION INTRAMUSCULAR; INTRAVENOUS
Status: DISCONTINUED | OUTPATIENT
Start: 2019-03-18 | End: 2019-03-18

## 2019-03-18 RX ORDER — SODIUM CHLORIDE 9 MG/ML
INJECTION, SOLUTION INTRAVENOUS CONTINUOUS
Status: DISCONTINUED | OUTPATIENT
Start: 2019-03-18 | End: 2019-03-18 | Stop reason: HOSPADM

## 2019-03-18 RX ORDER — PROPOFOL 10 MG/ML
VIAL (ML) INTRAVENOUS
Status: DISCONTINUED | OUTPATIENT
Start: 2019-03-18 | End: 2019-03-18

## 2019-03-18 RX ORDER — PROPOFOL 10 MG/ML
VIAL (ML) INTRAVENOUS CONTINUOUS PRN
Status: DISCONTINUED | OUTPATIENT
Start: 2019-03-18 | End: 2019-03-18

## 2019-03-18 RX ORDER — LIDOCAINE HCL/PF 100 MG/5ML
SYRINGE (ML) INTRAVENOUS
Status: DISCONTINUED | OUTPATIENT
Start: 2019-03-18 | End: 2019-03-18

## 2019-03-18 RX ADMIN — GLYCOPYRROLATE 0.2 MG: 0.2 INJECTION, SOLUTION INTRAMUSCULAR; INTRAVENOUS at 11:03

## 2019-03-18 RX ADMIN — LIDOCAINE HYDROCHLORIDE 80 MG: 20 INJECTION, SOLUTION INTRAVENOUS at 11:03

## 2019-03-18 RX ADMIN — PROPOFOL 175 MCG/KG/MIN: 10 INJECTION, EMULSION INTRAVENOUS at 11:03

## 2019-03-18 RX ADMIN — SODIUM CHLORIDE: 0.9 INJECTION, SOLUTION INTRAVENOUS at 10:03

## 2019-03-18 RX ADMIN — PROPOFOL 80 MG: 10 INJECTION, EMULSION INTRAVENOUS at 11:03

## 2019-03-18 NOTE — TELEPHONE ENCOUNTER
----- Message from Edith Pierson sent at 3/18/2019  1:10 PM CDT -----  Contact: Pt [phone]  Pt called to let MD/RN know that he had  the colonoscopy and discharged. Will need to schedule follow up appt.      Thank you

## 2019-03-18 NOTE — PROVATION PATIENT INSTRUCTIONS
Discharge Summary/Instructions after an Endoscopic Procedure  Patient Name: Yao Alan  Patient MRN: [id number]  Patient YOB: 1975 Monday, March 18, 2019  Indra Bowen MD  RESTRICTIONS:  During your procedure today, you received medications for sedation.  These   medications may affect your judgment, balance and coordination.  Therefore,   for 24 hours, you have the following restrictions:   - DO NOT drive a car, operate machinery, make legal/financial decisions,   sign important papers or drink alcohol.    ACTIVITY:  Today: no heavy lifting, straining or running due to procedural   sedation/anesthesia.  The following day: return to full activity including work.  DIET:  Eat and drink normally unless instructed otherwise.     TREATMENT FOR COMMON SIDE EFFECTS:  - Mild abdominal pain, nausea, belching, bloating or excessive gas:  rest,   eat lightly and use a heating pad.  - Sore Throat: treat with throat lozenges and/or gargle with warm salt   water.  - Because air was used during the procedure, expelling large amounts of air   from your rectum or belching is normal.  - If a bowel prep was taken, you may not have a bowel movement for 1-3 days.    This is normal.  SYMPTOMS TO WATCH FOR AND REPORT TO YOUR PHYSICIAN:  1. Abdominal pain or bloating, other than gas cramps.  2. Chest pain.  3. Back pain.  4. Signs of infection such as: chills or fever occurring within 24 hours   after the procedure.  5. Rectal bleeding, which would show as bright red, maroon, or black stools.   (A tablespoon of blood from the rectum is not serious, especially if   hemorrhoids are present.)  6. Vomiting.  7. Weakness or dizziness.  GO DIRECTLY TO THE NEAREST EMERGENCY ROOM IF YOU HAVE ANY OF THE FOLLOWING:      Difficulty breathing              Chills and/or fever over 101 F   Persistent vomiting and/or vomiting blood   Severe abdominal pain   Severe chest pain   Black, tarry stools   Bleeding- more than one tablespoon   Any other  symptom or condition that you feel may need urgent attention  Your doctor recommends these additional instructions:  If any biopsies were taken, your doctors clinic will contact you in 1 to 2   weeks with any results.  - Patient has a contact number available for emergencies.  The signs and   symptoms of potential delayed complications were discussed with the   patient.  Return to normal activities tomorrow.  Written discharge   instructions were provided to the patient.   - Discharge patient to home.   - Repeat colonoscopy in 10 years for screening purposes.   - The findings and recommendations were discussed with the designated   responsible adult.   - Resume Xarelto (rivaroxaban) at prior dose in 2 days.  For questions, problems or results please call your physician - Indra Bowen MD at Work:  [phone].  [facility], EMERGENCY ROOM PHONE NUMBER: [phone]  IF A COMPLICATION OR EMERGENCY SITUATION ARISES AND YOU ARE UNABLE TO REACH   YOUR PHYSICIAN - GO DIRECTLY TO THE EMERGENCY ROOM.  Indra Bowen MD  3/18/2019 12:05:57 PM  This report has been verified and signed electronically.  [employer]

## 2019-03-18 NOTE — DISCHARGE INSTRUCTIONS
Understanding Colon and Rectal Polyps    The colon (also called the large intestine) is a muscular tube that forms the last part of the digestive tract. It absorbs water and stores food waste. The colon is about 4 to 6 feet long. The rectum is the last 6 inches of the colon. The colon and rectum have a smooth lining composed of millions of cells. Changes in these cells can lead to growths in the colon that can become cancerous and should be removed. Multiple tests are available to screen for colon cancer, but the colonoscopy is the most recommended test. During colonoscopy, these polyps can be removed. How often you need this test depends on many things including your condition, your family history, symptoms, and what the findings were at the previous colonoscopy.   When the colon lining changes  Changes that happen in the cells that line the colon or rectum can lead to growths called polyps. Over a period of years, polyps can turn cancerous. Removing polyps early may prevent cancer from ever forming.  Polyps  Polyps are fleshy clumps of tissue that form on the lining of the colon or rectum. Small polyps are usually benign (not cancerous). However, over time, cells in a polyp can change and become cancerous. Certain types of polyps known as adenomatous polyps are premalignant. The risk for invasive cancer increases with the size of the polyp and certain cell and gene features. This means that they can become cancerous if they're not removed. Hyperplastic polyps are benign. They can grow quite large and not turn cancerous.   Cancer  Almost all colorectal cancers start when polyp cells begin growing abnormally. As a cancerous tumor grows, it may involve more and more of the colon or rectum. In time, cancer can also grow beyond the colon or rectum and spread to nearby organs or to glands called lymph nodes. The cells can also travel to other parts of the body. This is known as metastasis. The earlier a cancerous  tumor is removed, the better the chance of preventing its spread.    Date Last Reviewed: 8/1/2016  © 8765-6716 [employer]. [address] [place] [address], [place] [address]. All rights reserved. This information is not intended as a substitute for professional medical care. Always follow your healthcare professional's instructions.        Upper GI Endoscopy     During endoscopy, a long, flexible tube is used to view the inside of your upper GI tract.      Upper GI endoscopy allows your healthcare provider to look directly into the beginning of your gastrointestinal (GI) tract. The esophagus, stomach, and duodenum (the first part of the small intestine) make up the upper GI tract.   Before the exam  Follow these and any other instructions you are given before your endoscopy. If you dont follow the healthcare providers instructions carefully, the test may need to be canceled or done over:  · Don't eat or drink anything after midnight the night before your exam. If your exam is in the afternoon, drink only clear liquids in the morning. Don't eat or drink anything for 8 hours before the exam. In some cases, you may be able to take medicines with sips of water until 2 hours before the procedure. Speak with your healthcare provider about this.   · Bring your X-rays and any other test results you have.  · Because you will be sedated, arrange for an adult to drive you home after the exam.  · Tell your healthcare provider before the exam if you are taking any medicines or have any medical problems.  The procedure  Here is what to expect:  · You will lie on the endoscopy table. Usually patients lie on the left side.  · You will be monitored and given oxygen.  · Your throat may be numbed with a spray or gargle. You are given medicine through an intravenous (IV) line that will help you relax and remain comfortable. You may be awake or asleep during the procedure.  · The healthcare provider will put the endoscope in  your mouth and down your esophagus. It is thinner than most pieces of food that you swallow. It will not affect your breathing. The medicine helps keep you from gagging.  · Air is put into your GI tract to expand it. It can make you burp.  · During the procedure, the healthcare provider can take biopsies (tissue samples), remove abnormalities, such as polyps, or treat abnormalities through a variety of devices placed through the endoscope. You will not feel this.   · The endoscope carries images of your upper GI tract to a video screen. If you are awake, you may be able to look at the images.  · After the procedure is done, you will rest for a time. An adult must drive you home.  When to call your healthcare provider  Contact your healthcare provider if you have:  · Black or tarry stools, or blood in your stool  · Fever  · Pain in your belly that does not go away  · Nausea and vomiting, or vomiting blood   Date Last Reviewed: 7/1/2016  © 6859-9942 [employer]. [address] [place] [address], [place] [address]. All rights reserved. This information is not intended as a substitute for professional medical care. Always follow your healthcare professional's instructions.
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
## 2019-03-18 NOTE — INTERVAL H&P NOTE
The patient has been examined and the H&P has been reviewed:    I concur with the findings and no changes have occurred since H&P was written.     History and Exam unchanged from visit.    Procedure - EGD/Colonoscopy  Neck - supple  Plan of anesthesia - General  ASA - per anesthesia  Mallampati - per anesthesia  Anesthesia problems - no  Family history of anesthesia problems - no      Anesthesia/Surgery risks, benefits and alternative options discussed and understood by patient/family.          Active Hospital Problems    Diagnosis  POA    Colon cancer screening [Z12.11]  Not Applicable      Resolved Hospital Problems   No resolved problems to display.

## 2019-03-18 NOTE — PLAN OF CARE
Delivery note    Valentine is a 26 year old  at 39w0d weeks who presented to labor and delivery for IOL.  She progressed well through labor.  For pain relief she had an epidural..  She progressed to complete.  She pushed well for approximately 1 hour and had a spontaneous vaginal delivery over an intact perineum. The infant was suctioned thoroughly after delivery and was passed to the mother.  The cord was clamped and cut.  There was spontaneous delivery of an intact placenta with three-vessel cord.  There was no tear noted.   The delivery resulted in an infant male who was found to weigh 7 pounds and 1 ounces and was assigned Apgars of 7 and 8.  The patient and her child were left stable in the labor and delivery room. Estimated blood loss was 100 mL.   Problem: Patient Care Overview (Adult)  Goal: Adult Individualization and Mutuality  1. PAC  2. Likes warm blankets   Outcome: Ongoing (interventions implemented as appropriate)  Labs , hx, and medications reviewed. Assessment completed. Discussed plan of care with patient. Patient in agreement. VSS.  Chair reclined and warm blanket and snack offered. Will cont to monitor

## 2019-03-18 NOTE — TRANSFER OF CARE
"Anesthesia Transfer of Care Note    Patient: Yao Alan    Procedure(s) Performed: Procedure(s) (LRB):  ESOPHAGOGASTRODUODENOSCOPY (EGD) (N/A)  COLONOSCOPY (N/A)    Patient location: GI    Anesthesia Type: general    Transport from OR: Transported from OR on room air with adequate spontaneous ventilation    Post pain: adequate analgesia    Post assessment: no apparent anesthetic complications and tolerated procedure well    Post vital signs: stable    Level of consciousness: responds to stimulation and sedated    Nausea/Vomiting: no nausea/vomiting    Complications: none    Transfer of care protocol was followed      Last vitals:   Visit Vitals  BP (!) 140/98 (BP Location: Left arm, Patient Position: Lying)   Pulse 72   Temp 36.5 °C (97.7 °F) (Temporal)   Resp 16   Ht 5' 6" (1.676 m)   Wt 81.2 kg (179 lb)   SpO2 98%   BMI 28.89 kg/m²     "

## 2019-03-18 NOTE — PROVATION PATIENT INSTRUCTIONS
Discharge Summary/Instructions after an Endoscopic Procedure  Patient Name: Yao Alan  Patient MRN: [id number]  Patient YOB: 1975 Monday, March 18, 2019  Indra Bowen MD  RESTRICTIONS:  During your procedure today, you received medications for sedation.  These   medications may affect your judgment, balance and coordination.  Therefore,   for 24 hours, you have the following restrictions:   - DO NOT drive a car, operate machinery, make legal/financial decisions,   sign important papers or drink alcohol.    ACTIVITY:  Today: no heavy lifting, straining or running due to procedural   sedation/anesthesia.  The following day: return to full activity including work.  DIET:  Eat and drink normally unless instructed otherwise.     TREATMENT FOR COMMON SIDE EFFECTS:  - Mild abdominal pain, nausea, belching, bloating or excessive gas:  rest,   eat lightly and use a heating pad.  - Sore Throat: treat with throat lozenges and/or gargle with warm salt   water.  - Because air was used during the procedure, expelling large amounts of air   from your rectum or belching is normal.  - If a bowel prep was taken, you may not have a bowel movement for 1-3 days.    This is normal.  SYMPTOMS TO WATCH FOR AND REPORT TO YOUR PHYSICIAN:  1. Abdominal pain or bloating, other than gas cramps.  2. Chest pain.  3. Back pain.  4. Signs of infection such as: chills or fever occurring within 24 hours   after the procedure.  5. Rectal bleeding, which would show as bright red, maroon, or black stools.   (A tablespoon of blood from the rectum is not serious, especially if   hemorrhoids are present.)  6. Vomiting.  7. Weakness or dizziness.  GO DIRECTLY TO THE NEAREST EMERGENCY ROOM IF YOU HAVE ANY OF THE FOLLOWING:      Difficulty breathing              Chills and/or fever over 101 F   Persistent vomiting and/or vomiting blood   Severe abdominal pain   Severe chest pain   Black, tarry stools   Bleeding- more than one tablespoon   Any other  symptom or condition that you feel may need urgent attention  Your doctor recommends these additional instructions:  If any biopsies were taken, your doctors clinic will contact you in 1 to 2   weeks with any results.  - Discharge patient to home.   - Await pathology results.   - Perform a colonoscopy today.   - The findings and recommendations were discussed with the designated   responsible adult.  For questions, problems or results please call your physician - Indra Bowen MD at Work:  [phone].  [facility], EMERGENCY ROOM PHONE NUMBER: [phone]  IF A COMPLICATION OR EMERGENCY SITUATION ARISES AND YOU ARE UNABLE TO REACH   YOUR PHYSICIAN - GO DIRECTLY TO THE EMERGENCY ROOM.  Indra Bowen MD  3/18/2019 11:50:12 AM  This report has been verified and signed electronically.  [employer]

## 2019-03-18 NOTE — ANESTHESIA POSTPROCEDURE EVALUATION
"Anesthesia Post Evaluation    Patient: Yao Alan    Procedure(s) Performed: Procedure(s) (LRB):  ESOPHAGOGASTRODUODENOSCOPY (EGD) (N/A)  COLONOSCOPY (N/A)    Final Anesthesia Type: general  Patient location during evaluation: PACU  Patient participation: Yes- Able to Participate  Level of consciousness: awake and alert  Post-procedure vital signs: reviewed and stable  Pain management: adequate  Airway patency: patent  PONV status at discharge: No PONV  Anesthetic complications: no      Cardiovascular status: hemodynamically stable  Respiratory status: unassisted  Hydration status: euvolemic  Follow-up not needed.        Visit Vitals  BP (!) 141/100   Pulse 63   Temp 36.6 °C (97.9 °F) (Temporal)   Resp 20   Ht 5' 6" (1.676 m)   Wt 81.2 kg (179 lb)   SpO2 100%   BMI 28.89 kg/m²       Pain/Ethan Score: Ethan Score: 10 (3/18/2019 12:45 PM)  Modified Ethna Score: 15 (3/18/2019 12:15 PM)        "

## 2019-03-25 ENCOUNTER — TELEPHONE (OUTPATIENT)
Dept: ENDOSCOPY | Facility: HOSPITAL | Age: 44
End: 2019-03-25

## 2019-03-25 ENCOUNTER — LAB VISIT (OUTPATIENT)
Dept: LAB | Facility: HOSPITAL | Age: 44
End: 2019-03-25
Attending: INTERNAL MEDICINE
Payer: MEDICARE

## 2019-03-25 DIAGNOSIS — C34.90 MALIGNANT NEOPLASM OF LUNG, UNSPECIFIED LATERALITY, UNSPECIFIED PART OF LUNG: ICD-10-CM

## 2019-03-25 LAB
ALBUMIN SERPL BCP-MCNC: 4.1 G/DL (ref 3.5–5.2)
ALP SERPL-CCNC: 63 U/L (ref 55–135)
ALT SERPL W/O P-5'-P-CCNC: 20 U/L (ref 10–44)
ANION GAP SERPL CALC-SCNC: 10 MMOL/L (ref 8–16)
AST SERPL-CCNC: 20 U/L (ref 10–40)
BILIRUB SERPL-MCNC: 0.3 MG/DL (ref 0.1–1)
BUN SERPL-MCNC: 12 MG/DL (ref 6–20)
CALCIUM SERPL-MCNC: 9.6 MG/DL (ref 8.7–10.5)
CHLORIDE SERPL-SCNC: 106 MMOL/L (ref 95–110)
CO2 SERPL-SCNC: 23 MMOL/L (ref 23–29)
CREAT SERPL-MCNC: 1.4 MG/DL (ref 0.5–1.4)
ERYTHROCYTE [DISTWIDTH] IN BLOOD BY AUTOMATED COUNT: 14.2 % (ref 11.5–14.5)
EST. GFR  (AFRICAN AMERICAN): >60 ML/MIN/1.73 M^2
EST. GFR  (NON AFRICAN AMERICAN): >60 ML/MIN/1.73 M^2
GLUCOSE SERPL-MCNC: 84 MG/DL (ref 70–110)
HCT VFR BLD AUTO: 41.8 % (ref 40–54)
HGB BLD-MCNC: 13.4 G/DL (ref 14–18)
MCH RBC QN AUTO: 26.6 PG (ref 27–31)
MCHC RBC AUTO-ENTMCNC: 32.1 G/DL (ref 32–36)
MCV RBC AUTO: 83 FL (ref 82–98)
NEUTROPHILS # BLD AUTO: 3.6 K/UL (ref 1.8–7.7)
PLATELET # BLD AUTO: 272 K/UL (ref 150–350)
PMV BLD AUTO: 10.2 FL (ref 9.2–12.9)
POTASSIUM SERPL-SCNC: 4.2 MMOL/L (ref 3.5–5.1)
PROT SERPL-MCNC: 7.5 G/DL (ref 6–8.4)
RBC # BLD AUTO: 5.03 M/UL (ref 4.6–6.2)
SODIUM SERPL-SCNC: 139 MMOL/L (ref 136–145)
WBC # BLD AUTO: 6.67 K/UL (ref 3.9–12.7)

## 2019-03-25 PROCEDURE — 80053 COMPREHEN METABOLIC PANEL: CPT

## 2019-03-25 PROCEDURE — 36415 COLL VENOUS BLD VENIPUNCTURE: CPT

## 2019-03-25 PROCEDURE — 85027 COMPLETE CBC AUTOMATED: CPT

## 2019-03-26 ENCOUNTER — TELEPHONE (OUTPATIENT)
Dept: HEMATOLOGY/ONCOLOGY | Facility: CLINIC | Age: 44
End: 2019-03-26

## 2019-03-26 ENCOUNTER — OFFICE VISIT (OUTPATIENT)
Dept: HEMATOLOGY/ONCOLOGY | Facility: CLINIC | Age: 44
End: 2019-03-26
Payer: MEDICARE

## 2019-03-26 ENCOUNTER — DOCUMENTATION ONLY (OUTPATIENT)
Dept: HEMATOLOGY/ONCOLOGY | Facility: CLINIC | Age: 44
End: 2019-03-26

## 2019-03-26 ENCOUNTER — INFUSION (OUTPATIENT)
Dept: INFUSION THERAPY | Facility: HOSPITAL | Age: 44
End: 2019-03-26
Attending: INTERNAL MEDICINE
Payer: MEDICARE

## 2019-03-26 VITALS
SYSTOLIC BLOOD PRESSURE: 134 MMHG | RESPIRATION RATE: 18 BRPM | BODY MASS INDEX: 28.89 KG/M2 | HEIGHT: 67 IN | HEART RATE: 71 BPM | WEIGHT: 184.06 LBS | DIASTOLIC BLOOD PRESSURE: 93 MMHG | OXYGEN SATURATION: 100 % | TEMPERATURE: 97 F

## 2019-03-26 DIAGNOSIS — C79.51 SECONDARY MALIGNANT NEOPLASM OF BONE: ICD-10-CM

## 2019-03-26 DIAGNOSIS — C34.91 LUNG CANCER, PRIMARY, WITH METASTASIS FROM LUNG TO OTHER SITE, RIGHT: ICD-10-CM

## 2019-03-26 DIAGNOSIS — R52 PAIN: ICD-10-CM

## 2019-03-26 DIAGNOSIS — D50.0 IRON DEFICIENCY ANEMIA DUE TO CHRONIC BLOOD LOSS: Primary | ICD-10-CM

## 2019-03-26 DIAGNOSIS — K04.7 TOOTH ABSCESS: ICD-10-CM

## 2019-03-26 DIAGNOSIS — E03.2 HYPOTHYROIDISM DUE TO MEDICATION: ICD-10-CM

## 2019-03-26 DIAGNOSIS — C34.91 PRIMARY MALIGNANT NEOPLASM OF RIGHT LUNG METASTATIC TO OTHER SITE: Primary | ICD-10-CM

## 2019-03-26 DIAGNOSIS — C34.11 MALIGNANT NEOPLASM OF UPPER LOBE OF RIGHT LUNG: ICD-10-CM

## 2019-03-26 DIAGNOSIS — I82.402 DEEP VEIN THROMBOSIS (DVT) OF LEFT LOWER EXTREMITY, UNSPECIFIED CHRONICITY, UNSPECIFIED VEIN: ICD-10-CM

## 2019-03-26 PROCEDURE — 96413 CHEMO IV INFUSION 1 HR: CPT

## 2019-03-26 PROCEDURE — 99215 PR OFFICE/OUTPT VISIT, EST, LEVL V, 40-54 MIN: ICD-10-PCS | Mod: S$PBB,,, | Performed by: INTERNAL MEDICINE

## 2019-03-26 PROCEDURE — 63600175 PHARM REV CODE 636 W HCPCS: Mod: JG | Performed by: INTERNAL MEDICINE

## 2019-03-26 PROCEDURE — 99215 OFFICE O/P EST HI 40 MIN: CPT | Mod: S$PBB,,, | Performed by: INTERNAL MEDICINE

## 2019-03-26 PROCEDURE — 99214 OFFICE O/P EST MOD 30 MIN: CPT | Mod: PBBFAC,25 | Performed by: INTERNAL MEDICINE

## 2019-03-26 PROCEDURE — 99999 PR PBB SHADOW E&M-EST. PATIENT-LVL IV: ICD-10-PCS | Mod: PBBFAC,,, | Performed by: INTERNAL MEDICINE

## 2019-03-26 PROCEDURE — 96372 THER/PROPH/DIAG INJ SC/IM: CPT | Mod: 59

## 2019-03-26 PROCEDURE — A4216 STERILE WATER/SALINE, 10 ML: HCPCS | Performed by: INTERNAL MEDICINE

## 2019-03-26 PROCEDURE — 25000003 PHARM REV CODE 250: Performed by: INTERNAL MEDICINE

## 2019-03-26 PROCEDURE — 99999 PR PBB SHADOW E&M-EST. PATIENT-LVL IV: CPT | Mod: PBBFAC,,, | Performed by: INTERNAL MEDICINE

## 2019-03-26 RX ORDER — SODIUM CHLORIDE 0.9 % (FLUSH) 0.9 %
10 SYRINGE (ML) INJECTION
Status: DISCONTINUED | OUTPATIENT
Start: 2019-03-26 | End: 2019-03-26 | Stop reason: HOSPADM

## 2019-03-26 RX ORDER — HEPARIN 100 UNIT/ML
500 SYRINGE INTRAVENOUS
Status: CANCELLED | OUTPATIENT
Start: 2019-03-26

## 2019-03-26 RX ORDER — AMOXICILLIN AND CLAVULANATE POTASSIUM 875; 125 MG/1; MG/1
1 TABLET, FILM COATED ORAL 2 TIMES DAILY
Qty: 20 TABLET | Refills: 0 | Status: SHIPPED | OUTPATIENT
Start: 2019-03-26 | End: 2019-04-05

## 2019-03-26 RX ORDER — HEPARIN 100 UNIT/ML
500 SYRINGE INTRAVENOUS
Status: DISCONTINUED | OUTPATIENT
Start: 2019-03-26 | End: 2019-03-26 | Stop reason: HOSPADM

## 2019-03-26 RX ORDER — SODIUM CHLORIDE 0.9 % (FLUSH) 0.9 %
10 SYRINGE (ML) INJECTION
Status: CANCELLED | OUTPATIENT
Start: 2019-03-26

## 2019-03-26 RX ORDER — OXYCODONE AND ACETAMINOPHEN 10; 325 MG/1; MG/1
1 TABLET ORAL EVERY 4 HOURS PRN
Qty: 40 TABLET | Refills: 0 | Status: SHIPPED | OUTPATIENT
Start: 2019-03-26 | End: 2019-06-04

## 2019-03-26 RX ADMIN — SODIUM CHLORIDE 240 MG: 9 INJECTION, SOLUTION INTRAVENOUS at 09:03

## 2019-03-26 RX ADMIN — SODIUM CHLORIDE: 9 INJECTION, SOLUTION INTRAVENOUS at 09:03

## 2019-03-26 RX ADMIN — DENOSUMAB 120 MG: 120 INJECTION SUBCUTANEOUS at 09:03

## 2019-03-26 RX ADMIN — Medication 10 ML: at 09:03

## 2019-03-26 RX ADMIN — HEPARIN 500 UNITS: 100 SYRINGE at 09:03

## 2019-03-26 NOTE — PLAN OF CARE
Problem: Adult Inpatient Plan of Care  Goal: Plan of Care Review  Outcome: Ongoing (interventions implemented as appropriate)  Pt tolerated opdivo without complications. VSS. No s/s of reaction. xgeva given subcu in right arm. Instructed to contact MD with any questions. PAC heparin locked and de-accessed. AVS given to patient.

## 2019-03-26 NOTE — PROGRESS NOTES
Received call from patient. He stated that when he checked in for his appointments he was told to go talk with the  but no one as there/answered when he went to her office door. I do not know what he needs to speak with her about. Noted that Tere was on line in [employer] and sent her a message; she replied that her supervisor assigned her to cover another area today and she will call patient. Provided her with patient's cell phone number. Explained this to patient and provided him with Tere's office phone number in the [facility]. Patient asked about Dr. Garay cutting down on one of his prescriptions and can it be changed back. I advised patient to call the clinic and ask to speak with Dr. Garay's RN Yasmin, as this is something he needs to discuss with them. Patient stated understanding. No other needs indicated at this time.

## 2019-03-26 NOTE — LETTER
March 26, 2019    [facility] Alan  [address] [place] [address]             [facility]  [address]  [place] [address]  Phone: [phone] To Whom It May Concern:    Please evaluate Yao Waqas in your office for possible tooth abscess, as he is experiencing tooth pain. I have started him on Augmentin in the interim of seeing you for a dental exam.     Sincerely,          Bel Garay MD

## 2019-03-26 NOTE — TELEPHONE ENCOUNTER
----- Message from Bel Garay MD sent at 3/26/2019  8:48 AM CDT -----  Schedule CBC,CMp, TSH and free T4 and see me in 2 weeks and for Opdivo

## 2019-03-29 ENCOUNTER — TELEPHONE (OUTPATIENT)
Dept: GASTROENTEROLOGY | Facility: CLINIC | Age: 44
End: 2019-03-29

## 2019-03-29 NOTE — TELEPHONE ENCOUNTER
Ma spoke to pt informed pt per Dr. Bowen message below:    Please let him know biopsies from stomach came back negative for bacterial infections.    Pt verbalize understanding and thank Ma     ----- Message from Indra Bowen MD sent at 3/28/2019  6:37 PM CDT -----  Please let him know biopsies from stomach came back negative for bacterial infections

## 2019-04-09 ENCOUNTER — INFUSION (OUTPATIENT)
Dept: INFUSION THERAPY | Facility: HOSPITAL | Age: 44
End: 2019-04-09
Attending: INTERNAL MEDICINE
Payer: MEDICARE

## 2019-04-09 ENCOUNTER — LAB VISIT (OUTPATIENT)
Dept: LAB | Facility: HOSPITAL | Age: 44
End: 2019-04-09
Attending: INTERNAL MEDICINE
Payer: MEDICARE

## 2019-04-09 ENCOUNTER — OFFICE VISIT (OUTPATIENT)
Dept: HEMATOLOGY/ONCOLOGY | Facility: CLINIC | Age: 44
End: 2019-04-09
Payer: MEDICARE

## 2019-04-09 VITALS
HEIGHT: 66 IN | HEART RATE: 84 BPM | BODY MASS INDEX: 29.55 KG/M2 | TEMPERATURE: 98 F | OXYGEN SATURATION: 98 % | WEIGHT: 183.88 LBS | SYSTOLIC BLOOD PRESSURE: 135 MMHG | DIASTOLIC BLOOD PRESSURE: 89 MMHG

## 2019-04-09 VITALS
HEART RATE: 78 BPM | SYSTOLIC BLOOD PRESSURE: 110 MMHG | OXYGEN SATURATION: 98 % | DIASTOLIC BLOOD PRESSURE: 74 MMHG | TEMPERATURE: 98 F | RESPIRATION RATE: 18 BRPM

## 2019-04-09 DIAGNOSIS — C34.11 MALIGNANT NEOPLASM OF UPPER LOBE OF RIGHT LUNG: ICD-10-CM

## 2019-04-09 DIAGNOSIS — C34.91 PRIMARY MALIGNANT NEOPLASM OF RIGHT LUNG METASTATIC TO OTHER SITE: ICD-10-CM

## 2019-04-09 DIAGNOSIS — D50.0 IRON DEFICIENCY ANEMIA DUE TO CHRONIC BLOOD LOSS: Primary | ICD-10-CM

## 2019-04-09 DIAGNOSIS — E03.2 HYPOTHYROIDISM DUE TO MEDICATION: ICD-10-CM

## 2019-04-09 DIAGNOSIS — C79.51 SECONDARY MALIGNANT NEOPLASM OF BONE: ICD-10-CM

## 2019-04-09 DIAGNOSIS — C34.91 LUNG CANCER, PRIMARY, WITH METASTASIS FROM LUNG TO OTHER SITE, RIGHT: ICD-10-CM

## 2019-04-09 DIAGNOSIS — C34.11 MALIGNANT NEOPLASM OF UPPER LOBE OF RIGHT LUNG: Primary | ICD-10-CM

## 2019-04-09 DIAGNOSIS — G89.29 OTHER CHRONIC PAIN: ICD-10-CM

## 2019-04-09 LAB
ALBUMIN SERPL BCP-MCNC: 4 G/DL (ref 3.5–5.2)
ALP SERPL-CCNC: 74 U/L (ref 55–135)
ALT SERPL W/O P-5'-P-CCNC: 12 U/L (ref 10–44)
ANION GAP SERPL CALC-SCNC: 9 MMOL/L (ref 8–16)
AST SERPL-CCNC: 19 U/L (ref 10–40)
BILIRUB SERPL-MCNC: 0.2 MG/DL (ref 0.1–1)
BUN SERPL-MCNC: 13 MG/DL (ref 6–20)
CALCIUM SERPL-MCNC: 10.1 MG/DL (ref 8.7–10.5)
CHLORIDE SERPL-SCNC: 106 MMOL/L (ref 95–110)
CO2 SERPL-SCNC: 27 MMOL/L (ref 23–29)
CREAT SERPL-MCNC: 1.6 MG/DL (ref 0.5–1.4)
ERYTHROCYTE [DISTWIDTH] IN BLOOD BY AUTOMATED COUNT: 14.2 % (ref 11.5–14.5)
EST. GFR  (AFRICAN AMERICAN): 60 ML/MIN/1.73 M^2
EST. GFR  (NON AFRICAN AMERICAN): 52 ML/MIN/1.73 M^2
GLUCOSE SERPL-MCNC: 133 MG/DL (ref 70–110)
HCT VFR BLD AUTO: 42.5 % (ref 40–54)
HGB BLD-MCNC: 13.7 G/DL (ref 14–18)
MCH RBC QN AUTO: 27 PG (ref 27–31)
MCHC RBC AUTO-ENTMCNC: 32.2 G/DL (ref 32–36)
MCV RBC AUTO: 84 FL (ref 82–98)
NEUTROPHILS # BLD AUTO: 3.6 K/UL (ref 1.8–7.7)
PLATELET # BLD AUTO: 276 K/UL (ref 150–350)
PMV BLD AUTO: 10.4 FL (ref 9.2–12.9)
POTASSIUM SERPL-SCNC: 3.9 MMOL/L (ref 3.5–5.1)
PROT SERPL-MCNC: 7.4 G/DL (ref 6–8.4)
RBC # BLD AUTO: 5.08 M/UL (ref 4.6–6.2)
SODIUM SERPL-SCNC: 142 MMOL/L (ref 136–145)
T4 FREE SERPL-MCNC: 0.91 NG/DL (ref 0.71–1.51)
TSH SERPL DL<=0.005 MIU/L-ACNC: 5.88 UIU/ML (ref 0.4–4)
WBC # BLD AUTO: 6.83 K/UL (ref 3.9–12.7)

## 2019-04-09 PROCEDURE — 99999 PR PBB SHADOW E&M-EST. PATIENT-LVL V: ICD-10-PCS | Mod: PBBFAC,,, | Performed by: INTERNAL MEDICINE

## 2019-04-09 PROCEDURE — 63600175 PHARM REV CODE 636 W HCPCS: Mod: JG | Performed by: INTERNAL MEDICINE

## 2019-04-09 PROCEDURE — 80053 COMPREHEN METABOLIC PANEL: CPT

## 2019-04-09 PROCEDURE — 99215 OFFICE O/P EST HI 40 MIN: CPT | Mod: PBBFAC | Performed by: INTERNAL MEDICINE

## 2019-04-09 PROCEDURE — 99215 OFFICE O/P EST HI 40 MIN: CPT | Mod: S$PBB,,, | Performed by: INTERNAL MEDICINE

## 2019-04-09 PROCEDURE — 84443 ASSAY THYROID STIM HORMONE: CPT

## 2019-04-09 PROCEDURE — 36415 COLL VENOUS BLD VENIPUNCTURE: CPT

## 2019-04-09 PROCEDURE — A4216 STERILE WATER/SALINE, 10 ML: HCPCS | Performed by: INTERNAL MEDICINE

## 2019-04-09 PROCEDURE — 96413 CHEMO IV INFUSION 1 HR: CPT

## 2019-04-09 PROCEDURE — 99215 PR OFFICE/OUTPT VISIT, EST, LEVL V, 40-54 MIN: ICD-10-PCS | Mod: S$PBB,,, | Performed by: INTERNAL MEDICINE

## 2019-04-09 PROCEDURE — 25000003 PHARM REV CODE 250: Performed by: INTERNAL MEDICINE

## 2019-04-09 PROCEDURE — 84439 ASSAY OF FREE THYROXINE: CPT

## 2019-04-09 PROCEDURE — 85027 COMPLETE CBC AUTOMATED: CPT

## 2019-04-09 PROCEDURE — 99999 PR PBB SHADOW E&M-EST. PATIENT-LVL V: CPT | Mod: PBBFAC,,, | Performed by: INTERNAL MEDICINE

## 2019-04-09 RX ORDER — SODIUM CHLORIDE 0.9 % (FLUSH) 0.9 %
10 SYRINGE (ML) INJECTION
Status: CANCELLED | OUTPATIENT
Start: 2019-04-24

## 2019-04-09 RX ORDER — HEPARIN 100 UNIT/ML
500 SYRINGE INTRAVENOUS
Status: DISCONTINUED | OUTPATIENT
Start: 2019-04-09 | End: 2019-04-09 | Stop reason: HOSPADM

## 2019-04-09 RX ORDER — HEPARIN 100 UNIT/ML
500 SYRINGE INTRAVENOUS
Status: CANCELLED | OUTPATIENT
Start: 2019-04-24

## 2019-04-09 RX ORDER — SODIUM CHLORIDE 0.9 % (FLUSH) 0.9 %
10 SYRINGE (ML) INJECTION
Status: DISCONTINUED | OUTPATIENT
Start: 2019-04-09 | End: 2019-04-09 | Stop reason: HOSPADM

## 2019-04-09 RX ADMIN — SODIUM CHLORIDE 240 MG: 9 INJECTION, SOLUTION INTRAVENOUS at 09:04

## 2019-04-09 RX ADMIN — HEPARIN SODIUM (PORCINE) LOCK FLUSH IV SOLN 100 UNIT/ML 500 UNITS: 100 SOLUTION at 10:04

## 2019-04-09 RX ADMIN — Medication 10 ML: at 10:04

## 2019-04-09 NOTE — PLAN OF CARE
Problem: Adult Inpatient Plan of Care  Goal: Plan of Care Review  Outcome: Ongoing (interventions implemented as appropriate)  Pt presented for Opdivo infusion. No complaints/concerns voiced while in Infusion Center. VSS. Pt tolerated infusion well. Pt reported receiving Xgeva 2 weeks ago, this info verified in pt's chart; Xgeva held today. Good blood return noted from port. Port flushed and heparinized after completion of infusion. Ambulatory into and out of unit. Distress screening tool completed. Future appt information reviewed with pt.

## 2019-04-09 NOTE — PROGRESS NOTES
"Subjective:       Patient ID: Yao Alan is a 44 y.o. male.    Chief Complaint: Follow-up  Oncologic History:  Mr. Yao Alan is a 44-year-old male who has a long history of smoking and was seen in the Pulmonary Clinic by Dr. Valle on 03/05/2015 with abnormal CT scan.    Apparently, he went to the [facility] in February with coughing as well as chest pain. A chest x-ray was done, which revealed a left upper lobe lung mass. Followup CT scan was done on 02/12/2015 and that revealed posterior superior mediastinal mass adjacent and prominent enlarged upper left mediastinal lymph nodes, abutting the aortic arch as well as left subclavian artery. A  CT scan of the abdomen was done on 03/03/2015 without contrast and that revealed nonobstructive nephrolithiasis, but a 2.1 cm lytic lesion involving the left iliac wing concerning for metastatic disease given the presence of emphysema and a mediastinal soft tissue mass on the CT chest from 02/12/2015. He saw Dr. Valle after that on 03/05/2015 and underwent an IR guided biopsy of the bone lesion on 03/17/2015. 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    His EGFR/EML/ALK is negative.    His PET scan on 3/25/15 revealed Left upper lobe lung lesion with an adjacent para-aortic lymph node, right anterior rib metastasis, right iliac metastasis, and pancreatic metastasis. A pancreatic cancer primary neoplasm is less likely.  MRI brain was negative for mets.  He completed 6 cycles of Carboplatin and Alimta and was on maintenance Alimta until end of March 2016.  His bone scan from 3/16 revealed stable disease. His CT scans also from end of March 2016 revealed "Interval enlargement of left upper lobe lung mass measuring 5.9 x 3.9 cm (previously 3.3 x 2.5 cm). This mass appears to invade the mediastinum and abutting the aortic arch and left subclavian artery"     He is now on Opdivo since March 2016                     HPI Mr. Alan returns " for follow up and Opdivo. He is doing well with no issues    Review of Systems   Constitutional: Negative for appetite change, fatigue and unexpected weight change.   HENT: Negative for mouth sores.    Eyes: Negative for visual disturbance.   Respiratory: Negative for cough and shortness of breath.    Cardiovascular: Negative for chest pain.   Gastrointestinal: Negative for abdominal pain and diarrhea.   Genitourinary: Negative for frequency.   Musculoskeletal: Negative for back pain.   Skin: Negative for rash.   Neurological: Negative for headaches.   Hematological: Negative for adenopathy.   Psychiatric/Behavioral: The patient is not nervous/anxious.    All other systems reviewed and are negative.      Objective:      Physical Exam   Constitutional: He is oriented to person, place, and time. He appears well-developed and well-nourished.   HENT:   Mouth/Throat: No oropharyngeal exudate.   Cardiovascular: Normal rate and normal heart sounds.   Pulmonary/Chest: Effort normal and breath sounds normal. He has no wheezes.   Abdominal: Soft. Bowel sounds are normal. There is no tenderness.   Musculoskeletal: He exhibits no edema or tenderness.   Lymphadenopathy:     He has no cervical adenopathy.   Neurological: He is alert and oriented to person, place, and time. Coordination normal.   Skin: Skin is warm and dry. No rash noted.   Psychiatric: He has a normal mood and affect. Judgment and thought content normal.   Vitals reviewed.      LABS:  WBC   Date Value Ref Range Status   04/09/2019 6.83 3.90 - 12.70 K/uL Final     Hemoglobin   Date Value Ref Range Status   04/09/2019 13.7 (L) 14.0 - 18.0 g/dL Final     Hematocrit   Date Value Ref Range Status   04/09/2019 42.5 40.0 - 54.0 % Final     Platelets   Date Value Ref Range Status   04/09/2019 276 150 - 350 K/uL Final     Gran # (ANC)   Date Value Ref Range Status   04/09/2019 3.6 1.8 - 7.7 K/uL Final     Comment:     The ANC is based on a white cell differential from an    automated cell counter. It has not been microscopically   reviewed for the presence of abnormal cells. Clinical   correlation is required.         Chemistry        Component Value Date/Time     04/09/2019 0816    K 3.9 04/09/2019 0816     04/09/2019 0816    CO2 27 04/09/2019 0816    BUN 13 04/09/2019 0816    CREATININE 1.6 (H) 04/09/2019 0816     (H) 04/09/2019 0816        Component Value Date/Time    CALCIUM 10.1 04/09/2019 0816    ALKPHOS 74 04/09/2019 0816    AST 19 04/09/2019 0816    ALT 12 04/09/2019 0816    BILITOT 0.2 04/09/2019 0816    ESTGFRAFRICA 60 04/09/2019 0816    EGFRNONAA 52 (A) 04/09/2019 0816        TSH   Date Value Ref Range Status   04/09/2019 5.882 (H) 0.400 - 4.000 uIU/mL Final     Free T4   Date Value Ref Range Status   04/09/2019 0.91 0.71 - 1.51 ng/dL Final        Assessment:       1. Malignant neoplasm of upper lobe of right lung    2. Secondary malignant neoplasm of bone    3. Hypothyroidism due to medication    4. Other chronic pain        Plan:        1,2. He will proceed with Opdivo and will return in 2 weeks for next cycle with restaging CT scans  3. Check labs in 2 weeks  4. He has no malignancy causing cancer related pain, so will schedule him to see pain management, Advised him to stop pain meds, He can take Aleve or Advil as needed.    Above care plan was discussed with patient and all questions were addressed to his satisfaction

## 2019-04-09 NOTE — Clinical Note
Schedule CBC, CMP, TSH, free T4, CT chest, abdomen/pelvis and bone scan and see me in 2 weeks and for OpdivoAlso needs to see pain management next available for chronic pain management

## 2019-04-11 ENCOUNTER — DOCUMENTATION ONLY (OUTPATIENT)
Dept: HEMATOLOGY/ONCOLOGY | Facility: CLINIC | Age: 44
End: 2019-04-11

## 2019-04-11 NOTE — PROGRESS NOTES
Received call from patient this afternoon, asking about any assistance with utility bills. His electric bill is due soon and it is close to $60 and he just received the water bill for $74.09. He and his wife are having financial hardship currently. He stated that they had to file bankruptcy in order to keep their home. He's going to [employer] tomorrow to see if he's eligible for any additional benefits. He's called [facility] but no funds are available yet. Explained to patient that he's already received the maximum assistance through [facility]. He's already receiving help through [facility] with prescription co-pays, but I can inquire if they may have any additional funds available to him. He gave permission for me to send an email to Chey at [facility], and I did so this afternoon. Will continue to follow.

## 2019-04-12 ENCOUNTER — DOCUMENTATION ONLY (OUTPATIENT)
Dept: HEMATOLOGY/ONCOLOGY | Facility: CLINIC | Age: 44
End: 2019-04-12

## 2019-04-12 NOTE — PROGRESS NOTES
Spoke with Chey at [facility], [phone], and she said that they will be able to assist patient with utility bills. She requested a copy of the bills. Called patient and discussed with him. His wife was able to email me screen shots of the electricity and water bills. I forwarded the email to Chey at [facility], and called her to confirm receipt. Chey said that she will process the assistance request this afternoon and send the payments to the companies. Called patient back and informed him. Patient stated appreciation. Will continue to follow and assist as needed.

## 2019-04-22 ENCOUNTER — HOSPITAL ENCOUNTER (OUTPATIENT)
Dept: RADIOLOGY | Facility: HOSPITAL | Age: 44
Discharge: HOME | End: 2019-04-22
Attending: INTERNAL MEDICINE
Payer: MEDICARE

## 2019-04-22 DIAGNOSIS — C34.11 MALIGNANT NEOPLASM OF UPPER LOBE OF RIGHT LUNG: ICD-10-CM

## 2019-04-22 PROCEDURE — 74177 CT ABD & PELVIS W/CONTRAST: CPT | Mod: 26,,, | Performed by: RADIOLOGY

## 2019-04-22 PROCEDURE — A9503 TC99M MEDRONATE: HCPCS

## 2019-04-22 PROCEDURE — 25500020 PHARM REV CODE 255: Performed by: INTERNAL MEDICINE

## 2019-04-22 PROCEDURE — 71260 CT CHEST WITH CONTRAST: ICD-10-PCS | Mod: 26,,, | Performed by: RADIOLOGY

## 2019-04-22 PROCEDURE — 71260 CT THORAX DX C+: CPT | Mod: TC

## 2019-04-22 PROCEDURE — 74177 CT ABDOMEN PELVIS WITH CONTRAST: ICD-10-PCS | Mod: 26,,, | Performed by: RADIOLOGY

## 2019-04-22 PROCEDURE — 78306 NM BONE SCAN WHOLE BODY: ICD-10-PCS | Mod: 26,,, | Performed by: RADIOLOGY

## 2019-04-22 PROCEDURE — 78306 BONE IMAGING WHOLE BODY: CPT | Mod: 26,,, | Performed by: RADIOLOGY

## 2019-04-22 PROCEDURE — 71260 CT THORAX DX C+: CPT | Mod: 26,,, | Performed by: RADIOLOGY

## 2019-04-22 PROCEDURE — 74177 CT ABD & PELVIS W/CONTRAST: CPT | Mod: TC

## 2019-04-22 RX ADMIN — IOHEXOL 15 ML: 300 INJECTION, SOLUTION INTRAVENOUS at 11:04

## 2019-04-22 RX ADMIN — IOHEXOL 75 ML: 350 INJECTION, SOLUTION INTRAVENOUS at 11:04

## 2019-04-23 ENCOUNTER — TELEPHONE (OUTPATIENT)
Dept: PAIN MEDICINE | Facility: CLINIC | Age: 44
End: 2019-04-23

## 2019-04-23 ENCOUNTER — INFUSION (OUTPATIENT)
Dept: INFUSION THERAPY | Facility: HOSPITAL | Age: 44
End: 2019-04-23
Attending: INTERNAL MEDICINE
Payer: MEDICARE

## 2019-04-23 ENCOUNTER — OFFICE VISIT (OUTPATIENT)
Dept: HEMATOLOGY/ONCOLOGY | Facility: CLINIC | Age: 44
End: 2019-04-23
Payer: MEDICARE

## 2019-04-23 VITALS
BODY MASS INDEX: 28.52 KG/M2 | TEMPERATURE: 98 F | OXYGEN SATURATION: 99 % | WEIGHT: 181.69 LBS | SYSTOLIC BLOOD PRESSURE: 120 MMHG | DIASTOLIC BLOOD PRESSURE: 89 MMHG | HEIGHT: 67 IN | HEART RATE: 69 BPM

## 2019-04-23 VITALS
DIASTOLIC BLOOD PRESSURE: 84 MMHG | OXYGEN SATURATION: 99 % | RESPIRATION RATE: 16 BRPM | HEART RATE: 66 BPM | SYSTOLIC BLOOD PRESSURE: 129 MMHG | TEMPERATURE: 98 F

## 2019-04-23 DIAGNOSIS — C34.11 MALIGNANT NEOPLASM OF UPPER LOBE OF RIGHT LUNG: ICD-10-CM

## 2019-04-23 DIAGNOSIS — E03.2 HYPOTHYROIDISM DUE TO MEDICATION: ICD-10-CM

## 2019-04-23 DIAGNOSIS — C34.91 LUNG CANCER, PRIMARY, WITH METASTASIS FROM LUNG TO OTHER SITE, RIGHT: ICD-10-CM

## 2019-04-23 DIAGNOSIS — C79.51 SECONDARY MALIGNANT NEOPLASM OF BONE: ICD-10-CM

## 2019-04-23 DIAGNOSIS — D50.0 IRON DEFICIENCY ANEMIA DUE TO CHRONIC BLOOD LOSS: Primary | ICD-10-CM

## 2019-04-23 DIAGNOSIS — C34.11 MALIGNANT NEOPLASM OF UPPER LOBE OF RIGHT LUNG: Primary | ICD-10-CM

## 2019-04-23 PROCEDURE — 99999 PR PBB SHADOW E&M-EST. PATIENT-LVL IV: CPT | Mod: PBBFAC,,, | Performed by: INTERNAL MEDICINE

## 2019-04-23 PROCEDURE — 96413 CHEMO IV INFUSION 1 HR: CPT

## 2019-04-23 PROCEDURE — 99215 OFFICE O/P EST HI 40 MIN: CPT | Mod: S$PBB,,, | Performed by: INTERNAL MEDICINE

## 2019-04-23 PROCEDURE — 25000003 PHARM REV CODE 250: Performed by: INTERNAL MEDICINE

## 2019-04-23 PROCEDURE — A4216 STERILE WATER/SALINE, 10 ML: HCPCS | Performed by: INTERNAL MEDICINE

## 2019-04-23 PROCEDURE — 63600175 PHARM REV CODE 636 W HCPCS: Mod: JG | Performed by: INTERNAL MEDICINE

## 2019-04-23 PROCEDURE — 96372 THER/PROPH/DIAG INJ SC/IM: CPT | Mod: 59

## 2019-04-23 PROCEDURE — 99215 PR OFFICE/OUTPT VISIT, EST, LEVL V, 40-54 MIN: ICD-10-PCS | Mod: S$PBB,,, | Performed by: INTERNAL MEDICINE

## 2019-04-23 PROCEDURE — 99999 PR PBB SHADOW E&M-EST. PATIENT-LVL IV: ICD-10-PCS | Mod: PBBFAC,,, | Performed by: INTERNAL MEDICINE

## 2019-04-23 PROCEDURE — 99214 OFFICE O/P EST MOD 30 MIN: CPT | Mod: PBBFAC,25 | Performed by: INTERNAL MEDICINE

## 2019-04-23 RX ORDER — SODIUM CHLORIDE 0.9 % (FLUSH) 0.9 %
10 SYRINGE (ML) INJECTION
Status: CANCELLED | OUTPATIENT
Start: 2019-05-08

## 2019-04-23 RX ORDER — SODIUM CHLORIDE 0.9 % (FLUSH) 0.9 %
10 SYRINGE (ML) INJECTION
Status: DISCONTINUED | OUTPATIENT
Start: 2019-04-23 | End: 2019-04-23 | Stop reason: HOSPADM

## 2019-04-23 RX ORDER — HEPARIN 100 UNIT/ML
500 SYRINGE INTRAVENOUS
Status: DISCONTINUED | OUTPATIENT
Start: 2019-04-23 | End: 2019-04-23 | Stop reason: HOSPADM

## 2019-04-23 RX ORDER — HEPARIN 100 UNIT/ML
500 SYRINGE INTRAVENOUS
Status: CANCELLED | OUTPATIENT
Start: 2019-05-08

## 2019-04-23 RX ADMIN — SODIUM CHLORIDE 240 MG: 9 INJECTION, SOLUTION INTRAVENOUS at 09:04

## 2019-04-23 RX ADMIN — DENOSUMAB 120 MG: 120 INJECTION SUBCUTANEOUS at 10:04

## 2019-04-23 RX ADMIN — Medication 10 ML: at 10:04

## 2019-04-23 RX ADMIN — HEPARIN SODIUM (PORCINE) LOCK FLUSH IV SOLN 100 UNIT/ML 500 UNITS: 100 SOLUTION at 10:04

## 2019-04-23 NOTE — TELEPHONE ENCOUNTER
"Staff contacted the patient to confirm his 4/23/19 2pm consult with Agnes Slater and go over IPM. Patient can call [phone] if he needs to reschedule or cancel the appointment.     Patient cancelled his appointment for today and stated, "I will call back to reschedule once I look at my schedule."     Staff cancelled the appointment for today.    Patient verbalized understanding and expressed thanks.       "

## 2019-04-23 NOTE — PROGRESS NOTES
"Subjective:       Patient ID: Yao Alan is a 44 y.o. male.    Chief Complaint: Follow-up  Oncologic History:  Mr. Yao Alan is a 44-year-old male who has a long history of smoking and was seen in the Pulmonary Clinic by Dr. Valle on 03/05/2015 with abnormal CT scan.    Apparently, he went to the [facility] in February with coughing as well as chest pain. A chest x-ray was done, which revealed a left upper lobe lung mass. Followup CT scan was done on 02/12/2015 and that revealed posterior superior mediastinal mass adjacent and prominent enlarged upper left mediastinal lymph nodes, abutting the aortic arch as well as left subclavian artery. A  CT scan of the abdomen was done on 03/03/2015 without contrast and that revealed nonobstructive nephrolithiasis, but a 2.1 cm lytic lesion involving the left iliac wing concerning for metastatic disease given the presence of emphysema and a mediastinal soft tissue mass on the CT chest from 02/12/2015. He saw Dr. Valle after that on 03/05/2015 and underwent an IR guided biopsy of the bone lesion on 03/17/2015. 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    His EGFR/EML/ALK is negative.    His PET scan on 3/25/15 revealed Left upper lobe lung lesion with an adjacent para-aortic lymph node, right anterior rib metastasis, right iliac metastasis, and pancreatic metastasis. A pancreatic cancer primary neoplasm is less likely.  MRI brain was negative for mets.  He completed 6 cycles of Carboplatin and Alimta and was on maintenance Alimta until end of March 2016.  His bone scan from 3/16 revealed stable disease. His CT scans also from end of March 2016 revealed "Interval enlargement of left upper lobe lung mass measuring 5.9 x 3.9 cm (previously 3.3 x 2.5 cm). This mass appears to invade the mediastinum and abutting the aortic arch and left subclavian artery"     He is now on Opdivo since March 2016                         HPI " Mr. Alan returns for follow up and Opdivo  Bone scan from 4/22/19 reveals There is no scintigraphic evidence of metastatic disease  CT scans from 4/22/19 reveals There are no findings to suggest recurrent or metastatic disease.  He feels well and denies any new issues     Review of Systems   Constitutional: Negative for appetite change, fatigue and unexpected weight change.   HENT: Negative for mouth sores.    Eyes: Negative for visual disturbance.   Respiratory: Negative for cough and shortness of breath.    Cardiovascular: Negative for chest pain.   Gastrointestinal: Negative for abdominal pain and diarrhea.   Genitourinary: Negative for frequency.   Musculoskeletal: Negative for back pain.   Skin: Negative for rash.   Neurological: Negative for headaches.   Hematological: Negative for adenopathy.   Psychiatric/Behavioral: The patient is not nervous/anxious.    All other systems reviewed and are negative.      Objective:      Physical Exam   Constitutional: He is oriented to person, place, and time. He appears well-developed and well-nourished.   HENT:   Mouth/Throat: No oropharyngeal exudate.   Cardiovascular: Normal rate and normal heart sounds.   Pulmonary/Chest: Effort normal and breath sounds normal. He has no wheezes.   Abdominal: Soft. Bowel sounds are normal. There is no tenderness.   Musculoskeletal: He exhibits no edema or tenderness.   Lymphadenopathy:     He has no cervical adenopathy.   Neurological: He is alert and oriented to person, place, and time. Coordination normal.   Skin: Skin is warm and dry. No rash noted.   Psychiatric: He has a normal mood and affect. Judgment and thought content normal.   Vitals reviewed.      LABS:  WBC   Date Value Ref Range Status   04/23/2019 6.91 3.90 - 12.70 K/uL Final     Hemoglobin   Date Value Ref Range Status   04/23/2019 13.9 (L) 14.0 - 18.0 g/dL Final     Hematocrit   Date Value Ref Range Status   04/23/2019 44.1 40.0 - 54.0 % Final     Platelets   Date Value  Ref Range Status   04/23/2019 283 150 - 350 K/uL Final     Gran # (ANC)   Date Value Ref Range Status   04/23/2019 4.0 1.8 - 7.7 K/uL Final     Comment:     The ANC is based on a white cell differential from an   automated cell counter. It has not been microscopically   reviewed for the presence of abnormal cells. Clinical   correlation is required.         Chemistry        Component Value Date/Time     04/23/2019 0824    K 3.9 04/23/2019 0824     04/23/2019 0824    CO2 27 04/23/2019 0824    BUN 14 04/23/2019 0824    CREATININE 1.4 04/23/2019 0824     (H) 04/23/2019 0824        Component Value Date/Time    CALCIUM 9.5 04/23/2019 0824    ALKPHOS 69 04/23/2019 0824    AST 19 04/23/2019 0824    ALT 13 04/23/2019 0824    BILITOT 0.3 04/23/2019 0824    ESTGFRAFRICA >60 04/23/2019 0824    EGFRNONAA >60 04/23/2019 0824        TSH   Date Value Ref Range Status   04/09/2019 5.882 (H) 0.400 - 4.000 uIU/mL Final     Free T4   Date Value Ref Range Status   04/09/2019 0.91 0.71 - 1.51 ng/dL Final        Assessment:       1. Malignant neoplasm of upper lobe of right lung    2. Secondary malignant neoplasm of bone    3. Hypothyroidism due to medication        Plan:        1,2. He is doing very well with complete response on scans. He will proceed with OPdivo and will return in 2 weeks for next cycle  3. Check TSH and free T4 in 2 weeks    Above care plan was discussed with patient and all questions were addressed to his satisfaction

## 2019-04-23 NOTE — PLAN OF CARE
Problem: Adult Inpatient Plan of Care  Goal: Plan of Care Review  Outcome: Ongoing (interventions implemented as appropriate)  Pt presented for Opdivo and Xgeva. VSS. No complaints/ concerns voiced. Good blood return noted from port. Port flushed and heparinized upon completion of Opdivo. Pt tolerated Opdivo and Xgeva well. Ambulatory into and out of unit. Distress screening tool completed. Future appt information reviewed with pt.

## 2019-05-07 ENCOUNTER — OFFICE VISIT (OUTPATIENT)
Dept: HEMATOLOGY/ONCOLOGY | Facility: CLINIC | Age: 44
End: 2019-05-07
Payer: MEDICARE

## 2019-05-07 ENCOUNTER — INFUSION (OUTPATIENT)
Dept: INFUSION THERAPY | Facility: HOSPITAL | Age: 44
End: 2019-05-07
Attending: INTERNAL MEDICINE
Payer: MEDICARE

## 2019-05-07 VITALS
WEIGHT: 180.31 LBS | HEART RATE: 76 BPM | OXYGEN SATURATION: 100 % | SYSTOLIC BLOOD PRESSURE: 126 MMHG | TEMPERATURE: 98 F | DIASTOLIC BLOOD PRESSURE: 78 MMHG | SYSTOLIC BLOOD PRESSURE: 128 MMHG | BODY MASS INDEX: 28.98 KG/M2 | TEMPERATURE: 98 F | RESPIRATION RATE: 15 BRPM | OXYGEN SATURATION: 95 % | DIASTOLIC BLOOD PRESSURE: 84 MMHG | HEIGHT: 66 IN | HEART RATE: 72 BPM

## 2019-05-07 DIAGNOSIS — C34.11 MALIGNANT NEOPLASM OF UPPER LOBE OF RIGHT LUNG: Primary | ICD-10-CM

## 2019-05-07 DIAGNOSIS — D50.0 IRON DEFICIENCY ANEMIA DUE TO CHRONIC BLOOD LOSS: Primary | ICD-10-CM

## 2019-05-07 DIAGNOSIS — C34.91 LUNG CANCER, PRIMARY, WITH METASTASIS FROM LUNG TO OTHER SITE, RIGHT: ICD-10-CM

## 2019-05-07 DIAGNOSIS — E03.2 HYPOTHYROIDISM DUE TO MEDICATION: ICD-10-CM

## 2019-05-07 DIAGNOSIS — C79.51 SECONDARY MALIGNANT NEOPLASM OF BONE: ICD-10-CM

## 2019-05-07 DIAGNOSIS — C34.11 MALIGNANT NEOPLASM OF UPPER LOBE OF RIGHT LUNG: ICD-10-CM

## 2019-05-07 PROCEDURE — 99214 OFFICE O/P EST MOD 30 MIN: CPT | Mod: PBBFAC,25 | Performed by: INTERNAL MEDICINE

## 2019-05-07 PROCEDURE — 99999 PR PBB SHADOW E&M-EST. PATIENT-LVL IV: ICD-10-PCS | Mod: PBBFAC,,, | Performed by: INTERNAL MEDICINE

## 2019-05-07 PROCEDURE — 99215 OFFICE O/P EST HI 40 MIN: CPT | Mod: S$PBB,,, | Performed by: INTERNAL MEDICINE

## 2019-05-07 PROCEDURE — 25000003 PHARM REV CODE 250: Performed by: INTERNAL MEDICINE

## 2019-05-07 PROCEDURE — A4216 STERILE WATER/SALINE, 10 ML: HCPCS | Performed by: INTERNAL MEDICINE

## 2019-05-07 PROCEDURE — 99999 PR PBB SHADOW E&M-EST. PATIENT-LVL IV: CPT | Mod: PBBFAC,,, | Performed by: INTERNAL MEDICINE

## 2019-05-07 PROCEDURE — 96413 CHEMO IV INFUSION 1 HR: CPT

## 2019-05-07 PROCEDURE — 63600175 PHARM REV CODE 636 W HCPCS: Performed by: INTERNAL MEDICINE

## 2019-05-07 PROCEDURE — 99215 PR OFFICE/OUTPT VISIT, EST, LEVL V, 40-54 MIN: ICD-10-PCS | Mod: S$PBB,,, | Performed by: INTERNAL MEDICINE

## 2019-05-07 RX ORDER — SODIUM CHLORIDE 0.9 % (FLUSH) 0.9 %
10 SYRINGE (ML) INJECTION
Status: DISCONTINUED | OUTPATIENT
Start: 2019-05-07 | End: 2019-05-07 | Stop reason: HOSPADM

## 2019-05-07 RX ORDER — HEPARIN 100 UNIT/ML
500 SYRINGE INTRAVENOUS
Status: CANCELLED | OUTPATIENT
Start: 2019-05-07

## 2019-05-07 RX ORDER — HEPARIN 100 UNIT/ML
500 SYRINGE INTRAVENOUS
Status: DISCONTINUED | OUTPATIENT
Start: 2019-05-07 | End: 2019-05-07 | Stop reason: HOSPADM

## 2019-05-07 RX ORDER — SODIUM CHLORIDE 0.9 % (FLUSH) 0.9 %
10 SYRINGE (ML) INJECTION
Status: CANCELLED | OUTPATIENT
Start: 2019-05-07

## 2019-05-07 RX ADMIN — SODIUM CHLORIDE 480 MG: 9 INJECTION, SOLUTION INTRAVENOUS at 09:05

## 2019-05-07 RX ADMIN — HEPARIN SODIUM (PORCINE) LOCK FLUSH IV SOLN 100 UNIT/ML 500 UNITS: 100 SOLUTION at 10:05

## 2019-05-07 RX ADMIN — Medication 10 ML: at 10:05

## 2019-05-07 NOTE — PLAN OF CARE
Problem: Adult Inpatient Plan of Care  Goal: Plan of Care Review  Outcome: Ongoing (interventions implemented as appropriate)  Arrived to unit from Dr. Garay's office. Cleared for chemo today. Pt denies pain or chemo related side effects. Pt tolerated Opdivo. No reactions noted. Reinforced to pt that treatment is now every 4 weeks. Pt verbalized understanding. Pt has next appt. Pt amublatory, discharged off unit.

## 2019-05-07 NOTE — PROGRESS NOTES
"Subjective:       Patient ID: Yao Alan is a 44 y.o. male.    Chief Complaint: Follow-up  Oncologic History:  Mr. Yao Alan is a 44-year-old male who has a long history of smoking and was seen in the Pulmonary Clinic by Dr. Valle on 03/05/2015 with abnormal CT scan.    Apparently, he went to the [facility] in February with coughing as well as chest pain. A chest x-ray was done, which revealed a left upper lobe lung mass. Followup CT scan was done on 02/12/2015 and that revealed posterior superior mediastinal mass adjacent and prominent enlarged upper left mediastinal lymph nodes, abutting the aortic arch as well as left subclavian artery. A  CT scan of the abdomen was done on 03/03/2015 without contrast and that revealed nonobstructive nephrolithiasis, but a 2.1 cm lytic lesion involving the left iliac wing concerning for metastatic disease given the presence of emphysema and a mediastinal soft tissue mass on the CT chest from 02/12/2015. He saw Dr. Valle after that on 03/05/2015 and underwent an IR guided biopsy of the bone lesion on 03/17/2015. 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    His EGFR/EML/ALK is negative.    His PET scan on 3/25/15 revealed Left upper lobe lung lesion with an adjacent para-aortic lymph node, right anterior rib metastasis, right iliac metastasis, and pancreatic metastasis. A pancreatic cancer primary neoplasm is less likely.  MRI brain was negative for mets.  He completed 6 cycles of Carboplatin and Alimta and was on maintenance Alimta until end of March 2016.  His bone scan from 3/16 revealed stable disease. His CT scans also from end of March 2016 revealed "Interval enlargement of left upper lobe lung mass measuring 5.9 x 3.9 cm (previously 3.3 x 2.5 cm). This mass appears to invade the mediastinum and abutting the aortic arch and left subclavian artery"     He is now on Opdivo since March 2016                   HPI Mr. Alan returns for " follow up and Opdivo. He feels well and denies any new issues    Review of Systems   Constitutional: Negative for appetite change, fatigue and unexpected weight change.   HENT: Negative for mouth sores.    Eyes: Negative for visual disturbance.   Respiratory: Negative for cough and shortness of breath.    Cardiovascular: Negative for chest pain.   Gastrointestinal: Negative for abdominal pain and diarrhea.   Genitourinary: Negative for frequency.   Musculoskeletal: Negative for back pain.   Skin: Negative for rash.   Neurological: Negative for headaches.   Hematological: Negative for adenopathy.   Psychiatric/Behavioral: The patient is not nervous/anxious.    All other systems reviewed and are negative.      Objective:      Physical Exam   Constitutional: He is oriented to person, place, and time. He appears well-developed and well-nourished.   HENT:   Mouth/Throat: No oropharyngeal exudate.   Cardiovascular: Normal rate and normal heart sounds.   Pulmonary/Chest: Effort normal and breath sounds normal. He has no wheezes.   Abdominal: Soft. Bowel sounds are normal. There is no tenderness.   Musculoskeletal: He exhibits no edema or tenderness.   Lymphadenopathy:     He has no cervical adenopathy.   Neurological: He is alert and oriented to person, place, and time. Coordination normal.   Skin: Skin is warm and dry. No rash noted.   Psychiatric: He has a normal mood and affect. Judgment and thought content normal.   Vitals reviewed.      LABS:  WBC   Date Value Ref Range Status   05/07/2019 7.47 3.90 - 12.70 K/uL Final     Hemoglobin   Date Value Ref Range Status   05/07/2019 13.7 (L) 14.0 - 18.0 g/dL Final     Hematocrit   Date Value Ref Range Status   05/07/2019 42.9 40.0 - 54.0 % Final     Platelets   Date Value Ref Range Status   05/07/2019 285 150 - 350 K/uL Final     Gran # (ANC)   Date Value Ref Range Status   05/07/2019 3.9 1.8 - 7.7 K/uL Final     Comment:     The ANC is based on a white cell differential from  an   automated cell counter. It has not been microscopically   reviewed for the presence of abnormal cells. Clinical   correlation is required.         Chemistry        Component Value Date/Time     05/07/2019 0806    K 3.8 05/07/2019 0806     05/07/2019 0806    CO2 23 05/07/2019 0806    BUN 14 05/07/2019 0806    CREATININE 1.4 05/07/2019 0806     (H) 05/07/2019 0806        Component Value Date/Time    CALCIUM 9.9 05/07/2019 0806    ALKPHOS 68 05/07/2019 0806    AST 20 05/07/2019 0806    ALT 10 05/07/2019 0806    BILITOT 0.4 05/07/2019 0806    ESTGFRAFRICA >60 05/07/2019 0806    EGFRNONAA >60 05/07/2019 0806        TSH   Date Value Ref Range Status   04/23/2019 1.354 0.400 - 4.000 uIU/mL Final     Free T4   Date Value Ref Range Status   04/23/2019 1.12 0.71 - 1.51 ng/dL Final        Assessment:       1. Malignant neoplasm of upper lobe of right lung    2. Secondary malignant neoplasm of bone    3. Hypothyroidism due to medication        Plan:        1,2. He will proceed with Opdivo today and will return in 4 weeks for next dose. Changing to Opdivo every 4 weeks. He will also receive Xgeva in 4 weeks  3. Check TSH and free T4 in 4 weeks.    Above care plan was discussed with patient and all questions were addressed to his satisfaction

## 2019-05-07 NOTE — Clinical Note
Schedule CBC, CMP, TSH and free T4 and see me in 4 weeks and for Opdivo (note changing Opdivo to Q 4week dose)

## 2019-06-04 ENCOUNTER — OFFICE VISIT (OUTPATIENT)
Dept: HEMATOLOGY/ONCOLOGY | Facility: CLINIC | Age: 44
End: 2019-06-04
Payer: MEDICARE

## 2019-06-04 ENCOUNTER — INFUSION (OUTPATIENT)
Dept: INFUSION THERAPY | Facility: HOSPITAL | Age: 44
End: 2019-06-04
Attending: INTERNAL MEDICINE
Payer: MEDICARE

## 2019-06-04 VITALS
HEIGHT: 66 IN | SYSTOLIC BLOOD PRESSURE: 121 MMHG | BODY MASS INDEX: 28.42 KG/M2 | DIASTOLIC BLOOD PRESSURE: 86 MMHG | RESPIRATION RATE: 17 BRPM | TEMPERATURE: 98 F | HEART RATE: 64 BPM | WEIGHT: 176.81 LBS | OXYGEN SATURATION: 97 %

## 2019-06-04 VITALS
TEMPERATURE: 98 F | OXYGEN SATURATION: 97 % | SYSTOLIC BLOOD PRESSURE: 117 MMHG | DIASTOLIC BLOOD PRESSURE: 68 MMHG | RESPIRATION RATE: 18 BRPM | HEART RATE: 72 BPM

## 2019-06-04 DIAGNOSIS — C79.51 SECONDARY MALIGNANT NEOPLASM OF BONE: ICD-10-CM

## 2019-06-04 DIAGNOSIS — E03.2 HYPOTHYROIDISM DUE TO MEDICATION: ICD-10-CM

## 2019-06-04 DIAGNOSIS — C34.11 MALIGNANT NEOPLASM OF UPPER LOBE OF RIGHT LUNG: ICD-10-CM

## 2019-06-04 DIAGNOSIS — E03.9 HYPOTHYROIDISM, UNSPECIFIED TYPE: ICD-10-CM

## 2019-06-04 DIAGNOSIS — C34.11 MALIGNANT NEOPLASM OF UPPER LOBE OF RIGHT LUNG: Primary | ICD-10-CM

## 2019-06-04 DIAGNOSIS — K08.89 TOOTH PAIN: ICD-10-CM

## 2019-06-04 DIAGNOSIS — D50.0 IRON DEFICIENCY ANEMIA DUE TO CHRONIC BLOOD LOSS: Primary | ICD-10-CM

## 2019-06-04 DIAGNOSIS — C34.91 LUNG CANCER, PRIMARY, WITH METASTASIS FROM LUNG TO OTHER SITE, RIGHT: ICD-10-CM

## 2019-06-04 DIAGNOSIS — I82.402 DEEP VEIN THROMBOSIS (DVT) OF LEFT LOWER EXTREMITY, UNSPECIFIED CHRONICITY, UNSPECIFIED VEIN: ICD-10-CM

## 2019-06-04 PROCEDURE — 99214 OFFICE O/P EST MOD 30 MIN: CPT | Mod: PBBFAC,25 | Performed by: INTERNAL MEDICINE

## 2019-06-04 PROCEDURE — 25000003 PHARM REV CODE 250: Performed by: INTERNAL MEDICINE

## 2019-06-04 PROCEDURE — 99999 PR PBB SHADOW E&M-EST. PATIENT-LVL IV: ICD-10-PCS | Mod: PBBFAC,,, | Performed by: INTERNAL MEDICINE

## 2019-06-04 PROCEDURE — 96372 THER/PROPH/DIAG INJ SC/IM: CPT | Mod: 59

## 2019-06-04 PROCEDURE — 63600175 PHARM REV CODE 636 W HCPCS: Performed by: INTERNAL MEDICINE

## 2019-06-04 PROCEDURE — 99999 PR PBB SHADOW E&M-EST. PATIENT-LVL IV: CPT | Mod: PBBFAC,,, | Performed by: INTERNAL MEDICINE

## 2019-06-04 PROCEDURE — 96413 CHEMO IV INFUSION 1 HR: CPT

## 2019-06-04 PROCEDURE — A4216 STERILE WATER/SALINE, 10 ML: HCPCS | Performed by: INTERNAL MEDICINE

## 2019-06-04 PROCEDURE — 99215 OFFICE O/P EST HI 40 MIN: CPT | Mod: S$PBB,,, | Performed by: INTERNAL MEDICINE

## 2019-06-04 PROCEDURE — 99215 PR OFFICE/OUTPT VISIT, EST, LEVL V, 40-54 MIN: ICD-10-PCS | Mod: S$PBB,,, | Performed by: INTERNAL MEDICINE

## 2019-06-04 RX ORDER — SODIUM CHLORIDE 0.9 % (FLUSH) 0.9 %
10 SYRINGE (ML) INJECTION
Status: CANCELLED | OUTPATIENT
Start: 2019-06-04

## 2019-06-04 RX ORDER — HEPARIN 100 UNIT/ML
500 SYRINGE INTRAVENOUS
Status: CANCELLED | OUTPATIENT
Start: 2019-06-04

## 2019-06-04 RX ORDER — LEVOTHYROXINE SODIUM 100 UG/1
100 TABLET ORAL DAILY
Qty: 30 TABLET | Refills: 11 | Status: SHIPPED | OUTPATIENT
Start: 2019-06-04 | End: 2019-10-22 | Stop reason: SDUPTHER

## 2019-06-04 RX ORDER — OXYCODONE AND ACETAMINOPHEN 5; 325 MG/1; MG/1
1 TABLET ORAL EVERY 4 HOURS PRN
Qty: 5 TABLET | Refills: 0 | Status: SHIPPED | OUTPATIENT
Start: 2019-06-04 | End: 2019-06-04 | Stop reason: SDUPTHER

## 2019-06-04 RX ORDER — OXYCODONE AND ACETAMINOPHEN 5; 325 MG/1; MG/1
1 TABLET ORAL EVERY 4 HOURS PRN
Qty: 5 TABLET | Refills: 0 | Status: SHIPPED | OUTPATIENT
Start: 2019-06-04 | End: 2019-07-12 | Stop reason: SDUPTHER

## 2019-06-04 RX ORDER — HEPARIN 100 UNIT/ML
500 SYRINGE INTRAVENOUS
Status: DISCONTINUED | OUTPATIENT
Start: 2019-06-04 | End: 2019-06-04 | Stop reason: HOSPADM

## 2019-06-04 RX ORDER — SODIUM CHLORIDE 0.9 % (FLUSH) 0.9 %
10 SYRINGE (ML) INJECTION
Status: DISCONTINUED | OUTPATIENT
Start: 2019-06-04 | End: 2019-06-04 | Stop reason: HOSPADM

## 2019-06-04 RX ADMIN — SODIUM CHLORIDE 480 MG: 9 INJECTION, SOLUTION INTRAVENOUS at 09:06

## 2019-06-04 RX ADMIN — Medication 10 ML: at 10:06

## 2019-06-04 RX ADMIN — DENOSUMAB 120 MG: 120 INJECTION SUBCUTANEOUS at 10:06

## 2019-06-04 RX ADMIN — HEPARIN SODIUM (PORCINE) LOCK FLUSH IV SOLN 100 UNIT/ML 500 UNITS: 100 SOLUTION at 10:06

## 2019-06-04 NOTE — PLAN OF CARE
Problem: Adult Inpatient Plan of Care  Goal: Plan of Care Review  Outcome: Ongoing (interventions implemented as appropriate)  Arrived to unit. Cleared for chemo per Dr. Garay. No reported side effects from Opdivo. Tolerated infusion. No reactions noted. VSS. Pt has next appts. Pt ambulatory, discharged from unit.

## 2019-06-04 NOTE — PROGRESS NOTES
"Subjective:       Patient ID: Yao Alan is a 44 y.o. male.    Chief Complaint: Lung Cancer  Oncologic History:  Mr. Yao Alan is a 44-year-old male who has a long history of smoking and was seen in the Pulmonary Clinic by Dr. Valle on 03/05/2015 with abnormal CT scan.    Apparently, he went to the [facility] in February with coughing as well as chest pain. A chest x-ray was done, which revealed a left upper lobe lung mass. Followup CT scan was done on 02/12/2015 and that revealed posterior superior mediastinal mass adjacent and prominent enlarged upper left mediastinal lymph nodes, abutting the aortic arch as well as left subclavian artery. A  CT scan of the abdomen was done on 03/03/2015 without contrast and that revealed nonobstructive nephrolithiasis, but a 2.1 cm lytic lesion involving the left iliac wing concerning for metastatic disease given the presence of emphysema and a mediastinal soft tissue mass on the CT chest from 02/12/2015. He saw Dr. Valle after that on 03/05/2015 and underwent an IR guided biopsy of the bone lesion on 03/17/2015. 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    His EGFR/EML/ALK is negative.    His PET scan on 3/25/15 revealed Left upper lobe lung lesion with an adjacent para-aortic lymph node, right anterior rib metastasis, right iliac metastasis, and pancreatic metastasis. A pancreatic cancer primary neoplasm is less likely.  MRI brain was negative for mets.  He completed 6 cycles of Carboplatin and Alimta and was on maintenance Alimta until end of March 2016.  His bone scan from 3/16 revealed stable disease. His CT scans also from end of March 2016 revealed "Interval enlargement of left upper lobe lung mass measuring 5.9 x 3.9 cm (previously 3.3 x 2.5 cm). This mass appears to invade the mediastinum and abutting the aortic arch and left subclavian artery"     He is now on Opdivo since March 2016           HPI  Mr. Alan returns for " follow up and Opdivo.    Review of Systems   Constitutional: Negative for appetite change, fatigue and unexpected weight change.   HENT: Negative for mouth sores.    Eyes: Negative for visual disturbance.   Respiratory: Negative for cough and shortness of breath.    Cardiovascular: Negative for chest pain.   Gastrointestinal: Negative for abdominal pain and diarrhea.   Genitourinary: Negative for frequency.   Musculoskeletal: Negative for back pain.   Skin: Negative for rash.   Neurological: Negative for headaches.   Hematological: Negative for adenopathy.   Psychiatric/Behavioral: The patient is not nervous/anxious.    All other systems reviewed and are negative.      Objective:      Physical Exam   Constitutional: He is oriented to person, place, and time. He appears well-developed and well-nourished.   HENT:   Mouth/Throat: No oropharyngeal exudate.   Cardiovascular: Normal rate and normal heart sounds.   Pulmonary/Chest: Effort normal and breath sounds normal. He has no wheezes.   Abdominal: Soft. Bowel sounds are normal. There is no tenderness.   Musculoskeletal: He exhibits no edema or tenderness.   Lymphadenopathy:     He has no cervical adenopathy.   Neurological: He is alert and oriented to person, place, and time. Coordination normal.   Skin: Skin is warm and dry. No rash noted.   Psychiatric: He has a normal mood and affect. Judgment and thought content normal.   Vitals reviewed.      LABs:  WBC   Date Value Ref Range Status   06/04/2019 8.98 3.90 - 12.70 K/uL Final     Hemoglobin   Date Value Ref Range Status   06/04/2019 13.3 (L) 14.0 - 18.0 g/dL Final     Hematocrit   Date Value Ref Range Status   06/04/2019 41.3 40.0 - 54.0 % Final     Platelets   Date Value Ref Range Status   06/04/2019 293 150 - 350 K/uL Final     Gran # (ANC)   Date Value Ref Range Status   06/04/2019 4.9 1.8 - 7.7 K/uL Final     Comment:     The ANC is based on a white cell differential from an   automated cell counter. It has not  been microscopically   reviewed for the presence of abnormal cells. Clinical   correlation is required.         Chemistry        Component Value Date/Time     06/04/2019 0711    K 3.8 06/04/2019 0711     06/04/2019 0711    CO2 26 06/04/2019 0711    BUN 12 06/04/2019 0711    CREATININE 1.2 06/04/2019 0711    GLU 89 06/04/2019 0711        Component Value Date/Time    CALCIUM 8.9 06/04/2019 0711    ALKPHOS 73 06/04/2019 0711    AST 20 06/04/2019 0711    ALT 13 06/04/2019 0711    BILITOT 0.2 06/04/2019 0711    ESTGFRAFRICA >60.0 06/04/2019 0711    EGFRNONAA >60.0 06/04/2019 0711        TSH   Date Value Ref Range Status   06/04/2019 1.200 0.400 - 4.000 uIU/mL Final     Free T4   Date Value Ref Range Status   06/04/2019 0.88 0.71 - 1.51 ng/dL Final       Assessment:       1. Malignant neoplasm of upper lobe of right lung    2. Secondary malignant neoplasm of bone    3. Hypothyroidism due to medication    4. Tooth pain    5. Hypothyroidism, unspecified type    6. Deep vein thrombosis (DVT) of left lower extremity, unspecified chronicity, unspecified vein        Plan:        1,2. He is doing well on Q 4 week dose of Opdivo and will return in 4 weeks for next dose.  3. Stable, continue with 100 mcg Synthroid, Refill sent  4. Dispensed # 5 tabs of Percocet. He knows that we will not dispense further pills. He will see his dentist  6. Refilled Xarelto    Above care plan was discussed with patient and all questions were addressed to his satisfaction

## 2019-06-08 ENCOUNTER — HOSPITAL ENCOUNTER (EMERGENCY)
Facility: HOSPITAL | Age: 44
Discharge: HOME | End: 2019-06-09
Attending: EMERGENCY MEDICINE
Payer: MEDICARE

## 2019-06-08 DIAGNOSIS — R04.0 EPISTAXIS: Primary | ICD-10-CM

## 2019-06-08 DIAGNOSIS — Z79.01 ANTICOAGULANT LONG-TERM USE: ICD-10-CM

## 2019-06-08 PROCEDURE — 99284 EMERGENCY DEPT VISIT MOD MDM: CPT | Mod: ER

## 2019-06-08 PROCEDURE — 25000003 PHARM REV CODE 250: Mod: ER | Performed by: EMERGENCY MEDICINE

## 2019-06-08 RX ADMIN — Medication 1 SPRAY: at 10:06

## 2019-06-09 VITALS
BODY MASS INDEX: 28.28 KG/M2 | HEART RATE: 86 BPM | WEIGHT: 176 LBS | TEMPERATURE: 99 F | RESPIRATION RATE: 17 BRPM | SYSTOLIC BLOOD PRESSURE: 128 MMHG | HEIGHT: 66 IN | DIASTOLIC BLOOD PRESSURE: 80 MMHG | OXYGEN SATURATION: 99 %

## 2019-06-09 RX ORDER — LORATADINE 10 MG/1
10 TABLET ORAL DAILY
Qty: 60 TABLET | Refills: 0 | COMMUNITY
Start: 2019-06-09 | End: 2019-06-12

## 2019-06-09 RX ORDER — FLUTICASONE PROPIONATE 50 MCG
2 SPRAY, SUSPENSION (ML) NASAL DAILY
Qty: 16 G | Refills: 0 | Status: SHIPPED | OUTPATIENT
Start: 2019-06-09 | End: 2019-08-22

## 2019-06-09 NOTE — ED PROVIDER NOTES
Encounter Date: 6/8/2019    SCRIBE #1 NOTE: I, Marivel Angulo, am scribing for, and in the presence of,  Dr. Garcia. I have scribed the following portions of the note - Other sections scribed: HPI, ROS, PE.       History     Chief Complaint   Patient presents with    Epistaxis     pt c/o nose and mouth bleeding x 10 mins ago     This is a 44 year old male with multiple medical problems including lung cancer and blood clot in leg presenting to ED due to acute nose bleed in right nostril  x 10 mins pta. Denies trauma or recent injury. Reports all he did was stand up and his nose began to bleed. Denies pain, recent illness, nausea, or vomiting. Patient is on Xarelto (blood thinner).     The history is provided by the patient. No  was used.     Review of patient's allergies indicates:   Allergen Reactions    Nsaids (non-steroidal anti-inflammatory drug)      Patient says since been diagnosed with lung mass on 2-12-15, if take any NSAIDS he spikes a fever.    Tylenol [acetaminophen] Other (See Comments)     Sweating +     Past Medical History:   Diagnosis Date    Asthma     COPD (chronic obstructive pulmonary disease)     HTN (hypertension)     Hypertension     Lung cancer     Lung mass     Thyroid disease      Past Surgical History:   Procedure Laterality Date    COLONOSCOPY N/A 3/18/2019    Performed by Indra [facility] at [facility] (4TH FLR)    COLONOSCOPY N/A 4/22/2015    Performed by FRANSISCO Nur MD at [facility] ENDO (4TH FLR)    ESOPHAGOGASTRODUODENOSCOPY (EGD) N/A 3/18/2019    Performed by Indra [facility]en MD at [facility] END[facility] (4TH FLR)    ESOPHAGOGASTRODUODENOSCOPY (EGD) N/A 5/8/2015    Performed by Young Cain MD at [facility] ENDO (4TH FLR)    FRACTURE SURGERY      finger    [id number]-neck or chest Fluoro equipment needed  **PT MUST BE FIRST CASE Right 7/20/2015    Performed by Miguel Jacobo MD at [facility] OR 1ST FLR    LUNG CANCER SURGERY Right March 2015    lung biopsy      PORTACAT PLACEMENT       Family History   Problem Relation Age of Onset    Hypertension Father     No Known Problems Mother     No Known Problems Sister     No Known Problems Brother     No Known Problems Maternal Aunt     No Known Problems Maternal Uncle     No Known Problems Paternal Aunt     No Known Problems Paternal Uncle     No Known Problems Maternal Grandmother     No Known Problems Maternal Grandfather     No Known Problems Paternal Grandmother     No Known Problems Paternal Grandfather     Amblyopia Neg Hx     Blindness Neg Hx     Cancer Neg Hx     Cataracts Neg Hx     Diabetes Neg Hx     Glaucoma Neg Hx     Macular degeneration Neg Hx     Retinal detachment Neg Hx     Strabismus Neg Hx     Stroke Neg Hx     Thyroid disease Neg Hx      Social History     Tobacco Use    Smoking status: Former Smoker     Packs/day: 0.75     Years: 25.00     Pack years: 18.75     Types: Cigarettes     Last attempt to quit: 2014     Years since quittin.5    Smokeless tobacco: Never Used    Tobacco comment: Patient is no longer smoking   Substance Use Topics    Alcohol use: No    Drug use: Yes     Types: Marijuana     Review of Systems   Constitutional: Negative for fever.   HENT: Positive for congestion (chronic sinus problems) and nosebleeds. Negative for rhinorrhea.    Eyes: Negative for redness.   Respiratory: Negative for shortness of breath.    Gastrointestinal: Negative for nausea and vomiting.   Neurological: Negative for dizziness, syncope and weakness.   All other systems reviewed and are negative.      Physical Exam     Initial Vitals [19 2152]   BP Pulse Resp Temp SpO2   130/82 87 18 99 °F (37.2 °C) 98 %      MAP       --         Physical Exam    Nursing note and vitals reviewed.  Constitutional: He appears well-developed and well-nourished. He is not diaphoretic. No distress.   HENT:   Head: Normocephalic and atraumatic.   Mouth/Throat: Oropharynx is clear and moist.    Dried blood noted to right nostril.     Post nasal drip of small amount of blood noted to posterior oropharyngeal.   Eyes: Conjunctivae are normal.   Neck: Normal range of motion. Neck supple.   Cardiovascular: Normal rate, normal heart sounds and intact distal pulses.   Pulmonary/Chest: Effort normal and breath sounds normal. No stridor. No respiratory distress.   Musculoskeletal: Normal range of motion. He exhibits no edema or tenderness.   Neurological: He is alert and oriented to person, place, and time.   Skin: Skin is warm and dry.   Psychiatric: He has a normal mood and affect.         ED Course   Procedures  Labs Reviewed - No data to display       Imaging Results    None       Labs Reviewed  No visits with results within 1 Day(s) from this visit.   Latest known visit with results is:   Lab Visit on 06/04/2019   Component Date Value Ref Range Status    WBC 06/04/2019 8.98  3.90 - 12.70 K/uL Final    RBC 06/04/2019 5.09  4.60 - 6.20 M/uL Final    Hemoglobin 06/04/2019 13.3* 14.0 - 18.0 g/dL Final    Hematocrit 06/04/2019 41.3  40.0 - 54.0 % Final    Mean Corpuscular Volume 06/04/2019 81* 82 - 98 fL Final    Mean Corpuscular Hemoglobin 06/04/2019 26.1* 27.0 - 31.0 pg Final    Mean Corpuscular Hemoglobin Conc 06/04/2019 32.2  32.0 - 36.0 g/dL Final    RDW 06/04/2019 14.4  11.5 - 14.5 % Final    Platelets 06/04/2019 293  150 - 350 K/uL Final    MPV 06/04/2019 10.6  9.2 - 12.9 fL Final    Gran # (ANC) 06/04/2019 4.9  1.8 - 7.7 K/uL Final    Comment: The ANC is based on a white cell differential from an   automated cell counter. It has not been microscopically   reviewed for the presence of abnormal cells. Clinical   correlation is required.      Immature Grans (Abs) 06/04/2019 0.03  0.00 - 0.04 K/uL Final    Comment: Mild elevation in immature granulocytes is non specific and   can be seen in a variety of conditions including stress response,   acute inflammation, trauma and pregnancy. Correlation  with other   laboratory and clinical findings is essential.      Sodium 06/04/2019 141  136 - 145 mmol/L Final    Potassium 06/04/2019 3.8  3.5 - 5.1 mmol/L Final    Chloride 06/04/2019 108  95 - 110 mmol/L Final    CO2 06/04/2019 26  23 - 29 mmol/L Final    Glucose 06/04/2019 89  70 - 110 mg/dL Final    BUN, Bld 06/04/2019 12  6 - 20 mg/dL Final    Creatinine 06/04/2019 1.2  0.5 - 1.4 mg/dL Final    Calcium 06/04/2019 8.9  8.7 - 10.5 mg/dL Final    Total Protein 06/04/2019 7.1  6.0 - 8.4 g/dL Final    Albumin 06/04/2019 3.7  3.5 - 5.2 g/dL Final    Total Bilirubin 06/04/2019 0.2  0.1 - 1.0 mg/dL Final    Comment: For infants and newborns, interpretation of results should be based  on gestational age, weight and in agreement with clinical  observations.  Premature Infant recommended reference ranges:  Up to 24 hours.............<8.0 mg/dL  Up to 48 hours............<12.0 mg/dL  3-5 days..................<15.0 mg/dL  6-29 days.................<15.0 mg/dL      Alkaline Phosphatase 06/04/2019 73  55 - 135 U/L Final    AST 06/04/2019 20  10 - 40 U/L Final    ALT 06/04/2019 13  10 - 44 U/L Final    Anion Gap 06/04/2019 7* 8 - 16 mmol/L Final    eGFR if African American 06/04/2019 >60.0  >60 mL/min/1.73 m^2 Final    eGFR if non African American 06/04/2019 >60.0  >60 mL/min/1.73 m^2 Final    Comment: Calculation used to obtain the estimated glomerular filtration  rate (eGFR) is the CKD-EPI equation.       TSH 06/04/2019 1.200  0.400 - 4.000 uIU/mL Final    Free T4 06/04/2019 0.88  0.71 - 1.51 ng/dL Final        Imaging Reviewed    Imaging Results    None         Medications given in ED    Medications   phenylephrine HCL 0.5% nasal spray 1 spray (1 spray Each Nare Given 6/8/19 2246)       This document was produced by a scribe under my direction and in my presence. I agree with the content of the note and have made any necessary edits.     Jude Garcia MD         Note was created using voice recognition  "software. Note may have occasional typographical errors that may not have been identified and edited despite good jessy initial review prior to signing.       Medical Decision Making:   Clinical Tests:   Lab Tests: Reviewed  ED Management:  No recurrent bleeding after phenylephrine nasal spray instilled.  Post nasal drip no longer blood tinged. Patient tolerating PO. HD stable. Advised no nose picking or nose blowing. Prescribed meds for sinus precautions to prevent recurrent bleeding. Patient understands to return immediately if symptoms recur. Referred to ENT            Scribe Attestation:   Scribe #1: I performed the above scribed service and the documentation accurately describes the services I performed. I attest to the accuracy of the note.      Vitals:    06/08/19 2152 06/09/19 0024   BP: 130/82 128/80   BP Location:  Left arm   Patient Position:  Sitting   Pulse: 87 86   Resp: 18 17   Temp: 99 °F (37.2 °C) 98.9 °F (37.2 °C)   TempSrc: Oral Oral   SpO2: 98% 99%   Weight: 79.8 kg (176 lb)    Height: 5' 6" (1.676 m)               ED Course as of Jun 14 2123   Sat Jun 08, 2019   2257 No active bleeding thus far.    [DL]   Sun Jun 09, 2019   0000 No recurrent nose bleed throughout ED course. Blood tinged post nasal drip resolved.     [DL]      ED Course User Index  [DL] Jude Garcia MD     Discharge Medications     Discharge Medication List as of 6/9/2019 12:15 AM      START taking these medications    Details   fluticasone propionate (FLONASE) 50 mcg/actuation nasal spray 2 sprays (100 mcg total) by Each Nare route once daily., Starting Sun 6/9/2019, Print      sodium chloride (SALINE NASAL) 0.65 % nasal spray 1 spray by Nasal route as needed for Congestion (and nasal passages)., Starting Sun 6/9/2019, Print      loratadine (CLARITIN) 10 mg tablet Take 1 tablet (10 mg total) by mouth once daily., Starting Sun 6/9/2019, Until Mon 6/8/2020, OTC         CONTINUE these medications which have NOT CHANGED    Details "   !! albuterol 90 mcg/actuation inhaler Inhale 2 puffs into the lungs every 6 (six) hours as needed for Wheezing., Starting 8/11/2015, Until Discontinued, Normal      levothyroxine (SYNTHROID) 100 MCG tablet Take 1 tablet (100 mcg total) by mouth once daily., Starting Tue 6/4/2019, Until Wed 6/3/2020, Normal      oxyCODONE-acetaminophen (PERCOCET) 5-325 mg per tablet Take 1 tablet by mouth every 4 (four) hours as needed for Pain., Starting Tue 6/4/2019, Normal      !! PROAIR HFA 90 mcg/actuation inhaler INHALE TWO PUFFS BY MOUTH EVERY 6 HOURS AS NEEDED FOR WHEEZING, Normal      rivaroxaban (XARELTO) 20 mg Tab Take 1 tablet (20 mg total) by mouth once daily., Starting Tue 6/4/2019, Normal      clotrimazole (LOTRIMIN) 1 % cream Apply to affected area 2 times daily, Normal      diphenhydrAMINE (BENADRYL) 25 mg capsule Take 1 each (25 mg total) by mouth every 6 (six) hours as needed for Itching or Allergies., Starting Fri 8/4/2017, Print      docusate sodium (COLACE) 100 MG capsule Take 1 capsule (100 mg total) by mouth 2 (two) times daily., Starting 7/23/2015, Until Discontinued, Print      hydrocortisone 2.5 % cream Apply topically 2 (two) times daily. for 10 days, Starting Fri 3/8/2019, Until Mon 3/18/2019, Print      triamcinolone acetonide 0.1% (KENALOG) 0.1 % ointment Apply topically 2 (two) times daily., Starting Fri 10/13/2017, Until Tue 2/12/2019, Normal      amlodipine (NORVASC) 10 MG tablet Take 1 tablet (10 mg total) by mouth once daily., Starting Tue 4/18/2017, Until Tue 4/23/2019, Normal      atorvastatin (LIPITOR) 20 MG tablet Take 1 tablet (20 mg total) by mouth once daily., Starting Wed 2/13/2019, Until Thu 2/13/2020, Normal      lactulose (CHRONULAC) 10 gram/15 mL solution Take 30 mLs (20 g total) by mouth 3 (three) times daily., Starting 11/1/2016, Until Discontinued, Normal      metoclopramide HCl (REGLAN) 10 MG tablet Take 1 tablet (10 mg total) by mouth every 6 (six) hours as needed  (nausea/vomiting and/or abdominal cramps)., Starting 7/25/2015, Until Discontinued, Print      naloxegol 25 mg Tab Take 25 mg by mouth once daily., Starting 12/2/2015, Until Discontinued, Normal      omeprazole (PRILOSEC) 20 MG capsule Take 1 capsule (20 mg total) by mouth once daily., Starting Wed 12/2/2015, Until Tue 2/12/2019, Normal      polyethylene glycol (GLYCOLAX) 17 gram/dose powder Take 17 g by mouth once daily., Starting 7/28/2015, Until Discontinued, Normal       !! - Potential duplicate medications found. Please discuss with provider.                Patient discharged to home in stable condition with instructions to:   1. Please take all meds as prescribed.  2. Follow-up with your primary care doctor   3. Return precautions discussed and patient and/or family/caretaker understands to return to the emergency room for any concerns including worsening of your current symptoms, fever, chills, night sweats, worsening pain, chest pain, shortness of breath, nausea, vomiting, diarrhea, bleeding, headache, difficulty talking, visual disturbances, weakness, numbness or any other acute concerns    Clinical Impression:     1. Epistaxis    2. Anticoagulant long-term use            Disposition:   Disposition: Discharged  Condition: Stable                        Jude Garcia MD  06/14/19 7845

## 2019-06-09 NOTE — ED NOTES
Assessment completed- AO x 3 - No acute distress noted- GCS 15 - - Walked into room  without assistance - patient is holding pressure to nose

## 2019-06-12 ENCOUNTER — HOSPITAL ENCOUNTER (EMERGENCY)
Facility: HOSPITAL | Age: 44
Discharge: HOME | End: 2019-06-12
Attending: EMERGENCY MEDICINE
Payer: MEDICARE

## 2019-06-12 ENCOUNTER — TELEPHONE (OUTPATIENT)
Dept: HEMATOLOGY/ONCOLOGY | Facility: CLINIC | Age: 44
End: 2019-06-12

## 2019-06-12 VITALS
HEIGHT: 66 IN | RESPIRATION RATE: 16 BRPM | DIASTOLIC BLOOD PRESSURE: 82 MMHG | SYSTOLIC BLOOD PRESSURE: 133 MMHG | OXYGEN SATURATION: 100 % | BODY MASS INDEX: 28.12 KG/M2 | TEMPERATURE: 99 F | HEART RATE: 73 BPM | WEIGHT: 175 LBS

## 2019-06-12 DIAGNOSIS — R04.0 EPISTAXIS: Primary | ICD-10-CM

## 2019-06-12 DIAGNOSIS — Z79.01 ANTICOAGULANT LONG-TERM USE: ICD-10-CM

## 2019-06-12 LAB
BUN SERPL-MCNC: 12 MG/DL (ref 6–30)
CHLORIDE SERPL-SCNC: 105 MMOL/L (ref 95–110)
CREAT SERPL-MCNC: 1.2 MG/DL (ref 0.5–1.4)
GLUCOSE SERPL-MCNC: 88 MG/DL (ref 70–110)
HCT VFR BLD CALC: 40 %PCV (ref 36–54)
POC IONIZED CALCIUM: 1.11 MMOL/L (ref 1.06–1.42)
POC TCO2 (MEASURED): 25 MMOL/L (ref 23–29)
POTASSIUM BLD-SCNC: 4.4 MMOL/L (ref 3.5–5.1)
SAMPLE: NORMAL
SODIUM BLD-SCNC: 140 MMOL/L (ref 136–145)

## 2019-06-12 PROCEDURE — 99284 EMERGENCY DEPT VISIT MOD MDM: CPT | Mod: ,,, | Performed by: PHYSICIAN ASSISTANT

## 2019-06-12 PROCEDURE — 99284 PR EMERGENCY DEPT VISIT,LEVEL IV: ICD-10-PCS | Mod: ,,, | Performed by: PHYSICIAN ASSISTANT

## 2019-06-12 PROCEDURE — 99283 EMERGENCY DEPT VISIT LOW MDM: CPT

## 2019-06-12 NOTE — ED PROVIDER NOTES
"Encounter Date: 6/12/2019       History     Chief Complaint   Patient presents with    Epistaxis     Pt presents with a nose bleed that began approximately 30 minutes ago. Pt states he is on Xarelto. Pt denies trauma.      44-year-old male with PMHx of asthma, COPD, asthma, HTN, aristeo cancer (receiving chemo infusions q4 weeks, approx 3 yrs), DVT (on Xarelto), and thyroid disease who presents to the ED with complaint of epistaxis.  The patient was evaluated 4 days ago in urgent care for epistaxis and was prescribed Flonase and instructed to return to the ED if his symptoms return.  This afternoon he bent over to pick something off the floor and his right nare began bleeding. Per pt's wife, he continued to bleed with a "loose flow" of bright red blood, soaking through a towel.  His bleeding stopped while in the ED waiting room. Denies trauma or injury. Patient contacted oncologist, Dr. Garay, who referred patient to ENT.  ENT  contacted patient's wife in ED waiting room to schedule appointment.  He reports having a mild intermittent headache for the past few months. He took an 81 mg ASA last night for relief.  He continues to have a mild frontal headache, rated 3/10 in severity. Denies lightheadedness, weakness, shortness of breath, chest pain, palpitations, confusion, numbness, congestion, cough, nausea, vomiting, abdominal pain, blood in stool, melena or any other medical complaints.    The history is provided by the patient.     Review of patient's allergies indicates:   Allergen Reactions    Nsaids (non-steroidal anti-inflammatory drug)      Patient says since been diagnosed with lung mass on 2-12-15, if take any NSAIDS he spikes a fever.    Tylenol [acetaminophen] Other (See Comments)     Sweating +     Past Medical History:   Diagnosis Date    Asthma     COPD (chronic obstructive pulmonary disease)     HTN (hypertension)     Hypertension     Lung cancer     Lung mass     Thyroid disease  "     Past Surgical History:   Procedure Laterality Date    COLONOSCOPY N/A 3/18/2019    [facility] (4TH FLR)    COLONOSCOPY N/A 2015    [facility] by FRANSISCO Nur [facility] at [facility] END[facility] (4TH FLR)    ESOPHAGOGASTRODUODENOSCOPY (EGD) N/A 3/18/2019    [facility] (4TH FLR)    ESOPHAGOGASTRODUODENOSCOPY (EGD) N/A 2015    Performed by Young Cain MD at [facility] ENDO (4TH FLR)    FRACTURE SURGERY      finger    [id number]-CATH-neck or chest Fluoro equipment needed  **PT MUST BE FIRST CASE Right 2015    Performed by [facility] at [facility] OR 1ST FLR    LUNG CANCER SURGERY Right 2015    lung biopsy     PORTACATH PLACEMENT       Family History   Problem Relation Age of Onset    Hypertension Father     No Known Problems Mother     No Known Problems Sister     No Known Problems Brother     No Known Problems Maternal Aunt     No Known Problems Maternal Uncle     No Known Problems Paternal Aunt     No Known Problems Paternal Uncle     No Known Problems Maternal Grandmother     No Known Problems Maternal Grandfather     No Known Problems Paternal Grandmother     No Known Problems Paternal Grandfather     Amblyopia Neg Hx     Blindness Neg Hx     Cancer Neg Hx     Cataracts Neg Hx     Diabetes Neg Hx     Glaucoma Neg Hx     Macular degeneration Neg Hx     Retinal detachment Neg Hx     Strabismus Neg Hx     Stroke Neg Hx     Thyroid disease Neg Hx      Social History     Tobacco Use    Smoking status: Former Smoker     Packs/day: 0.75     Years: 25.00     Pack years: 18.75     Types: Cigarettes     Last attempt to quit: 2014     Years since quittin.5    Smokeless tobacco: Never Used    Tobacco comment: Patient is no longer smoking   Substance Use Topics    Alcohol use: No    Drug use: Yes     Types: Marijuana     Review of Systems   Constitutional: Negative for chills and fever.   HENT: Negative for  congestion, rhinorrhea, sinus pressure and sore throat.         + epistaxis   Eyes: Negative for visual disturbance.   Respiratory: Negative for cough and shortness of breath.    Cardiovascular: Negative for chest pain and palpitations.   Gastrointestinal: Negative for abdominal pain, blood in stool, constipation, diarrhea, nausea and vomiting.   Genitourinary: Negative for dysuria.   Musculoskeletal: Negative for back pain.   Skin: Negative for pallor and rash.   Neurological: Negative for dizziness, syncope, weakness, light-headedness, numbness and headaches.   Hematological: Does not bruise/bleed easily.   Psychiatric/Behavioral: Negative for confusion.       Physical Exam     Initial Vitals [06/12/19 1257]   BP Pulse Resp Temp SpO2   128/84 83 16 98.3 °F (36.8 °C) 98 %      MAP       --         Physical Exam    Vitals reviewed.  Constitutional: He appears well-developed and well-nourished.   HENT:   Head: Normocephalic and atraumatic.   Right Ear: Tympanic membrane and ear canal normal.   Left Ear: Tympanic membrane and ear canal normal.   Nose: Nose normal. No rhinorrhea, nose lacerations, nasal deformity, septal deviation or nasal septal hematoma.  No foreign bodies. Right sinus exhibits no maxillary sinus tenderness and no frontal sinus tenderness. Left sinus exhibits no maxillary sinus tenderness and no frontal sinus tenderness.   Mouth/Throat: Uvula is midline and oropharynx is clear and moist.   Erythematous and edematous nasal mucosa in right nare. No active bleeding bilaterally.    Eyes: Conjunctivae and EOM are normal.   Neck: Normal range of motion. Neck supple.   Cardiovascular: Normal rate, regular rhythm and normal heart sounds.   Pulmonary/Chest: Breath sounds normal. He has no wheezes. He has no rhonchi. He has no rales.   Abdominal: Soft. There is no tenderness. There is no rebound and no guarding.   Musculoskeletal: Normal range of motion. He exhibits no edema or tenderness.   Neurological: He  is alert and oriented to person, place, and time.   Psychiatric: He has a normal mood and affect.         ED Course   Procedures  Labs Reviewed   ISTAT PROCEDURE   ISTAT CHEM8          Imaging Results    None          Medical Decision Making:   History:   Old Medical Records: I decided to obtain old medical records.  Old Records Summarized: records from clinic visits.       <> Summary of Records: Reviewed notes from urgent care visit 06/08/2019, see HPI for details.  Initial Assessment:   44-year-old male with PMHx of asthma, COPD, asthma, HTN, aristeo cancer (receiving chemo infusions q4 weeks, approx 3 yrs), DVT (on Xarelto), and thyroid disease who presents to the ED with complaint of epistaxis.  Patient was evaluated 4 days ago in urgent care for epistaxis and was prescribed Flonase and instructed to return to the ED if symptoms return.  He was bending over earlier today and began having epistaxis from his right nare.  Continued to bleed for about 30 min and soaked through 1 towel.  He has had intermittent headaches for months and took a 81 mg aspirin last night.  Denies any anemia symptoms.  Vital signs are stable. Hemodynamically stable. RR.  Lungs CTA bilaterally.  Abdomen soft nontender. Erythematous and edematous nasal mucosa in right nare. No active bleeding bilaterally. Oropharynx clear.  Differential Diagnosis:   DDX includes but is not limited to epistaxis, anticoagulant use, anemia, electrolyte abnormality.   Clinical Tests:   Lab Tests: Ordered and Reviewed  ED Management:  Patient is hemodynamically stable and in no acute distress. No active bleeding on exam.  Will obtain iSTAT to assess for anemia status.     Hematocrit 40. creatinine 1.2.  BUN 12. Pt is stable for discharge with instructions to follow up with ENT at the earliest availability.  Patient already in contact with ENT, but ambulatory referral placed. Instructions given on proper epistaxis care.  Strict ER return precautions given.  All  questions answered.  Patient expressed understanding is agreeable.    I have discussed the treatment and management of this patient with my supervising physician, and we agree on the plan of care.      Palmira Meyers PA-C                        Clinical Impression:       ICD-10-CM ICD-9-CM   1. Epistaxis R04.0 784.7   2. Anticoagulant long-term use Z79.01 V58.61         Disposition:   Disposition: Discharged  Condition: Stable                        Palmira Meyers PA-C  06/12/19 1604

## 2019-06-12 NOTE — TELEPHONE ENCOUNTER
"Spoke with wife.  She is calling to state she is on her way to the ED with patient, as he started having epistaxis over the weekend--went to . At  they were advised if it happens again to go to the ED. Wife states, "the blood is pouring out of his nose. We are on our way to [facility]. We are almost there."    Nurse informed wife she would forward message to dr carl. She thanked nurse.     Message routed to dr carl (wife was driving, so she didn't want to answer further questions)  "

## 2019-06-12 NOTE — TELEPHONE ENCOUNTER
----- Message from Adilene Donnelly sent at 6/12/2019 12:03 PM CDT -----  Contact: Pt's wife  Patient's wife called stating that patient is bleeding from nose and will be taking patient to the ER at the main [place].    Patient can be reached at [phone]

## 2019-06-12 NOTE — DISCHARGE INSTRUCTIONS
You have been referred to ENT. Please schedule an appointment at their earliest availability. You should apply continuous pressure if you develop another nose bleed. Return to the ER if you continue to bleed or if you feel lightheaded, weak, or develop any other concerning symptoms.

## 2019-06-12 NOTE — TELEPHONE ENCOUNTER
Spoke with patients wife regarding patients nose bleed. Patients at [facility] ED at this time for nose bleed and will call us with updated information after ER Visit.

## 2019-06-12 NOTE — ED TRIAGE NOTES
Pt arrived POV with family from home. Pt had a nosebleed around 1230pm today. Pt has been having them since Saturday. Bleeding controlled at this time. Takes Xarelto for DVT in 2015. Going to chemo every 4 weeks. Denies pain.

## 2019-06-13 ENCOUNTER — OFFICE VISIT (OUTPATIENT)
Dept: OTOLARYNGOLOGY | Facility: CLINIC | Age: 44
End: 2019-06-13
Payer: MEDICARE

## 2019-06-13 VITALS
SYSTOLIC BLOOD PRESSURE: 125 MMHG | HEART RATE: 70 BPM | DIASTOLIC BLOOD PRESSURE: 81 MMHG | WEIGHT: 177 LBS | BODY MASS INDEX: 28.45 KG/M2 | HEIGHT: 66 IN

## 2019-06-13 DIAGNOSIS — R04.0 EPISTAXIS: Primary | ICD-10-CM

## 2019-06-13 PROCEDURE — 99999 PR PBB SHADOW E&M-EST. PATIENT-LVL III: CPT | Mod: PBBFAC,,, | Performed by: OTOLARYNGOLOGY

## 2019-06-13 PROCEDURE — 99214 OFFICE O/P EST MOD 30 MIN: CPT | Mod: S$PBB,,, | Performed by: OTOLARYNGOLOGY

## 2019-06-13 PROCEDURE — 99213 OFFICE O/P EST LOW 20 MIN: CPT | Mod: PBBFAC | Performed by: OTOLARYNGOLOGY

## 2019-06-13 PROCEDURE — 99214 PR OFFICE/OUTPT VISIT, EST, LEVL IV, 30-39 MIN: ICD-10-PCS | Mod: S$PBB,,, | Performed by: OTOLARYNGOLOGY

## 2019-06-13 PROCEDURE — 99999 PR PBB SHADOW E&M-EST. PATIENT-LVL III: ICD-10-PCS | Mod: PBBFAC,,, | Performed by: OTOLARYNGOLOGY

## 2019-06-13 NOTE — LETTER
June 13, 2019      Palmira Meyers PA-C  [address]  [facility] [address]           [facility]  [address]  [facility] [address]  Phone: [phone]  Fax: [phone]          Patient: Yao Alan   MR Number: [id number]   YOB: 1975   Date of Visit: 6/13/2019       Dear Palmira Meyers:    Thank you for referring Yao Alan to me for evaluation. Attached you will find relevant portions of my assessment and plan of care.    If you have questions, please do not hesitate to call me. I look forward to following Yao Alan along with you.    Sincerely,    [place] THOMAS Niño III, MD    Enclosure  CC:  No Recipients    If you would like to receive this communication electronically, please contact [email or web address] or [phone] to request more information on [employer] [employer] access.    For providers and/or their staff who would like to refer a patient to [facility], please contact us through our one-stop-shop provider referral line, [facility], at [phone].    If you feel you have received this communication in error or would no longer like to receive these types of communications, please e-mail [email or web address]

## 2019-06-17 ENCOUNTER — TELEPHONE (OUTPATIENT)
Dept: OTOLARYNGOLOGY | Facility: CLINIC | Age: 44
End: 2019-06-17

## 2019-06-17 NOTE — TELEPHONE ENCOUNTER
----- Message from Julian Xiong RN sent at 6/17/2019 10:02 AM CDT -----  Contact: [phone]  Patient still having nose bleeds, can he get another appointment please  ----- Message -----  From: Zaida Cevallos LCSW  Sent: 6/17/2019   9:04 AM  To: Yasmin Cain RN, Jarrod DOMINGUEZ Staff    Received call this morning from patient.  He said he's tried calling the clinic number a couple of times but is getting disconnected.  He's had more nosebleeds.  Please call him, and assist with getting another appointment with Dr. Niño (Otorhinolaryngology Clinic) scheduled.

## 2019-06-18 ENCOUNTER — TELEPHONE (OUTPATIENT)
Dept: OTOLARYNGOLOGY | Facility: CLINIC | Age: 44
End: 2019-06-18

## 2019-06-18 ENCOUNTER — TELEPHONE (OUTPATIENT)
Dept: HEMATOLOGY/ONCOLOGY | Facility: CLINIC | Age: 44
End: 2019-06-18

## 2019-06-18 NOTE — TELEPHONE ENCOUNTER
Spoke with patient. Pt states he was trying to get in touch with Dr. Overton's office [facility]. Provide him with number [phone]

## 2019-06-18 NOTE — TELEPHONE ENCOUNTER
----- Message from Zaida Cevallos LCSW sent at 6/17/2019  9:04 AM CDT -----  Contact: [phone]  Received call this morning from patient.  He said he's tried calling the clinic number a couple of times but is getting disconnected.  He's had more nosebleeds.  Please call him, and assist with getting another appointment with Dr. Niño (Otorhinolaryngology Clinic) scheduled.

## 2019-06-19 NOTE — PROGRESS NOTES
Mr. Alan presents, referred by the Emergency Department for consultation.  Vital   signs per nurses' notes.    CHIEF COMPLAINT:  Nosebleeds.    HISTORY OF PRESENT ILLNESS:  This is a 44-year-old black male who states he has   had nosebleeds on his right side first on Saturday and then again on Wednesday,   one day ago.  This was heavy, soaking through the towel.  He presented to the   Emergency Department, where his bleeding had stopped.  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  He is on chemotherapy regime and is also on Xarelto for the last four   years having experienced a DVT in his left calf.  He states that this is the   first time he has experienced any significant nosebleed.  He was seen   approximately three months ago by Dr. Overton, having been referred to her by   Dr. Sanchez for a nasal mass.  This had shown up on an MRI with his regular   examination.  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  She felt that this cystic lesion was stable.  He was given reassurance   and told to return as needed.    PHYSICAL EXAMINATION:  GENERAL APPEARANCE:  His is a well-developed, well-nourished, 44-year-old male,   in no apparent distress.  Communication ability is good.  HEENT:  Today, his ears are clear.  External nose is normal.  Intranasally, his   septum is relatively straight.  He has 2+ turbinates.  His airway is clear.  I   do not see any prominent vessels along his turbinates or septum that could be   causing his bleeding.  Intraorally, he is edentulous.  Tonsils are minimum.    Palates are elongated.  NECK:  Supple.  Thyroid:  No masses.    LYMPHATICS:  No nodes.  RESPIRATORY:  Effort normal.    CRANIAL NERVES:  2-12 are grossly intact.    IMPRESSION:  A 44-year-old black male with adenocarcinoma of the lung,   metastatic to  bone, on Xarelto, also occasionally taking aspirin with a   significant nosebleeds this week.    RECOMMENDATIONS:  I have discussed my findings with he and his wife in detail.    I have given them epistaxis precautions and we reviewed all of these steps and   procedures with them completely.  I have also suggested a followup with Dr. Overton for reevaluation of the nasopharyngeal cyst as they state they do live   in the [facility] and would rather visit there.  They will return to clinic   p.r.n.      SHAVON/CARLOS  dd: 06/13/2019 10:05:04 (CDT)  td: 06/13/2019 23:54:40 (CDT)  Doc ID   #[id number]  Job ID #[id number]    CC:

## 2019-06-20 ENCOUNTER — OFFICE VISIT (OUTPATIENT)
Dept: OTOLARYNGOLOGY | Facility: CLINIC | Age: 44
End: 2019-06-20
Payer: MEDICARE

## 2019-06-20 VITALS
WEIGHT: 177 LBS | HEIGHT: 66 IN | BODY MASS INDEX: 28.45 KG/M2 | DIASTOLIC BLOOD PRESSURE: 70 MMHG | SYSTOLIC BLOOD PRESSURE: 124 MMHG

## 2019-06-20 DIAGNOSIS — R04.0 EPISTAXIS: Primary | ICD-10-CM

## 2019-06-20 PROCEDURE — 99214 PR OFFICE/OUTPT VISIT, EST, LEVL IV, 30-39 MIN: ICD-10-PCS | Mod: 25,S$GLB,, | Performed by: OTOLARYNGOLOGY

## 2019-06-20 PROCEDURE — 30901 CONTROL OF NOSEBLEED: CPT | Mod: RT,S$GLB,, | Performed by: OTOLARYNGOLOGY

## 2019-06-20 PROCEDURE — 99214 OFFICE O/P EST MOD 30 MIN: CPT | Mod: 25,S$GLB,, | Performed by: OTOLARYNGOLOGY

## 2019-06-20 PROCEDURE — 30901 PR CTRL 2SEBLEED,ANTER,SIMPLE: ICD-10-PCS | Mod: RT,S$GLB,, | Performed by: OTOLARYNGOLOGY

## 2019-06-20 NOTE — PROGRESS NOTES
Chief Complaint   Patient presents with    Epistaxis         44 y.o. male presents with recurrent right sided epistaxis. Seen in the ED several times, but bleeding had stopped. He is on Xarelto. Last episode was Sunday. Using nasal saline. No nasal pain.      Review of Systems     Constitutional: Negative for fatigue and unexpected weight change.   HENT: per HPI.  Eyes: Negative for visual disturbance.   Respiratory: Negative for shortness of breath, hemoptysis  Cardiovascular: Negative for chest pain and palpitations.   Genitourinary: Negative for dysuria and difficulty urinating.   Musculoskeletal: Negative for decreased ROM, back pain.   Skin: Negative for rash, sunburn, itching.   Neurological: Negative for dizziness and seizures.   Hematological: Negative for adenopathy. Does not bruise/bleed easily.   Psychiatric/Behavioral: Negative for agitation. The patient is not nervous/anxious.   Endocrine: Negative for rapid weight loss/weight gain, heat/cold intolerance.     Past Medical History:   Diagnosis Date    Asthma     COPD (chronic obstructive pulmonary disease)     HTN (hypertension)     Hypertension     Lung cancer     Lung mass     Thyroid disease        Past Surgical History:   Procedure Laterality Date    COLONOSCOPY N/A 3/18/2019    Performed by [facility] at [facility] ENDO (4TH FLR)    COLONOSCOPY N/A 4/22/2015    Performed by FRANSISCO Nur MD at [facility] ENDO (4TH FLR)    ESOPHAGOGASTRODUODENOSCOPY (EGD) N/A 3/18/2019    Performed by Indra [facility] at [facility] ENDO (4TH FLR)    ESOPHAGOGASTRODUODENOSCOPY (EGD) N/A 5/8/2015    Performed by Young Cain MD at [facility] ENDO (4TH FLR)    FRACTURE SURGERY      finger    [id number]-neck or chest Fluoro equipment needed  **PT MUST BE FIRST CASE Right 7/20/2015    Performed by Miguel [facility]o MD at [facility] OR 1ST FLR    LUNG CANCER SURGERY Right March 2015    lung biopsy     PORTACATH PLACEMENT         family history includes Hypertension  in his father; No Known Problems in his brother, maternal aunt, maternal grandfather, maternal grandmother, maternal uncle, mother, paternal aunt, paternal grandfather, paternal grandmother, paternal uncle, and sister.    Pt  reports that he quit smoking about 4 years ago. His smoking use included cigarettes. He has a 18.75 pack-year smoking history. He has never used smokeless tobacco. He reports that he has current or past drug history. Drug: Marijuana. He reports that he does not drink alcohol.    Review of patient's allergies indicates:   Allergen Reactions    Nsaids (non-steroidal anti-inflammatory drug)      Patient says since been diagnosed with lung mass on 2-12-15, if take any NSAIDS he spikes a fever.    Tylenol [acetaminophen] Other (See Comments)     Sweating +        Physical Exam    Vitals:    06/20/19 0944   BP: 124/70     Body mass index is 28.57 kg/m².    Physical Exam   Constitutional: He is oriented to person, place, and time. He appears well-developed and well-nourished. No distress.   HENT:   Head: Normocephalic and atraumatic.   Right Ear: Hearing, tympanic membrane, external ear and ear canal normal. Tympanic membrane mobility is normal. No middle ear effusion. No decreased hearing is noted.   Left Ear: Hearing, tympanic membrane, external ear and ear canal normal. Tympanic membrane mobility is normal.  No middle ear effusion. No decreased hearing is noted.   Nose: Epistaxis (area of prominent capillaries at base of right inferior turbinate) is observed.   Mouth/Throat: Oropharynx is clear and moist.   Bilateral nasal cavities inspected, no polyps or purulent fluid seen.   Eyes: Pupils are equal, round, and reactive to light. Conjunctivae, EOM and lids are normal. Right eye exhibits no discharge. Left eye exhibits no discharge.   Neck: Trachea normal, normal range of motion and phonation normal. Neck supple. No tracheal tenderness present. No tracheal deviation, no edema and no erythema  present. No thyroid mass and no thyromegaly present.   Cardiovascular: Normal heart sounds.   Pulmonary/Chest: Breath sounds normal. No stridor.   Abdominal: Soft.   Lymphadenopathy:     He has no cervical adenopathy.   Neurological: He is alert and oriented to person, place, and time.   Skin: Skin is warm and dry. No rash noted. He is not diaphoretic. No erythema. No pallor.   Psychiatric: He has a normal mood and affect.   Nursing note and vitals reviewed.    I offered continued observation versus cautery of the prominent vessels on the inferior turbinate. I explained that the risks of cautery include recurrence, perforation, and discomfort. The benefit would be potential sclerosis of the offending vessels with fewer nosebleeds. Time was allowed for questions, and all questions were answered to her apparent satisfaction. Verbal assent was obtained. The nasal cavity was anesthetized with pontocaine spray. The prominent vessels on the right upper middle turbinate were cauterized with silver nitrate without impacting the nasal septum at the same point so as to minimize the risk of synechiae formation. The patient tolerated this procedure well without complication. The patient was counseled that there may be some drainage of black or grey material over the next couple of days - this is normal and reflects the composition of the cautery agent which was noted to be silver nitrate.    Assessment     1. Epistaxis          Plan  In summary, Mr. Alan is a 44 year old male on Xarelto with recurrent right sided epistaxis, silver nitrate applied in clinic today. Discussed epistaxis prevention and management. All questions were answered. Return to clinic if symptoms fail to improve or worsen.

## 2019-07-02 ENCOUNTER — INFUSION (OUTPATIENT)
Dept: INFUSION THERAPY | Facility: HOSPITAL | Age: 44
End: 2019-07-02
Attending: INTERNAL MEDICINE
Payer: MEDICARE

## 2019-07-02 ENCOUNTER — OFFICE VISIT (OUTPATIENT)
Dept: HEMATOLOGY/ONCOLOGY | Facility: CLINIC | Age: 44
End: 2019-07-02
Payer: MEDICARE

## 2019-07-02 VITALS
SYSTOLIC BLOOD PRESSURE: 120 MMHG | HEART RATE: 77 BPM | BODY MASS INDEX: 28.27 KG/M2 | DIASTOLIC BLOOD PRESSURE: 80 MMHG | HEIGHT: 66 IN | TEMPERATURE: 99 F | WEIGHT: 175.94 LBS | OXYGEN SATURATION: 99 %

## 2019-07-02 VITALS
OXYGEN SATURATION: 100 % | RESPIRATION RATE: 18 BRPM | DIASTOLIC BLOOD PRESSURE: 67 MMHG | TEMPERATURE: 98 F | SYSTOLIC BLOOD PRESSURE: 105 MMHG | HEART RATE: 75 BPM

## 2019-07-02 DIAGNOSIS — D50.0 IRON DEFICIENCY ANEMIA DUE TO CHRONIC BLOOD LOSS: Primary | ICD-10-CM

## 2019-07-02 DIAGNOSIS — C79.51 SECONDARY MALIGNANT NEOPLASM OF BONE: ICD-10-CM

## 2019-07-02 DIAGNOSIS — C34.11 MALIGNANT NEOPLASM OF UPPER LOBE OF RIGHT LUNG: ICD-10-CM

## 2019-07-02 DIAGNOSIS — C34.11 MALIGNANT NEOPLASM OF UPPER LOBE OF RIGHT LUNG: Primary | ICD-10-CM

## 2019-07-02 DIAGNOSIS — C34.10: ICD-10-CM

## 2019-07-02 DIAGNOSIS — E03.2 HYPOTHYROIDISM DUE TO MEDICATION: ICD-10-CM

## 2019-07-02 DIAGNOSIS — C34.91 LUNG CANCER, PRIMARY, WITH METASTASIS FROM LUNG TO OTHER SITE, RIGHT: ICD-10-CM

## 2019-07-02 PROCEDURE — 99214 OFFICE O/P EST MOD 30 MIN: CPT | Mod: PBBFAC,25 | Performed by: INTERNAL MEDICINE

## 2019-07-02 PROCEDURE — 99215 OFFICE O/P EST HI 40 MIN: CPT | Mod: S$PBB,,, | Performed by: INTERNAL MEDICINE

## 2019-07-02 PROCEDURE — A4216 STERILE WATER/SALINE, 10 ML: HCPCS | Performed by: INTERNAL MEDICINE

## 2019-07-02 PROCEDURE — 96372 THER/PROPH/DIAG INJ SC/IM: CPT | Mod: 59

## 2019-07-02 PROCEDURE — 99999 PR PBB SHADOW E&M-EST. PATIENT-LVL IV: CPT | Mod: PBBFAC,,, | Performed by: INTERNAL MEDICINE

## 2019-07-02 PROCEDURE — 63600175 PHARM REV CODE 636 W HCPCS: Mod: JG | Performed by: INTERNAL MEDICINE

## 2019-07-02 PROCEDURE — 25000003 PHARM REV CODE 250: Performed by: INTERNAL MEDICINE

## 2019-07-02 PROCEDURE — 99999 PR PBB SHADOW E&M-EST. PATIENT-LVL IV: ICD-10-PCS | Mod: PBBFAC,,, | Performed by: INTERNAL MEDICINE

## 2019-07-02 PROCEDURE — 96413 CHEMO IV INFUSION 1 HR: CPT

## 2019-07-02 PROCEDURE — 99215 PR OFFICE/OUTPT VISIT, EST, LEVL V, 40-54 MIN: ICD-10-PCS | Mod: S$PBB,,, | Performed by: INTERNAL MEDICINE

## 2019-07-02 RX ORDER — SODIUM CHLORIDE 0.9 % (FLUSH) 0.9 %
10 SYRINGE (ML) INJECTION
Status: CANCELLED | OUTPATIENT
Start: 2019-07-02

## 2019-07-02 RX ORDER — HEPARIN 100 UNIT/ML
500 SYRINGE INTRAVENOUS
Status: DISCONTINUED | OUTPATIENT
Start: 2019-07-02 | End: 2019-07-02 | Stop reason: HOSPADM

## 2019-07-02 RX ORDER — HEPARIN 100 UNIT/ML
500 SYRINGE INTRAVENOUS
Status: CANCELLED | OUTPATIENT
Start: 2019-07-02

## 2019-07-02 RX ORDER — SODIUM CHLORIDE 0.9 % (FLUSH) 0.9 %
10 SYRINGE (ML) INJECTION
Status: DISCONTINUED | OUTPATIENT
Start: 2019-07-02 | End: 2019-07-02 | Stop reason: HOSPADM

## 2019-07-02 RX ADMIN — DENOSUMAB 120 MG: 120 INJECTION SUBCUTANEOUS at 10:07

## 2019-07-02 RX ADMIN — Medication 10 ML: at 10:07

## 2019-07-02 RX ADMIN — HEPARIN 500 UNITS: 100 SYRINGE at 10:07

## 2019-07-02 RX ADMIN — SODIUM CHLORIDE 480 MG: 9 INJECTION, SOLUTION INTRAVENOUS at 09:07

## 2019-07-02 NOTE — PROGRESS NOTES
"Subjective:       Patient ID: Yao Alan is a 44 y.o. male.    Chief Complaint: Follow-up  Oncologic History:  Mr. Yao Alan is a 44-year-old male who has a long history of smoking and was seen in the Pulmonary Clinic by Dr. Valle on 03/05/2015 with abnormal CT scan.    Apparently, he went to the [facility] in February with coughing as well as chest pain. A chest x-ray was done, which revealed a left upper lobe lung mass. Followup CT scan was done on 02/12/2015 and that revealed posterior superior mediastinal mass adjacent and prominent enlarged upper left mediastinal lymph nodes, abutting the aortic arch as well as left subclavian artery. A  CT scan of the abdomen was done on 03/03/2015 without contrast and that revealed nonobstructive nephrolithiasis, but a 2.1 cm lytic lesion involving the left iliac wing concerning for metastatic disease given the presence of emphysema and a mediastinal soft tissue mass on the CT chest from 02/12/2015. He saw Dr. Valle after that on 03/05/2015 and underwent an IR guided biopsy of the bone lesion on 03/17/2015. 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    His EGFR/EML/ALK is negative.    His PET scan on 3/25/15 revealed Left upper lobe lung lesion with an adjacent para-aortic lymph node, right anterior rib metastasis, right iliac metastasis, and pancreatic metastasis. A pancreatic cancer primary neoplasm is less likely.  MRI brain was negative for mets.  He completed 6 cycles of Carboplatin and Alimta and was on maintenance Alimta until end of March 2016.  His bone scan from 3/16 revealed stable disease. His CT scans also from end of March 2016 revealed "Interval enlargement of left upper lobe lung mass measuring 5.9 x 3.9 cm (previously 3.3 x 2.5 cm). This mass appears to invade the mediastinum and abutting the aortic arch and left subclavian artery"     He is now on Opdivo since March 2016            HPI Mr. Alan returns for follow " up and Opdivo. He notes nose bleeds. Notes headaches, denies fever. Also denies chest pain and shortness of breath.  Notes generalized abdomen pain, has normal bowel movements.      Review of Systems   Constitutional: Negative for appetite change, fatigue and unexpected weight change.   HENT: Positive for nosebleeds. Negative for mouth sores.    Eyes: Negative for visual disturbance.   Respiratory: Positive for cough. Negative for shortness of breath.    Cardiovascular: Negative for chest pain.   Gastrointestinal: Positive for abdominal pain. Negative for diarrhea.   Genitourinary: Negative for frequency.   Musculoskeletal: Negative for back pain.   Skin: Negative for rash.   Neurological: Negative for headaches.   Hematological: Negative for adenopathy.   Psychiatric/Behavioral: The patient is not nervous/anxious.    All other systems reviewed and are negative.      Objective:      Physical Exam   Constitutional: He is oriented to person, place, and time. He appears well-developed and well-nourished.   HENT:   Mouth/Throat: No oropharyngeal exudate.   Cardiovascular: Normal rate and normal heart sounds.   Pulmonary/Chest: Effort normal and breath sounds normal. He has no wheezes.   Abdominal: Soft. Bowel sounds are normal. There is no tenderness.   Musculoskeletal: He exhibits no edema or tenderness.   Lymphadenopathy:     He has no cervical adenopathy.   Neurological: He is alert and oriented to person, place, and time. Coordination normal.   Skin: Skin is warm and dry. No rash noted.   Psychiatric: He has a normal mood and affect. Judgment and thought content normal.   Vitals reviewed.      LABS:  WBC   Date Value Ref Range Status   07/02/2019 7.37 3.90 - 12.70 K/uL Final     Hemoglobin   Date Value Ref Range Status   07/02/2019 12.5 (L) 14.0 - 18.0 g/dL Final     POC Hematocrit   Date Value Ref Range Status   06/12/2019 40 36 - 54 %PCV Final     Hematocrit   Date Value Ref Range Status   07/02/2019 39.2 (L) 40.0  - 54.0 % Final     Platelets   Date Value Ref Range Status   07/02/2019 307 150 - 350 K/uL Final     Gran # (ANC)   Date Value Ref Range Status   07/02/2019 4.6 1.8 - 7.7 K/uL Final     Comment:     The ANC is based on a white cell differential from an   automated cell counter. It has not been microscopically   reviewed for the presence of abnormal cells. Clinical   correlation is required.         Chemistry        Component Value Date/Time     07/02/2019 0815    K 4.1 07/02/2019 0815     07/02/2019 0815    CO2 23 07/02/2019 0815    BUN 15 07/02/2019 0815    CREATININE 1.4 07/02/2019 0815     (H) 07/02/2019 0815        Component Value Date/Time    CALCIUM 9.1 07/02/2019 0815    ALKPHOS 65 07/02/2019 0815    AST 20 07/02/2019 0815    ALT 14 07/02/2019 0815    BILITOT 0.3 07/02/2019 0815    ESTGFRAFRICA >60 07/02/2019 0815    EGFRNONAA >60 07/02/2019 0815        TSH   Date Value Ref Range Status   06/04/2019 1.200 0.400 - 4.000 uIU/mL Final     Free T4   Date Value Ref Range Status   06/04/2019 0.88 0.71 - 1.51 ng/dL Final       Assessment:       1. Malignant neoplasm of upper lobe of right lung    2. Secondary malignant neoplasm of bone    3. Hypothyroidism due to medication        Plan:         1,2,3. He is doing well overall and will proceed with Opdivo and will return in 4 weeks for next dose with restaging CT and bone scan. Will also check TSH and free T4 at that time    Above care plan was discussed with patient and all questions were addressed to his satisfaction

## 2019-07-02 NOTE — PLAN OF CARE
Problem: Adult Inpatient Plan of Care  Goal: Plan of Care Review  Outcome: Ongoing (interventions implemented as appropriate)  Pt presented for Opdivo infusion. VSS. Only reported complaint was fatigue. Pt tolerated Opdivo well. Pt also received Xgeva to R arm; pt tolerated well-Calcium WNL. Good blood return noted from port. Port flushed and heparinized upon completion. Ambulatory into and out of unit. Distress screening tool completed. Future appt information reviewed with pt.

## 2019-07-02 NOTE — Clinical Note
Schedule CBC,CMp, TSH, free T4, CT chest, abdomen/pelvis and see me in 4 weeks and for Opdivo.Since I am on vacation that week, please schedule all scans on [facility] and see me on main campus on 7/25 or 7/26 and schedule Opdivo on [facility] on 7/30/19

## 2019-07-05 ENCOUNTER — DOCUMENTATION ONLY (OUTPATIENT)
Dept: HEMATOLOGY/ONCOLOGY | Facility: CLINIC | Age: 44
End: 2019-07-05

## 2019-07-05 NOTE — PROGRESS NOTES
Received call from patient. He asked for assistance with his electricity bill. Explained to him that he has already used the maximum plus from the [employer] Patient Assistance Fund and I cannot assist him further from the fund. Suggested that he contact the [facility] he resides in and ask if the [facility] branch within/nearest can assist with the electricity bill. He expressed understanding. He said he also needs his thyroid medication refilled; he called the clinic earlier and held on for a while and hung up before anyone picked up. Asked if he has refills remaining on the bottle and he said yes. Advised him to call the pharmacy ([facility]) and speak with staff there; they can process his refill and call [facility] and leave a VM message for the staff for Monday ([facility] has been closed since 3 PM Wed for the holiday and will re-open on Mon morning). Informed patient that he will need to complete a new application as [facility]'s new rosanna year period started on 7/1, and can provide him with a copy of the new application form. Will continue to follow and assist as needs identified.

## 2019-07-12 ENCOUNTER — HOSPITAL ENCOUNTER (EMERGENCY)
Facility: HOSPITAL | Age: 44
Discharge: HOME | End: 2019-07-12
Attending: EMERGENCY MEDICINE
Payer: MEDICARE

## 2019-07-12 VITALS
HEART RATE: 84 BPM | SYSTOLIC BLOOD PRESSURE: 137 MMHG | TEMPERATURE: 99 F | DIASTOLIC BLOOD PRESSURE: 81 MMHG | RESPIRATION RATE: 16 BRPM | WEIGHT: 175 LBS | OXYGEN SATURATION: 98 % | BODY MASS INDEX: 28.25 KG/M2

## 2019-07-12 DIAGNOSIS — Z85.118 HISTORY OF LUNG CANCER: ICD-10-CM

## 2019-07-12 DIAGNOSIS — K08.89 PAIN, DENTAL: ICD-10-CM

## 2019-07-12 DIAGNOSIS — K04.7 DENTAL ABSCESS: Primary | ICD-10-CM

## 2019-07-12 PROCEDURE — 25000003 PHARM REV CODE 250: Mod: ER | Performed by: PHYSICIAN ASSISTANT

## 2019-07-12 PROCEDURE — 99283 EMERGENCY DEPT VISIT LOW MDM: CPT | Mod: ER

## 2019-07-12 RX ORDER — OXYCODONE AND ACETAMINOPHEN 5; 325 MG/1; MG/1
1 TABLET ORAL EVERY 6 HOURS PRN
Qty: 5 TABLET | Refills: 0 | Status: SHIPPED | OUTPATIENT
Start: 2019-07-12 | End: 2019-09-24

## 2019-07-12 RX ORDER — CLINDAMYCIN HYDROCHLORIDE 150 MG/1
300 CAPSULE ORAL
Status: COMPLETED | OUTPATIENT
Start: 2019-07-12 | End: 2019-07-12

## 2019-07-12 RX ORDER — CLINDAMYCIN HYDROCHLORIDE 150 MG/1
300 CAPSULE ORAL 4 TIMES DAILY
Qty: 80 CAPSULE | Refills: 0 | Status: SHIPPED | OUTPATIENT
Start: 2019-07-12 | End: 2019-07-22

## 2019-07-12 RX ADMIN — CLINDAMYCIN HYDROCHLORIDE 300 MG: 150 CAPSULE ORAL at 06:07

## 2019-07-12 NOTE — ED PROVIDER NOTES
Encounter Date: 7/12/2019       History     Chief Complaint   Patient presents with    Dental Pain     since yesterday      44-year-old male with history of metastatic lung cancer presents to emergency department for 1 day history of left lower dental pain.  Denies fever, sore throat, nausea, vomiting, and inability to tolerate p.o..  Percocet taken earlier today with no relief of symptoms.  No recent use antibiotics.  Patient is still currently undergoing chemotherapy.  Patient has that dental appointment scheduled this upcoming Wednesday.        Review of patient's allergies indicates:   Allergen Reactions    Nsaids (non-steroidal anti-inflammatory drug)      Patient says since been diagnosed with lung mass on 2-12-15, if take any NSAIDS he spikes a fever.    Tylenol [acetaminophen] Other (See Comments)     Sweating +     Past Medical History:   Diagnosis Date    Asthma     COPD (chronic obstructive pulmonary disease)     HTN (hypertension)     Hypertension     Lung cancer     Lung mass     Thyroid disease      Past Surgical History:   Procedure Laterality Date    COLONOSCOPY N/A 3/18/2019    Performed by [facility]ra [facility]en [facility] at [facility] ENDO (4TH FLR)    COLONOSCOPY N/A 4/22/2015    Performed by FRANSISCO Nur MD at [facility] END[facility] (4TH FLR)    ESOPHAGOGASTRODUODENOSCOPY (EGD) N/A 3/18/2019    Performed by [facility] at [facility] (4TH FLR)    ESOPHAGOGASTRODUODENOSCOPY (EGD) N/A 5/8/2015    Performed by Young Cain MD at [facility] (4TH FLR)    FRACTURE SURGERY      finger    [id number]-CATH-neck or chest Fluoro equipment needed  **PT MUST BE FIRST CASE Right 7/20/2015    Performed by Miguel [facility]o [facility] at [facility] OR 1ST FLR    LUNG CANCER SURGERY Right March 2015    lung biopsy     PORTACATH PLACEMENT       Family History   Problem Relation Age of Onset    Hypertension Father     No Known Problems Mother     No Known Problems Sister     No Known Problems Brother     No Known Problems  Maternal Aunt     No Known Problems Maternal Uncle     No Known Problems Paternal Aunt     No Known Problems Paternal Uncle     No Known Problems Maternal Grandmother     No Known Problems Maternal Grandfather     No Known Problems Paternal Grandmother     No Known Problems Paternal Grandfather     Amblyopia Neg Hx     Blindness Neg Hx     Cancer Neg Hx     Cataracts Neg Hx     Diabetes Neg Hx     Glaucoma Neg Hx     Macular degeneration Neg Hx     Retinal detachment Neg Hx     Strabismus Neg Hx     Stroke Neg Hx     Thyroid disease Neg Hx      Social History     Tobacco Use    Smoking status: Former Smoker     Packs/day: 0.75     Years: 25.00     Pack years: 18.75     Types: Cigarettes     Last attempt to quit: 2014     Years since quittin.6    Smokeless tobacco: Never Used    Tobacco comment: Patient is no longer smoking   Substance Use Topics    Alcohol use: No    Drug use: Yes     Types: Marijuana     Review of Systems   Constitutional: Negative for fever.   HENT: Positive for dental problem and facial swelling. Negative for congestion, sore throat and trouble swallowing.    Respiratory: Negative for cough and shortness of breath.    Cardiovascular: Negative for chest pain.   Gastrointestinal: Negative for abdominal pain, constipation, diarrhea, nausea and vomiting.   Genitourinary: Negative for dysuria, flank pain, frequency and urgency.   Musculoskeletal: Negative for back pain.   Skin: Negative for rash.   Neurological: Negative for headaches.   All other systems reviewed and are negative.      Physical Exam     Initial Vitals [19 1749]   BP Pulse Resp Temp SpO2   137/81 84 16 98.6 °F (37 °C) 98 %      MAP       --         Physical Exam    Nursing note and vitals reviewed.  Constitutional: He appears well-developed and well-nourished. He is not diaphoretic. No distress.   HENT:   Head: Normocephalic and atraumatic.   Nose: Nose normal.   Mouth/Throat:       Majority of  teeth missing.  There are fragments of teeth retained sporadically.  Pain localized to left lower 1st molar where fragment of tooth remains.  There is is some associated mild swelling and erythema to the gum line.  No obvious well-defined fluid collection.  Speaking in clear and complete sentences.  No trismus or drooling.  Uvula midline. No meningeal signs.   Eyes: Conjunctivae and EOM are normal. Right eye exhibits no discharge. Left eye exhibits no discharge.   Neck: Normal range of motion. No tracheal deviation present. No JVD present.   Cardiovascular: Normal rate, regular rhythm and normal heart sounds. Exam reveals no friction rub.    No murmur heard.  Pulmonary/Chest: Breath sounds normal. No stridor. No respiratory distress. He has no wheezes. He has no rhonchi. He has no rales. He exhibits no tenderness.   Musculoskeletal: Normal range of motion.   Neurological: He is alert and oriented to person, place, and time.   Skin: Skin is warm and dry. No rash and no abscess noted. No erythema. No pallor.         ED Course   Procedures  Labs Reviewed - No data to display       Imaging Results    None          Medical Decision Making:   Initial Assessment:   44-year-old male with dental pain  ED Management:  Patient likely has a mild/early developing dental abscess.  There is no obvious localized area of swelling that can be drained in the ED at this time.  No facial cellulitis.  No evidence to suggest deep space infection, including for Ruben's at this time.  No meningeal signs. No peritonsillar abscess.  No acute otitis media or mastoiditis.  Started on antibiotics in the ED.  Per record review, patient routinely receives narcotic pain medication.  Given that patient has metastatic lung cancer, I will offer short course of Percocet until he can be treated by dentist.  Also sent out on antibiotics.  Advised patient maintain his dental appointment for Wednesday.  Strict return precautions discussed with patient.   Patient agreeable plan.                      Clinical Impression:       ICD-10-CM ICD-9-CM   1. Dental abscess K04.7 522.5   2. Pain, dental K08.89 525.9   3. History of lung cancer Z85.118 V10.11                                Navin Bourne PA-C  07/12/19 1819

## 2019-07-12 NOTE — DISCHARGE INSTRUCTIONS
Thank you for coming to our Emergency Department today. It is important to remember that some problems are difficult to diagnose and may not be found during your first visit. Be sure to follow up with your primary care doctor.    Return to the ER with any questions/concerns, new/concerning symptoms, worsening or failure to improve. Do not drive or make any important decisions for 24 hours if you have received any pain medications, sedatives or mood altering drugs during your ER visit.   Vaccine Information Statement(s) for Human papillomavirus (HPV)  # 1 LD given and reviewed, questions answered, verbal consent given by Parent for injection(s) administered.

## 2019-07-13 PROCEDURE — 99285 EMERGENCY DEPT VISIT HI MDM: CPT | Mod: 25

## 2019-07-13 PROCEDURE — 96374 THER/PROPH/DIAG INJ IV PUSH: CPT

## 2019-07-13 PROCEDURE — 96375 TX/PRO/DX INJ NEW DRUG ADDON: CPT

## 2019-07-14 ENCOUNTER — HOSPITAL ENCOUNTER (EMERGENCY)
Facility: HOSPITAL | Age: 44
Discharge: HOME | End: 2019-07-14
Attending: EMERGENCY MEDICINE
Payer: MEDICARE

## 2019-07-14 VITALS
WEIGHT: 176 LBS | RESPIRATION RATE: 20 BRPM | HEART RATE: 58 BPM | HEIGHT: 66 IN | SYSTOLIC BLOOD PRESSURE: 136 MMHG | OXYGEN SATURATION: 100 % | BODY MASS INDEX: 28.28 KG/M2 | DIASTOLIC BLOOD PRESSURE: 92 MMHG | TEMPERATURE: 99 F

## 2019-07-14 DIAGNOSIS — R07.9 CHEST PAIN: Primary | ICD-10-CM

## 2019-07-14 DIAGNOSIS — T50.905A PILL ESOPHAGITIS: ICD-10-CM

## 2019-07-14 DIAGNOSIS — K20.80 PILL ESOPHAGITIS: ICD-10-CM

## 2019-07-14 LAB
ALBUMIN SERPL BCP-MCNC: 3.9 G/DL (ref 3.5–5.2)
ALP SERPL-CCNC: 63 U/L (ref 55–135)
ALT SERPL W/O P-5'-P-CCNC: 10 U/L (ref 10–44)
ANION GAP SERPL CALC-SCNC: 10 MMOL/L (ref 8–16)
AST SERPL-CCNC: 18 U/L (ref 10–40)
BASOPHILS # BLD AUTO: 0.04 K/UL (ref 0–0.2)
BASOPHILS NFR BLD: 0.5 % (ref 0–1.9)
BILIRUB SERPL-MCNC: 0.2 MG/DL (ref 0.1–1)
BILIRUB UR QL STRIP: NEGATIVE
BNP SERPL-MCNC: <10 PG/ML (ref 0–99)
BUN SERPL-MCNC: 17 MG/DL (ref 6–20)
CALCIUM SERPL-MCNC: 9.3 MG/DL (ref 8.7–10.5)
CHLORIDE SERPL-SCNC: 107 MMOL/L (ref 95–110)
CLARITY UR: CLEAR
CO2 SERPL-SCNC: 25 MMOL/L (ref 23–29)
COLOR UR: YELLOW
CREAT SERPL-MCNC: 1.4 MG/DL (ref 0.5–1.4)
D DIMER PPP IA.FEU-MCNC: <0.19 MG/L FEU
DIFFERENTIAL METHOD: ABNORMAL
EOSINOPHIL # BLD AUTO: 0.3 K/UL (ref 0–0.5)
EOSINOPHIL NFR BLD: 3.8 % (ref 0–8)
ERYTHROCYTE [DISTWIDTH] IN BLOOD BY AUTOMATED COUNT: 15.1 % (ref 11.5–14.5)
EST. GFR  (AFRICAN AMERICAN): >60 ML/MIN/1.73 M^2
EST. GFR  (NON AFRICAN AMERICAN): >60 ML/MIN/1.73 M^2
GLUCOSE SERPL-MCNC: 68 MG/DL (ref 70–110)
GLUCOSE UR QL STRIP: NEGATIVE
HCT VFR BLD AUTO: 38.7 % (ref 40–54)
HGB BLD-MCNC: 12.3 G/DL (ref 14–18)
HGB UR QL STRIP: ABNORMAL
KETONES UR QL STRIP: NEGATIVE
LEUKOCYTE ESTERASE UR QL STRIP: NEGATIVE
LYMPHOCYTES # BLD AUTO: 2.9 K/UL (ref 1–4.8)
LYMPHOCYTES NFR BLD: 35 % (ref 18–48)
MCH RBC QN AUTO: 26.6 PG (ref 27–31)
MCHC RBC AUTO-ENTMCNC: 31.8 G/DL (ref 32–36)
MCV RBC AUTO: 84 FL (ref 82–98)
MICROSCOPIC COMMENT: NORMAL
MONOCYTES # BLD AUTO: 0.9 K/UL (ref 0.3–1)
MONOCYTES NFR BLD: 10.4 % (ref 4–15)
NEUTROPHILS # BLD AUTO: 4.2 K/UL (ref 1.8–7.7)
NEUTROPHILS NFR BLD: 50.5 % (ref 38–73)
NITRITE UR QL STRIP: NEGATIVE
PH UR STRIP: 5 [PH] (ref 5–8)
PLATELET # BLD AUTO: 326 K/UL (ref 150–350)
PMV BLD AUTO: 10.5 FL (ref 9.2–12.9)
POTASSIUM SERPL-SCNC: 3.7 MMOL/L (ref 3.5–5.1)
PROT SERPL-MCNC: 7.2 G/DL (ref 6–8.4)
PROT UR QL STRIP: NEGATIVE
RBC # BLD AUTO: 4.62 M/UL (ref 4.6–6.2)
RBC #/AREA URNS HPF: 3 /HPF (ref 0–4)
SODIUM SERPL-SCNC: 142 MMOL/L (ref 136–145)
SP GR UR STRIP: 1.02 (ref 1–1.03)
TROPONIN I SERPL DL<=0.01 NG/ML-MCNC: <0.006 NG/ML (ref 0–0.03)
TROPONIN I SERPL DL<=0.01 NG/ML-MCNC: <0.006 NG/ML (ref 0–0.03)
URN SPEC COLLECT METH UR: ABNORMAL
UROBILINOGEN UR STRIP-ACNC: NEGATIVE EU/DL
WBC # BLD AUTO: 8.26 K/UL (ref 3.9–12.7)

## 2019-07-14 PROCEDURE — 83880 ASSAY OF NATRIURETIC PEPTIDE: CPT

## 2019-07-14 PROCEDURE — S0028 INJECTION, FAMOTIDINE, 20 MG: HCPCS | Performed by: EMERGENCY MEDICINE

## 2019-07-14 PROCEDURE — 93005 ELECTROCARDIOGRAM TRACING: CPT

## 2019-07-14 PROCEDURE — 81000 URINALYSIS NONAUTO W/SCOPE: CPT

## 2019-07-14 PROCEDURE — 84484 ASSAY OF TROPONIN QUANT: CPT | Mod: 91

## 2019-07-14 PROCEDURE — 80053 COMPREHEN METABOLIC PANEL: CPT

## 2019-07-14 PROCEDURE — 85025 COMPLETE CBC W/AUTO DIFF WBC: CPT

## 2019-07-14 PROCEDURE — 25000003 PHARM REV CODE 250: Performed by: EMERGENCY MEDICINE

## 2019-07-14 PROCEDURE — 63600175 PHARM REV CODE 636 W HCPCS: Performed by: EMERGENCY MEDICINE

## 2019-07-14 PROCEDURE — 93010 EKG 12-LEAD: ICD-10-PCS | Mod: ,,, | Performed by: INTERNAL MEDICINE

## 2019-07-14 PROCEDURE — C9113 INJ PANTOPRAZOLE SODIUM, VIA: HCPCS | Performed by: EMERGENCY MEDICINE

## 2019-07-14 PROCEDURE — 93010 ELECTROCARDIOGRAM REPORT: CPT | Mod: ,,, | Performed by: INTERNAL MEDICINE

## 2019-07-14 PROCEDURE — 85379 FIBRIN DEGRADATION QUANT: CPT

## 2019-07-14 RX ORDER — SUCRALFATE 1 G/10ML
1 SUSPENSION ORAL EVERY 6 HOURS PRN
Qty: 414 ML | Refills: 2 | Status: SHIPPED | OUTPATIENT
Start: 2019-07-14 | End: 2019-08-22

## 2019-07-14 RX ORDER — PANTOPRAZOLE SODIUM 40 MG/10ML
40 INJECTION, POWDER, LYOPHILIZED, FOR SOLUTION INTRAVENOUS
Status: COMPLETED | OUTPATIENT
Start: 2019-07-14 | End: 2019-07-14

## 2019-07-14 RX ORDER — FAMOTIDINE 20 MG/1
20 TABLET, FILM COATED ORAL 2 TIMES DAILY
Qty: 30 TABLET | Refills: 1 | Status: SHIPPED | OUTPATIENT
Start: 2019-07-14 | End: 2019-09-24

## 2019-07-14 RX ORDER — PANTOPRAZOLE SODIUM 20 MG/1
20 TABLET, DELAYED RELEASE ORAL
Qty: 30 TABLET | Refills: 1 | Status: SHIPPED | OUTPATIENT
Start: 2019-07-14 | End: 2019-09-24

## 2019-07-14 RX ORDER — FAMOTIDINE 10 MG/ML
20 INJECTION INTRAVENOUS
Status: COMPLETED | OUTPATIENT
Start: 2019-07-14 | End: 2019-07-14

## 2019-07-14 RX ORDER — SUCRALFATE 1 G/10ML
1 SUSPENSION ORAL
Status: COMPLETED | OUTPATIENT
Start: 2019-07-14 | End: 2019-07-14

## 2019-07-14 RX ADMIN — PANTOPRAZOLE SODIUM 40 MG: 40 INJECTION, POWDER, FOR SOLUTION INTRAVENOUS at 02:07

## 2019-07-14 RX ADMIN — FAMOTIDINE 20 MG: 10 INJECTION INTRAVENOUS at 02:07

## 2019-07-14 RX ADMIN — SUCRALFATE 1 G: 1 SUSPENSION ORAL at 02:07

## 2019-07-14 RX ADMIN — LIDOCAINE HYDROCHLORIDE: 20 SOLUTION ORAL; TOPICAL at 01:07

## 2019-07-14 NOTE — ED TRIAGE NOTES
Patient came to the ed with complaint of chest pain that started 30 minutes ago. Patient states that he was sleeping and it woke him up out of his sleep.

## 2019-07-14 NOTE — DISCHARGE INSTRUCTIONS
Continue taking your Clindamycin as prescribed for your dental abscess. You should drink plenty of water after taking your Clindamycin.    Clindamycin can cause irritation of the esophagus, giving you burning chest pain. Therefore we are starting you on medications for stomach acid and to soothe the esophagus.

## 2019-07-14 NOTE — ED PROVIDER NOTES
Encounter Date: 7/13/2019       History     Chief Complaint   Patient presents with    Chest Pain     pt c/o midsternal tightening cp starting 30 mins prior to arrival.     45yo male with metastatic adenocarcinoma on Opdivo presents with chest pain. Patient reports he woke up with burning substernal chest pain around an hour PTA. He reports pain sometimes resolves but recurs or worsens with sitting upright. No belching. Patient has been mildly nauseated for about a day. No vomiting. Last PO was sausage angely around 1pm. Patient took 2 Cha aspirin prior to arrival. No change to pain.     Patient was prescribed Clindamycin 300mg PO f5pftxc about 2 days ago (Friday 7/12/19) for dental pain and possible abscess. He has been taking this medication and thinks he has had about 8 pills so far.     PMH: metastatic adenocarcinoma (?primary, possibly lung) with lesions to para-aortic lymph node, right anterior rib, right iliac, pancreas, and left upper lobe. Also COPD, asthma, HTN, and thyroid disease.    PSH: EGD/colonoscopy, port insertion R chest, lung mass biopsy, finger surgery    Smokes THC. Former tobacco.        Review of patient's allergies indicates:   Allergen Reactions    Nsaids (non-steroidal anti-inflammatory drug)      Patient says since been diagnosed with lung mass on 2-12-15, if take any NSAIDS he spikes a fever.    Tylenol [acetaminophen] Other (See Comments)     Sweating +     Past Medical History:   Diagnosis Date    Asthma     COPD (chronic obstructive pulmonary disease)     HTN (hypertension)     Hypertension     Lung cancer     Lung mass     Thyroid disease      Past Surgical History:   Procedure Laterality Date    COLONOSCOPY N/A 3/18/2019    [facility]formed [facility] (4TH FLR)    COLONOSCOPY N/A 4/22/2015    Per[facility] by FRANS[facility]CO [facility]ur [facility] at [facility] END[facility] (4TH FLR)    ESOPHAGOGASTRODUODENOSCOPY (EGD) N/A 3/18/2019    [facility] by [facility] END[facility] (4TH FLR)     ESOPHAGOGASTRODUODENOSCOPY (EGD) N/A 2015    [facility] by [facility] (4TH FLR)    FRACTURE SURGERY      finger    [id number]-CATH-neck or chest Fluoro equipment needed  **PT MUST BE FIRST CASE Right 2015    Per[facility] by [facility] at [facility] OR 1ST FLR    LUNG CANCER SURGERY Right 2015    lung biopsy     PORTACATH PLACEMENT       Family History   Problem Relation Age of Onset    Hypertension Father     No Known Problems Mother     No Known Problems Sister     No Known Problems Brother     No Known Problems Maternal Aunt     No Known Problems Maternal Uncle     No Known Problems Paternal Aunt     No Known Problems Paternal Uncle     No Known Problems Maternal Grandmother     No Known Problems Maternal Grandfather     No Known Problems Paternal Grandmother     No Known Problems Paternal Grandfather     Amblyopia Neg Hx     Blindness Neg Hx     Cancer Neg Hx     Cataracts Neg Hx     Diabetes Neg Hx     Glaucoma Neg Hx     Macular degeneration Neg Hx     Retinal detachment Neg Hx     Strabismus Neg Hx     Stroke Neg Hx     Thyroid disease Neg Hx      Social History     Tobacco Use    Smoking status: Former Smoker     Packs/day: 0.75     Years: 25.00     Pack years: 18.75     Types: Cigarettes     Last attempt to quit: 2014     Years since quittin.6    Smokeless tobacco: Never Used    Tobacco comment: Patient is no longer smoking   Substance Use Topics    Alcohol use: No    Drug use: Yes     Types: Marijuana     Review of Systems   Constitutional: Negative for diaphoresis and fever.   HENT: Positive for nosebleeds (intermittent). Negative for sore throat.    Eyes: Negative for visual disturbance.   Respiratory: Negative for shortness of breath.    Cardiovascular: Positive for chest pain.   Gastrointestinal: Positive for nausea. Negative for diarrhea and vomiting.   Genitourinary: Negative for dysuria.   Musculoskeletal: Negative  for neck pain.   Skin: Negative for rash.   Neurological: Negative for light-headedness.       Physical Exam     Initial Vitals [07/13/19 2359]   BP Pulse Resp Temp SpO2   137/88 88 16 98.1 °F (36.7 °C) 100 %      MAP       --         Physical Exam    Nursing note and vitals reviewed.  Constitutional: He appears well-developed and well-nourished. He is not diaphoretic.   Awake, alert nontoxic male.   HENT:   Head: Normocephalic and atraumatic.   Poor dentition. Patient is wearing upper dentures. Some areas of gum breakdown below dentures.   No epistaxis or dried blood to nares.   Eyes: Conjunctivae and EOM are normal. Pupils are equal, round, and reactive to light.   Neck: Normal range of motion. Neck supple.   Cardiovascular: Normal rate, regular rhythm and intact distal pulses.   Pulmonary/Chest: No respiratory distress. He has no wheezes. He has rhonchi (bibasilar). He has no rales.   R chest wall port.   Abdominal: Soft. There is no tenderness. There is no rebound and no guarding.   Musculoskeletal: Normal range of motion. He exhibits no edema or tenderness.   Neurological: He is alert and oriented to person, place, and time. He has normal strength.   Moving all extremities   Skin: Skin is warm and dry.   Psychiatric: He has a normal mood and affect.         ED Course   Procedures  Labs Reviewed   CBC W/ AUTO DIFFERENTIAL - Abnormal; Notable for the following components:       Result Value    Hemoglobin 12.3 (*)     Hematocrit 38.7 (*)     Mean Corpuscular Hemoglobin 26.6 (*)     Mean Corpuscular Hemoglobin Conc 31.8 (*)     RDW 15.1 (*)     All other components within normal limits   COMPREHENSIVE METABOLIC PANEL - Abnormal; Notable for the following components:    Glucose 68 (*)     All other components within normal limits   URINALYSIS, REFLEX TO URINE CULTURE - Abnormal; Notable for the following components:    Occult Blood UA 1+ (*)     All other components within normal limits    Narrative:     Preferred  "Collection Type->Urine, Clean Catch   B-TYPE NATRIURETIC PEPTIDE   TROPONIN I   D DIMER, QUANTITATIVE   URINALYSIS MICROSCOPIC    Narrative:     Preferred Collection Type->Urine, Clean Catch   TROPONIN I     EKG Readings: (Independently Interpreted)   00:00: NSR, HR 72. Normal axis. No STEMI.        Imaging Results          X-Ray Chest AP Portable (Final result)  Result time 07/14/19 01:13:32    Final result by Miguel Mcmahan MD (07/14/19 01:13:32)                 Impression:      No convincing acute cardiopulmonary finding identified on this single view.      Electronically signed by: Miguel Mcmahan MD  Date:    07/14/2019  Time:    01:13             Narrative:    EXAMINATION:  XR CHEST AP PORTABLE    CLINICAL HISTORY:  Provided history is "  Chest pain, unspecified".    TECHNIQUE:  One view of the chest.    COMPARISON:  05/27/2018.    FINDINGS:  Cardiac wires overlie the chest.  Right-sided Port-A-Cath overlies the upper SVC.  Cardiac silhouette is not enlarged.  There are mildly coarsened interstitial lung markings, but no large focal consolidation.  Biapical bullous/emphysematous changes noted.  No sizable pleural effusion.  No pneumothorax.                              X-Rays:   Independently Interpreted Readings:   Other Readings:  CXR NAD. R chest wall port.     Medical Decision Making:   History:   Old Medical Records: I decided to obtain old medical records.  Old Records Summarized: records from another hospital, records from clinic visits and records from previous admission(s).  Initial Assessment:   44 y.o. Male with burning substernal chest pain acute onset tonight. On clindamycin since yesterday.  Differential Diagnosis:   Ddx includes ACS, recurrent metastatic disease, PE, pill esophagitis, other.  Independently Interpreted Test(s):   I have ordered and independently interpreted X-rays - see prior notes.  I have ordered and independently interpreted EKG Reading(s) - see prior notes  Clinical " Tests:   Lab Tests: Ordered and Reviewed  Radiological Study: Ordered and Reviewed  Medical Tests: Ordered and Reviewed  ED Management:  EKG no STEMI.    CXR NAD.    Labs reassuring. Negative troponin x 2. Negative dimer. Negative troponin x 2 rules out ACS. Negative dimer very strongly predictive for no PE, no dissection; also somewhat reassuring for no new metastatic disease.     Pain improved s/p GI cocktail, pepcid, protonix, carafate.    I suspect pill esophagitis as symptoms began just after patient started clindamycin for dental disease. I have instructed patient to drink lots of water after he takes clindamycin. I have also rx'ed protonix, pepcid, and carafate. F/u PRN.                       Clinical Impression:       ICD-10-CM ICD-9-CM   1. Chest pain R07.9 786.50   2. Pill esophagitis K20.8 530.19                                Marleny Pelaez MD  07/14/19 1110

## 2019-07-17 ENCOUNTER — TELEPHONE (OUTPATIENT)
Dept: HEMATOLOGY/ONCOLOGY | Facility: CLINIC | Age: 44
End: 2019-07-17

## 2019-07-17 DIAGNOSIS — B37.0 THRUSH: Primary | ICD-10-CM

## 2019-07-17 RX ORDER — FLUCONAZOLE 100 MG/1
TABLET ORAL
Qty: 15 TABLET | Refills: 0 | Status: SHIPPED | OUTPATIENT
Start: 2019-07-17 | End: 2019-08-22

## 2019-07-17 NOTE — TELEPHONE ENCOUNTER
----- Message from Kami Vidal sent at 7/17/2019 10:14 AM CDT -----  Contact: Pt   Pt called to speak with nurse sonali have some questions have some issue with his mouth   Callback#[phone]  Thank You  CHAR Vidal

## 2019-07-17 NOTE — TELEPHONE ENCOUNTER
"Spoke with patient. He is calling to state he went to his dentist recently, as he was having left lower jaw pain. He is s/p full dental extractions. The dentist told him his "jaw is dying." he is scheduled to see oral surgeon on the 31st to be evaluated for jaw bone graft (with femur).   He is calling to ask if his "bone shot" is causing this issue. Nurse informed him she would contact him back after speaking with dr carl next week. He voiced understanding.      He notes having thrush. Dr. Toure will send diflucan for him.      Message routed to dr carl and prescription routed to dr toure  "

## 2019-07-20 NOTE — TELEPHONE ENCOUNTER
Could be. He has not been keeping up with his dental hygeine as well. I remember he got clearance and had all his teeth extracted prior to starting xgeva, Can you find that note

## 2019-07-23 NOTE — TELEPHONE ENCOUNTER
Spoke with patient.  Informed him bone necrosis was pre-existing, though the xgeva most likely didn't help. xgeva will be DC'd. He voiced understanding.

## 2019-07-25 ENCOUNTER — HOSPITAL ENCOUNTER (OUTPATIENT)
Dept: RADIOLOGY | Facility: HOSPITAL | Age: 44
Discharge: HOME | End: 2019-07-25
Attending: INTERNAL MEDICINE
Payer: MEDICARE

## 2019-07-25 DIAGNOSIS — C34.11 MALIGNANT NEOPLASM OF UPPER LOBE OF RIGHT LUNG: ICD-10-CM

## 2019-07-25 PROCEDURE — 71260 CT CHEST ABDOMEN PELVIS WITH CONTRAST (XPD): ICD-10-PCS | Mod: 26,,, | Performed by: RADIOLOGY

## 2019-07-25 PROCEDURE — 78306 NM BONE SCAN WHOLE BODY: ICD-10-PCS | Mod: 26,,, | Performed by: RADIOLOGY

## 2019-07-25 PROCEDURE — 71260 CT THORAX DX C+: CPT | Mod: 26,,, | Performed by: RADIOLOGY

## 2019-07-25 PROCEDURE — 74177 CT ABD & PELVIS W/CONTRAST: CPT | Mod: TC

## 2019-07-25 PROCEDURE — 25500020 PHARM REV CODE 255: Performed by: INTERNAL MEDICINE

## 2019-07-25 PROCEDURE — 78306 BONE IMAGING WHOLE BODY: CPT | Mod: 26,,, | Performed by: RADIOLOGY

## 2019-07-25 PROCEDURE — 74177 CT ABD & PELVIS W/CONTRAST: CPT | Mod: 26,,, | Performed by: RADIOLOGY

## 2019-07-25 PROCEDURE — A9503 TC99M MEDRONATE: HCPCS

## 2019-07-25 PROCEDURE — 74177 CT CHEST ABDOMEN PELVIS WITH CONTRAST (XPD): ICD-10-PCS | Mod: 26,,, | Performed by: RADIOLOGY

## 2019-07-25 RX ADMIN — IOHEXOL 85 ML: 350 INJECTION, SOLUTION INTRAVENOUS at 10:07

## 2019-07-25 RX ADMIN — IOHEXOL 15 ML: 300 INJECTION, SOLUTION INTRAVENOUS at 09:07

## 2019-07-26 ENCOUNTER — OFFICE VISIT (OUTPATIENT)
Dept: HEMATOLOGY/ONCOLOGY | Facility: CLINIC | Age: 44
End: 2019-07-26
Payer: MEDICARE

## 2019-07-26 VITALS
HEART RATE: 80 BPM | WEIGHT: 174.19 LBS | HEIGHT: 66 IN | DIASTOLIC BLOOD PRESSURE: 76 MMHG | OXYGEN SATURATION: 98 % | SYSTOLIC BLOOD PRESSURE: 122 MMHG | BODY MASS INDEX: 27.99 KG/M2 | TEMPERATURE: 99 F | RESPIRATION RATE: 18 BRPM

## 2019-07-26 DIAGNOSIS — N18.30 STAGE 3 CHRONIC KIDNEY DISEASE: ICD-10-CM

## 2019-07-26 DIAGNOSIS — C34.11 MALIGNANT NEOPLASM OF UPPER LOBE OF RIGHT LUNG: Primary | ICD-10-CM

## 2019-07-26 DIAGNOSIS — E03.2 HYPOTHYROIDISM DUE TO MEDICATION: ICD-10-CM

## 2019-07-26 DIAGNOSIS — C79.51 SECONDARY MALIGNANT NEOPLASM OF BONE: ICD-10-CM

## 2019-07-26 PROCEDURE — 99215 PR OFFICE/OUTPT VISIT, EST, LEVL V, 40-54 MIN: ICD-10-PCS | Mod: S$PBB,,, | Performed by: INTERNAL MEDICINE

## 2019-07-26 PROCEDURE — 99215 OFFICE O/P EST HI 40 MIN: CPT | Mod: S$PBB,,, | Performed by: INTERNAL MEDICINE

## 2019-07-26 PROCEDURE — 99999 PR PBB SHADOW E&M-EST. PATIENT-LVL IV: ICD-10-PCS | Mod: PBBFAC,,, | Performed by: INTERNAL MEDICINE

## 2019-07-26 PROCEDURE — 99214 OFFICE O/P EST MOD 30 MIN: CPT | Mod: PBBFAC | Performed by: INTERNAL MEDICINE

## 2019-07-26 PROCEDURE — 99999 PR PBB SHADOW E&M-EST. PATIENT-LVL IV: CPT | Mod: PBBFAC,,, | Performed by: INTERNAL MEDICINE

## 2019-07-26 RX ORDER — SODIUM CHLORIDE 0.9 % (FLUSH) 0.9 %
10 SYRINGE (ML) INJECTION
Status: CANCELLED | OUTPATIENT
Start: 2019-07-30

## 2019-07-26 RX ORDER — HEPARIN 100 UNIT/ML
500 SYRINGE INTRAVENOUS
Status: CANCELLED | OUTPATIENT
Start: 2019-07-30

## 2019-07-26 RX ORDER — CHLORHEXIDINE GLUCONATE ORAL RINSE 1.2 MG/ML
15 SOLUTION DENTAL
COMMUNITY
Start: 2019-07-17 | End: 2019-08-07

## 2019-07-26 NOTE — PROGRESS NOTES
"Subjective:       Patient ID: Yao Alan is a 44 y.o. male.    Chief Complaint: Malignant neoplasm of upper lobe of right lung  Oncologic History:  Mr. Yao Alan is a 44-year-old male who has a long history of smoking and was seen in the Pulmonary Clinic by Dr. Valle on 03/05/2015 with abnormal CT scan.    Apparently, he went to the [facility] in February with coughing as well as chest pain. A chest x-ray was done, which revealed a left upper lobe lung mass. Followup CT scan was done on 02/12/2015 and that revealed posterior superior mediastinal mass adjacent and prominent enlarged upper left mediastinal lymph nodes, abutting the aortic arch as well as left subclavian artery. A  CT scan of the abdomen was done on 03/03/2015 without contrast and that revealed nonobstructive nephrolithiasis, but a 2.1 cm lytic lesion involving the left iliac wing concerning for metastatic disease given the presence of emphysema and a mediastinal soft tissue mass on the CT chest from 02/12/2015. He saw Dr. Valle after that on 03/05/2015 and underwent an IR guided biopsy of the bone lesion on 03/17/2015. 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    His EGFR/EML/ALK is negative.    His PET scan on 3/25/15 revealed Left upper lobe lung lesion with an adjacent para-aortic lymph node, right anterior rib metastasis, right iliac metastasis, and pancreatic metastasis. A pancreatic cancer primary neoplasm is less likely.  MRI brain was negative for mets.  He completed 6 cycles of Carboplatin and Alimta and was on maintenance Alimta until end of March 2016.  His bone scan from 3/16 revealed stable disease. His CT scans also from end of March 2016 revealed "Interval enlargement of left upper lobe lung mass measuring 5.9 x 3.9 cm (previously 3.3 x 2.5 cm). This mass appears to invade the mediastinum and abutting the aortic arch and left subclavian artery"     He is now on Opdivo since March " "2016              HPI Mr. Alan returns for follow up and Opdivo.  CT scans reveals "No findings to indicate metastatic disease. Stable irregular sclerosis right anterior iliac similar dating to September 2017 at least. Nonobstructing punctate right renal stone"  Bone scan reveals no evidence of disease   He denies any nausea, vomiting, diarrhea, constipation, abdominal pain, weight loss or loss of appetite, chest pain, shortness of breath, leg swelling, fatigue, pain, headache, dizziness, or mood changes. His ECOG PS is zero.           Review of Systems   Constitutional: Negative for appetite change, fatigue and unexpected weight change.   HENT: Negative for mouth sores.    Eyes: Negative for visual disturbance.   Respiratory: Negative for cough and shortness of breath.    Cardiovascular: Negative for chest pain.   Gastrointestinal: Negative for abdominal pain and diarrhea.   Genitourinary: Negative for frequency.   Musculoskeletal: Negative for back pain.   Skin: Negative for rash.   Neurological: Negative for headaches.   Hematological: Negative for adenopathy.   Psychiatric/Behavioral: The patient is not nervous/anxious.    All other systems reviewed and are negative.      Objective:      Physical Exam   Constitutional: He is oriented to person, place, and time. He appears well-developed and well-nourished.   HENT:   Mouth/Throat: No oropharyngeal exudate.   Cardiovascular: Normal rate and normal heart sounds.   Pulmonary/Chest: Effort normal and breath sounds normal. He has no wheezes.   Abdominal: Soft. Bowel sounds are normal. There is no tenderness.   Musculoskeletal: He exhibits no edema or tenderness.   Lymphadenopathy:     He has no cervical adenopathy.   Neurological: He is alert and oriented to person, place, and time. Coordination normal.   Skin: Skin is warm and dry. No rash noted.   Psychiatric: He has a normal mood and affect. Judgment and thought content normal.   Vitals reviewed.      LABS:  WBC "   Date Value Ref Range Status   07/25/2019 7.81 3.90 - 12.70 K/uL Final     Hemoglobin   Date Value Ref Range Status   07/25/2019 13.0 (L) 14.0 - 18.0 g/dL Final     POC Hematocrit   Date Value Ref Range Status   06/12/2019 40 36 - 54 %PCV Final     Hematocrit   Date Value Ref Range Status   07/25/2019 41.7 40.0 - 54.0 % Final     Platelets   Date Value Ref Range Status   07/25/2019 353 (H) 150 - 350 K/uL Final     Gran # (ANC)   Date Value Ref Range Status   07/25/2019 4.6 1.8 - 7.7 K/uL Final     Comment:     The ANC is based on a white cell differential from an   automated cell counter. It has not been microscopically   reviewed for the presence of abnormal cells. Clinical   correlation is required.         Chemistry        Component Value Date/Time     07/25/2019 0949    K 4.1 07/25/2019 0949     07/25/2019 0949    CO2 26 07/25/2019 0949    BUN 13 07/25/2019 0949    CREATININE 1.6 (H) 07/25/2019 0949     (H) 07/25/2019 0949        Component Value Date/Time    CALCIUM 9.6 07/25/2019 0949    ALKPHOS 82 07/25/2019 0949    AST 22 07/25/2019 0949    ALT 18 07/25/2019 0949    BILITOT 0.3 07/25/2019 0949    ESTGFRAFRICA 60 07/25/2019 0949    EGFRNONAA 52 (A) 07/25/2019 0949        TSH   Date Value Ref Range Status   07/25/2019 2.765 0.400 - 4.000 uIU/mL Final     Free T4   Date Value Ref Range Status   07/25/2019 0.94 0.71 - 1.51 ng/dL Final        Assessment:       1. Malignant neoplasm of upper lobe of right lung    2. Secondary malignant neoplasm of bone    3. Hypothyroidism due to medication    4. Stage 3 chronic kidney disease        Plan:         1,2,3. He is doing well with no evidence of recurrence on scans and will proceed with Opdivo. DC Xgeva due to dental issues. He will return in 4 weeks for next cycle  4. Stable, monitor.    Above care plan was discussed with patient and accompanying wife and all questions were addressed to their satisfaction

## 2019-07-30 ENCOUNTER — INFUSION (OUTPATIENT)
Dept: INFUSION THERAPY | Facility: HOSPITAL | Age: 44
End: 2019-07-30
Attending: INTERNAL MEDICINE
Payer: MEDICARE

## 2019-07-30 VITALS
HEART RATE: 84 BPM | DIASTOLIC BLOOD PRESSURE: 72 MMHG | SYSTOLIC BLOOD PRESSURE: 110 MMHG | RESPIRATION RATE: 18 BRPM | TEMPERATURE: 98 F | OXYGEN SATURATION: 97 %

## 2019-07-30 DIAGNOSIS — D50.0 IRON DEFICIENCY ANEMIA DUE TO CHRONIC BLOOD LOSS: Primary | ICD-10-CM

## 2019-07-30 DIAGNOSIS — C34.11 MALIGNANT NEOPLASM OF UPPER LOBE OF RIGHT LUNG: ICD-10-CM

## 2019-07-30 DIAGNOSIS — C34.91 LUNG CANCER, PRIMARY, WITH METASTASIS FROM LUNG TO OTHER SITE, RIGHT: ICD-10-CM

## 2019-07-30 DIAGNOSIS — C79.51 SECONDARY MALIGNANT NEOPLASM OF BONE: ICD-10-CM

## 2019-07-30 PROCEDURE — 96413 CHEMO IV INFUSION 1 HR: CPT

## 2019-07-30 PROCEDURE — A4216 STERILE WATER/SALINE, 10 ML: HCPCS | Performed by: INTERNAL MEDICINE

## 2019-07-30 PROCEDURE — 63600175 PHARM REV CODE 636 W HCPCS: Performed by: INTERNAL MEDICINE

## 2019-07-30 PROCEDURE — 25000003 PHARM REV CODE 250: Performed by: INTERNAL MEDICINE

## 2019-07-30 RX ORDER — SODIUM CHLORIDE 0.9 % (FLUSH) 0.9 %
10 SYRINGE (ML) INJECTION
Status: DISCONTINUED | OUTPATIENT
Start: 2019-07-30 | End: 2019-07-30 | Stop reason: HOSPADM

## 2019-07-30 RX ORDER — HEPARIN 100 UNIT/ML
500 SYRINGE INTRAVENOUS
Status: DISCONTINUED | OUTPATIENT
Start: 2019-07-30 | End: 2019-07-30 | Stop reason: HOSPADM

## 2019-07-30 RX ADMIN — HEPARIN 500 UNITS: 100 SYRINGE at 10:07

## 2019-07-30 RX ADMIN — SODIUM CHLORIDE 480 MG: 9 INJECTION, SOLUTION INTRAVENOUS at 09:07

## 2019-07-30 RX ADMIN — Medication 10 ML: at 10:07

## 2019-07-30 NOTE — PLAN OF CARE
Problem: Adult Inpatient Plan of Care  Goal: Plan of Care Review  Outcome: Ongoing (interventions implemented as appropriate)  Pt presented for Opdivo infusion. VSS. Pt reported mouth pain and fatigue. Tolerated Opdivo well. Good blood return noted from port. Port flushed and heparinized upon completion. Xgeva held d/t dental issues. Ambulatory into and out of unit. Distress screening tool completed. AVS provided.

## 2019-08-06 ENCOUNTER — TELEPHONE (OUTPATIENT)
Dept: HEMATOLOGY/ONCOLOGY | Facility: CLINIC | Age: 44
End: 2019-08-06

## 2019-08-06 NOTE — TELEPHONE ENCOUNTER
Spoke with patient's mom. She is calling to state patient went to the dentist today for necrotic jaw. The dentist is wanting to take a conservative approach with him, seeing him back in one week. In the interim, the dentist prescribed him an antibiotic mouth rinse to use on the area with a soft toothbrush. Mom was provided dr carl's fax number, where she will fax the dental office progress note from today.    Message routed to dr carl (just FYI)

## 2019-08-06 NOTE — TELEPHONE ENCOUNTER
----- Message from José Miguel Fu sent at 8/6/2019 10:35 AM CDT -----  Contact: Patient  Needs Advice    Reason for call: Pt wants to speak with the nurse concerning a dental appt he has coming up.        Communication Preference: [phone]    Additional Information:

## 2019-08-09 ENCOUNTER — TELEPHONE (OUTPATIENT)
Dept: HEMATOLOGY/ONCOLOGY | Facility: CLINIC | Age: 44
End: 2019-08-09

## 2019-08-09 NOTE — TELEPHONE ENCOUNTER
Spoke with patient.  Informed him dr carl will not be able to prescribe pain medication for him, as he has no evidence of disease. He is calling to get pain medication for his mouth, as his jaw is hurting him.  Nurse advised him to contact his dentist to discuss.

## 2019-08-09 NOTE — TELEPHONE ENCOUNTER
----- Message from Kami Vidal sent at 8/9/2019 11:00 AM CDT -----  Contact: Pt   Pt called to speak with nurse about medication have some questions   Callback#[phone]  Thank You  CHAR Flores

## 2019-08-22 ENCOUNTER — OFFICE VISIT (OUTPATIENT)
Dept: FAMILY MEDICINE | Facility: CLINIC | Age: 44
End: 2019-08-22
Payer: MEDICARE

## 2019-08-22 VITALS
TEMPERATURE: 99 F | OXYGEN SATURATION: 97 % | SYSTOLIC BLOOD PRESSURE: 118 MMHG | DIASTOLIC BLOOD PRESSURE: 80 MMHG | HEIGHT: 66 IN | WEIGHT: 176.13 LBS | BODY MASS INDEX: 28.31 KG/M2 | RESPIRATION RATE: 20 BRPM | HEART RATE: 92 BPM

## 2019-08-22 DIAGNOSIS — H66.002 ACUTE SUPPURATIVE OTITIS MEDIA OF LEFT EAR WITHOUT SPONTANEOUS RUPTURE OF TYMPANIC MEMBRANE, RECURRENCE NOT SPECIFIED: Primary | ICD-10-CM

## 2019-08-22 DIAGNOSIS — R09.81 SINUS CONGESTION: ICD-10-CM

## 2019-08-22 PROCEDURE — 99999 PR PBB SHADOW E&M-EST. PATIENT-LVL IV: CPT | Mod: PBBFAC,,, | Performed by: FAMILY MEDICINE

## 2019-08-22 PROCEDURE — 99999 PR PBB SHADOW E&M-EST. PATIENT-LVL IV: ICD-10-PCS | Mod: PBBFAC,,, | Performed by: FAMILY MEDICINE

## 2019-08-22 PROCEDURE — 99214 PR OFFICE/OUTPT VISIT, EST, LEVL IV, 30-39 MIN: ICD-10-PCS | Mod: S$PBB,,, | Performed by: FAMILY MEDICINE

## 2019-08-22 PROCEDURE — 99214 OFFICE O/P EST MOD 30 MIN: CPT | Mod: S$PBB,,, | Performed by: FAMILY MEDICINE

## 2019-08-22 PROCEDURE — 99214 OFFICE O/P EST MOD 30 MIN: CPT | Mod: PBBFAC,PO | Performed by: FAMILY MEDICINE

## 2019-08-22 RX ORDER — CHLORHEXIDINE GLUCONATE ORAL RINSE 1.2 MG/ML
15 SOLUTION DENTAL
COMMUNITY
Start: 2019-08-14 | End: 2019-09-13

## 2019-08-22 RX ORDER — MINERAL OIL
180 ENEMA (ML) RECTAL DAILY
Qty: 30 TABLET | Refills: 0 | Status: SHIPPED | OUTPATIENT
Start: 2019-08-22 | End: 2019-09-24

## 2019-08-22 RX ORDER — DOXYCYCLINE 100 MG/1
100 CAPSULE ORAL 2 TIMES DAILY
Qty: 14 CAPSULE | Refills: 0 | Status: SHIPPED | OUTPATIENT
Start: 2019-08-22 | End: 2019-08-29

## 2019-08-26 ENCOUNTER — OFFICE VISIT (OUTPATIENT)
Dept: HEMATOLOGY/ONCOLOGY | Facility: CLINIC | Age: 44
End: 2019-08-26
Payer: MEDICARE

## 2019-08-26 ENCOUNTER — LAB VISIT (OUTPATIENT)
Dept: LAB | Facility: HOSPITAL | Age: 44
End: 2019-08-26
Attending: INTERNAL MEDICINE
Payer: MEDICARE

## 2019-08-26 VITALS
BODY MASS INDEX: 27.85 KG/M2 | DIASTOLIC BLOOD PRESSURE: 77 MMHG | WEIGHT: 173.31 LBS | OXYGEN SATURATION: 97 % | TEMPERATURE: 98 F | RESPIRATION RATE: 18 BRPM | HEART RATE: 69 BPM | SYSTOLIC BLOOD PRESSURE: 113 MMHG | HEIGHT: 66 IN

## 2019-08-26 DIAGNOSIS — E03.2 HYPOTHYROIDISM DUE TO MEDICATION: ICD-10-CM

## 2019-08-26 DIAGNOSIS — C79.51 SECONDARY MALIGNANT NEOPLASM OF BONE: ICD-10-CM

## 2019-08-26 DIAGNOSIS — C34.11 MALIGNANT NEOPLASM OF UPPER LOBE OF RIGHT LUNG: Primary | ICD-10-CM

## 2019-08-26 DIAGNOSIS — C34.11 MALIGNANT NEOPLASM OF UPPER LOBE OF RIGHT LUNG: ICD-10-CM

## 2019-08-26 LAB
ALBUMIN SERPL BCP-MCNC: 4.1 G/DL (ref 3.5–5.2)
ALP SERPL-CCNC: 70 U/L (ref 55–135)
ALT SERPL W/O P-5'-P-CCNC: 16 U/L (ref 10–44)
ANION GAP SERPL CALC-SCNC: 11 MMOL/L (ref 8–16)
AST SERPL-CCNC: 21 U/L (ref 10–40)
BILIRUB SERPL-MCNC: 0.2 MG/DL (ref 0.1–1)
BUN SERPL-MCNC: 18 MG/DL (ref 6–20)
CALCIUM SERPL-MCNC: 9.8 MG/DL (ref 8.7–10.5)
CHLORIDE SERPL-SCNC: 104 MMOL/L (ref 95–110)
CO2 SERPL-SCNC: 26 MMOL/L (ref 23–29)
CREAT SERPL-MCNC: 1.5 MG/DL (ref 0.5–1.4)
ERYTHROCYTE [DISTWIDTH] IN BLOOD BY AUTOMATED COUNT: 14.6 % (ref 11.5–14.5)
EST. GFR  (AFRICAN AMERICAN): >60 ML/MIN/1.73 M^2
EST. GFR  (NON AFRICAN AMERICAN): 55.8 ML/MIN/1.73 M^2
GLUCOSE SERPL-MCNC: 91 MG/DL (ref 70–110)
HCT VFR BLD AUTO: 42.9 % (ref 40–54)
HGB BLD-MCNC: 13.1 G/DL (ref 14–18)
IMM GRANULOCYTES # BLD AUTO: 0.03 K/UL (ref 0–0.04)
MCH RBC QN AUTO: 25.9 PG (ref 27–31)
MCHC RBC AUTO-ENTMCNC: 30.5 G/DL (ref 32–36)
MCV RBC AUTO: 85 FL (ref 82–98)
NEUTROPHILS # BLD AUTO: 4.3 K/UL (ref 1.8–7.7)
PLATELET # BLD AUTO: 331 K/UL (ref 150–350)
PMV BLD AUTO: 10.4 FL (ref 9.2–12.9)
POTASSIUM SERPL-SCNC: 4.4 MMOL/L (ref 3.5–5.1)
PROT SERPL-MCNC: 7.3 G/DL (ref 6–8.4)
RBC # BLD AUTO: 5.05 M/UL (ref 4.6–6.2)
SODIUM SERPL-SCNC: 141 MMOL/L (ref 136–145)
T4 FREE SERPL-MCNC: 0.83 NG/DL (ref 0.71–1.51)
TSH SERPL DL<=0.005 MIU/L-ACNC: 9.31 UIU/ML (ref 0.4–4)
WBC # BLD AUTO: 7.9 K/UL (ref 3.9–12.7)

## 2019-08-26 PROCEDURE — 85027 COMPLETE CBC AUTOMATED: CPT

## 2019-08-26 PROCEDURE — 36415 COLL VENOUS BLD VENIPUNCTURE: CPT

## 2019-08-26 PROCEDURE — 84443 ASSAY THYROID STIM HORMONE: CPT

## 2019-08-26 PROCEDURE — 99999 PR PBB SHADOW E&M-EST. PATIENT-LVL IV: ICD-10-PCS | Mod: PBBFAC,,, | Performed by: INTERNAL MEDICINE

## 2019-08-26 PROCEDURE — 99215 OFFICE O/P EST HI 40 MIN: CPT | Mod: S$PBB,,, | Performed by: INTERNAL MEDICINE

## 2019-08-26 PROCEDURE — 80053 COMPREHEN METABOLIC PANEL: CPT

## 2019-08-26 PROCEDURE — 99999 PR PBB SHADOW E&M-EST. PATIENT-LVL IV: CPT | Mod: PBBFAC,,, | Performed by: INTERNAL MEDICINE

## 2019-08-26 PROCEDURE — 99214 OFFICE O/P EST MOD 30 MIN: CPT | Mod: PBBFAC | Performed by: INTERNAL MEDICINE

## 2019-08-26 PROCEDURE — 84439 ASSAY OF FREE THYROXINE: CPT

## 2019-08-26 PROCEDURE — 99215 PR OFFICE/OUTPT VISIT, EST, LEVL V, 40-54 MIN: ICD-10-PCS | Mod: S$PBB,,, | Performed by: INTERNAL MEDICINE

## 2019-08-26 RX ORDER — SODIUM CHLORIDE 0.9 % (FLUSH) 0.9 %
10 SYRINGE (ML) INJECTION
Status: CANCELLED | OUTPATIENT
Start: 2019-08-27

## 2019-08-26 RX ORDER — HEPARIN 100 UNIT/ML
500 SYRINGE INTRAVENOUS
Status: CANCELLED | OUTPATIENT
Start: 2019-08-27

## 2019-08-26 NOTE — PROGRESS NOTES
"Subjective:       Patient ID: Yao Alan is a 44 y.o. male.    Chief Complaint: Malignant neoplasm of upper lobe of right lung  Oncologic History:  Mr. Yao Alan is a 44-year-old male who has a long history of smoking and was seen in the Pulmonary Clinic by Dr. Valle on 03/05/2015 with abnormal CT scan.    Apparently, he went to the [facility] in February with coughing as well as chest pain. A chest x-ray was done, which revealed a left upper lobe lung mass. Followup CT scan was done on 02/12/2015 and that revealed posterior superior mediastinal mass adjacent and prominent enlarged upper left mediastinal lymph nodes, abutting the aortic arch as well as left subclavian artery. A  CT scan of the abdomen was done on 03/03/2015 without contrast and that revealed nonobstructive nephrolithiasis, but a 2.1 cm lytic lesion involving the left iliac wing concerning for metastatic disease given the presence of emphysema and a mediastinal soft tissue mass on the CT chest from 02/12/2015. He saw Dr. Valle after that on 03/05/2015 and underwent an IR guided biopsy of the bone lesion on 03/17/2015. 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    His EGFR/EML/ALK is negative.    His PET scan on 3/25/15 revealed Left upper lobe lung lesion with an adjacent para-aortic lymph node, right anterior rib metastasis, right iliac metastasis, and pancreatic metastasis. A pancreatic cancer primary neoplasm is less likely.  MRI brain was negative for mets.  He completed 6 cycles of Carboplatin and Alimta and was on maintenance Alimta until end of March 2016.  His bone scan from 3/16 revealed stable disease. His CT scans also from end of March 2016 revealed "Interval enlargement of left upper lobe lung mass measuring 5.9 x 3.9 cm (previously 3.3 x 2.5 cm). This mass appears to invade the mediastinum and abutting the aortic arch and left subclavian artery"     He is now on Opdivo since March " 2016                HPI Mr. Alan returns for follow up and Opdivo. He denies any new issues.    Review of Systems   Constitutional: Negative for appetite change, fatigue and unexpected weight change.   HENT: Negative for mouth sores.    Eyes: Negative for visual disturbance.   Respiratory: Negative for cough and shortness of breath.    Cardiovascular: Negative for chest pain.   Gastrointestinal: Negative for abdominal pain and diarrhea.   Genitourinary: Negative for frequency.   Musculoskeletal: Negative for back pain.   Skin: Negative for rash.   Neurological: Negative for headaches.   Hematological: Negative for adenopathy.   Psychiatric/Behavioral: The patient is not nervous/anxious.    All other systems reviewed and are negative.      Objective:      Physical Exam   Constitutional: He is oriented to person, place, and time. He appears well-developed and well-nourished.   HENT:   Mouth/Throat: No oropharyngeal exudate.   Cardiovascular: Normal rate and normal heart sounds.   Pulmonary/Chest: Effort normal and breath sounds normal. He has no wheezes.   Abdominal: Soft. Bowel sounds are normal. There is no tenderness.   Musculoskeletal: He exhibits no edema or tenderness.   Lymphadenopathy:     He has no cervical adenopathy.   Neurological: He is alert and oriented to person, place, and time. Coordination normal.   Skin: Skin is warm and dry. No rash noted.   Psychiatric: He has a normal mood and affect. Judgment and thought content normal.   Vitals reviewed.        LABS:  WBC   Date Value Ref Range Status   08/26/2019 7.90 3.90 - 12.70 K/uL Final     Hemoglobin   Date Value Ref Range Status   08/26/2019 13.1 (L) 14.0 - 18.0 g/dL Final     POC Hematocrit   Date Value Ref Range Status   06/12/2019 40 36 - 54 %PCV Final     Hematocrit   Date Value Ref Range Status   08/26/2019 42.9 40.0 - 54.0 % Final     Platelets   Date Value Ref Range Status   08/26/2019 331 150 - 350 K/uL Final     Gran # (ANC)   Date Value Ref  Range Status   08/26/2019 4.3 1.8 - 7.7 K/uL Final     Comment:     The ANC is based on a white cell differential from an   automated cell counter. It has not been microscopically   reviewed for the presence of abnormal cells. Clinical   correlation is required.         Chemistry        Component Value Date/Time     08/26/2019 0743    K 4.4 08/26/2019 0743     08/26/2019 0743    CO2 26 08/26/2019 0743    BUN 18 08/26/2019 0743    CREATININE 1.5 (H) 08/26/2019 0743    GLU 91 08/26/2019 0743        Component Value Date/Time    CALCIUM 9.8 08/26/2019 0743    ALKPHOS 70 08/26/2019 0743    AST 21 08/26/2019 0743    ALT 16 08/26/2019 0743    BILITOT 0.2 08/26/2019 0743    ESTGFRAFRICA >60.0 08/26/2019 0743    EGFRNONAA 55.8 (A) 08/26/2019 0743           TSH   Date Value Ref Range Status   07/25/2019 2.765 0.400 - 4.000 uIU/mL Final     Free T4   Date Value Ref Range Status   07/25/2019 0.94 0.71 - 1.51 ng/dL Final         Assessment:       1. Malignant neoplasm of upper lobe of right lung    2. Secondary malignant neoplasm of bone    3. Hypothyroidism due to medication        Plan:        1,2,3. He will proceed with Opdivo and will return in 4 weeks for next cycle. Will plan on restaging scans at that time and if no evidence of disease, will stop Opdivo    Above care plan was discussed with patient and all questions were addressed to his satisfaction

## 2019-08-26 NOTE — PROGRESS NOTES
Subjective:       Patient ID: Yao Alan is a 44 y.o. male.    Chief Complaint: Headache    HPI   45 yo male presents for HA. States he has occasional HA. Feels percocets help the pain. Ibuprofen and other meds help temporarily. Pt endorses sinus congestion on the L side for about 2 weeks. Denies any f/c.    Review of Systems   Constitutional: Negative.    HENT: Positive for congestion, postnasal drip and rhinorrhea.    Respiratory: Negative.    Cardiovascular: Negative.    Gastrointestinal: Negative.    Endocrine: Negative.    Genitourinary: Negative.    Musculoskeletal: Negative.    Neurological: Positive for headaches.   Psychiatric/Behavioral: Negative.           Past Medical History:   Diagnosis Date    Asthma     COPD (chronic obstructive pulmonary disease)     HTN (hypertension)     Hypertension     Lung cancer     Lung mass     Thyroid disease      Past Surgical History:   Procedure Laterality Date    COLONOSCOPY N/A 3/18/2019    Performed by Indra [facility] at [facility] (4TH FLR)    COLONOSCOPY N/A 4/22/2015    Performed by FRANSISCO Nur MD at [facility] END[facility] (4TH FLR)    ESOPHAGOGASTRODUODENOSCOPY (EGD) N/A 3/18/2019    Performed by Indra Bowen [facility] at [facility] END[facility] (4TH FLR)    ESOPHAGOGASTRODUODENOSCOPY (EGD) N/A 5/8/2015    Performed by Young Cain MD at [facility] END[facility] (4TH FLR)    FRACTURE SURGERY      finger    [id number]-neck or chest Fluoro equipment needed  **PT MUST BE FIRST CASE Right 7/20/2015    Performed by Miguel Jacobo MD at [facility] OR 1ST FLR    LUNG CANCER SURGERY Right March 2015    lung biopsy     PORTACATH PLACEMENT       Family History   Problem Relation Age of Onset    Hypertension Father     No Known Problems Mother     No Known Problems Sister     No Known Problems Brother     No Known Problems Maternal Aunt     No Known Problems Maternal Uncle     No Known Problems Paternal Aunt     No Known Problems Paternal Uncle     No Known Problems Maternal  Grandmother     No Known Problems Maternal Grandfather     No Known Problems Paternal Grandmother     No Known Problems Paternal Grandfather     Amblyopia Neg Hx     Blindness Neg Hx     Cancer Neg Hx     Cataracts Neg Hx     Diabetes Neg Hx     Glaucoma Neg Hx     Macular degeneration Neg Hx     Retinal detachment Neg Hx     Strabismus Neg Hx     Stroke Neg Hx     Thyroid disease Neg Hx      Social History     Socioeconomic History    Marital status:      Spouse name: Not on file    Number of children: Not on file    Years of education: Not on file    Highest education level: Not on file   Occupational History    Not on file   Social Needs    Financial resource strain: Not on file    Food insecurity:     Worry: Not on file     Inability: Not on file    Transportation needs:     Medical: Not on file     Non-medical: Not on file   Tobacco Use    Smoking status: Former Smoker     Packs/day: 0.75     Years: 25.00     Pack years: 18.75     Types: Cigarettes     Last attempt to quit: 2014     Years since quittin.7    Smokeless tobacco: Never Used    Tobacco comment: Patient is no longer smoking   Substance and Sexual Activity    Alcohol use: No    Drug use: Yes     Types: Marijuana    Sexual activity: Yes     Partners: Female   Lifestyle    Physical activity:     Days per week: Not on file     Minutes per session: Not on file    Stress: Not on file   Relationships    Social connections:     Talks on phone: Not on file     Gets together: Not on file     Attends [employer] service: Not on file     Active member of club or organization: Not on file     Attends meetings of clubs or organizations: Not on file     Relationship status: Not on file   Other Topics Concern    Not on file   Social History Narrative    Not on file       Current Outpatient Medications:     albuterol 90 mcg/actuation inhaler, Inhale 2 puffs into the lungs every 6 (six) hours as needed for Wheezing.,  Disp: 1 each, Rfl: 11    chlorhexidine (PERIDEX) 0.12 % solution, 15 mLs., Disp: , Rfl:     diphenhydrAMINE (BENADRYL) 25 mg capsule, Take 1 each (25 mg total) by mouth every 6 (six) hours as needed for Itching or Allergies., Disp: 12 capsule, Rfl: 0    famotidine (PEPCID) 20 MG tablet, Take 1 tablet (20 mg total) by mouth 2 (two) times daily. Take until you are finished with Clindamycin., Disp: 30 tablet, Rfl: 1    levothyroxine (SYNTHROID) 100 MCG tablet, Take 1 tablet (100 mcg total) by mouth once daily., Disp: 30 tablet, Rfl: 11    oxyCODONE-acetaminophen (PERCOCET) 5-325 mg per tablet, Take 1 tablet by mouth every 6 (six) hours as needed for Pain., Disp: 5 tablet, Rfl: 0    pantoprazole (PROTONIX) 20 MG tablet, Take 1 tablet (20 mg total) by mouth 2 (two) times daily before meals. Take until you are finished with Clindamycin (antibiotic)., Disp: 30 tablet, Rfl: 1    PROAIR HFA 90 mcg/actuation inhaler, INHALE TWO PUFFS BY MOUTH EVERY 6 HOURS AS NEEDED FOR WHEEZING, Disp: 9 each, Rfl: 0    rivaroxaban (XARELTO) 20 mg Tab, Take 1 tablet (20 mg total) by mouth once daily., Disp: 30 tablet, Rfl: 3    sodium chloride (SALINE NASAL) 0.65 % nasal spray, 1 spray by Nasal route as needed for Congestion (and nasal passages)., Disp: 30 mL, Rfl: 0    clotrimazole (LOTRIMIN) 1 % cream, Apply to affected area 2 times daily, Disp: 30 g, Rfl: 1    doxycycline (VIBRAMYCIN) 100 MG Cap, Take 1 capsule (100 mg total) by mouth 2 (two) times daily. for 7 days, Disp: 14 capsule, Rfl: 0    fexofenadine (ALLEGRA) 180 MG tablet, Take 1 tablet (180 mg total) by mouth once daily., Disp: 30 tablet, Rfl: 0    hydrocortisone 2.5 % cream, Apply topically 2 (two) times daily. for 10 days, Disp: 20 g, Rfl: 0    triamcinolone acetonide 0.1% (KENALOG) 0.1 % ointment, Apply topically 2 (two) times daily., Disp: 80 g, Rfl: 1   Objective:      Vitals:    08/22/19 1528   BP: 118/80   BP Location: Right arm   Patient Position: Sitting  "  BP Method: Large (Manual)   Pulse: 92   Resp: 20   Temp: 98.9 °F (37.2 °C)   TempSrc: Oral   SpO2: 97%   Weight: 79.9 kg (176 lb 2.4 oz)   Height: 5' 6" (1.676 m)       Physical Exam   Constitutional: He is oriented to person, place, and time. No distress.   HENT:   Head: Normocephalic and atraumatic.   Left Ear: Tympanic membrane is erythematous. A middle ear effusion is present.   L side turbinate swelling   Eyes: Conjunctivae are normal.   Neck: Neck supple.   Cardiovascular: Normal rate, regular rhythm and normal heart sounds. Exam reveals no gallop and no friction rub.   No murmur heard.  Pulmonary/Chest: Effort normal and breath sounds normal. He has no wheezes. He has no rales.   Neurological: He is alert and oriented to person, place, and time.   Skin: Skin is warm and dry.   Psychiatric: He has a normal mood and affect. His behavior is normal. Judgment and thought content normal.        Assessment:       1. Acute suppurative otitis media of left ear without spontaneous rupture of tympanic membrane, recurrence not specified    2. Sinus congestion        Plan:       Acute suppurative otitis media of left ear without spontaneous rupture of tympanic membrane, recurrence not specified  - Advised pt about otc meds to use. Advised pt about honey and saltwater gargling PRN.  -     doxycycline (VIBRAMYCIN) 100 MG Cap; Take 1 capsule (100 mg total) by mouth 2 (two) times daily. for 7 days  Dispense: 14 capsule; Refill: 0  -     fexofenadine (ALLEGRA) 180 MG tablet; Take 1 tablet (180 mg total) by mouth once daily.  Dispense: 30 tablet; Refill: 0    Sinus congestion  -     fexofenadine (ALLEGRA) 180 MG tablet; Take 1 tablet (180 mg total) by mouth once daily.  Dispense: 30 tablet; Refill: 0      Follow up if symptoms worsen or fail to improve.            Albert Rudolph MD  "

## 2019-08-26 NOTE — Clinical Note
Schedule CBC,cMp, TSH and free T4 and CT scans and bone scan and see me in 4 weeks on [place] and for Opdivo

## 2019-08-27 ENCOUNTER — INFUSION (OUTPATIENT)
Dept: INFUSION THERAPY | Facility: HOSPITAL | Age: 44
End: 2019-08-27
Attending: INTERNAL MEDICINE
Payer: MEDICARE

## 2019-08-27 VITALS
SYSTOLIC BLOOD PRESSURE: 111 MMHG | HEART RATE: 100 BPM | DIASTOLIC BLOOD PRESSURE: 74 MMHG | OXYGEN SATURATION: 99 % | TEMPERATURE: 98 F | RESPIRATION RATE: 18 BRPM

## 2019-08-27 DIAGNOSIS — C34.11 MALIGNANT NEOPLASM OF UPPER LOBE OF RIGHT LUNG: ICD-10-CM

## 2019-08-27 DIAGNOSIS — D50.0 IRON DEFICIENCY ANEMIA DUE TO CHRONIC BLOOD LOSS: Primary | ICD-10-CM

## 2019-08-27 DIAGNOSIS — C34.91 LUNG CANCER, PRIMARY, WITH METASTASIS FROM LUNG TO OTHER SITE, RIGHT: ICD-10-CM

## 2019-08-27 DIAGNOSIS — C79.51 SECONDARY MALIGNANT NEOPLASM OF BONE: ICD-10-CM

## 2019-08-27 PROCEDURE — 63600175 PHARM REV CODE 636 W HCPCS: Performed by: INTERNAL MEDICINE

## 2019-08-27 PROCEDURE — 96413 CHEMO IV INFUSION 1 HR: CPT

## 2019-08-27 RX ORDER — HEPARIN 100 UNIT/ML
500 SYRINGE INTRAVENOUS
Status: COMPLETED | OUTPATIENT
Start: 2019-08-27 | End: 2019-08-27

## 2019-08-27 RX ADMIN — HEPARIN 500 UNITS: 100 SYRINGE at 10:08

## 2019-08-27 RX ADMIN — SODIUM CHLORIDE 480 MG: 9 INJECTION, SOLUTION INTRAVENOUS at 09:08

## 2019-08-27 NOTE — PLAN OF CARE
Problem: Adult Inpatient Plan of Care  Goal: Patient-Specific Goal (Individualization)  Outcome: Ongoing (interventions implemented as appropriate)  Pt tolerated opdivo infusion without issue. VSS. AVS given. Pt d.c home in stable condition with instructions to return 9/24/19 for next cycle after restaging scans, pt states understanding. In interim, pt knows to call clinic with any issues.

## 2019-09-20 ENCOUNTER — HOSPITAL ENCOUNTER (OUTPATIENT)
Dept: RADIOLOGY | Facility: HOSPITAL | Age: 44
Discharge: HOME | End: 2019-09-20
Attending: INTERNAL MEDICINE
Payer: MEDICARE

## 2019-09-20 DIAGNOSIS — C34.11 MALIGNANT NEOPLASM OF UPPER LOBE OF RIGHT LUNG: ICD-10-CM

## 2019-09-20 PROCEDURE — 74177 CT ABD & PELVIS W/CONTRAST: CPT | Mod: 26,,, | Performed by: RADIOLOGY

## 2019-09-20 PROCEDURE — 74177 CT CHEST ABDOMEN PELVIS WITH CONTRAST (XPD): ICD-10-PCS | Mod: 26,,, | Performed by: RADIOLOGY

## 2019-09-20 PROCEDURE — A9503 TC99M MEDRONATE: HCPCS

## 2019-09-20 PROCEDURE — 71260 CT CHEST ABDOMEN PELVIS WITH CONTRAST (XPD): ICD-10-PCS | Mod: 26,,, | Performed by: RADIOLOGY

## 2019-09-20 PROCEDURE — 74177 CT ABD & PELVIS W/CONTRAST: CPT | Mod: TC

## 2019-09-20 PROCEDURE — 78306 NM BONE SCAN WHOLE BODY: ICD-10-PCS | Mod: 26,,, | Performed by: RADIOLOGY

## 2019-09-20 PROCEDURE — 25500020 PHARM REV CODE 255: Performed by: INTERNAL MEDICINE

## 2019-09-20 PROCEDURE — 71260 CT THORAX DX C+: CPT | Mod: 26,,, | Performed by: RADIOLOGY

## 2019-09-20 PROCEDURE — 78306 BONE IMAGING WHOLE BODY: CPT | Mod: 26,,, | Performed by: RADIOLOGY

## 2019-09-20 RX ADMIN — IOHEXOL 75 ML: 350 INJECTION, SOLUTION INTRAVENOUS at 10:09

## 2019-09-20 RX ADMIN — IOHEXOL 15 ML: 300 INJECTION, SOLUTION INTRAVENOUS at 09:09

## 2019-09-24 ENCOUNTER — TELEPHONE (OUTPATIENT)
Dept: HEMATOLOGY/ONCOLOGY | Facility: CLINIC | Age: 44
End: 2019-09-24

## 2019-09-24 ENCOUNTER — OFFICE VISIT (OUTPATIENT)
Dept: HEMATOLOGY/ONCOLOGY | Facility: CLINIC | Age: 44
End: 2019-09-24
Payer: MEDICARE

## 2019-09-24 ENCOUNTER — LAB VISIT (OUTPATIENT)
Dept: LAB | Facility: HOSPITAL | Age: 44
End: 2019-09-24
Attending: INTERNAL MEDICINE
Payer: MEDICARE

## 2019-09-24 VITALS
WEIGHT: 170.88 LBS | SYSTOLIC BLOOD PRESSURE: 111 MMHG | OXYGEN SATURATION: 99 % | BODY MASS INDEX: 27.46 KG/M2 | DIASTOLIC BLOOD PRESSURE: 70 MMHG | HEIGHT: 66 IN | TEMPERATURE: 98 F | HEART RATE: 73 BPM

## 2019-09-24 DIAGNOSIS — E03.2 HYPOTHYROIDISM DUE TO MEDICATION: ICD-10-CM

## 2019-09-24 DIAGNOSIS — C34.11 MALIGNANT NEOPLASM OF UPPER LOBE OF RIGHT LUNG: ICD-10-CM

## 2019-09-24 DIAGNOSIS — C79.51 SECONDARY MALIGNANT NEOPLASM OF BONE: ICD-10-CM

## 2019-09-24 DIAGNOSIS — C34.91 PRIMARY MALIGNANT NEOPLASM OF RIGHT LUNG METASTATIC TO OTHER SITE: Primary | ICD-10-CM

## 2019-09-24 LAB
ALBUMIN SERPL BCP-MCNC: 4.1 G/DL (ref 3.5–5.2)
ALP SERPL-CCNC: 77 U/L (ref 55–135)
ALT SERPL W/O P-5'-P-CCNC: 14 U/L (ref 10–44)
ANION GAP SERPL CALC-SCNC: 8 MMOL/L (ref 8–16)
AST SERPL-CCNC: 20 U/L (ref 10–40)
BILIRUB SERPL-MCNC: 0.3 MG/DL (ref 0.1–1)
BUN SERPL-MCNC: 14 MG/DL (ref 6–20)
CALCIUM SERPL-MCNC: 9.4 MG/DL (ref 8.7–10.5)
CHLORIDE SERPL-SCNC: 105 MMOL/L (ref 95–110)
CO2 SERPL-SCNC: 29 MMOL/L (ref 23–29)
CREAT SERPL-MCNC: 1.4 MG/DL (ref 0.5–1.4)
ERYTHROCYTE [DISTWIDTH] IN BLOOD BY AUTOMATED COUNT: 14.8 % (ref 11.5–14.5)
EST. GFR  (AFRICAN AMERICAN): >60 ML/MIN/1.73 M^2
EST. GFR  (NON AFRICAN AMERICAN): >60 ML/MIN/1.73 M^2
GLUCOSE SERPL-MCNC: 117 MG/DL (ref 70–110)
HCT VFR BLD AUTO: 42.9 % (ref 40–54)
HGB BLD-MCNC: 13.6 G/DL (ref 14–18)
MCH RBC QN AUTO: 25.4 PG (ref 27–31)
MCHC RBC AUTO-ENTMCNC: 31.7 G/DL (ref 32–36)
MCV RBC AUTO: 80 FL (ref 82–98)
NEUTROPHILS # BLD AUTO: 4.9 K/UL (ref 1.8–7.7)
PLATELET # BLD AUTO: 337 K/UL (ref 150–350)
PMV BLD AUTO: 9.9 FL (ref 9.2–12.9)
POTASSIUM SERPL-SCNC: 4.4 MMOL/L (ref 3.5–5.1)
PROT SERPL-MCNC: 7.6 G/DL (ref 6–8.4)
RBC # BLD AUTO: 5.35 M/UL (ref 4.6–6.2)
SODIUM SERPL-SCNC: 142 MMOL/L (ref 136–145)
T4 FREE SERPL-MCNC: 0.8 NG/DL (ref 0.71–1.51)
TSH SERPL DL<=0.005 MIU/L-ACNC: 4.13 UIU/ML (ref 0.4–4)
WBC # BLD AUTO: 8.6 K/UL (ref 3.9–12.7)

## 2019-09-24 PROCEDURE — 85027 COMPLETE CBC AUTOMATED: CPT

## 2019-09-24 PROCEDURE — 99999 PR PBB SHADOW E&M-EST. PATIENT-LVL III: CPT | Mod: PBBFAC,,, | Performed by: INTERNAL MEDICINE

## 2019-09-24 PROCEDURE — 99999 PR PBB SHADOW E&M-EST. PATIENT-LVL III: ICD-10-PCS | Mod: PBBFAC,,, | Performed by: INTERNAL MEDICINE

## 2019-09-24 PROCEDURE — 84439 ASSAY OF FREE THYROXINE: CPT

## 2019-09-24 PROCEDURE — 99215 PR OFFICE/OUTPT VISIT, EST, LEVL V, 40-54 MIN: ICD-10-PCS | Mod: S$PBB,,, | Performed by: INTERNAL MEDICINE

## 2019-09-24 PROCEDURE — 36415 COLL VENOUS BLD VENIPUNCTURE: CPT

## 2019-09-24 PROCEDURE — 80053 COMPREHEN METABOLIC PANEL: CPT

## 2019-09-24 PROCEDURE — 99213 OFFICE O/P EST LOW 20 MIN: CPT | Mod: PBBFAC | Performed by: INTERNAL MEDICINE

## 2019-09-24 PROCEDURE — 84443 ASSAY THYROID STIM HORMONE: CPT

## 2019-09-24 PROCEDURE — 99215 OFFICE O/P EST HI 40 MIN: CPT | Mod: S$PBB,,, | Performed by: INTERNAL MEDICINE

## 2019-09-24 NOTE — Clinical Note
Schedule CBC,CMP, TSH and free T4, CT chest, abdomen/pelvis and bone scan and see me in 3 months on [facility]

## 2019-09-24 NOTE — TELEPHONE ENCOUNTER
Per , called pt & informed him: thryoid labs are better so he needs to stay on same dose of synthroid. He voiced understanding.

## 2019-09-24 NOTE — PROGRESS NOTES
"Subjective:       Patient ID: Yao Alan is a 44 y.o. male.    Chief Complaint: Follow-up  Oncologic History:  Mr. Yao Alan is a 44-year-old male who has a long history of smoking and was seen in the Pulmonary Clinic by Dr. Valle on 03/05/2015 with abnormal CT scan.    Apparently, he went to the [facility] in February with coughing as well as chest pain. A chest x-ray was done, which revealed a left upper lobe lung mass. Followup CT scan was done on 02/12/2015 and that revealed posterior superior mediastinal mass adjacent and prominent enlarged upper left mediastinal lymph nodes, abutting the aortic arch as well as left subclavian artery. A  CT scan of the abdomen was done on 03/03/2015 without contrast and that revealed nonobstructive nephrolithiasis, but a 2.1 cm lytic lesion involving the left iliac wing concerning for metastatic disease given the presence of emphysema and a mediastinal soft tissue mass on the CT chest from 02/12/2015. He saw Dr. Valle after that on 03/05/2015 and underwent an IR guided biopsy of the bone lesion on 03/17/2015. 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    His EGFR/EML/ALK is negative.    His PET scan on 3/25/15 revealed Left upper lobe lung lesion with an adjacent para-aortic lymph node, right anterior rib metastasis, right iliac metastasis, and pancreatic metastasis. A pancreatic cancer primary neoplasm is less likely.  MRI brain was negative for mets.  He completed 6 cycles of Carboplatin and Alimta and was on maintenance Alimta until end of March 2016.  His bone scan from 3/16 revealed stable disease. His CT scans also from end of March 2016 revealed "Interval enlargement of left upper lobe lung mass measuring 5.9 x 3.9 cm (previously 3.3 x 2.5 cm). This mass appears to invade the mediastinum and abutting the aortic arch and left subclavian artery"     He is now on Opdivo since March 2016                   HPI Mr. Alan returns for " "follow up. His last Opdivo was on 8/27/19  CT scans on 9/20/19 reveal No CT evidence for recurrent or metastatic disease. Bone scan also on 9/20/19 reveals "There is no scintigraphic evidence of metastatic disease.  Dental inflammatory disease involving mental and left mandibular region question and clinical correlation requested"  He continues to have issues with teeth and is seeing his dentist.     Review of Systems   Constitutional: Negative for appetite change, fatigue and unexpected weight change.   HENT: Negative for mouth sores.    Eyes: Negative for visual disturbance.   Respiratory: Negative for cough and shortness of breath.    Cardiovascular: Negative for chest pain.   Gastrointestinal: Negative for abdominal pain and diarrhea.   Genitourinary: Negative for frequency.   Musculoskeletal: Negative for back pain.   Skin: Negative for rash.   Neurological: Negative for headaches.   Hematological: Negative for adenopathy.   Psychiatric/Behavioral: The patient is not nervous/anxious.    All other systems reviewed and are negative.      Objective:      Physical Exam   Constitutional: He is oriented to person, place, and time. He appears well-developed and well-nourished.   HENT:   Mouth/Throat: No oropharyngeal exudate.   Cardiovascular: Normal rate and normal heart sounds.   Pulmonary/Chest: Effort normal and breath sounds normal. He has no wheezes.   Abdominal: Soft. Bowel sounds are normal. There is no tenderness.   Musculoskeletal: He exhibits no edema or tenderness.   Lymphadenopathy:     He has no cervical adenopathy.   Neurological: He is alert and oriented to person, place, and time. Coordination normal.   Skin: Skin is warm and dry. No rash noted.   Psychiatric: He has a normal mood and affect. Judgment and thought content normal.   Vitals reviewed.        LABS:  WBC   Date Value Ref Range Status   09/24/2019 8.60 3.90 - 12.70 K/uL Final     Hemoglobin   Date Value Ref Range Status   09/24/2019 13.6 (L) " 14.0 - 18.0 g/dL Final     POC Hematocrit   Date Value Ref Range Status   06/12/2019 40 36 - 54 %PCV Final     Hematocrit   Date Value Ref Range Status   09/24/2019 42.9 40.0 - 54.0 % Final     Platelets   Date Value Ref Range Status   09/24/2019 337 150 - 350 K/uL Final     Gran # (ANC)   Date Value Ref Range Status   09/24/2019 4.9 1.8 - 7.7 K/uL Final     Comment:     The ANC is based on a white cell differential from an   automated cell counter. It has not been microscopically   reviewed for the presence of abnormal cells. Clinical   correlation is required.         Chemistry        Component Value Date/Time     09/24/2019 0815    K 4.4 09/24/2019 0815     09/24/2019 0815    CO2 29 09/24/2019 0815    BUN 14 09/24/2019 0815    CREATININE 1.4 09/24/2019 0815     (H) 09/24/2019 0815        Component Value Date/Time    CALCIUM 9.4 09/24/2019 0815    ALKPHOS 77 09/24/2019 0815    AST 20 09/24/2019 0815    ALT 14 09/24/2019 0815    BILITOT 0.3 09/24/2019 0815    ESTGFRAFRICA >60 09/24/2019 0815    EGFRNONAA >60 09/24/2019 0815        TSH   Date Value Ref Range Status   09/24/2019 4.133 (H) 0.400 - 4.000 uIU/mL Final     Free T4   Date Value Ref Range Status   09/24/2019 0.80 0.71 - 1.51 ng/dL Final       Assessment:       1. Primary malignant neoplasm of right lung metastatic to other site    2. Secondary malignant neoplasm of bone    3. Hypothyroidism due to medication        Plan:        1,2. He is doing well with no evidence of recurrence so will monitor off of Opdivo and will return in 3 months with restaging scans  3. Improved, continue with same dose.    Above care plan was discussed with patient and all questions were addressed to his satisfaction

## 2019-10-02 PROBLEM — R51.9 HEADACHE: Status: ACTIVE | Noted: 2017-04-04

## 2019-10-22 DIAGNOSIS — I82.402 DEEP VEIN THROMBOSIS (DVT) OF LEFT LOWER EXTREMITY, UNSPECIFIED CHRONICITY, UNSPECIFIED VEIN: ICD-10-CM

## 2019-10-22 DIAGNOSIS — E03.9 HYPOTHYROIDISM, UNSPECIFIED TYPE: ICD-10-CM

## 2019-10-22 RX ORDER — LEVOTHYROXINE SODIUM 100 UG/1
100 TABLET ORAL DAILY
Qty: 30 TABLET | Refills: 11 | Status: SHIPPED | OUTPATIENT
Start: 2019-10-22 | End: 2019-12-20

## 2019-12-13 ENCOUNTER — HOSPITAL ENCOUNTER (OUTPATIENT)
Dept: RADIOLOGY | Facility: HOSPITAL | Age: 44
Discharge: HOME | End: 2019-12-13
Attending: INTERNAL MEDICINE
Payer: MEDICARE

## 2019-12-13 ENCOUNTER — LAB VISIT (OUTPATIENT)
Dept: LAB | Facility: HOSPITAL | Age: 44
End: 2019-12-13
Attending: INTERNAL MEDICINE
Payer: MEDICARE

## 2019-12-13 DIAGNOSIS — C34.91 PRIMARY MALIGNANT NEOPLASM OF RIGHT LUNG METASTATIC TO OTHER SITE: ICD-10-CM

## 2019-12-13 DIAGNOSIS — E03.2 HYPOTHYROIDISM DUE TO MEDICATION: ICD-10-CM

## 2019-12-13 LAB
ALBUMIN SERPL BCP-MCNC: 4.1 G/DL (ref 3.5–5.2)
ALP SERPL-CCNC: 75 U/L (ref 55–135)
ALT SERPL W/O P-5'-P-CCNC: 16 U/L (ref 10–44)
ANION GAP SERPL CALC-SCNC: 9 MMOL/L (ref 8–16)
AST SERPL-CCNC: 22 U/L (ref 10–40)
BILIRUB SERPL-MCNC: 0.4 MG/DL (ref 0.1–1)
BUN SERPL-MCNC: 12 MG/DL (ref 6–20)
CALCIUM SERPL-MCNC: 9.2 MG/DL (ref 8.7–10.5)
CHLORIDE SERPL-SCNC: 108 MMOL/L (ref 95–110)
CO2 SERPL-SCNC: 26 MMOL/L (ref 23–29)
CREAT SERPL-MCNC: 1.3 MG/DL (ref 0.5–1.4)
ERYTHROCYTE [DISTWIDTH] IN BLOOD BY AUTOMATED COUNT: 15 % (ref 11.5–14.5)
EST. GFR  (AFRICAN AMERICAN): >60 ML/MIN/1.73 M^2
EST. GFR  (NON AFRICAN AMERICAN): >60 ML/MIN/1.73 M^2
GLUCOSE SERPL-MCNC: 102 MG/DL (ref 70–110)
HCT VFR BLD AUTO: 39.3 % (ref 40–54)
HGB BLD-MCNC: 12.3 G/DL (ref 14–18)
IMM GRANULOCYTES # BLD AUTO: 0.03 K/UL (ref 0–0.04)
MCH RBC QN AUTO: 25.2 PG (ref 27–31)
MCHC RBC AUTO-ENTMCNC: 31.3 G/DL (ref 32–36)
MCV RBC AUTO: 80 FL (ref 82–98)
NEUTROPHILS # BLD AUTO: 6 K/UL (ref 1.8–7.7)
PLATELET # BLD AUTO: 308 K/UL (ref 150–350)
PMV BLD AUTO: 9.9 FL (ref 9.2–12.9)
POTASSIUM SERPL-SCNC: 4.2 MMOL/L (ref 3.5–5.1)
PROT SERPL-MCNC: 7.3 G/DL (ref 6–8.4)
RBC # BLD AUTO: 4.89 M/UL (ref 4.6–6.2)
SODIUM SERPL-SCNC: 143 MMOL/L (ref 136–145)
T4 FREE SERPL-MCNC: 0.92 NG/DL (ref 0.71–1.51)
TSH SERPL DL<=0.005 MIU/L-ACNC: 6.67 UIU/ML (ref 0.4–4)
WBC # BLD AUTO: 10.07 K/UL (ref 3.9–12.7)

## 2019-12-13 PROCEDURE — 84439 ASSAY OF FREE THYROXINE: CPT

## 2019-12-13 PROCEDURE — 36415 COLL VENOUS BLD VENIPUNCTURE: CPT

## 2019-12-13 PROCEDURE — 74177 CT CHEST ABDOMEN PELVIS WITH CONTRAST (XPD): ICD-10-PCS | Mod: 26,,, | Performed by: RADIOLOGY

## 2019-12-13 PROCEDURE — A9503 TC99M MEDRONATE: HCPCS

## 2019-12-13 PROCEDURE — 78306 NM BONE SCAN WHOLE BODY: ICD-10-PCS | Mod: 26,,, | Performed by: RADIOLOGY

## 2019-12-13 PROCEDURE — 78306 BONE IMAGING WHOLE BODY: CPT | Mod: 26,,, | Performed by: RADIOLOGY

## 2019-12-13 PROCEDURE — 85027 COMPLETE CBC AUTOMATED: CPT

## 2019-12-13 PROCEDURE — 71260 CT THORAX DX C+: CPT | Mod: 26,,, | Performed by: RADIOLOGY

## 2019-12-13 PROCEDURE — 84443 ASSAY THYROID STIM HORMONE: CPT

## 2019-12-13 PROCEDURE — 80053 COMPREHEN METABOLIC PANEL: CPT

## 2019-12-13 PROCEDURE — 74177 CT ABD & PELVIS W/CONTRAST: CPT | Mod: TC

## 2019-12-13 PROCEDURE — 74177 CT ABD & PELVIS W/CONTRAST: CPT | Mod: 26,,, | Performed by: RADIOLOGY

## 2019-12-13 PROCEDURE — 71260 CT CHEST ABDOMEN PELVIS WITH CONTRAST (XPD): ICD-10-PCS | Mod: 26,,, | Performed by: RADIOLOGY

## 2019-12-13 PROCEDURE — 25500020 PHARM REV CODE 255: Performed by: INTERNAL MEDICINE

## 2019-12-13 RX ADMIN — IOHEXOL 15 ML: 300 INJECTION, SOLUTION INTRAVENOUS at 10:12

## 2019-12-13 RX ADMIN — IOHEXOL 75 ML: 350 INJECTION, SOLUTION INTRAVENOUS at 10:12

## 2019-12-20 ENCOUNTER — OFFICE VISIT (OUTPATIENT)
Dept: HEMATOLOGY/ONCOLOGY | Facility: CLINIC | Age: 44
End: 2019-12-20
Payer: MEDICARE

## 2019-12-20 VITALS
BODY MASS INDEX: 27.1 KG/M2 | RESPIRATION RATE: 18 BRPM | WEIGHT: 168.63 LBS | OXYGEN SATURATION: 98 % | SYSTOLIC BLOOD PRESSURE: 126 MMHG | TEMPERATURE: 98 F | HEART RATE: 87 BPM | DIASTOLIC BLOOD PRESSURE: 89 MMHG | HEIGHT: 66 IN

## 2019-12-20 DIAGNOSIS — I82.402 DEEP VEIN THROMBOSIS (DVT) OF LEFT LOWER EXTREMITY, UNSPECIFIED CHRONICITY, UNSPECIFIED VEIN: ICD-10-CM

## 2019-12-20 DIAGNOSIS — E03.9 HYPOTHYROIDISM, UNSPECIFIED TYPE: ICD-10-CM

## 2019-12-20 DIAGNOSIS — C34.11 MALIGNANT NEOPLASM OF UPPER LOBE OF RIGHT LUNG: Primary | ICD-10-CM

## 2019-12-20 DIAGNOSIS — C79.51 SECONDARY MALIGNANT NEOPLASM OF BONE: ICD-10-CM

## 2019-12-20 PROCEDURE — 99999 PR PBB SHADOW E&M-EST. PATIENT-LVL III: CPT | Mod: PBBFAC,,, | Performed by: INTERNAL MEDICINE

## 2019-12-20 PROCEDURE — 99213 OFFICE O/P EST LOW 20 MIN: CPT | Mod: PBBFAC | Performed by: INTERNAL MEDICINE

## 2019-12-20 PROCEDURE — 99999 PR PBB SHADOW E&M-EST. PATIENT-LVL III: ICD-10-PCS | Mod: PBBFAC,,, | Performed by: INTERNAL MEDICINE

## 2019-12-20 PROCEDURE — 99214 OFFICE O/P EST MOD 30 MIN: CPT | Mod: S$PBB,,, | Performed by: INTERNAL MEDICINE

## 2019-12-20 PROCEDURE — 99214 PR OFFICE/OUTPT VISIT, EST, LEVL IV, 30-39 MIN: ICD-10-PCS | Mod: S$PBB,,, | Performed by: INTERNAL MEDICINE

## 2019-12-20 RX ORDER — LEVOTHYROXINE SODIUM 125 UG/1
125 TABLET ORAL DAILY
Qty: 30 TABLET | Refills: 11 | Status: SHIPPED | OUTPATIENT
Start: 2019-12-20 | End: 2020-03-17 | Stop reason: ALTCHOICE

## 2019-12-20 NOTE — PROGRESS NOTES
"Subjective:       Patient ID: Yao Alan is a 44 y.o. male.    Chief Complaint: Primary malignant neoplasm of right lung metastatic to other  Oncologic History:  Mr. Yao Alan is a 44-year-old male who has a long history of smoking and was seen in the Pulmonary Clinic by Dr. Valle on 03/05/2015 with abnormal CT scan.    Apparently, he went to the [facility] in February with coughing as well as chest pain. A chest x-ray was done, which revealed a left upper lobe lung mass. Followup CT scan was done on 02/12/2015 and that revealed posterior superior mediastinal mass adjacent and prominent enlarged upper left mediastinal lymph nodes, abutting the aortic arch as well as left subclavian artery. A  CT scan of the abdomen was done on 03/03/2015 without contrast and that revealed nonobstructive nephrolithiasis, but a 2.1 cm lytic lesion involving the left iliac wing concerning for metastatic disease given the presence of emphysema and a mediastinal soft tissue mass on the CT chest from 02/12/2015. He saw Dr. Valle after that on 03/05/2015 and underwent an IR guided biopsy of the bone lesion on 03/17/2015. 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    His EGFR/EML/ALK is negative.    His PET scan on 3/25/15 revealed Left upper lobe lung lesion with an adjacent para-aortic lymph node, right anterior rib metastasis, right iliac metastasis, and pancreatic metastasis. A pancreatic cancer primary neoplasm is less likely.  MRI brain was negative for mets.  He completed 6 cycles of Carboplatin and Alimta and was on maintenance Alimta until end of March 2016.  His bone scan from 3/16 revealed stable disease. His CT scans also from end of March 2016 revealed "Interval enlargement of left upper lobe lung mass measuring 5.9 x 3.9 cm (previously 3.3 x 2.5 cm). This mass appears to invade the mediastinum and abutting the aortic arch and left subclavian artery"     He started Opdivo since " "March 2016 and his last Opdivo was on 8/27/19    HPIHe comes in to review CT scans and bone scans which reveal no evidence of recurrence.  He feels well and denies any new issues    Review of Systems   Constitutional: Negative for appetite change, fatigue and unexpected weight change.   HENT: Negative for mouth sores.    Eyes: Negative for visual disturbance.   Respiratory: Negative for cough and shortness of breath.    Cardiovascular: Negative for chest pain.   Gastrointestinal: Negative for abdominal pain and diarrhea.   Genitourinary: Negative for frequency.   Musculoskeletal: Negative for back pain.   Skin: Negative for rash.   Neurological: Negative for headaches.   Hematological: Negative for adenopathy.   Psychiatric/Behavioral: The patient is not nervous/anxious.    All other systems reviewed and are negative.      Objective:      Physical Exam   Constitutional: He is oriented to person, place, and time. He appears well-developed and well-nourished.   HENT:   Mouth/Throat: No oropharyngeal exudate.   Cardiovascular: Normal rate and normal heart sounds.   Pulmonary/Chest: Effort normal and breath sounds normal. He has no wheezes.   Abdominal: Soft. Bowel sounds are normal. There is no tenderness.   Musculoskeletal: He exhibits no edema or tenderness.   Lymphadenopathy:     He has no cervical adenopathy.   Neurological: He is alert and oriented to person, place, and time. Coordination normal.   Skin: Skin is warm and dry. No rash noted.   Psychiatric: He has a normal mood and affect. Judgment and thought content normal.   Vitals reviewed.        LABS"  WBC   Date Value Ref Range Status   12/13/2019 10.07 3.90 - 12.70 K/uL Final     Hemoglobin   Date Value Ref Range Status   12/13/2019 12.3 (L) 14.0 - 18.0 g/dL Final     POC Hematocrit   Date Value Ref Range Status   06/12/2019 40 36 - 54 %PCV Final     Hematocrit   Date Value Ref Range Status   12/13/2019 39.3 (L) 40.0 - 54.0 % Final     Platelets   Date Value " Ref Range Status   12/13/2019 308 150 - 350 K/uL Final     Gran # (ANC)   Date Value Ref Range Status   12/13/2019 6.0 1.8 - 7.7 K/uL Final     Comment:     The ANC is based on a white cell differential from an   automated cell counter. It has not been microscopically   reviewed for the presence of abnormal cells. Clinical   correlation is required.         Chemistry        Component Value Date/Time     12/13/2019 0913    K 4.2 12/13/2019 0913     12/13/2019 0913    CO2 26 12/13/2019 0913    BUN 12 12/13/2019 0913    CREATININE 1.3 12/13/2019 0913     12/13/2019 0913        Component Value Date/Time    CALCIUM 9.2 12/13/2019 0913    ALKPHOS 75 12/13/2019 0913    AST 22 12/13/2019 0913    ALT 16 12/13/2019 0913    BILITOT 0.4 12/13/2019 0913    ESTGFRAFRICA >60 12/13/2019 0913    EGFRNONAA >60 12/13/2019 0913           TSH   Date Value Ref Range Status   12/13/2019 6.675 (H) 0.400 - 4.000 uIU/mL Final     Free T4   Date Value Ref Range Status   12/13/2019 0.92 0.71 - 1.51 ng/dL Final       Assessment:       1. Malignant neoplasm of upper lobe of right lung    2. Secondary malignant neoplasm of bone    3. Hypothyroidism, unspecified type    4. Deep vein thrombosis (DVT) of left lower extremity, unspecified chronicity, unspecified vein        Plan:        1,2. He is doing well clinically with no evidence of recurrence and will return in 3 months for repeat scans.  3. Increased Synthroid to 125 mcg and recheck in 3 months  4. Refilled Xarelto  Above care plan was discussed with patient and all questions were addressed to his satisfaction

## 2019-12-20 NOTE — Clinical Note
Can u call [facility] and ask them about his bone scan results, they are still pending from 12/13/19

## 2020-02-12 DIAGNOSIS — R53.1 WEAKNESS: ICD-10-CM

## 2020-02-12 DIAGNOSIS — C34.11 MALIGNANT NEOPLASM OF UPPER LOBE OF RIGHT LUNG: Primary | ICD-10-CM

## 2020-03-11 ENCOUNTER — TELEPHONE (OUTPATIENT)
Dept: HEMATOLOGY/ONCOLOGY | Facility: CLINIC | Age: 45
End: 2020-03-11

## 2020-03-11 NOTE — TELEPHONE ENCOUNTER
Ct department called and stated patient having CT scan tomorrow and needs a creatine order placed for blood work tomorrow.

## 2020-03-12 ENCOUNTER — LAB VISIT (OUTPATIENT)
Dept: LAB | Facility: HOSPITAL | Age: 45
End: 2020-03-12
Attending: INTERNAL MEDICINE
Payer: MEDICARE

## 2020-03-12 ENCOUNTER — HOSPITAL ENCOUNTER (OUTPATIENT)
Dept: RADIOLOGY | Facility: HOSPITAL | Age: 45
Discharge: HOME | End: 2020-03-12
Attending: INTERNAL MEDICINE
Payer: MEDICARE

## 2020-03-12 DIAGNOSIS — C34.11 MALIGNANT NEOPLASM OF UPPER LOBE OF RIGHT LUNG: ICD-10-CM

## 2020-03-12 DIAGNOSIS — C34.90 MALIGNANT NEOPLASM OF LUNG, UNSPECIFIED LATERALITY, UNSPECIFIED PART OF LUNG: ICD-10-CM

## 2020-03-12 LAB
ALBUMIN SERPL BCP-MCNC: 3.9 G/DL (ref 3.5–5.2)
ALP SERPL-CCNC: 92 U/L (ref 55–135)
ALT SERPL W/O P-5'-P-CCNC: 17 U/L (ref 10–44)
ANION GAP SERPL CALC-SCNC: 11 MMOL/L (ref 8–16)
AST SERPL-CCNC: 18 U/L (ref 10–40)
BILIRUB SERPL-MCNC: 0.3 MG/DL (ref 0.1–1)
BUN SERPL-MCNC: 13 MG/DL (ref 6–20)
CALCIUM SERPL-MCNC: 9.5 MG/DL (ref 8.7–10.5)
CHLORIDE SERPL-SCNC: 105 MMOL/L (ref 95–110)
CO2 SERPL-SCNC: 25 MMOL/L (ref 23–29)
CREAT SERPL-MCNC: 1.3 MG/DL (ref 0.5–1.4)
EST. GFR  (AFRICAN AMERICAN): >60 ML/MIN/1.73 M^2
EST. GFR  (NON AFRICAN AMERICAN): >60 ML/MIN/1.73 M^2
GLUCOSE SERPL-MCNC: 103 MG/DL (ref 70–110)
POTASSIUM SERPL-SCNC: 4.2 MMOL/L (ref 3.5–5.1)
PROT SERPL-MCNC: 7.3 G/DL (ref 6–8.4)
SODIUM SERPL-SCNC: 141 MMOL/L (ref 136–145)

## 2020-03-12 PROCEDURE — 78306 BONE IMAGING WHOLE BODY: CPT | Mod: 26,,, | Performed by: RADIOLOGY

## 2020-03-12 PROCEDURE — 71260 CT THORAX DX C+: CPT | Mod: 26,,, | Performed by: RADIOLOGY

## 2020-03-12 PROCEDURE — 25500020 PHARM REV CODE 255: Performed by: INTERNAL MEDICINE

## 2020-03-12 PROCEDURE — 74177 CT ABD & PELVIS W/CONTRAST: CPT | Mod: TC

## 2020-03-12 PROCEDURE — A9503 TC99M MEDRONATE: HCPCS

## 2020-03-12 PROCEDURE — 36415 COLL VENOUS BLD VENIPUNCTURE: CPT

## 2020-03-12 PROCEDURE — 80053 COMPREHEN METABOLIC PANEL: CPT

## 2020-03-12 PROCEDURE — 74177 CT ABD & PELVIS W/CONTRAST: CPT | Mod: 26,,, | Performed by: RADIOLOGY

## 2020-03-12 PROCEDURE — 78306 NM BONE SCAN WHOLE BODY: ICD-10-PCS | Mod: 26,,, | Performed by: RADIOLOGY

## 2020-03-12 PROCEDURE — 74177 CT CHEST ABDOMEN PELVIS WITH CONTRAST (XPD): ICD-10-PCS | Mod: 26,,, | Performed by: RADIOLOGY

## 2020-03-12 PROCEDURE — 71260 CT CHEST ABDOMEN PELVIS WITH CONTRAST (XPD): ICD-10-PCS | Mod: 26,,, | Performed by: RADIOLOGY

## 2020-03-12 RX ADMIN — IOHEXOL 75 ML: 350 INJECTION, SOLUTION INTRAVENOUS at 10:03

## 2020-03-17 ENCOUNTER — OFFICE VISIT (OUTPATIENT)
Dept: HEMATOLOGY/ONCOLOGY | Facility: CLINIC | Age: 45
End: 2020-03-17
Payer: MEDICARE

## 2020-03-17 VITALS
WEIGHT: 164.25 LBS | HEART RATE: 90 BPM | BODY MASS INDEX: 26.51 KG/M2 | DIASTOLIC BLOOD PRESSURE: 90 MMHG | TEMPERATURE: 99 F | SYSTOLIC BLOOD PRESSURE: 145 MMHG

## 2020-03-17 DIAGNOSIS — E05.90 HYPERTHYROIDISM: ICD-10-CM

## 2020-03-17 DIAGNOSIS — C79.51 SECONDARY MALIGNANT NEOPLASM OF BONE: ICD-10-CM

## 2020-03-17 DIAGNOSIS — C34.11 MALIGNANT NEOPLASM OF UPPER LOBE OF RIGHT LUNG: Primary | ICD-10-CM

## 2020-03-17 DIAGNOSIS — R04.0 EPISTAXIS: ICD-10-CM

## 2020-03-17 PROCEDURE — 99213 OFFICE O/P EST LOW 20 MIN: CPT | Mod: PBBFAC | Performed by: INTERNAL MEDICINE

## 2020-03-17 PROCEDURE — 99999 PR PBB SHADOW E&M-EST. PATIENT-LVL III: ICD-10-PCS | Mod: PBBFAC,,, | Performed by: INTERNAL MEDICINE

## 2020-03-17 PROCEDURE — 99214 OFFICE O/P EST MOD 30 MIN: CPT | Mod: S$PBB,,, | Performed by: INTERNAL MEDICINE

## 2020-03-17 PROCEDURE — 99999 PR PBB SHADOW E&M-EST. PATIENT-LVL III: CPT | Mod: PBBFAC,,, | Performed by: INTERNAL MEDICINE

## 2020-03-17 PROCEDURE — 99214 PR OFFICE/OUTPT VISIT, EST, LEVL IV, 30-39 MIN: ICD-10-PCS | Mod: S$PBB,,, | Performed by: INTERNAL MEDICINE

## 2020-03-17 RX ORDER — LEVOTHYROXINE SODIUM 100 UG/1
100 TABLET ORAL DAILY
Qty: 30 TABLET | Refills: 11 | Status: SHIPPED | OUTPATIENT
Start: 2020-03-17 | End: 2020-06-23 | Stop reason: SDUPTHER

## 2020-03-17 NOTE — Clinical Note
Schedule CBC,CMP, CT scans and bone scan and see me in 3 months on [facility]

## 2020-03-17 NOTE — PROGRESS NOTES
"Subjective:       Patient ID: Yao Alan is a 45 y.o. male.    Chief Complaint: Primary malignant neoplasm of right lung  Oncologic History:  Mr. Yao Alan is a 45-year-old male who has a long history of smoking and was seen in the Pulmonary Clinic by Dr. Valle on 03/05/2015 with abnormal CT scan.    Apparently, he went to the [facility] in February with coughing as well as chest pain. A chest x-ray was done, which revealed a left upper lobe lung mass. Followup CT scan was done on 02/12/2015 and that revealed posterior superior mediastinal mass adjacent and prominent enlarged upper left mediastinal lymph nodes, abutting the aortic arch as well as left subclavian artery. A  CT scan of the abdomen was done on 03/03/2015 without contrast and that revealed nonobstructive nephrolithiasis, but a 2.1 cm lytic lesion involving the left iliac wing concerning for metastatic disease given the presence of emphysema and a mediastinal soft tissue mass on the CT chest from 02/12/2015. He saw Dr. Valle after that on 03/05/2015 and underwent an IR guided biopsy of the bone lesion on 03/17/2015. 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    His EGFR/EML/ALK is negative.    His PET scan on 3/25/15 revealed Left upper lobe lung lesion with an adjacent para-aortic lymph node, right anterior rib metastasis, right iliac metastasis, and pancreatic metastasis. A pancreatic cancer primary neoplasm is less likely.  MRI brain was negative for mets.  He completed 6 cycles of Carboplatin and Alimta and was on maintenance Alimta until end of March 2016.  His bone scan from 3/16 revealed stable disease. His CT scans also from end of March 2016 revealed "Interval enlargement of left upper lobe lung mass measuring 5.9 x 3.9 cm (previously 3.3 x 2.5 cm). This mass appears to invade the mediastinum and abutting the aortic arch and left subclavian artery"     He started Opdivo since March 2016 and his last " Opdivo was on 8/27/19       HPI He comes in to review CT scans and bone scans from 3/12/2020 which reveal no evidence of recurrence  He notes nose bleeding which started today while he was on his way to see us. He denies any other complaints at this time.    Review of Systems   Constitutional: Negative for appetite change, fatigue and unexpected weight change.   HENT: Negative for mouth sores.    Eyes: Negative for visual disturbance.   Respiratory: Negative for cough and shortness of breath.    Cardiovascular: Negative for chest pain.   Gastrointestinal: Negative for abdominal pain and diarrhea.   Genitourinary: Negative for frequency.   Musculoskeletal: Negative for back pain.   Skin: Negative for rash.   Neurological: Negative for headaches.   Hematological: Negative for adenopathy.   Psychiatric/Behavioral: The patient is not nervous/anxious.    All other systems reviewed and are negative.      PMFSH: all information reviewed and updated as relevant to today's visit  Objective:      Physical Exam   Constitutional: He is oriented to person, place, and time. He appears well-developed and well-nourished.   HENT:   Mouth/Throat: No oropharyngeal exudate.   Cardiovascular: Normal rate and normal heart sounds.   Pulmonary/Chest: Effort normal and breath sounds normal. He has no wheezes.   Abdominal: Soft. Bowel sounds are normal. There is no tenderness.   Musculoskeletal: He exhibits no edema or tenderness.   Lymphadenopathy:     He has no cervical adenopathy.   Neurological: He is alert and oriented to person, place, and time. Coordination normal.   Skin: Skin is warm and dry. No rash noted.   Psychiatric: He has a normal mood and affect. Judgment and thought content normal.   Vitals reviewed.        LABS:  WBC   Date Value Ref Range Status   03/17/2020 9.17 3.90 - 12.70 K/uL Final     Hemoglobin   Date Value Ref Range Status   03/17/2020 12.4 (L) 14.0 - 18.0 g/dL Final     POC Hematocrit   Date Value Ref Range  Status   06/12/2019 40 36 - 54 %PCV Final     Hematocrit   Date Value Ref Range Status   03/17/2020 40.3 40.0 - 54.0 % Final     Platelets   Date Value Ref Range Status   03/17/2020 321 150 - 350 K/uL Final     Gran # (ANC)   Date Value Ref Range Status   03/17/2020 5.6 1.8 - 7.7 K/uL Final     Comment:     The ANC is based on a white cell differential from an   automated cell counter. It has not been microscopically   reviewed for the presence of abnormal cells. Clinical   correlation is required.         Chemistry        Component Value Date/Time     03/17/2020 0910    K 4.0 03/17/2020 0910     03/17/2020 0910    CO2 23 03/17/2020 0910    BUN 16 03/17/2020 0910    CREATININE 1.5 (H) 03/17/2020 0910     03/17/2020 0910        Component Value Date/Time    CALCIUM 9.7 03/17/2020 0910    ALKPHOS 100 03/17/2020 0910    AST 17 03/17/2020 0910    ALT 16 03/17/2020 0910    BILITOT 0.1 03/17/2020 0910    ESTGFRAFRICA >60 03/17/2020 0910    EGFRNONAA 55 (A) 03/17/2020 0910        TSH   Date Value Ref Range Status   03/17/2020 0.157 (L) 0.400 - 4.000 uIU/mL Final     Free T4   Date Value Ref Range Status   03/17/2020 1.05 0.71 - 1.51 ng/dL Final       Assessment:       1. Malignant neoplasm of upper lobe of right lung    2. Secondary malignant neoplasm of bone    3. Epistaxis    4. Hyperthyroidism        Plan:        1,2. He is doing well with no evidence of recurrence,. He is off of all treatments for his cancer. He will return in 3 months with labs and CT scans  3. Advised him to stop Xarleto permanently, Unable to stop nose bleeds with pressure, referred him to [facility] for ENT consult. Called and notified  [facility]   4. Decrease Synthroid to 100 mcg, sent script to [facility]. Called wife and told her as patient did not answer his phone    Above care plan was discussed with patient and all questions were addressed to his satisfaction

## 2020-06-22 ENCOUNTER — HOSPITAL ENCOUNTER (OUTPATIENT)
Dept: RADIOLOGY | Facility: HOSPITAL | Age: 45
Discharge: HOME | End: 2020-06-22
Attending: INTERNAL MEDICINE
Payer: MEDICARE

## 2020-06-22 DIAGNOSIS — C34.11 MALIGNANT NEOPLASM OF UPPER LOBE OF RIGHT LUNG: ICD-10-CM

## 2020-06-22 PROCEDURE — 74160 CT ABDOMEN W/CONTRAST: CPT | Mod: TC

## 2020-06-22 PROCEDURE — 74160 CT CHEST ABDOMEN WITH CONTRAST (XPD): ICD-10-PCS | Mod: 26,,, | Performed by: RADIOLOGY

## 2020-06-22 PROCEDURE — 78306 BONE IMAGING WHOLE BODY: CPT | Mod: 26,,, | Performed by: RADIOLOGY

## 2020-06-22 PROCEDURE — 71260 CT THORAX DX C+: CPT | Mod: 26,,, | Performed by: RADIOLOGY

## 2020-06-22 PROCEDURE — 74160 CT ABDOMEN W/CONTRAST: CPT | Mod: 26,,, | Performed by: RADIOLOGY

## 2020-06-22 PROCEDURE — 25500020 PHARM REV CODE 255: Performed by: INTERNAL MEDICINE

## 2020-06-22 PROCEDURE — 71260 CT CHEST ABDOMEN WITH CONTRAST (XPD): ICD-10-PCS | Mod: 26,,, | Performed by: RADIOLOGY

## 2020-06-22 PROCEDURE — A9503 TC99M MEDRONATE: HCPCS

## 2020-06-22 PROCEDURE — 78306 NM BONE SCAN WHOLE BODY: ICD-10-PCS | Mod: 26,,, | Performed by: RADIOLOGY

## 2020-06-22 PROCEDURE — 71260 CT THORAX DX C+: CPT | Mod: TC

## 2020-06-22 RX ADMIN — IOHEXOL 75 ML: 350 INJECTION, SOLUTION INTRAVENOUS at 12:06

## 2020-06-22 RX ADMIN — IOHEXOL 15 ML: 300 INJECTION, SOLUTION INTRAVENOUS at 12:06

## 2020-06-23 ENCOUNTER — OFFICE VISIT (OUTPATIENT)
Dept: HEMATOLOGY/ONCOLOGY | Facility: CLINIC | Age: 45
End: 2020-06-23
Payer: MEDICARE

## 2020-06-23 VITALS
HEIGHT: 65 IN | DIASTOLIC BLOOD PRESSURE: 81 MMHG | SYSTOLIC BLOOD PRESSURE: 117 MMHG | WEIGHT: 166.25 LBS | HEART RATE: 79 BPM | BODY MASS INDEX: 27.7 KG/M2 | TEMPERATURE: 99 F

## 2020-06-23 DIAGNOSIS — E03.2 HYPOTHYROIDISM DUE TO MEDICATION: ICD-10-CM

## 2020-06-23 DIAGNOSIS — C34.91 PRIMARY MALIGNANT NEOPLASM OF RIGHT LUNG METASTATIC TO OTHER SITE: Primary | ICD-10-CM

## 2020-06-23 PROCEDURE — 99999 PR PBB SHADOW E&M-EST. PATIENT-LVL III: CPT | Mod: PBBFAC,,, | Performed by: INTERNAL MEDICINE

## 2020-06-23 PROCEDURE — 99213 OFFICE O/P EST LOW 20 MIN: CPT | Mod: PBBFAC | Performed by: INTERNAL MEDICINE

## 2020-06-23 PROCEDURE — 99999 PR PBB SHADOW E&M-EST. PATIENT-LVL III: ICD-10-PCS | Mod: PBBFAC,,, | Performed by: INTERNAL MEDICINE

## 2020-06-23 PROCEDURE — 99214 PR OFFICE/OUTPT VISIT, EST, LEVL IV, 30-39 MIN: ICD-10-PCS | Mod: S$PBB,,, | Performed by: INTERNAL MEDICINE

## 2020-06-23 PROCEDURE — 99214 OFFICE O/P EST MOD 30 MIN: CPT | Mod: S$PBB,,, | Performed by: INTERNAL MEDICINE

## 2020-06-23 RX ORDER — LEVOTHYROXINE SODIUM 100 UG/1
100 TABLET ORAL DAILY
Qty: 30 TABLET | Refills: 11 | Status: SHIPPED | OUTPATIENT
Start: 2020-06-23 | End: 2021-07-12 | Stop reason: SDUPTHER

## 2020-06-23 NOTE — PROGRESS NOTES
"Subjective:       Patient ID: Yao Alan is a 45 y.o. male.    Chief Complaint: Lung Cancer  Oncologic History:  Mr. Yao Alan is a 45-year-old male who has a long history of smoking and was seen in the Pulmonary Clinic by Dr. Valle on 03/05/2015 with abnormal CT scan.    Apparently, he went to the [facility] in February with coughing as well as chest pain. A chest x-ray was done, which revealed a left upper lobe lung mass. Followup CT scan was done on 02/12/2015 and that revealed posterior superior mediastinal mass adjacent and prominent enlarged upper left mediastinal lymph nodes, abutting the aortic arch as well as left subclavian artery. A  CT scan of the abdomen was done on 03/03/2015 without contrast and that revealed nonobstructive nephrolithiasis, but a 2.1 cm lytic lesion involving the left iliac wing concerning for metastatic disease given the presence of emphysema and a mediastinal soft tissue mass on the CT chest from 02/12/2015. He saw Dr. Valle after that on 03/05/2015 and underwent an IR guided biopsy of the bone lesion on 03/17/2015. 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    His EGFR/EML/ALK is negative.    His PET scan on 3/25/15 revealed Left upper lobe lung lesion with an adjacent para-aortic lymph node, right anterior rib metastasis, right iliac metastasis, and pancreatic metastasis. A pancreatic cancer primary neoplasm is less likely.  MRI brain was negative for mets.  He completed 6 cycles of Carboplatin and Alimta and was on maintenance Alimta until end of March 2016.  His bone scan from 3/16 revealed stable disease. His CT scans also from end of March 2016 revealed "Interval enlargement of left upper lobe lung mass measuring 5.9 x 3.9 cm (previously 3.3 x 2.5 cm). This mass appears to invade the mediastinum and abutting the aortic arch and left subclavian artery"     He started Opdivo since March 2016 and his last Opdivo was on " 8/27/19          HPI He comes in to review CT scans and bone scan which reveal no evidence of recurrenc He is doing well clinically.    Review of Systems   Constitutional: Negative for appetite change, fatigue and unexpected weight change.   HENT: Negative for mouth sores.    Eyes: Negative for visual disturbance.   Respiratory: Negative for cough and shortness of breath.    Cardiovascular: Negative for chest pain.   Gastrointestinal: Negative for abdominal pain and diarrhea.   Genitourinary: Negative for frequency.   Musculoskeletal: Negative for back pain.   Integumentary:  Negative for rash.   Neurological: Negative for headaches.   Hematological: Negative for adenopathy.   Psychiatric/Behavioral: The patient is not nervous/anxious.    All other systems reviewed and are negative.        Objective:      Physical Exam  Vitals signs reviewed.   Constitutional:       Appearance: He is well-developed.   HENT:      Mouth/Throat:      Pharynx: No oropharyngeal exudate.   Cardiovascular:      Rate and Rhythm: Normal rate.      Heart sounds: Normal heart sounds.   Pulmonary:      Effort: Pulmonary effort is normal.      Breath sounds: Normal breath sounds. No wheezing.   Abdominal:      General: Bowel sounds are normal.      Palpations: Abdomen is soft.      Tenderness: There is no abdominal tenderness.   Musculoskeletal:         General: No tenderness.   Lymphadenopathy:      Cervical: No cervical adenopathy.   Skin:     General: Skin is warm and dry.      Findings: No rash.   Neurological:      Mental Status: He is alert and oriented to person, place, and time.      Coordination: Coordination normal.   Psychiatric:         Thought Content: Thought content normal.         Judgment: Judgment normal.           LABS:  WBC   Date Value Ref Range Status   06/22/2020 6.87 3.90 - 12.70 K/uL Final     Hemoglobin   Date Value Ref Range Status   06/22/2020 13.3 (L) 14.0 - 18.0 g/dL Final     POC Hematocrit   Date Value Ref Range  Status   06/12/2019 40 36 - 54 %PCV Final     Hematocrit   Date Value Ref Range Status   06/22/2020 42.2 40.0 - 54.0 % Final     Platelets   Date Value Ref Range Status   06/22/2020 246 150 - 350 K/uL Final     Gran # (ANC)   Date Value Ref Range Status   06/22/2020 3.7 1.8 - 7.7 K/uL Final     Comment:     The ANC is based on a white cell differential from an   automated cell counter. It has not been microscopically   reviewed for the presence of abnormal cells. Clinical   correlation is required.         Chemistry        Component Value Date/Time     06/22/2020 0846    K 4.0 06/22/2020 0846     06/22/2020 0846    CO2 24 06/22/2020 0846    BUN 21 (H) 06/22/2020 0846    CREATININE 1.4 06/22/2020 0846     06/22/2020 0846        Component Value Date/Time    CALCIUM 9.2 06/22/2020 0846    ALKPHOS 97 06/22/2020 0846    AST 26 06/22/2020 0846    ALT 24 06/22/2020 0846    BILITOT 0.3 06/22/2020 0846    ESTGFRAFRICA >60 06/22/2020 0846    EGFRNONAA >60 06/22/2020 0846        TSH   Date Value Ref Range Status   06/22/2020 1.265 0.400 - 4.000 uIU/mL Final     Free T4   Date Value Ref Range Status   06/22/2020 0.86 0.71 - 1.51 ng/dL Final   '    Assessment:       1. Primary malignant neoplasm of right lung metastatic to other site    2. Hypothyroidism due to medication        Plan:        1. He is doing well with no evidence of recurrence and will return in 3 months with labs and CT scans  2. Stable on meds, refills sent    Above care plan was discussed with patient and all questions were addressed to his satisfaction

## 2020-06-23 NOTE — Clinical Note
Schedule CBC,CMP, TSH, free T4, CT chest, abdomen/pelvis and see me in 3 months on [facility]

## 2020-07-21 ENCOUNTER — PES CALL (OUTPATIENT)
Dept: ADMINISTRATIVE | Facility: CLINIC | Age: 45
End: 2020-07-21

## 2020-07-23 ENCOUNTER — TELEPHONE (OUTPATIENT)
Dept: FAMILY MEDICINE | Facility: CLINIC | Age: 45
End: 2020-07-23

## 2020-07-23 ENCOUNTER — OFFICE VISIT (OUTPATIENT)
Dept: FAMILY MEDICINE | Facility: CLINIC | Age: 45
End: 2020-07-23
Payer: MEDICARE

## 2020-07-23 VITALS
SYSTOLIC BLOOD PRESSURE: 90 MMHG | OXYGEN SATURATION: 97 % | HEIGHT: 65 IN | DIASTOLIC BLOOD PRESSURE: 62 MMHG | BODY MASS INDEX: 27.88 KG/M2 | TEMPERATURE: 99 F | RESPIRATION RATE: 16 BRPM | HEART RATE: 66 BPM | WEIGHT: 167.31 LBS

## 2020-07-23 DIAGNOSIS — I10 ESSENTIAL HYPERTENSION: ICD-10-CM

## 2020-07-23 DIAGNOSIS — Z00.00 ENCOUNTER FOR PREVENTIVE HEALTH EXAMINATION: Primary | ICD-10-CM

## 2020-07-23 DIAGNOSIS — C79.51 SECONDARY MALIGNANT NEOPLASM OF BONE: ICD-10-CM

## 2020-07-23 DIAGNOSIS — C34.11 MALIGNANT NEOPLASM OF UPPER LOBE OF RIGHT LUNG: ICD-10-CM

## 2020-07-23 DIAGNOSIS — J43.9 PULMONARY EMPHYSEMA, UNSPECIFIED EMPHYSEMA TYPE: ICD-10-CM

## 2020-07-23 DIAGNOSIS — C34.91 PRIMARY MALIGNANT NEOPLASM OF RIGHT LUNG METASTATIC TO OTHER SITE: ICD-10-CM

## 2020-07-23 PROBLEM — R19.7 DIARRHEA: Status: RESOLVED | Noted: 2017-01-10 | Resolved: 2020-07-23

## 2020-07-23 PROBLEM — Z79.01 CURRENT USE OF LONG TERM ANTICOAGULATION: Status: RESOLVED | Noted: 2018-01-09 | Resolved: 2020-07-23

## 2020-07-23 PROBLEM — N18.30 STAGE 3 CHRONIC KIDNEY DISEASE: Status: RESOLVED | Noted: 2019-07-26 | Resolved: 2020-07-23
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
## 2020-07-23 PROBLEM — Z12.11 COLON CANCER SCREENING: Status: RESOLVED | Noted: 2019-03-18 | Resolved: 2020-07-23

## 2020-07-23 PROBLEM — E05.90 HYPERTHYROIDISM: Status: RESOLVED | Noted: 2018-09-24 | Resolved: 2020-07-23

## 2020-07-23 PROBLEM — R51.9 HEADACHE: Status: RESOLVED | Noted: 2017-04-04 | Resolved: 2020-07-23

## 2020-07-23 PROCEDURE — 99214 OFFICE O/P EST MOD 30 MIN: CPT | Mod: PBBFAC,PO | Performed by: PHYSICIAN ASSISTANT

## 2020-07-23 PROCEDURE — 99999 PR PBB SHADOW E&M-EST. PATIENT-LVL IV: CPT | Mod: PBBFAC,,, | Performed by: PHYSICIAN ASSISTANT

## 2020-07-23 PROCEDURE — G0439 PPPS, SUBSEQ VISIT: HCPCS | Mod: S$GLB,,, | Performed by: PHYSICIAN ASSISTANT

## 2020-07-23 PROCEDURE — G0439 PR MEDICARE ANNUAL WELLNESS SUBSEQUENT VISIT: ICD-10-PCS | Mod: S$GLB,,, | Performed by: PHYSICIAN ASSISTANT

## 2020-07-23 PROCEDURE — 99999 PR PBB SHADOW E&M-EST. PATIENT-LVL IV: ICD-10-PCS | Mod: PBBFAC,,, | Performed by: PHYSICIAN ASSISTANT

## 2020-07-23 RX ORDER — AMLODIPINE BESYLATE 10 MG/1
10 TABLET ORAL
COMMUNITY
End: 2022-06-30

## 2020-07-23 NOTE — PATIENT INSTRUCTIONS
Counseling and Referral of Other Preventative  (Italic type indicates deductible and co-insurance are waived)    Patient Name: Yao Alan  Today's Date: 7/23/2020    Health Maintenance       Date Due Completion Date    Hepatitis C Screening 1975 ---    TETANUS VACCINE 02/02/1993 ---    Pneumococcal Vaccine (Highest Risk) (2 of 3 - PPSV23) 03/13/2019 1/16/2019    Influenza Vaccine (1) 09/01/2020 1/16/2019    Lipid Panel 01/16/2024 1/16/2019        No orders of the defined types were placed in this encounter.    The following information is provided to all patients.  This information is to help you find resources for any of the problems found today that may be affecting your health:                Living healthy guide: www.[facility].[place].gov      Understanding Diabetes: www.diabetes.org      Eating healthy: www.cdc.gov/healthyweight      [facility] home safety checklist: www.cdc.gov/steadi/patient.html      Agency on Aging: www.goea.[place].gov      Alcoholics anonymous (AA): www.aa.org      Physical Activity: www.mele.nih.gov/dv9xmmc      Tobacco use: www.quitwithusla.org

## 2020-07-23 NOTE — PROGRESS NOTES
"  Yao Alan presented for a  Medicare AWV and comprehensive Health Risk Assessment today. The following components were reviewed and updated:    · Medical history  · Family History  · Social history  · Allergies and Current Medications  · Health Risk Assessment  · Health Maintenance  · Care Team     ** See Completed Assessments for Annual Wellness Visit within the encounter summary.**         The following assessments were completed:  · Living Situation  · CAGE  · Depression Screening  · Timed Get Up and Go  · Whisper Test  · Cognitive Function Screening  · Nutrition Screening  · ADL Screening  · PAQ Screening        Vitals:    07/23/20 0902   BP: 90/62   BP Location: Left arm   Patient Position: Sitting   BP Method: Small (Manual)   Pulse: 66   Resp: 16   Temp: 98.6 °F (37 °C)   TempSrc: Oral   SpO2: 97%   Weight: 75.9 kg (167 lb 5.3 oz)   Height: 5' 5" (1.651 m)     Body mass index is 27.85 kg/m².  Physical Exam          Diagnoses and health risks identified today and associated recommendations/orders:    1. Encounter for preventive health examination  Provided Yao with a 5-10 year written screening schedule and personal prevention plan. Recommendations were developed using the USPSTF age appropriate recommendations. Education, counseling, and referrals were provided as needed. After Visit Summary printed and given to patient which includes a list of additional screenings\tests needed    2. Primary malignant neoplasm of right lung metastatic to other site  Followed by heme/onc    3. Secondary malignant neoplasm of bone  Followed by heme/onc    4. Malignant neoplasm of upper lobe of right lung  Followed by heme/onc    5. Pulmonary emphysema, unspecified emphysema type  Stable continue meds    6. Essential hypertension  Stable on meds        No follow-ups on file.    hSanell Carbone PA-C    "

## 2020-08-14 DIAGNOSIS — Z11.59 NEED FOR HEPATITIS C SCREENING TEST: ICD-10-CM

## 2020-09-22 ENCOUNTER — HOSPITAL ENCOUNTER (OUTPATIENT)
Dept: RADIOLOGY | Facility: HOSPITAL | Age: 45
Discharge: HOME | End: 2020-09-22
Attending: INTERNAL MEDICINE
Payer: MEDICARE

## 2020-09-22 ENCOUNTER — OFFICE VISIT (OUTPATIENT)
Dept: HEMATOLOGY/ONCOLOGY | Facility: CLINIC | Age: 45
End: 2020-09-22
Payer: MEDICARE

## 2020-09-22 VITALS
OXYGEN SATURATION: 99 % | HEART RATE: 67 BPM | WEIGHT: 147.5 LBS | SYSTOLIC BLOOD PRESSURE: 137 MMHG | BODY MASS INDEX: 23.7 KG/M2 | TEMPERATURE: 98 F | HEIGHT: 66 IN | DIASTOLIC BLOOD PRESSURE: 89 MMHG

## 2020-09-22 DIAGNOSIS — C34.91 PRIMARY MALIGNANT NEOPLASM OF RIGHT LUNG METASTATIC TO OTHER SITE: ICD-10-CM

## 2020-09-22 DIAGNOSIS — C34.11 MALIGNANT NEOPLASM OF UPPER LOBE OF RIGHT LUNG: Primary | ICD-10-CM

## 2020-09-22 PROCEDURE — 71260 CT CHEST ABDOMEN PELVIS WITH CONTRAST (XPD): ICD-10-PCS | Mod: 26,,, | Performed by: RADIOLOGY

## 2020-09-22 PROCEDURE — 74177 CT ABD & PELVIS W/CONTRAST: CPT | Mod: 26,,, | Performed by: RADIOLOGY

## 2020-09-22 PROCEDURE — 25500020 PHARM REV CODE 255: Performed by: INTERNAL MEDICINE

## 2020-09-22 PROCEDURE — 99999 PR PBB SHADOW E&M-EST. PATIENT-LVL III: CPT | Mod: PBBFAC,,, | Performed by: INTERNAL MEDICINE

## 2020-09-22 PROCEDURE — 74177 CT CHEST ABDOMEN PELVIS WITH CONTRAST (XPD): ICD-10-PCS | Mod: 26,,, | Performed by: RADIOLOGY

## 2020-09-22 PROCEDURE — 99214 PR OFFICE/OUTPT VISIT, EST, LEVL IV, 30-39 MIN: ICD-10-PCS | Mod: S$PBB,,, | Performed by: INTERNAL MEDICINE

## 2020-09-22 PROCEDURE — 99999 PR PBB SHADOW E&M-EST. PATIENT-LVL III: ICD-10-PCS | Mod: PBBFAC,,, | Performed by: INTERNAL MEDICINE

## 2020-09-22 PROCEDURE — 74177 CT ABD & PELVIS W/CONTRAST: CPT | Mod: TC

## 2020-09-22 PROCEDURE — 71260 CT THORAX DX C+: CPT | Mod: 26,,, | Performed by: RADIOLOGY

## 2020-09-22 PROCEDURE — 99214 OFFICE O/P EST MOD 30 MIN: CPT | Mod: S$PBB,,, | Performed by: INTERNAL MEDICINE

## 2020-09-22 PROCEDURE — 99213 OFFICE O/P EST LOW 20 MIN: CPT | Mod: PBBFAC,25 | Performed by: INTERNAL MEDICINE

## 2020-09-22 RX ADMIN — IOHEXOL 75 ML: 350 INJECTION, SOLUTION INTRAVENOUS at 02:09

## 2020-09-22 RX ADMIN — IOHEXOL 15 ML: 300 INJECTION, SOLUTION INTRAVENOUS at 02:09

## 2020-09-22 NOTE — PROGRESS NOTES
"Subjective:       Patient ID: Yao Alan is a 45 y.o. male.    Chief Complaint: Follow-up and Lung Cancer  Oncologic History:  Mr. Yao Alan is a 45-year-old male who has a long history of smoking and was seen in the Pulmonary Clinic by Dr. Valle on 03/05/2015 with abnormal CT scan.    Apparently, he went to the [facility] in February with coughing as well as chest pain. A chest x-ray was done, which revealed a left upper lobe lung mass. Followup CT scan was done on 02/12/2015 and that revealed posterior superior mediastinal mass adjacent and prominent enlarged upper left mediastinal lymph nodes, abutting the aortic arch as well as left subclavian artery. A  CT scan of the abdomen was done on 03/03/2015 without contrast and that revealed nonobstructive nephrolithiasis, but a 2.1 cm lytic lesion involving the left iliac wing concerning for metastatic disease given the presence of emphysema and a mediastinal soft tissue mass on the CT chest from 02/12/2015. He saw Dr. Valle after that on 03/05/2015 and underwent an IR guided biopsy of the bone lesion on 03/17/2015. 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    His EGFR/EML/ALK is negative.    His PET scan on 3/25/15 revealed Left upper lobe lung lesion with an adjacent para-aortic lymph node, right anterior rib metastasis, right iliac metastasis, and pancreatic metastasis. A pancreatic cancer primary neoplasm is less likely.  MRI brain was negative for mets.  He completed 6 cycles of Carboplatin and Alimta and was on maintenance Alimta until end of March 2016.  His bone scan from 3/16 revealed stable disease. His CT scans also from end of March 2016 revealed "Interval enlargement of left upper lobe lung mass measuring 5.9 x 3.9 cm (previously 3.3 x 2.5 cm). This mass appears to invade the mediastinum and abutting the aortic arch and left subclavian artery"     He started Opdivo since March 2016 and his last Opdivo was on " "8/27/19             HPI He comes in to review CT scans which reveals "No evidence for local tumor recurrence or metastatic disease in this patient with history of right upper lobe lung cancer. No detrimental interval change noted when compared to the prior examination"  He feels well and denies any new issues. ECOG PS is zero.    Review of Systems   Constitutional: Negative for appetite change, fatigue and unexpected weight change.   HENT: Negative for mouth sores.    Eyes: Negative for visual disturbance.   Respiratory: Negative for cough and shortness of breath.    Cardiovascular: Negative for chest pain.   Gastrointestinal: Negative for abdominal pain and diarrhea.   Genitourinary: Negative for frequency.   Musculoskeletal: Negative for back pain.   Integumentary:  Negative for rash.   Neurological: Negative for headaches.   Hematological: Negative for adenopathy.   Psychiatric/Behavioral: The patient is not nervous/anxious.    All other systems reviewed and are negative.        Objective:      Physical Exam  Vitals signs reviewed.   Constitutional:       Appearance: He is well-developed.   HENT:      Mouth/Throat:      Pharynx: No oropharyngeal exudate.   Cardiovascular:      Rate and Rhythm: Normal rate.      Heart sounds: Normal heart sounds.   Pulmonary:      Effort: Pulmonary effort is normal.      Breath sounds: Normal breath sounds. No wheezing.   Abdominal:      General: Bowel sounds are normal.      Palpations: Abdomen is soft.      Tenderness: There is no abdominal tenderness.   Musculoskeletal:         General: No tenderness.   Lymphadenopathy:      Cervical: No cervical adenopathy.   Skin:     General: Skin is warm and dry.      Findings: No rash.   Neurological:      Mental Status: He is alert and oriented to person, place, and time.      Coordination: Coordination normal.   Psychiatric:         Thought Content: Thought content normal.         Judgment: Judgment normal.           LABS:  WBC   Date " Value Ref Range Status   09/22/2020 7.93 3.90 - 12.70 K/uL Final     Hemoglobin   Date Value Ref Range Status   09/22/2020 13.6 (L) 14.0 - 18.0 g/dL Final     POC Hematocrit   Date Value Ref Range Status   06/12/2019 40 36 - 54 %PCV Final     Hematocrit   Date Value Ref Range Status   09/22/2020 43.3 40.0 - 54.0 % Final     Platelets   Date Value Ref Range Status   09/22/2020 264 150 - 350 K/uL Final     Gran # (ANC)   Date Value Ref Range Status   09/22/2020 3.6 1.8 - 7.7 K/uL Final     Comment:     The ANC is based on a white cell differential from an   automated cell counter. It has not been microscopically   reviewed for the presence of abnormal cells. Clinical   correlation is required.         Chemistry        Component Value Date/Time     09/22/2020 1217    K 4.0 09/22/2020 1217     09/22/2020 1217    CO2 27 09/22/2020 1217    BUN 21 (H) 09/22/2020 1217    CREATININE 1.4 09/22/2020 1217    GLU 94 09/22/2020 1217        Component Value Date/Time    CALCIUM 9.4 09/22/2020 1217    ALKPHOS 105 09/22/2020 1217    AST 18 09/22/2020 1217    ALT 12 09/22/2020 1217    BILITOT 0.4 09/22/2020 1217    ESTGFRAFRICA >60 09/22/2020 1217    EGFRNONAA >60 09/22/2020 1217          Assessment:       1. Malignant neoplasm of upper lobe of right lung        Plan:        1. He is doing well clinically with no evidence of recurrence off of treatment,. Will continue to monitor off treatment. She will return in 3 months with CT chest and bone scan    Above care plan was discussed with patient and all questions were addressed to his satisfaction

## 2020-12-01 ENCOUNTER — DOCUMENTATION ONLY (OUTPATIENT)
Dept: HEMATOLOGY/ONCOLOGY | Facility: CLINIC | Age: 45
End: 2020-12-01

## 2020-12-01 NOTE — PROGRESS NOTES
Received call from patient, who needed to verify when his next appointments are; he thinks they are on 12/8, but he misplaced the appointment slips and isn't able to access his [facility] account today from his phone. Checked in Patient Station and reviewed with patient the times listed  for his next appointments on 12/18, and he wrote down the information. Patient stated appreciation for assistance given, and he reported no other needs/concerns at this time.

## 2020-12-18 ENCOUNTER — HOSPITAL ENCOUNTER (OUTPATIENT)
Dept: RADIOLOGY | Facility: HOSPITAL | Age: 45
Discharge: HOME | End: 2020-12-18
Attending: INTERNAL MEDICINE
Payer: MEDICARE

## 2020-12-18 DIAGNOSIS — C34.11 MALIGNANT NEOPLASM OF UPPER LOBE OF RIGHT LUNG: ICD-10-CM

## 2020-12-18 PROCEDURE — 71260 CT CHEST WITH CONTRAST: ICD-10-PCS | Mod: 26,,, | Performed by: RADIOLOGY

## 2020-12-18 PROCEDURE — 71260 CT THORAX DX C+: CPT | Mod: TC

## 2020-12-18 PROCEDURE — 78306 NM BONE SCAN WHOLE BODY: ICD-10-PCS | Mod: 26,,, | Performed by: RADIOLOGY

## 2020-12-18 PROCEDURE — 71260 CT THORAX DX C+: CPT | Mod: 26,,, | Performed by: RADIOLOGY

## 2020-12-18 PROCEDURE — 78306 BONE IMAGING WHOLE BODY: CPT | Mod: 26,,, | Performed by: RADIOLOGY

## 2020-12-18 PROCEDURE — A9503 TC99M MEDRONATE: HCPCS

## 2020-12-18 PROCEDURE — 25500020 PHARM REV CODE 255: Performed by: INTERNAL MEDICINE

## 2020-12-18 RX ADMIN — IOHEXOL 75 ML: 350 INJECTION, SOLUTION INTRAVENOUS at 09:12

## 2020-12-22 ENCOUNTER — OFFICE VISIT (OUTPATIENT)
Dept: HEMATOLOGY/ONCOLOGY | Facility: CLINIC | Age: 45
End: 2020-12-22
Payer: MEDICARE

## 2020-12-22 VITALS
DIASTOLIC BLOOD PRESSURE: 82 MMHG | HEART RATE: 88 BPM | SYSTOLIC BLOOD PRESSURE: 120 MMHG | OXYGEN SATURATION: 96 % | BODY MASS INDEX: 27.35 KG/M2 | TEMPERATURE: 99 F | HEIGHT: 66 IN | WEIGHT: 170.19 LBS

## 2020-12-22 DIAGNOSIS — C34.11 MALIGNANT NEOPLASM OF UPPER LOBE OF RIGHT LUNG: Primary | ICD-10-CM

## 2020-12-22 PROCEDURE — 99999 PR PBB SHADOW E&M-EST. PATIENT-LVL III: CPT | Mod: PBBFAC,,, | Performed by: INTERNAL MEDICINE

## 2020-12-22 PROCEDURE — 99999 PR PBB SHADOW E&M-EST. PATIENT-LVL III: ICD-10-PCS | Mod: PBBFAC,,, | Performed by: INTERNAL MEDICINE

## 2020-12-22 PROCEDURE — 99213 OFFICE O/P EST LOW 20 MIN: CPT | Mod: S$PBB,,, | Performed by: INTERNAL MEDICINE

## 2020-12-22 PROCEDURE — 99213 PR OFFICE/OUTPT VISIT, EST, LEVL III, 20-29 MIN: ICD-10-PCS | Mod: S$PBB,,, | Performed by: INTERNAL MEDICINE

## 2020-12-22 PROCEDURE — 99213 OFFICE O/P EST LOW 20 MIN: CPT | Mod: PBBFAC | Performed by: INTERNAL MEDICINE

## 2020-12-22 NOTE — PROGRESS NOTES
"Subjective:       Patient ID: Yao Alan is a 45 y.o. male.    Chief Complaint: Lung Cancer  Oncologic History:  Mr. Yao Alan is a 45-year-old male who has a long history of smoking and was seen in the Pulmonary Clinic by Dr. Valle on 03/05/2015 with abnormal CT scan.    Apparently, he went to the [facility] in February with coughing as well as chest pain. A chest x-ray was done, which revealed a left upper lobe lung mass. Followup CT scan was done on 02/12/2015 and that revealed posterior superior mediastinal mass adjacent and prominent enlarged upper left mediastinal lymph nodes, abutting the aortic arch as well as left subclavian artery. A  CT scan of the abdomen was done on 03/03/2015 without contrast and that revealed nonobstructive nephrolithiasis, but a 2.1 cm lytic lesion involving the left iliac wing concerning for metastatic disease given the presence of emphysema and a mediastinal soft tissue mass on the CT chest from 02/12/2015. He saw Dr. Valle after that on 03/05/2015 and underwent an IR guided biopsy of the bone lesion on 03/17/2015. 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    His EGFR/EML/ALK is negative.    His PET scan on 3/25/15 revealed Left upper lobe lung lesion with an adjacent para-aortic lymph node, right anterior rib metastasis, right iliac metastasis, and pancreatic metastasis. A pancreatic cancer primary neoplasm is less likely.  MRI brain was negative for mets.  He completed 6 cycles of Carboplatin and Alimta and was on maintenance Alimta until end of March 2016.  His bone scan from 3/16 revealed stable disease. His CT scans also from end of March 2016 revealed "Interval enlargement of left upper lobe lung mass measuring 5.9 x 3.9 cm (previously 3.3 x 2.5 cm). This mass appears to invade the mediastinum and abutting the aortic arch and left subclavian artery"     He started Opdivo since March 2016 and his last Opdivo was on " 8/27/19                HPI He comes in to review CT scans and bone scans from 12/18/2020 which reveal no evidence of recurrence    Review of Systems   Constitutional: Negative for appetite change, fatigue and unexpected weight change.   HENT: Negative for mouth sores.    Eyes: Negative for visual disturbance.   Respiratory: Negative for cough and shortness of breath.    Cardiovascular: Negative for chest pain.   Gastrointestinal: Negative for abdominal pain and diarrhea.   Genitourinary: Negative for frequency.   Musculoskeletal: Negative for back pain.   Integumentary:  Negative for rash.   Neurological: Negative for headaches.   Hematological: Negative for adenopathy.   Psychiatric/Behavioral: The patient is not nervous/anxious.    All other systems reviewed and are negative.        Objective:      Physical Exam  Vitals signs reviewed.   Constitutional:       Appearance: He is well-developed.   HENT:      Mouth/Throat:      Pharynx: No oropharyngeal exudate.   Cardiovascular:      Rate and Rhythm: Normal rate.      Heart sounds: Normal heart sounds.   Pulmonary:      Effort: Pulmonary effort is normal.      Breath sounds: Normal breath sounds. No wheezing.   Abdominal:      General: Bowel sounds are normal.      Palpations: Abdomen is soft.      Tenderness: There is no abdominal tenderness.   Musculoskeletal:         General: No tenderness.   Lymphadenopathy:      Cervical: No cervical adenopathy.   Skin:     General: Skin is warm and dry.      Findings: No rash.   Neurological:      Mental Status: He is alert and oriented to person, place, and time.      Coordination: Coordination normal.   Psychiatric:         Thought Content: Thought content normal.         Judgment: Judgment normal.           LABS:  WBC   Date Value Ref Range Status   12/18/2020 8.53 3.90 - 12.70 K/uL Final     Hemoglobin   Date Value Ref Range Status   12/18/2020 14.1 14.0 - 18.0 g/dL Final     POC Hematocrit   Date Value Ref Range Status    06/12/2019 40 36 - 54 %PCV Final     Hematocrit   Date Value Ref Range Status   12/18/2020 44.1 40.0 - 54.0 % Final     Platelets   Date Value Ref Range Status   12/18/2020 274 150 - 350 K/uL Final     Gran # (ANC)   Date Value Ref Range Status   12/18/2020 4.3 1.8 - 7.7 K/uL Final     Comment:     The ANC is based on a white cell differential from an   automated cell counter. It has not been microscopically   reviewed for the presence of abnormal cells. Clinical   correlation is required.         Chemistry        Component Value Date/Time     12/18/2020 0843    K 4.0 12/18/2020 0843     12/18/2020 0843    CO2 29 12/18/2020 0843    BUN 21 (H) 12/18/2020 0843    CREATININE 1.6 (H) 12/18/2020 0843    GLU 96 12/18/2020 0843        Component Value Date/Time    CALCIUM 9.3 12/18/2020 0843    ALKPHOS 107 12/18/2020 0843    AST 22 12/18/2020 0843    ALT 17 12/18/2020 0843    BILITOT 0.3 12/18/2020 0843    ESTGFRAFRICA 59 (A) 12/18/2020 0843    EGFRNONAA 51 (A) 12/18/2020 0843          Assessment:       1. Malignant neoplasm of upper lobe of right lung        Plan:        1. He is doing well with no evidence of recurrence year and half since stopping Opdivo, he will return in 6 months with labs and CT scans and bone scan    Above care plan was discussed with patient and all questions were addressed to his satisfaction

## 2020-12-22 NOTE — Clinical Note
Schedule CBC,CMP, CT chest, abdomen/pelvis and bone scan and see me in 6 months on [facility]

## 2021-01-01 NOTE — TELEPHONE ENCOUNTER
"Returned call to patient, who is calling to state he is still experiencing pruritic rash on legs and arms--patient saw dr cintron---completed oral steroids and topical steroid cream is not helping. Patient states, "the rash is actually worse." patient was diagnosed earlier this week with pityriasis rosea.    Patient advised to contact dr cintron's office, as he may have to be seen or started on something else.    Patient is also requesting refill on percocet and zarelto--to [facility].      Message routed to dr cintron's staff and snoal dejesus (please contact patient to discuss pityriasis rosea)  "
----- Message from Maryam Duong sent at 10/13/2017 12:25 PM CDT -----  Contact: Pt  Pt is calling to speak with nurse in regards to bumps on skin and medication refill.  Pt can be reached at [phone].    Thank you  
Could be Immune-mediated toxicity? Will refill Prednisone.   May need to Referral to derm for biopsy of lesion.   Reassess at follow up 10/17. May need to increase steroids to 1mg/kg if immune mediated.   PJ    
Spoke with patient --  Informed patient pj sent steroids to his pharmacy-  We will reassess on Tuesday at his clinic visit---  And if necessary---will make referral to derm.  Patient thanked nurse.    
colostrum

## 2021-03-11 ENCOUNTER — HOSPITAL ENCOUNTER (EMERGENCY)
Facility: HOSPITAL | Age: 46
Discharge: HOME | End: 2021-03-11
Attending: EMERGENCY MEDICINE
Payer: MEDICARE

## 2021-03-11 VITALS
RESPIRATION RATE: 20 BRPM | BODY MASS INDEX: 26.84 KG/M2 | OXYGEN SATURATION: 99 % | HEIGHT: 66 IN | WEIGHT: 167 LBS | HEART RATE: 75 BPM | SYSTOLIC BLOOD PRESSURE: 129 MMHG | TEMPERATURE: 98 F | DIASTOLIC BLOOD PRESSURE: 95 MMHG

## 2021-03-11 DIAGNOSIS — M62.830 BACK MUSCLE SPASM: Primary | ICD-10-CM

## 2021-03-11 PROCEDURE — 99284 EMERGENCY DEPT VISIT MOD MDM: CPT | Mod: ER

## 2021-03-11 RX ORDER — BACLOFEN 10 MG/1
10 TABLET ORAL 3 TIMES DAILY
Qty: 30 TABLET | Refills: 0 | Status: SHIPPED | OUTPATIENT
Start: 2021-03-11 | End: 2021-06-18

## 2021-03-11 RX ORDER — PREDNISONE 50 MG/1
50 TABLET ORAL DAILY
Qty: 5 TABLET | Refills: 0 | Status: SHIPPED | OUTPATIENT
Start: 2021-03-11 | End: 2021-03-16

## 2021-05-19 ENCOUNTER — HOSPITAL ENCOUNTER (EMERGENCY)
Facility: HOSPITAL | Age: 46
Discharge: HOME | End: 2021-05-19
Attending: EMERGENCY MEDICINE
Payer: MEDICARE

## 2021-05-19 VITALS
RESPIRATION RATE: 18 BRPM | SYSTOLIC BLOOD PRESSURE: 131 MMHG | TEMPERATURE: 99 F | DIASTOLIC BLOOD PRESSURE: 84 MMHG | BODY MASS INDEX: 27.44 KG/M2 | OXYGEN SATURATION: 98 % | WEIGHT: 170 LBS | HEART RATE: 82 BPM

## 2021-05-19 DIAGNOSIS — K64.9 HEMORRHOIDS, UNSPECIFIED HEMORRHOID TYPE: ICD-10-CM

## 2021-05-19 DIAGNOSIS — K62.89 RECTAL PAIN: Primary | ICD-10-CM

## 2021-05-19 PROCEDURE — 99282 EMERGENCY DEPT VISIT SF MDM: CPT | Mod: ER

## 2021-05-19 RX ORDER — HYDROCORTISONE ACETATE 25 MG/1
25 SUPPOSITORY RECTAL 2 TIMES DAILY
Qty: 20 SUPPOSITORY | Refills: 0 | Status: SHIPPED | OUTPATIENT
Start: 2021-05-19 | End: 2021-05-29

## 2021-05-25 ENCOUNTER — OFFICE VISIT (OUTPATIENT)
Dept: FAMILY MEDICINE | Facility: CLINIC | Age: 46
End: 2021-05-25
Payer: MEDICARE

## 2021-05-25 VITALS
WEIGHT: 169.31 LBS | HEART RATE: 94 BPM | SYSTOLIC BLOOD PRESSURE: 128 MMHG | DIASTOLIC BLOOD PRESSURE: 88 MMHG | TEMPERATURE: 98 F | RESPIRATION RATE: 16 BRPM | HEIGHT: 66 IN | BODY MASS INDEX: 27.21 KG/M2 | OXYGEN SATURATION: 95 %

## 2021-05-25 DIAGNOSIS — K64.4 EXTERNAL HEMORRHOIDS: Primary | ICD-10-CM

## 2021-05-25 PROCEDURE — 99999 PR PBB SHADOW E&M-EST. PATIENT-LVL III: ICD-10-PCS | Mod: PBBFAC,,, | Performed by: FAMILY MEDICINE

## 2021-05-25 PROCEDURE — 99213 OFFICE O/P EST LOW 20 MIN: CPT | Mod: PBBFAC,PO | Performed by: FAMILY MEDICINE

## 2021-05-25 PROCEDURE — 99213 OFFICE O/P EST LOW 20 MIN: CPT | Mod: S$PBB,,, | Performed by: FAMILY MEDICINE

## 2021-05-25 PROCEDURE — 99213 PR OFFICE/OUTPT VISIT, EST, LEVL III, 20-29 MIN: ICD-10-PCS | Mod: S$PBB,,, | Performed by: FAMILY MEDICINE

## 2021-05-25 PROCEDURE — 99999 PR PBB SHADOW E&M-EST. PATIENT-LVL III: CPT | Mod: PBBFAC,,, | Performed by: FAMILY MEDICINE

## 2021-05-25 RX ORDER — HYDROCORTISONE 25 MG/G
CREAM TOPICAL 2 TIMES DAILY
Qty: 30 G | Refills: 2 | Status: SHIPPED | OUTPATIENT
Start: 2021-05-25 | End: 2022-06-30

## 2021-05-28 ENCOUNTER — TELEPHONE (OUTPATIENT)
Dept: SURGERY | Facility: CLINIC | Age: 46
End: 2021-05-28

## 2021-05-28 DIAGNOSIS — C34.11 MALIGNANT NEOPLASM OF UPPER LOBE OF RIGHT LUNG: Primary | ICD-10-CM

## 2021-06-02 ENCOUNTER — OFFICE VISIT (OUTPATIENT)
Dept: SURGERY | Facility: CLINIC | Age: 46
End: 2021-06-02
Payer: MEDICARE

## 2021-06-02 DIAGNOSIS — K62.89 RECTAL PAIN: Primary | ICD-10-CM

## 2021-06-02 PROCEDURE — 99204 OFFICE O/P NEW MOD 45 MIN: CPT | Mod: S$PBB,,, | Performed by: NURSE PRACTITIONER

## 2021-06-02 PROCEDURE — 99204 PR OFFICE/OUTPT VISIT, NEW, LEVL IV, 45-59 MIN: ICD-10-PCS | Mod: S$PBB,,, | Performed by: NURSE PRACTITIONER

## 2021-06-22 ENCOUNTER — LAB VISIT (OUTPATIENT)
Dept: LAB | Facility: HOSPITAL | Age: 46
End: 2021-06-22
Attending: INTERNAL MEDICINE
Payer: MEDICARE

## 2021-06-22 ENCOUNTER — OFFICE VISIT (OUTPATIENT)
Dept: HEMATOLOGY/ONCOLOGY | Facility: CLINIC | Age: 46
End: 2021-06-22
Payer: MEDICARE

## 2021-06-22 VITALS
DIASTOLIC BLOOD PRESSURE: 83 MMHG | HEART RATE: 78 BPM | HEIGHT: 66 IN | BODY MASS INDEX: 27.6 KG/M2 | WEIGHT: 171.75 LBS | SYSTOLIC BLOOD PRESSURE: 118 MMHG | OXYGEN SATURATION: 98 % | TEMPERATURE: 98 F

## 2021-06-22 DIAGNOSIS — J43.9 PULMONARY EMPHYSEMA, UNSPECIFIED EMPHYSEMA TYPE: ICD-10-CM

## 2021-06-22 DIAGNOSIS — C34.11 MALIGNANT NEOPLASM OF UPPER LOBE OF RIGHT LUNG: ICD-10-CM

## 2021-06-22 DIAGNOSIS — C79.51 SECONDARY MALIGNANT NEOPLASM OF BONE: ICD-10-CM

## 2021-06-22 DIAGNOSIS — C34.11 MALIGNANT NEOPLASM OF UPPER LOBE OF RIGHT LUNG: Primary | ICD-10-CM

## 2021-06-22 DIAGNOSIS — E21.3 HYPERPARATHYROIDISM, UNSPECIFIED: ICD-10-CM

## 2021-06-22 LAB
ALBUMIN SERPL BCP-MCNC: 3.9 G/DL (ref 3.5–5.2)
ALP SERPL-CCNC: 85 U/L (ref 55–135)
ALT SERPL W/O P-5'-P-CCNC: 15 U/L (ref 10–44)
ANION GAP SERPL CALC-SCNC: 6 MMOL/L (ref 8–16)
AST SERPL-CCNC: 20 U/L (ref 10–40)
BILIRUB SERPL-MCNC: 0.2 MG/DL (ref 0.1–1)
BUN SERPL-MCNC: 20 MG/DL (ref 6–20)
CALCIUM SERPL-MCNC: 9.1 MG/DL (ref 8.7–10.5)
CHLORIDE SERPL-SCNC: 107 MMOL/L (ref 95–110)
CO2 SERPL-SCNC: 27 MMOL/L (ref 23–29)
CREAT SERPL-MCNC: 1.5 MG/DL (ref 0.5–1.4)
ERYTHROCYTE [DISTWIDTH] IN BLOOD BY AUTOMATED COUNT: 14.1 % (ref 11.5–14.5)
EST. GFR  (AFRICAN AMERICAN): >60 ML/MIN/1.73 M^2
EST. GFR  (NON AFRICAN AMERICAN): 55 ML/MIN/1.73 M^2
GLUCOSE SERPL-MCNC: 99 MG/DL (ref 70–110)
HCT VFR BLD AUTO: 42.7 % (ref 40–54)
HGB BLD-MCNC: 13.6 G/DL (ref 14–18)
IMM GRANULOCYTES # BLD AUTO: 0.03 K/UL (ref 0–0.04)
MCH RBC QN AUTO: 26.4 PG (ref 27–31)
MCHC RBC AUTO-ENTMCNC: 31.9 G/DL (ref 32–36)
MCV RBC AUTO: 83 FL (ref 82–98)
NEUTROPHILS # BLD AUTO: 4.2 K/UL (ref 1.8–7.7)
PLATELET # BLD AUTO: 268 K/UL (ref 150–450)
PMV BLD AUTO: 10.3 FL (ref 9.2–12.9)
POTASSIUM SERPL-SCNC: 4.3 MMOL/L (ref 3.5–5.1)
PROT SERPL-MCNC: 7.2 G/DL (ref 6–8.4)
RBC # BLD AUTO: 5.15 M/UL (ref 4.6–6.2)
SODIUM SERPL-SCNC: 140 MMOL/L (ref 136–145)
WBC # BLD AUTO: 8.59 K/UL (ref 3.9–12.7)

## 2021-06-22 PROCEDURE — 80053 COMPREHEN METABOLIC PANEL: CPT | Performed by: INTERNAL MEDICINE

## 2021-06-22 PROCEDURE — 36415 COLL VENOUS BLD VENIPUNCTURE: CPT | Performed by: INTERNAL MEDICINE

## 2021-06-22 PROCEDURE — 99213 OFFICE O/P EST LOW 20 MIN: CPT | Mod: PBBFAC | Performed by: INTERNAL MEDICINE

## 2021-06-22 PROCEDURE — 99999 PR PBB SHADOW E&M-EST. PATIENT-LVL III: CPT | Mod: PBBFAC,,, | Performed by: INTERNAL MEDICINE

## 2021-06-22 PROCEDURE — 99214 OFFICE O/P EST MOD 30 MIN: CPT | Mod: S$PBB,,, | Performed by: INTERNAL MEDICINE

## 2021-06-22 PROCEDURE — 85027 COMPLETE CBC AUTOMATED: CPT | Performed by: INTERNAL MEDICINE

## 2021-06-22 PROCEDURE — 99999 PR PBB SHADOW E&M-EST. PATIENT-LVL III: ICD-10-PCS | Mod: PBBFAC,,, | Performed by: INTERNAL MEDICINE

## 2021-06-22 PROCEDURE — 99214 PR OFFICE/OUTPT VISIT, EST, LEVL IV, 30-39 MIN: ICD-10-PCS | Mod: S$PBB,,, | Performed by: INTERNAL MEDICINE

## 2021-06-24 ENCOUNTER — TELEPHONE (OUTPATIENT)
Dept: HEMATOLOGY/ONCOLOGY | Facility: CLINIC | Age: 46
End: 2021-06-24

## 2021-06-28 ENCOUNTER — HOSPITAL ENCOUNTER (OUTPATIENT)
Dept: RADIOLOGY | Facility: HOSPITAL | Age: 46
Discharge: HOME | End: 2021-06-28
Attending: INTERNAL MEDICINE
Payer: MEDICARE

## 2021-06-28 DIAGNOSIS — C34.11 MALIGNANT NEOPLASM OF UPPER LOBE OF RIGHT LUNG: ICD-10-CM

## 2021-06-28 PROCEDURE — 74177 CT ABD & PELVIS W/CONTRAST: CPT | Mod: 26,,, | Performed by: SPECIALIST

## 2021-06-28 PROCEDURE — A9503 TC99M MEDRONATE: HCPCS

## 2021-06-28 PROCEDURE — 74177 CT CHEST ABDOMEN PELVIS WITH CONTRAST (XPD): ICD-10-PCS | Mod: 26,,, | Performed by: SPECIALIST

## 2021-06-28 PROCEDURE — 78306 NM BONE SCAN WHOLE BODY: ICD-10-PCS | Mod: 26,,, | Performed by: RADIOLOGY

## 2021-06-28 PROCEDURE — 74177 CT ABD & PELVIS W/CONTRAST: CPT | Mod: TC

## 2021-06-28 PROCEDURE — 78306 BONE IMAGING WHOLE BODY: CPT | Mod: 26,,, | Performed by: RADIOLOGY

## 2021-06-28 PROCEDURE — 71260 CT THORAX DX C+: CPT | Mod: TC

## 2021-06-28 PROCEDURE — 71260 CT THORAX DX C+: CPT | Mod: 26,,, | Performed by: SPECIALIST

## 2021-06-28 PROCEDURE — 71260 CT CHEST ABDOMEN PELVIS WITH CONTRAST (XPD): ICD-10-PCS | Mod: 26,,, | Performed by: SPECIALIST

## 2021-06-28 PROCEDURE — 25500020 PHARM REV CODE 255: Performed by: INTERNAL MEDICINE

## 2021-06-28 RX ADMIN — IOHEXOL 75 ML: 350 INJECTION, SOLUTION INTRAVENOUS at 03:06

## 2021-06-28 RX ADMIN — IOHEXOL 15 ML: 300 INJECTION, SOLUTION INTRAVENOUS at 03:06

## 2021-07-02 ENCOUNTER — OFFICE VISIT (OUTPATIENT)
Dept: HEMATOLOGY/ONCOLOGY | Facility: CLINIC | Age: 46
End: 2021-07-02
Payer: MEDICARE

## 2021-07-02 VITALS
HEIGHT: 66 IN | OXYGEN SATURATION: 96 % | RESPIRATION RATE: 14 BRPM | BODY MASS INDEX: 27 KG/M2 | WEIGHT: 168 LBS | TEMPERATURE: 98 F | SYSTOLIC BLOOD PRESSURE: 123 MMHG | HEART RATE: 95 BPM | DIASTOLIC BLOOD PRESSURE: 82 MMHG

## 2021-07-02 DIAGNOSIS — I70.0 AORTIC ATHEROSCLEROSIS: ICD-10-CM

## 2021-07-02 DIAGNOSIS — C34.11 MALIGNANT NEOPLASM OF UPPER LOBE OF RIGHT LUNG: Primary | ICD-10-CM

## 2021-07-02 PROCEDURE — 99999 PR PBB SHADOW E&M-EST. PATIENT-LVL III: ICD-10-PCS | Mod: PBBFAC,,, | Performed by: INTERNAL MEDICINE

## 2021-07-02 PROCEDURE — 99214 PR OFFICE/OUTPT VISIT, EST, LEVL IV, 30-39 MIN: ICD-10-PCS | Mod: S$PBB,,, | Performed by: INTERNAL MEDICINE

## 2021-07-02 PROCEDURE — 99213 OFFICE O/P EST LOW 20 MIN: CPT | Mod: PBBFAC | Performed by: INTERNAL MEDICINE

## 2021-07-02 PROCEDURE — 99214 OFFICE O/P EST MOD 30 MIN: CPT | Mod: S$PBB,,, | Performed by: INTERNAL MEDICINE

## 2021-07-02 PROCEDURE — 99999 PR PBB SHADOW E&M-EST. PATIENT-LVL III: CPT | Mod: PBBFAC,,, | Performed by: INTERNAL MEDICINE

## 2021-07-08 ENCOUNTER — PES CALL (OUTPATIENT)
Dept: ADMINISTRATIVE | Facility: CLINIC | Age: 46
End: 2021-07-08

## 2021-07-12 ENCOUNTER — HOSPITAL ENCOUNTER (EMERGENCY)
Facility: HOSPITAL | Age: 46
Discharge: HOME | End: 2021-07-12
Attending: EMERGENCY MEDICINE
Payer: MEDICARE

## 2021-07-12 VITALS
OXYGEN SATURATION: 99 % | DIASTOLIC BLOOD PRESSURE: 80 MMHG | WEIGHT: 167 LBS | SYSTOLIC BLOOD PRESSURE: 134 MMHG | BODY MASS INDEX: 26.84 KG/M2 | HEIGHT: 66 IN | RESPIRATION RATE: 18 BRPM | HEART RATE: 66 BPM | TEMPERATURE: 98 F

## 2021-07-12 DIAGNOSIS — E03.2 HYPOTHYROIDISM DUE TO MEDICATION: ICD-10-CM

## 2021-07-12 DIAGNOSIS — J32.9 RHINOSINUSITIS: Primary | ICD-10-CM

## 2021-07-12 PROCEDURE — 99284 EMERGENCY DEPT VISIT MOD MDM: CPT | Mod: ER

## 2021-07-12 RX ORDER — FLUTICASONE PROPIONATE 50 MCG
2 SPRAY, SUSPENSION (ML) NASAL DAILY
Qty: 16 G | Refills: 0 | OUTPATIENT
Start: 2021-07-12 | End: 2021-11-21

## 2021-07-12 RX ORDER — LORATADINE 10 MG/1
10 TABLET ORAL EVERY MORNING
Qty: 60 TABLET | Refills: 0 | OUTPATIENT
Start: 2021-07-12 | End: 2021-11-21

## 2021-07-12 RX ORDER — MONTELUKAST SODIUM 10 MG/1
10 TABLET ORAL NIGHTLY
Qty: 30 TABLET | Refills: 0 | Status: SHIPPED | OUTPATIENT
Start: 2021-07-12 | End: 2021-08-11

## 2021-07-12 RX ORDER — LEVOTHYROXINE SODIUM 100 UG/1
100 TABLET ORAL DAILY
Qty: 30 TABLET | Refills: 11 | Status: SHIPPED | OUTPATIENT
Start: 2021-07-12 | End: 2022-07-14

## 2021-07-27 ENCOUNTER — OFFICE VISIT (OUTPATIENT)
Dept: FAMILY MEDICINE | Facility: CLINIC | Age: 46
End: 2021-07-27
Payer: MEDICARE

## 2021-07-27 VITALS
HEIGHT: 66 IN | SYSTOLIC BLOOD PRESSURE: 98 MMHG | DIASTOLIC BLOOD PRESSURE: 68 MMHG | OXYGEN SATURATION: 98 % | HEART RATE: 70 BPM | TEMPERATURE: 98 F | RESPIRATION RATE: 17 BRPM | WEIGHT: 171.5 LBS | BODY MASS INDEX: 27.56 KG/M2

## 2021-07-27 DIAGNOSIS — C34.91 PRIMARY MALIGNANT NEOPLASM OF RIGHT LUNG METASTATIC TO OTHER SITE: ICD-10-CM

## 2021-07-27 DIAGNOSIS — C79.51 SECONDARY MALIGNANT NEOPLASM OF BONE: ICD-10-CM

## 2021-07-27 DIAGNOSIS — Z00.00 ENCOUNTER FOR PREVENTIVE HEALTH EXAMINATION: Primary | ICD-10-CM

## 2021-07-27 DIAGNOSIS — C34.11 MALIGNANT NEOPLASM OF UPPER LOBE OF RIGHT LUNG: ICD-10-CM

## 2021-07-27 DIAGNOSIS — J43.9 PULMONARY EMPHYSEMA, UNSPECIFIED EMPHYSEMA TYPE: ICD-10-CM

## 2021-07-27 PROBLEM — E21.3 HYPERPARATHYROIDISM, UNSPECIFIED: Status: RESOLVED | Noted: 2021-06-22 | Resolved: 2021-07-27

## 2021-07-27 PROCEDURE — 99999 PR PBB SHADOW E&M-EST. PATIENT-LVL IV: CPT | Mod: PBBFAC,,, | Performed by: PHYSICIAN ASSISTANT

## 2021-07-27 PROCEDURE — 99999 PR PBB SHADOW E&M-EST. PATIENT-LVL IV: ICD-10-PCS | Mod: PBBFAC,,, | Performed by: PHYSICIAN ASSISTANT

## 2021-07-27 PROCEDURE — G0439 PR MEDICARE ANNUAL WELLNESS SUBSEQUENT VISIT: ICD-10-PCS | Mod: ,,, | Performed by: PHYSICIAN ASSISTANT

## 2021-07-27 PROCEDURE — 99214 OFFICE O/P EST MOD 30 MIN: CPT | Mod: PBBFAC,PO | Performed by: PHYSICIAN ASSISTANT

## 2021-07-27 PROCEDURE — G0439 PPPS, SUBSEQ VISIT: HCPCS | Mod: ,,, | Performed by: PHYSICIAN ASSISTANT

## 2021-08-24 ENCOUNTER — TELEPHONE (OUTPATIENT)
Dept: HEMATOLOGY/ONCOLOGY | Facility: CLINIC | Age: 46
End: 2021-08-24

## 2021-08-24 ENCOUNTER — DOCUMENTATION ONLY (OUTPATIENT)
Dept: HEMATOLOGY/ONCOLOGY | Facility: CLINIC | Age: 46
End: 2021-08-24

## 2021-10-20 ENCOUNTER — HOSPITAL ENCOUNTER (EMERGENCY)
Facility: HOSPITAL | Age: 46
Discharge: HOME | End: 2021-10-20
Attending: EMERGENCY MEDICINE
Payer: MEDICARE

## 2021-10-20 VITALS
DIASTOLIC BLOOD PRESSURE: 80 MMHG | SYSTOLIC BLOOD PRESSURE: 125 MMHG | BODY MASS INDEX: 27 KG/M2 | WEIGHT: 168 LBS | HEART RATE: 70 BPM | HEIGHT: 66 IN | OXYGEN SATURATION: 98 % | RESPIRATION RATE: 18 BRPM | TEMPERATURE: 98 F

## 2021-10-20 DIAGNOSIS — H66.91 RIGHT OTITIS MEDIA, UNSPECIFIED OTITIS MEDIA TYPE: Primary | ICD-10-CM

## 2021-10-20 PROCEDURE — 99283 EMERGENCY DEPT VISIT LOW MDM: CPT | Mod: ER

## 2021-10-20 RX ORDER — AMOXICILLIN AND CLAVULANATE POTASSIUM 875; 125 MG/1; MG/1
1 TABLET, FILM COATED ORAL 2 TIMES DAILY
Qty: 14 TABLET | Refills: 0 | Status: SHIPPED | OUTPATIENT
Start: 2021-10-20 | End: 2022-06-30 | Stop reason: ALTCHOICE

## 2021-11-21 ENCOUNTER — HOSPITAL ENCOUNTER (EMERGENCY)
Facility: HOSPITAL | Age: 46
Discharge: HOME | End: 2021-11-21
Attending: EMERGENCY MEDICINE
Payer: MEDICARE

## 2021-11-21 VITALS
HEART RATE: 70 BPM | DIASTOLIC BLOOD PRESSURE: 77 MMHG | WEIGHT: 167 LBS | OXYGEN SATURATION: 98 % | BODY MASS INDEX: 26.84 KG/M2 | TEMPERATURE: 98 F | HEIGHT: 66 IN | SYSTOLIC BLOOD PRESSURE: 122 MMHG | RESPIRATION RATE: 20 BRPM

## 2021-11-21 DIAGNOSIS — J06.9 UPPER RESPIRATORY INFECTION WITH COUGH AND CONGESTION: Primary | ICD-10-CM

## 2021-11-21 LAB
CTP QC/QA: YES
INFLUENZA A ANTIGEN, POC: NEGATIVE
INFLUENZA B ANTIGEN, POC: NEGATIVE
POC RAPID STREP A: NEGATIVE
SARS-COV-2 RDRP RESP QL NAA+PROBE: NEGATIVE

## 2021-11-21 PROCEDURE — 63600175 PHARM REV CODE 636 W HCPCS: Mod: ER | Performed by: EMERGENCY MEDICINE

## 2021-11-21 PROCEDURE — U0002 COVID-19 LAB TEST NON-CDC: HCPCS | Mod: ER | Performed by: EMERGENCY MEDICINE

## 2021-11-21 PROCEDURE — 87804 INFLUENZA ASSAY W/OPTIC: CPT | Mod: ER

## 2021-11-21 PROCEDURE — 96372 THER/PROPH/DIAG INJ SC/IM: CPT | Mod: ER

## 2021-11-21 PROCEDURE — 99284 EMERGENCY DEPT VISIT MOD MDM: CPT | Mod: 25,ER

## 2021-11-21 RX ORDER — FLUTICASONE PROPIONATE 50 MCG
2 SPRAY, SUSPENSION (ML) NASAL DAILY
Qty: 16 G | Refills: 0 | OUTPATIENT
Start: 2021-11-21 | End: 2022-01-08

## 2021-11-21 RX ORDER — PREDNISONE 20 MG/1
40 TABLET ORAL DAILY
Qty: 10 TABLET | Refills: 0 | Status: SHIPPED | OUTPATIENT
Start: 2021-11-21 | End: 2021-11-26

## 2021-11-21 RX ORDER — MONTELUKAST SODIUM 10 MG/1
10 TABLET ORAL NIGHTLY
Qty: 30 TABLET | Refills: 0 | Status: SHIPPED | OUTPATIENT
Start: 2021-11-21 | End: 2021-12-21

## 2021-11-21 RX ORDER — OLOPATADINE HYDROCHLORIDE 2 MG/ML
1 SOLUTION/ DROPS OPHTHALMIC DAILY
Qty: 5 ML | Refills: 0 | Status: SHIPPED | OUTPATIENT
Start: 2021-11-21 | End: 2022-06-30

## 2021-11-21 RX ORDER — PROMETHAZINE HYDROCHLORIDE AND DEXTROMETHORPHAN HYDROBROMIDE 6.25; 15 MG/5ML; MG/5ML
5 SYRUP ORAL NIGHTLY PRN
Qty: 118 ML | Refills: 0 | Status: SHIPPED | OUTPATIENT
Start: 2021-11-21 | End: 2021-12-01

## 2021-11-21 RX ORDER — LORATADINE 10 MG/1
10 TABLET ORAL EVERY MORNING
Qty: 60 TABLET | Refills: 0 | Status: SHIPPED | OUTPATIENT
Start: 2021-11-21 | End: 2022-06-30

## 2021-11-21 RX ORDER — DEXAMETHASONE SODIUM PHOSPHATE 4 MG/ML
8 INJECTION, SOLUTION INTRA-ARTICULAR; INTRALESIONAL; INTRAMUSCULAR; INTRAVENOUS; SOFT TISSUE
Status: COMPLETED | OUTPATIENT
Start: 2021-11-21 | End: 2021-11-21

## 2021-11-21 RX ADMIN — DEXAMETHASONE SODIUM PHOSPHATE 8 MG: 4 INJECTION INTRA-ARTICULAR; INTRALESIONAL; INTRAMUSCULAR; INTRAVENOUS; SOFT TISSUE at 02:11

## 2022-01-07 ENCOUNTER — HOSPITAL ENCOUNTER (EMERGENCY)
Facility: HOSPITAL | Age: 47
Discharge: HOME | End: 2022-01-08
Attending: INTERNAL MEDICINE
Payer: MEDICARE

## 2022-01-07 DIAGNOSIS — B34.9 VIRAL SYNDROME: Primary | ICD-10-CM

## 2022-01-07 PROCEDURE — 99284 EMERGENCY DEPT VISIT MOD MDM: CPT | Mod: 25,ER

## 2022-01-07 PROCEDURE — U0003 INFECTIOUS AGENT DETECTION BY NUCLEIC ACID (DNA OR RNA); SEVERE ACUTE RESPIRATORY SYNDROME CORONAVIRUS 2 (SARS-COV-2) (CORONAVIRUS DISEASE [COVID-19]), AMPLIFIED PROBE TECHNIQUE, MAKING USE OF HIGH THROUGHPUT TECHNOLOGIES AS DESCRIBED BY CMS-2020-01-R: HCPCS | Performed by: PHYSICIAN ASSISTANT

## 2022-01-07 RX ORDER — IBUPROFEN 400 MG/1
800 TABLET ORAL
Status: COMPLETED | OUTPATIENT
Start: 2022-01-08 | End: 2022-01-08

## 2022-01-07 RX ORDER — ACETAMINOPHEN 500 MG
1000 TABLET ORAL
Status: COMPLETED | OUTPATIENT
Start: 2022-01-08 | End: 2022-01-08

## 2022-01-08 VITALS
OXYGEN SATURATION: 98 % | HEIGHT: 66 IN | HEART RATE: 94 BPM | BODY MASS INDEX: 26.84 KG/M2 | TEMPERATURE: 99 F | SYSTOLIC BLOOD PRESSURE: 95 MMHG | WEIGHT: 167 LBS | RESPIRATION RATE: 18 BRPM | DIASTOLIC BLOOD PRESSURE: 61 MMHG

## 2022-01-08 PROCEDURE — 25000003 PHARM REV CODE 250: Mod: ER | Performed by: INTERNAL MEDICINE

## 2022-01-08 RX ORDER — AZELASTINE 1 MG/ML
2 SPRAY, METERED NASAL 2 TIMES DAILY
Qty: 30 ML | Refills: 0 | Status: SHIPPED | OUTPATIENT
Start: 2022-01-08 | End: 2022-03-04

## 2022-01-08 RX ORDER — IBUPROFEN 800 MG/1
800 TABLET ORAL EVERY 8 HOURS PRN
Qty: 30 TABLET | Refills: 0 | Status: SHIPPED | OUTPATIENT
Start: 2022-01-08 | End: 2022-06-30

## 2022-01-08 RX ORDER — FLUTICASONE PROPIONATE 50 MCG
2 SPRAY, SUSPENSION (ML) NASAL DAILY
Qty: 15 G | Refills: 0 | Status: SHIPPED | OUTPATIENT
Start: 2022-01-08 | End: 2022-06-30

## 2022-01-08 RX ADMIN — ACETAMINOPHEN 1000 MG: 500 TABLET ORAL at 12:01

## 2022-01-08 RX ADMIN — IBUPROFEN 800 MG: 400 TABLET ORAL at 12:01

## 2022-01-08 NOTE — FIRST PROVIDER EVALUATION
Emergency Department TeleTriage Encounter Note      CHIEF COMPLAINT    Chief Complaint   Patient presents with    Fever     Reports fever at home as high as 101. Also c/o HA       VITAL SIGNS   Initial Vitals [01/07/22 2106]   BP Pulse Resp Temp SpO2   114/70 100 18 99.4 °F (37.4 °C) 97 %      MAP       --            ALLERGIES    Review of patient's allergies indicates:   Allergen Reactions    Nsaids (non-steroidal anti-inflammatory drug)      1/7/2022 Patient reports he is no longer allergic to this med.    Patient says since been diagnosed with lung mass on 2-12-15, if take any NSAIDS he spikes a fever.    Tylenol [acetaminophen] Other (See Comments)     1/7/2022 Patient reports he is no longer allergic to this med.  Sweating +       PROVIDER TRIAGE NOTE  This is a teletriage evaluation of a 46 y.o. male presenting to the ED complaining of fever, chills, HA, intermittent sore throat that began today.  Patient is not vaccinated and has not had a flu shot this season.     Initial orders will be placed and care will be transferred to an alternate provider when patient is roomed for a full evaluation. Any additional orders and the final disposition will be determined by that provider.           ORDERS  Labs Reviewed   SARS-COV-2 (COVID-19) QUALITATIVE PCR       ED Orders (720h ago, onward)    Start Ordered     Status Ordering Provider    01/07/22 2224 01/07/22 2223  COVID-19 Routine Screening  STAT         Ordered GLENN MCFADDEN            Virtual Visit Note: The provider triage portion of this emergency department evaluation and documentation was performed via [employer], a HIPAA-compliant telemedicine application, in concert with a tele-presenter in the room. A face to face patient evaluation with one of my colleagues will occur once the patient is placed in an emergency department room.      DISCLAIMER: This note was prepared with [employer] voice recognition transcription software. Garbled syntax, mangled  pronouns, and other bizarre constructions may be attributed to that software system.

## 2022-01-08 NOTE — ED PROVIDER NOTES
Encounter Date: 1/7/2022       History     Chief Complaint   Patient presents with    Fever     Reports fever at home as high as 101. Also c/o HA     46-year-old male presents to the emergency department complaining of fever at home x1 day.  He states he had a recorded temperature of 101° and took an aspirin at home for fever reduction.  He denies chest pain/shortness of breath.  He has a past medical history significant for COPD, hypertension, lung cancer and thyroid disease.    The history is provided by the patient. No  was used.     Review of patient's allergies indicates:  No Known Allergies  Past Medical History:   Diagnosis Date    Asthma     COPD (chronic obstructive pulmonary disease)     Emphysema of lung     HTN (hypertension)     Hypertension     Lung cancer     Lung mass     Thyroid disease      Past Surgical History:   Procedure Laterality Date    COLONOSCOPY N/A 3/18/2019    Procedure: COLONOSCOPY;  Surgeon: Indra Bowen MD;  Location: [facility] ([address] [place] [address]);  Service: Endoscopy;  Laterality: N/A;    ESOPHAGOGASTRODUODENOSCOPY N/A 3/18/2019    Procedure: ESOPHAGOGASTRODUODENOSCOPY (EGD);  Surgeon: Indra Bowen MD;  Location: [facility] ([address] [place] [address]);  Service: Endoscopy;  Laterality: N/A;  ok to hold Xarelto x 2 days per Dr. Garay-MS    FRACTURE SURGERY      finger    LUNG CANCER SURGERY Right March 2015    lung biopsy     PORTACATH PLACEMENT       Family History   Problem Relation Age of Onset    Hypertension Father     Kidney disease Father     No Known Problems Mother     No Known Problems Sister     No Known Problems Brother     No Known Problems Maternal Aunt     No Known Problems Maternal Uncle     No Known Problems Paternal Aunt     No Known Problems Paternal Uncle     No Known Problems Maternal Grandmother     No Known Problems Maternal Grandfather     No Known Problems Paternal Grandmother     No Known Problems Paternal Grandfather     Amblyopia Neg Hx      Blindness Neg Hx     Cancer Neg Hx     Cataracts Neg Hx     Diabetes Neg Hx     Glaucoma Neg Hx     Macular degeneration Neg Hx     Retinal detachment Neg Hx     Strabismus Neg Hx     Stroke Neg Hx     Thyroid disease Neg Hx      Social History     Tobacco Use    Smoking status: Former Smoker     Packs/day: 0.75     Years: 25.00     Pack years: 18.75     Types: Cigarettes     Quit date: 2014     Years since quittin.1    Smokeless tobacco: Never Used    Tobacco comment: Patient is no longer smoking   Substance Use Topics    Alcohol use: No    Drug use: Yes     Frequency: 7.0 times per week     Types: Marijuana     Comment: daily     Review of Systems   Constitutional: Positive for fever. Negative for chills.   HENT: Positive for congestion and postnasal drip.    Respiratory: Positive for cough.    Cardiovascular: Negative for chest pain.   Gastrointestinal: Negative for nausea and vomiting.   All other systems reviewed and are negative.      Physical Exam     Initial Vitals [22 2106]   BP Pulse Resp Temp SpO2   114/70 100 18 99.4 °F (37.4 °C) 97 %      MAP       --         Physical Exam    Nursing note and vitals reviewed.  Constitutional: He is not diaphoretic. No distress.   HENT:   Head: Normocephalic and atraumatic.   Right Ear: External ear normal.   Left Ear: External ear normal.   Clear postnasal drip, posterior oropharyngeal erythema without exudate or edema, enlarged nasal turbinates   Eyes: Conjunctivae and EOM are normal.   Neck:   Normal range of motion.  Cardiovascular: Normal rate and regular rhythm.   Pulmonary/Chest: Breath sounds normal. No respiratory distress.   Abdominal: Abdomen is soft. Bowel sounds are normal. There is no abdominal tenderness.   Musculoskeletal:         General: Normal range of motion.      Cervical back: Normal range of motion.     Neurological: He is alert. He has normal strength.   Skin: Skin is warm and dry. Capillary refill takes less than 2  seconds.   Psychiatric: He has a normal mood and affect.         ED Course   Procedures  Labs Reviewed   SARS-COV-2 (COVID-19) QUALITATIVE PCR          Imaging Results    None          Medications   acetaminophen tablet 1,000 mg (1,000 mg Oral Given 1/8/22 0012)   ibuprofen tablet 800 mg (800 mg Oral Given 1/8/22 0011)     Medical Decision Making:   ED Management:  Routine COVID-19 screen was performed and patient was given instructions for viral syndrome.  Prescriptions for Astelin/fluticasone/ibuprofen were given and the patient was advised to follow-up with his primary care physician within the next week for re-evaluation/return to the emergency department if condition worsens.                      Clinical Impression:   Final diagnoses:  [B34.9] Viral syndrome (Primary)          ED Disposition Condition    Discharge Stable        ED Prescriptions     None        Follow-up Information     Follow up With Specialties Details Why Contact Info    Albert Rudolph MD Family Medicine Schedule an appointment as soon as possible for a visit in 3 days For reevaluation [facility] [place] [id number] [phone]             Sarath Xiong MD  01/08/22 0024

## 2022-01-10 DIAGNOSIS — U07.1 COVID-19 VIRUS DETECTED: ICD-10-CM

## 2022-01-10 LAB
SARS-COV-2 RNA RESP QL NAA+PROBE: DETECTED
SARS-COV-2- CYCLE NUMBER: 23

## 2022-01-11 ENCOUNTER — NURSE TRIAGE (OUTPATIENT)
Dept: ADMINISTRATIVE | Facility: CLINIC | Age: 47
End: 2022-01-11
Payer: MEDICARE

## 2022-01-12 NOTE — TELEPHONE ENCOUNTER
[facility] calling currently c/o diarrhea x 2 days. Advised pt per triage protocol on home care instructions. Instructed pt to contact on call nurse with new/worsening symptoms. Verbalized understanding.     Reason for Disposition   [1] COVID-19 diagnosed AND [2] has mild nausea, vomiting or diarrhea    Additional Information   Negative: SEVERE difficulty breathing (e.g., struggling for each breath, speaks in single words)   Negative: Difficult to awaken or acting confused (e.g., disoriented, slurred speech)   Negative: Bluish (or gray) lips or face now   Negative: Shock suspected (e.g., cold/pale/clammy skin, too weak to stand, low BP, rapid pulse)   Negative: Sounds like a life-threatening emergency to the triager   Negative: SEVERE or constant chest pain or pressure (Exception: mild central chest pain, present only when coughing)   Negative: MODERATE difficulty breathing (e.g., speaks in phrases, SOB even at rest, pulse 100-120)   Negative: [1] Headache AND [2] stiff neck (can't touch chin to chest)   Negative: MILD difficulty breathing (e.g., minimal/no SOB at rest, SOB with walking, pulse <100)   Negative: Chest pain or pressure   Negative: Patient sounds very sick or weak to the triager   Negative: Fever > 103 F (39.4 C)   Negative: [1] Fever > 101 F (38.3 C) AND [2] age > 60 years   Negative: [1] Fever > 100.0 F (37.8 C) AND [2] bedridden (e.g., nursing home patient, CVA, chronic illness, recovering from surgery)   Negative: HIGH RISK for severe COVID complications (e.g., age > 64 years, obesity with BMI > 25, pregnant, chronic lung disease or other chronic medical condition)  (Exception: Already seen by PCP and no new or worsening symptoms.)   Negative: [1] HIGH RISK patient AND [2] influenza is widespread in the community AND [3] ONE OR MORE respiratory symptoms: cough, sore throat, runny or stuffy nose   Negative: [1] HIGH RISK patient AND [2] influenza exposure within the last 7 days AND [3]  ONE OR MORE respiratory symptoms: cough, sore throat, runny or stuffy nose   Negative: [1] COVID-19 infection suspected by caller or triager AND [2] mild symptoms (cough, fever, or others) AND [3] negative COVID-19 rapid test   Negative: Fever present > 3 days (72 hours)   Negative: [1] Fever returns after gone for over 24 hours AND [2] symptoms worse or not improved   Negative: [1] Continuous (nonstop) coughing interferes with work or school AND [2] no improvement using cough treatment per protocol   Negative: Cough present > 3 weeks   Negative: [1] COVID-19 diagnosed by positive lab test AND [2] NO symptoms (e.g., cough, fever, others)   Negative: [1] COVID-19 diagnosed by positive lab test AND [2] mild symptoms (e.g., cough, fever, others) AND [3] no complications or SOB   Negative: [1] COVID-19 diagnosed by doctor (or NP/PA) AND [2] mild symptoms (e.g., cough, fever, others) AND [3] no complications or SOB    Protocols used: CORONAVIRUS (COVID-19) DIAGNOSED OR [id number]

## 2022-04-13 ENCOUNTER — PATIENT OUTREACH (OUTPATIENT)
Dept: ADMINISTRATIVE | Facility: HOSPITAL | Age: 47
End: 2022-04-13
Payer: MEDICARE

## 2022-05-09 ENCOUNTER — PES CALL (OUTPATIENT)
Dept: ADMINISTRATIVE | Facility: CLINIC | Age: 47
End: 2022-05-09
Payer: MEDICARE

## 2022-05-18 ENCOUNTER — OFFICE VISIT (OUTPATIENT)
Dept: FAMILY MEDICINE | Facility: CLINIC | Age: 47
End: 2022-05-18
Payer: MEDICARE

## 2022-05-18 ENCOUNTER — LAB VISIT (OUTPATIENT)
Dept: LAB | Facility: HOSPITAL | Age: 47
End: 2022-05-18
Attending: FAMILY MEDICINE
Payer: MEDICARE

## 2022-05-18 VITALS
TEMPERATURE: 98 F | DIASTOLIC BLOOD PRESSURE: 72 MMHG | SYSTOLIC BLOOD PRESSURE: 112 MMHG | WEIGHT: 171.31 LBS | RESPIRATION RATE: 18 BRPM | BODY MASS INDEX: 27.53 KG/M2 | HEIGHT: 66 IN | OXYGEN SATURATION: 96 % | HEART RATE: 75 BPM

## 2022-05-18 DIAGNOSIS — Z00.00 ANNUAL PHYSICAL EXAM: ICD-10-CM

## 2022-05-18 DIAGNOSIS — Z00.00 ANNUAL PHYSICAL EXAM: Primary | ICD-10-CM

## 2022-05-18 DIAGNOSIS — I70.0 AORTIC ATHEROSCLEROSIS: ICD-10-CM

## 2022-05-18 DIAGNOSIS — J43.9 PULMONARY EMPHYSEMA, UNSPECIFIED EMPHYSEMA TYPE: ICD-10-CM

## 2022-05-18 DIAGNOSIS — C79.51 SECONDARY MALIGNANT NEOPLASM OF BONE: ICD-10-CM

## 2022-05-18 LAB
ALBUMIN SERPL BCP-MCNC: 3.8 G/DL (ref 3.5–5.2)
ALP SERPL-CCNC: 82 U/L (ref 55–135)
ALT SERPL W/O P-5'-P-CCNC: 19 U/L (ref 10–44)
ANION GAP SERPL CALC-SCNC: 11 MMOL/L (ref 8–16)
AST SERPL-CCNC: 26 U/L (ref 10–40)
BASOPHILS # BLD AUTO: 0.07 K/UL (ref 0–0.2)
BASOPHILS NFR BLD: 1 % (ref 0–1.9)
BILIRUB SERPL-MCNC: 0.2 MG/DL (ref 0.1–1)
BUN SERPL-MCNC: 17 MG/DL (ref 6–20)
CALCIUM SERPL-MCNC: 9.4 MG/DL (ref 8.7–10.5)
CHLORIDE SERPL-SCNC: 106 MMOL/L (ref 95–110)
CHOLEST SERPL-MCNC: 246 MG/DL (ref 120–199)
CHOLEST/HDLC SERPL: 5.2 {RATIO} (ref 2–5)
CO2 SERPL-SCNC: 23 MMOL/L (ref 23–29)
CREAT SERPL-MCNC: 1.5 MG/DL (ref 0.5–1.4)
DIFFERENTIAL METHOD: ABNORMAL
EOSINOPHIL # BLD AUTO: 0.2 K/UL (ref 0–0.5)
EOSINOPHIL NFR BLD: 2.8 % (ref 0–8)
ERYTHROCYTE [DISTWIDTH] IN BLOOD BY AUTOMATED COUNT: 14.5 % (ref 11.5–14.5)
EST. GFR  (AFRICAN AMERICAN): >60 ML/MIN/1.73 M^2
EST. GFR  (NON AFRICAN AMERICAN): 54.7 ML/MIN/1.73 M^2
ESTIMATED AVG GLUCOSE: 120 MG/DL (ref 68–131)
GLUCOSE SERPL-MCNC: 116 MG/DL (ref 70–110)
HBA1C MFR BLD: 5.8 % (ref 4–5.6)
HCT VFR BLD AUTO: 42.1 % (ref 40–54)
HDLC SERPL-MCNC: 47 MG/DL (ref 40–75)
HDLC SERPL: 19.1 % (ref 20–50)
HGB BLD-MCNC: 13.6 G/DL (ref 14–18)
IMM GRANULOCYTES # BLD AUTO: 0.03 K/UL (ref 0–0.04)
IMM GRANULOCYTES NFR BLD AUTO: 0.4 % (ref 0–0.5)
LDLC SERPL CALC-MCNC: 163.2 MG/DL (ref 63–159)
LYMPHOCYTES # BLD AUTO: 2.1 K/UL (ref 1–4.8)
LYMPHOCYTES NFR BLD: 30.8 % (ref 18–48)
MCH RBC QN AUTO: 26.3 PG (ref 27–31)
MCHC RBC AUTO-ENTMCNC: 32.3 G/DL (ref 32–36)
MCV RBC AUTO: 81 FL (ref 82–98)
MONOCYTES # BLD AUTO: 0.7 K/UL (ref 0.3–1)
MONOCYTES NFR BLD: 10.9 % (ref 4–15)
NEUTROPHILS # BLD AUTO: 3.6 K/UL (ref 1.8–7.7)
NEUTROPHILS NFR BLD: 54.1 % (ref 38–73)
NONHDLC SERPL-MCNC: 199 MG/DL
NRBC BLD-RTO: 0 /100 WBC
PLATELET # BLD AUTO: 267 K/UL (ref 150–450)
PMV BLD AUTO: 10.4 FL (ref 9.2–12.9)
POTASSIUM SERPL-SCNC: 4.2 MMOL/L (ref 3.5–5.1)
PROT SERPL-MCNC: 7.2 G/DL (ref 6–8.4)
RBC # BLD AUTO: 5.17 M/UL (ref 4.6–6.2)
SODIUM SERPL-SCNC: 140 MMOL/L (ref 136–145)
TRIGL SERPL-MCNC: 179 MG/DL (ref 30–150)
TSH SERPL DL<=0.005 MIU/L-ACNC: 0.84 UIU/ML (ref 0.4–4)
WBC # BLD AUTO: 6.72 K/UL (ref 3.9–12.7)

## 2022-05-18 PROCEDURE — 99396 PREV VISIT EST AGE 40-64: CPT | Mod: S$GLB,,, | Performed by: FAMILY MEDICINE

## 2022-05-18 PROCEDURE — 36415 COLL VENOUS BLD VENIPUNCTURE: CPT | Mod: PO | Performed by: FAMILY MEDICINE

## 2022-05-18 PROCEDURE — 80053 COMPREHEN METABOLIC PANEL: CPT | Performed by: FAMILY MEDICINE

## 2022-05-18 PROCEDURE — 86803 HEPATITIS C AB TEST: CPT | Performed by: FAMILY MEDICINE

## 2022-05-18 PROCEDURE — 99999 PR PBB SHADOW E&M-EST. PATIENT-LVL IV: CPT | Mod: PBBFAC,,, | Performed by: FAMILY MEDICINE

## 2022-05-18 PROCEDURE — 99396 PR PREVENTIVE VISIT,EST,40-64: ICD-10-PCS | Mod: S$GLB,,, | Performed by: FAMILY MEDICINE

## 2022-05-18 PROCEDURE — 99999 PR PBB SHADOW E&M-EST. PATIENT-LVL IV: ICD-10-PCS | Mod: PBBFAC,,, | Performed by: FAMILY MEDICINE

## 2022-05-18 PROCEDURE — 83036 HEMOGLOBIN GLYCOSYLATED A1C: CPT | Performed by: FAMILY MEDICINE

## 2022-05-18 PROCEDURE — 84443 ASSAY THYROID STIM HORMONE: CPT | Performed by: FAMILY MEDICINE

## 2022-05-18 PROCEDURE — 85025 COMPLETE CBC W/AUTO DIFF WBC: CPT | Performed by: FAMILY MEDICINE

## 2022-05-18 PROCEDURE — 80061 LIPID PANEL: CPT | Performed by: FAMILY MEDICINE

## 2022-05-18 NOTE — PROGRESS NOTES
Health Maintenance Due   Topic     Hepatitis C Screening  Consult pcp      COVID-19 Vaccine (1) Pt deny    TETANUS VACCINE      Pneumococcal Vaccines (Age 0-64) (2 - PPSV23 or PCV20) Pt deny

## 2022-05-20 NOTE — PROGRESS NOTES
Subjective:       Patient ID: Yao Alan is a 47 y.o. male.    Chief Complaint: Annual Exam      HPI  46 yo male presents for annual exam.    Review of Systems   Constitutional: Negative.    HENT: Negative.    Respiratory: Negative.    Cardiovascular: Negative.    Gastrointestinal: Negative.    Endocrine: Negative.    Genitourinary: Negative.    Musculoskeletal: Negative.    Neurological: Negative.    Psychiatric/Behavioral: Negative.           Past Medical History:   Diagnosis Date    Asthma     COPD (chronic obstructive pulmonary disease)     Emphysema of lung     HTN (hypertension)     Hypertension     Lung cancer     Lung mass     Thyroid disease      Past Surgical History:   Procedure Laterality Date    COLONOSCOPY N/A 3/18/2019    Procedure: COLONOSCOPY;  Surgeon: Indra Bowen MD;  Location: [facility] ([address] [place] [address]);  Service: Endoscopy;  Laterality: N/A;    ESOPHAGOGASTRODUODENOSCOPY N/A 3/18/2019    Procedure: ESOPHAGOGASTRODUODENOSCOPY (EGD);  Surgeon: Indra Bowen MD;  Location: [facility] ([address] [place] [address]);  Service: Endoscopy;  Laterality: N/A;  ok to hold Xarelto x 2 days per Dr. Garay-MS    FRACTURE SURGERY      finger    LUNG CANCER SURGERY Right March 2015    lung biopsy     PORTACATH PLACEMENT       Family History   Problem Relation Age of Onset    Hypertension Father     Kidney disease Father     No Known Problems Mother     No Known Problems Sister     No Known Problems Brother     No Known Problems Maternal Aunt     No Known Problems Maternal Uncle     No Known Problems Paternal Aunt     No Known Problems Paternal Uncle     No Known Problems Maternal Grandmother     No Known Problems Maternal Grandfather     No Known Problems Paternal Grandmother     No Known Problems Paternal Grandfather     Amblyopia Neg Hx     Blindness Neg Hx     Cancer Neg Hx     Cataracts Neg Hx     Diabetes Neg Hx     Glaucoma Neg Hx     Macular degeneration Neg Hx     Retinal detachment Neg Hx      Strabismus Neg Hx     Stroke Neg Hx     Thyroid disease Neg Hx      Social History     Socioeconomic History    Marital status:     Number of children: 1    Highest education level: High school graduate   Tobacco Use    Smoking status: Former Smoker     Packs/day: 0.75     Years: 25.00     Pack years: 18.75     Types: Cigarettes     Quit date: 2014     Years since quittin.4    Smokeless tobacco: Never Used    Tobacco comment: Patient is no longer smoking   Substance and Sexual Activity    Alcohol use: No    Drug use: Yes     Frequency: 7.0 times per week     Types: Marijuana     Comment: daily    Sexual activity: Yes     Partners: Female     Social Determinants of Health     Financial Resource Strain: Medium Risk    Difficulty of Paying Living Expenses: Somewhat hard   Food Insecurity: No Food Insecurity    Worried About Running Out of Food in the Last Year: Never true    Ran Out of Food in the Last Year: Never true   Transportation Needs: No Transportation Needs    Lack of Transportation (Medical): No    Lack of Transportation (Non-Medical): No   Physical Activity: Inactive    Days of Exercise per Week: 0 days    Minutes of Exercise per Session: 0 min   Stress: No Stress Concern Present    Feeling of Stress : Not at all   Social Connections: Moderately Isolated    Frequency of Communication with Friends and Family: Once a week    Frequency of Social Gatherings with Friends and Family: Twice a week    Attends Baptism Services: Never    Active Member of Clubs or Organizations: No    Attends Club or Organization Meetings: Never    Marital Status:    Housing Stability: Unknown    Unable to Pay for Housing in the Last Year: No    Unstable Housing in the Last Year: No       Current Outpatient Medications:     albuterol 90 mcg/actuation inhaler, Inhale 2 puffs into the lungs every 6 (six) hours as needed for Wheezing., Disp: 1 each, Rfl: 11    levothyroxine (SYNTHROID)  100 MCG tablet, Take 1 tablet (100 mcg total) by mouth once daily., Disp: 30 tablet, Rfl: 11    amLODIPine (NORVASC) 10 MG tablet, Take 10 mg by mouth., Disp: , Rfl:     amoxicillin-clavulanate 875-125mg (AUGMENTIN) 875-125 mg per tablet, Take 1 tablet by mouth 2 (two) times daily. (Patient not taking: Reported on 5/18/2022), Disp: 14 tablet, Rfl: 0    azelastine (ASTELIN) 137 mcg (0.1 %) nasal spray, 2 sprays (274 mcg total) by Nasal route 2 (two) times daily. (Patient not taking: Reported on 5/18/2022), Disp: 30 mL, Rfl: 0    diltiazem HCl (DILTIAZEM 2% CREAM), Apply topically 3 (three) times daily. Apply topically to anal area. (Patient not taking: Reported on 5/18/2022), Disp: 30 g, Rfl: 2    diltiazem HCl (DILTIAZEM 2% CREAM), APPLY TO ANAL AREA 3 TIMES A DAY, Disp: , Rfl:     fluticasone propionate (FLONASE) 50 mcg/actuation nasal spray, 2 sprays (100 mcg total) by Each Nostril route once daily. (Patient not taking: Reported on 5/18/2022), Disp: 15 g, Rfl: 0    hydrocortisone 2.5 % cream, Apply topically 2 (two) times daily. (Patient not taking: Reported on 5/18/2022), Disp: 30 g, Rfl: 2    ibuprofen (ADVIL,MOTRIN) 800 MG tablet, Take 1 tablet (800 mg total) by mouth every 8 (eight) hours as needed for Pain. (Patient not taking: Reported on 5/18/2022), Disp: 30 tablet, Rfl: 0    loratadine (CLARITIN) 10 mg tablet, Take 1 tablet (10 mg total) by mouth every morning. (Patient not taking: Reported on 5/18/2022), Disp: 60 tablet, Rfl: 0    olopatadine (PATADAY) 0.2 % Drop, Place 1 drop into both eyes once daily. (Patient not taking: Reported on 5/18/2022), Disp: 5 mL, Rfl: 0    PROAIR HFA 90 mcg/actuation inhaler, INHALE TWO PUFFS BY MOUTH EVERY 6 HOURS AS NEEDED FOR WHEEZING (Patient not taking: No sig reported), Disp: 9 each, Rfl: 0   Objective:      Vitals:    05/18/22 0943   BP: 112/72   BP Location: Left arm   Patient Position: Sitting   BP Method: Small (Manual)   Pulse: 75   Resp: 18   Temp: 98.4  "°F (36.9 °C)   TempSrc: Oral   SpO2: 96%   Weight: 77.7 kg (171 lb 4.8 oz)   Height: 5' 6" (1.676 m)       Physical Exam  Constitutional:       General: He is not in acute distress.  HENT:      Head: Normocephalic and atraumatic.   Eyes:      Conjunctiva/sclera: Conjunctivae normal.   Cardiovascular:      Rate and Rhythm: Normal rate and regular rhythm.      Heart sounds: Normal heart sounds. No murmur heard.    No friction rub. No gallop.   Pulmonary:      Effort: Pulmonary effort is normal.      Breath sounds: Normal breath sounds. No wheezing or rales.   Musculoskeletal:      Cervical back: Neck supple.   Skin:     General: Skin is warm and dry.   Neurological:      Mental Status: He is alert and oriented to person, place, and time.   Psychiatric:         Behavior: Behavior normal.         Thought Content: Thought content normal.         Judgment: Judgment normal.            Assessment:       1. Annual physical exam    2. Aortic atherosclerosis    3. Secondary malignant neoplasm of bone    4. Pulmonary emphysema, unspecified emphysema type        Plan:       Annual physical exam  -     CBC Auto Differential; Future; Expected date: 05/18/2022  -     Comprehensive Metabolic Panel; Future; Expected date: 05/18/2022  -     Hemoglobin A1C; Future; Expected date: 05/18/2022  -     TSH; Future; Expected date: 05/18/2022  -     Lipid Panel; Future; Expected date: 05/18/2022  -     HEPATITIS C ANTIBODY; Future; Expected date: 05/18/2022    Aortic atherosclerosis    Secondary malignant neoplasm of bone    Pulmonary emphysema, unspecified emphysema type      F/u labs. Cont meds.          Patient note was created using [employer].  Any errors in syntax or even information may not have been identified and edited on initial review prior to signing this note.  "

## 2022-05-24 LAB — HCV AB SERPL QL IA: NEGATIVE

## 2022-05-30 ENCOUNTER — PES CALL (OUTPATIENT)
Dept: ADMINISTRATIVE | Facility: CLINIC | Age: 47
End: 2022-05-30
Payer: MEDICARE

## 2022-06-29 ENCOUNTER — TELEPHONE (OUTPATIENT)
Dept: ADMINISTRATIVE | Facility: CLINIC | Age: 47
End: 2022-06-29
Payer: MEDICARE

## 2022-06-29 NOTE — TELEPHONE ENCOUNTER
Called pt; no answer; could not leave a message due to line kept ringing; I was calling to confirm pt's in office EAWV appt on 6/30/22

## 2022-06-30 ENCOUNTER — OFFICE VISIT (OUTPATIENT)
Dept: FAMILY MEDICINE | Facility: CLINIC | Age: 47
End: 2022-06-30
Payer: MEDICARE

## 2022-06-30 VITALS
RESPIRATION RATE: 17 BRPM | HEIGHT: 66 IN | BODY MASS INDEX: 27.53 KG/M2 | HEART RATE: 77 BPM | TEMPERATURE: 98 F | SYSTOLIC BLOOD PRESSURE: 96 MMHG | WEIGHT: 171.31 LBS | OXYGEN SATURATION: 95 % | DIASTOLIC BLOOD PRESSURE: 70 MMHG

## 2022-06-30 DIAGNOSIS — Z00.00 ENCOUNTER FOR PREVENTIVE HEALTH EXAMINATION: Primary | ICD-10-CM

## 2022-06-30 DIAGNOSIS — I70.0 AORTIC ATHEROSCLEROSIS: ICD-10-CM

## 2022-06-30 DIAGNOSIS — I10 ESSENTIAL HYPERTENSION: ICD-10-CM

## 2022-06-30 DIAGNOSIS — J43.9 PULMONARY EMPHYSEMA, UNSPECIFIED EMPHYSEMA TYPE: ICD-10-CM

## 2022-06-30 PROCEDURE — G0439 PR MEDICARE ANNUAL WELLNESS SUBSEQUENT VISIT: ICD-10-PCS | Mod: S$GLB,,, | Performed by: PHYSICIAN ASSISTANT

## 2022-06-30 PROCEDURE — 99214 OFFICE O/P EST MOD 30 MIN: CPT | Mod: PBBFAC,PO | Performed by: PHYSICIAN ASSISTANT

## 2022-06-30 PROCEDURE — 99999 PR PBB SHADOW E&M-EST. PATIENT-LVL IV: ICD-10-PCS | Mod: PBBFAC,,, | Performed by: PHYSICIAN ASSISTANT

## 2022-06-30 PROCEDURE — 99999 PR PBB SHADOW E&M-EST. PATIENT-LVL IV: CPT | Mod: PBBFAC,,, | Performed by: PHYSICIAN ASSISTANT

## 2022-06-30 PROCEDURE — G0439 PPPS, SUBSEQ VISIT: HCPCS | Mod: S$GLB,,, | Performed by: PHYSICIAN ASSISTANT

## 2022-06-30 NOTE — PROGRESS NOTES
Health Maintenance Due   Topic Date Due    COVID-19 Vaccine (1) Discuss with provider    TETANUS VACCINE  Patient denial     High Dose Statin  Discuss with provider    Pneumococcal Vaccines (Age 0-64) (2 - PPSV23 or PCV20) Patient denial          work

## 2022-06-30 NOTE — PROGRESS NOTES
"  Yao Alan presented for a  Medicare AWV and comprehensive Health Risk Assessment today. The following components were reviewed and updated:    · Medical history  · Family History  · Social history  · Allergies and Current Medications  · Health Risk Assessment  · Health Maintenance  · Care Team         ** See Completed Assessments for Annual Wellness Visit within the encounter summary.**         The following assessments were completed:  · Living Situation  · CAGE  · Depression Screening  · Timed Get Up and Go  · Whisper Test  · Cognitive Function Screening  · Nutrition Screening  · ADL Screening  · PAQ Screening        Vitals:    06/30/22 1050   BP: 96/70   BP Location: Left arm   Patient Position: Sitting   BP Method: Small (Manual)   Pulse: 77   Resp: 17   Temp: 98.3 °F (36.8 °C)   TempSrc: Other (see comments)   SpO2: 95%   Weight: 77.7 kg (171 lb 4.8 oz)   Height: 5' 6" (1.676 m)     Body mass index is 27.65 kg/m².  Physical Exam          Diagnoses and health risks identified today and associated recommendations/orders:    1. Encounter for preventive health examination  Provided Yao with a 5-10 year written screening schedule and personal prevention plan. Recommendations were developed using the USPSTF age appropriate recommendations. Education, counseling, and referrals were provided as needed. After Visit Summary printed and given to patient which includes a list of additional screenings\tests needed.    2. Pulmonary emphysema, unspecified emphysema type  Stable monitor    3. Aortic atherosclerosis  Stable monitor    4. Essential hypertension  controlled          No follow-ups on file.    Shanell Seals PA-C         I offered to discuss advanced care planning, including how to pick a person who would make decisions for you if you were unable to make them for yourself, called a health care power of , and what kind of decisions you might make such as use of life sustaining treatments such as ventilators " and tube feeding when faced with a life limiting illness recorded on a living will that they will need to know. (How you want to be cared for as you near the end of your natural life)     X Patient is interested in learning more about how to make advanced directives.  I provided them paperwork and offered to discuss this with them.

## 2022-06-30 NOTE — PATIENT INSTRUCTIONS
Counseling and Referral of Other Preventative  (Italic type indicates deductible and co-insurance are waived)    Patient Name: Yao Alan  Today's Date: 6/30/2022    Health Maintenance       Date Due Completion Date    COVID-19 Vaccine (1) Never done ---    TETANUS VACCINE Never done ---    High Dose Statin Never done ---    Pneumococcal Vaccines (Age 0-64) (2 - PPSV23 or PCV20) 01/16/2020 1/16/2019    Influenza Vaccine (Season Ended) 09/01/2022 1/16/2019    Lipid Panel 05/18/2027 5/18/2022    Colorectal Cancer Screening 03/18/2029 3/18/2019        No orders of the defined types were placed in this encounter.    The following information is provided to all patients.  This information is to help you find resources for any of the problems found today that may be affecting your health:                Living healthy guide: www.[facility].[place].[facility]      Understanding Diabetes: www.diabetes.org      Eating healthy: www.cdc.gov/healthyweight      [facility] home safety checklist: www.cdc.gov/steadi/patient.html      Agency on Aging: www.goea.[place]      Alcoholics anonymous (AA): www.aa.org      Physical Activity: www.mele.nih.gov/dv0ymtw      Tobacco use: www.quitwithusla.org

## 2022-07-05 ENCOUNTER — TELEPHONE (OUTPATIENT)
Dept: HEMATOLOGY/ONCOLOGY | Facility: CLINIC | Age: 47
End: 2022-07-05
Payer: MEDICARE

## 2022-07-05 DIAGNOSIS — C34.11 MALIGNANT NEOPLASM OF UPPER LOBE OF RIGHT LUNG: Primary | ICD-10-CM

## 2022-07-05 NOTE — TELEPHONE ENCOUNTER
Spoke with patient.  He is calling to schedule his recall appointments due now.      Message routed to schedulers (Schedule CBC,CMP, CT chest, abdomen/pelvis and bone scans and see dr carl-----due now--let me know if you need new orders.)

## 2022-07-05 NOTE — TELEPHONE ENCOUNTER
----- Message from Marva Reeves sent at 7/5/2022  8:24 AM CDT -----  Regarding: schedule  Contact: [phone]  Request Appt    Appt Type: F/U  Call Back Number:[phone]  Additional Information:

## 2022-07-11 ENCOUNTER — HOSPITAL ENCOUNTER (OUTPATIENT)
Dept: RADIOLOGY | Facility: HOSPITAL | Age: 47
Discharge: HOME | End: 2022-07-11
Attending: INTERNAL MEDICINE
Payer: MEDICARE

## 2022-07-11 DIAGNOSIS — C34.11 MALIGNANT NEOPLASM OF UPPER LOBE OF RIGHT LUNG: ICD-10-CM

## 2022-07-11 PROCEDURE — A9503 TC99M MEDRONATE: HCPCS

## 2022-07-11 PROCEDURE — 78306 NM BONE SCAN WHOLE BODY: ICD-10-PCS | Mod: 26,,, | Performed by: RADIOLOGY

## 2022-07-11 PROCEDURE — 78306 BONE IMAGING WHOLE BODY: CPT | Mod: TC

## 2022-07-11 PROCEDURE — 78306 BONE IMAGING WHOLE BODY: CPT | Mod: 26,,, | Performed by: RADIOLOGY

## 2022-07-13 ENCOUNTER — HOSPITAL ENCOUNTER (OUTPATIENT)
Dept: RADIOLOGY | Facility: HOSPITAL | Age: 47
Discharge: HOME | End: 2022-07-13
Attending: INTERNAL MEDICINE
Payer: MEDICARE

## 2022-07-13 DIAGNOSIS — C34.11 MALIGNANT NEOPLASM OF UPPER LOBE OF RIGHT LUNG: ICD-10-CM

## 2022-07-13 PROCEDURE — 74177 CT CHEST ABDOMEN PELVIS WITH CONTRAST (XPD): ICD-10-PCS | Mod: 26,,, | Performed by: INTERNAL MEDICINE

## 2022-07-13 PROCEDURE — 74177 CT ABD & PELVIS W/CONTRAST: CPT | Mod: TC

## 2022-07-13 PROCEDURE — 25500020 PHARM REV CODE 255: Performed by: INTERNAL MEDICINE

## 2022-07-13 PROCEDURE — 74177 CT ABD & PELVIS W/CONTRAST: CPT | Mod: 26,,, | Performed by: INTERNAL MEDICINE

## 2022-07-13 PROCEDURE — 71260 CT THORAX DX C+: CPT | Mod: TC

## 2022-07-13 PROCEDURE — 71260 CT CHEST ABDOMEN PELVIS WITH CONTRAST (XPD): ICD-10-PCS | Mod: 26,,, | Performed by: INTERNAL MEDICINE

## 2022-07-13 PROCEDURE — 71260 CT THORAX DX C+: CPT | Mod: 26,,, | Performed by: INTERNAL MEDICINE

## 2022-07-13 RX ADMIN — IOHEXOL 15 ML: 300 INJECTION, SOLUTION INTRAVENOUS at 09:07

## 2022-07-13 RX ADMIN — IOHEXOL 75 ML: 350 INJECTION, SOLUTION INTRAVENOUS at 10:07

## 2022-07-15 ENCOUNTER — OFFICE VISIT (OUTPATIENT)
Dept: HEMATOLOGY/ONCOLOGY | Facility: CLINIC | Age: 47
End: 2022-07-15
Payer: MEDICARE

## 2022-07-15 VITALS
RESPIRATION RATE: 18 BRPM | DIASTOLIC BLOOD PRESSURE: 81 MMHG | BODY MASS INDEX: 27.74 KG/M2 | WEIGHT: 172.63 LBS | SYSTOLIC BLOOD PRESSURE: 133 MMHG | HEART RATE: 81 BPM | OXYGEN SATURATION: 98 % | HEIGHT: 66 IN | TEMPERATURE: 98 F

## 2022-07-15 DIAGNOSIS — Z85.118 HISTORY OF LUNG CANCER: Primary | ICD-10-CM

## 2022-07-15 PROCEDURE — 99214 PR OFFICE/OUTPT VISIT, EST, LEVL IV, 30-39 MIN: ICD-10-PCS | Mod: S$GLB,,, | Performed by: INTERNAL MEDICINE

## 2022-07-15 PROCEDURE — 99214 OFFICE O/P EST MOD 30 MIN: CPT | Mod: S$GLB,,, | Performed by: INTERNAL MEDICINE

## 2022-07-15 PROCEDURE — 99999 PR PBB SHADOW E&M-EST. PATIENT-LVL III: CPT | Mod: PBBFAC,,, | Performed by: INTERNAL MEDICINE

## 2022-07-15 PROCEDURE — 99999 PR PBB SHADOW E&M-EST. PATIENT-LVL III: ICD-10-PCS | Mod: PBBFAC,,, | Performed by: INTERNAL MEDICINE

## 2022-07-15 NOTE — PROGRESS NOTES
"Subjective:       Patient ID: Yao Alan is a 47 y.o. male.    Chief Complaint: History of lung cancer  Oncologic History:  Mr. Yao Alan is a 47-year-old male who has a long history of smoking and was seen in the Pulmonary Clinic by Dr. Valle on 03/05/2015 with abnormal CT scan.    Apparently, he went to the [facility] in February with coughing as well as chest pain. A chest x-ray was done, which revealed a left upper lobe lung mass. Followup CT scan was done on 02/12/2015 and that revealed posterior superior mediastinal mass adjacent and prominent enlarged upper left mediastinal lymph nodes, abutting the aortic arch as well as left subclavian artery. A  CT scan of the abdomen was done on 03/03/2015 without contrast and that revealed nonobstructive nephrolithiasis, but a 2.1 cm lytic lesion involving the left iliac wing concerning for metastatic disease given the presence of emphysema and a mediastinal soft tissue mass on the CT chest from 02/12/2015. He saw Dr. Valle after that on 03/05/2015 and underwent an IR guided biopsy of the bone lesion on 03/17/2015. 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    His EGFR/EML/ALK is negative.    His PET scan on 3/25/15 revealed Left upper lobe lung lesion with an adjacent para-aortic lymph node, right anterior rib metastasis, right iliac metastasis, and pancreatic metastasis. A pancreatic cancer primary neoplasm is less likely.  MRI brain was negative for mets.  He completed 6 cycles of Carboplatin and Alimta and was on maintenance Alimta until end of March 2016.  His bone scan from 3/16 revealed stable disease. His CT scans also from end of March 2016 revealed "Interval enlargement of left upper lobe lung mass measuring 5.9 x 3.9 cm (previously 3.3 x 2.5 cm). This mass appears to invade the mediastinum and abutting the aortic arch and left subclavian artery"     He started Opdivo since March 2016 and his last Opdivo was on " 8/27/19                         HPI  He comes in for his follow-up  CT scans from 7/13/2022 reveal no evidence of recurrence.  Bone scan from 7/11/2022 reveal no evidence of recurrence  He feels well and denies any new issues. He is here by himself., ECOG PS is zero.    Review of Systems   Constitutional: Negative for appetite change, fatigue and unexpected weight change.   HENT: Negative for mouth sores.    Eyes: Negative for visual disturbance.   Respiratory: Negative for cough and shortness of breath.    Cardiovascular: Negative for chest pain.   Gastrointestinal: Negative for abdominal pain and diarrhea.   Genitourinary: Negative for frequency.   Musculoskeletal: Negative for back pain.   Integumentary:  Negative for rash.   Neurological: Negative for headaches.   Hematological: Negative for adenopathy.   Psychiatric/Behavioral: The patient is not nervous/anxious.    All other systems reviewed and are negative.        Objective:      Physical Exam  Vitals reviewed.   Constitutional:       Appearance: He is well-developed.   HENT:      Mouth/Throat:      Pharynx: No oropharyngeal exudate.   Cardiovascular:      Rate and Rhythm: Normal rate.      Heart sounds: Normal heart sounds.   Pulmonary:      Effort: Pulmonary effort is normal.      Breath sounds: Normal breath sounds. No wheezing.   Abdominal:      General: Bowel sounds are normal.      Palpations: Abdomen is soft.      Tenderness: There is no abdominal tenderness.   Musculoskeletal:         General: No tenderness.   Lymphadenopathy:      Cervical: No cervical adenopathy.   Skin:     General: Skin is warm and dry.      Findings: No rash.   Neurological:      Mental Status: He is alert and oriented to person, place, and time.      Coordination: Coordination normal.   Psychiatric:         Thought Content: Thought content normal.         Judgment: Judgment normal.           LABS:  WBC   Date Value Ref Range Status   07/13/2022 8.69 3.90 - 12.70 K/uL Final      Hemoglobin   Date Value Ref Range Status   07/13/2022 13.5 (L) 14.0 - 18.0 g/dL Final     POC Hematocrit   Date Value Ref Range Status   06/12/2019 40 36 - 54 %PCV Final     Hematocrit   Date Value Ref Range Status   07/13/2022 41.4 40.0 - 54.0 % Final     Platelets   Date Value Ref Range Status   07/13/2022 271 150 - 450 K/uL Final     Gran # (ANC)   Date Value Ref Range Status   07/13/2022 4.2 1.8 - 7.7 K/uL Final     Comment:     The ANC is based on a white cell differential from an   automated cell counter. It has not been microscopically   reviewed for the presence of abnormal cells. Clinical   correlation is required.         Chemistry        Component Value Date/Time     07/13/2022 0903    K 4.1 07/13/2022 0903     07/13/2022 0903    CO2 26 07/13/2022 0903    BUN 19 07/13/2022 0903    CREATININE 1.5 (H) 07/13/2022 0903    GLU 97 07/13/2022 0903        Component Value Date/Time    CALCIUM 9.4 07/13/2022 0903    ALKPHOS 79 07/13/2022 0903    AST 23 07/13/2022 0903    ALT 18 07/13/2022 0903    BILITOT 0.2 07/13/2022 0903    ESTGFRAFRICA >60 07/13/2022 0903    EGFRNONAA 55 (A) 07/13/2022 0903          Assessment:       Problem List Items Addressed This Visit     History of lung cancer - Primary    Relevant Orders    CBC W/ AUTO DIFFERENTIAL    Comprehensive Metabolic Panel    CT Chest Abdomen Pelvis With Contrast (xpd)          Plan:         Route Chart for Scheduling    Med Onc Chart Routing      Follow up with physician 1 year. Schedule CBC,CMP, CT chest, abdomen/pelvis with contrats and see me    Follow up with MARY    Infusion scheduling note    Injection scheduling note    Labs    Imaging    Pharmacy appointment    Other referrals           He is doing very well clinicallywith no evidence of recurrence and will return in 1 year with labs and CT scans    Above care plan was discussed with patient and all questions were addressed to his satisfaction

## 2022-11-10 ENCOUNTER — OFFICE VISIT (OUTPATIENT)
Dept: URGENT CARE | Facility: CLINIC | Age: 47
End: 2022-11-10
Payer: MEDICARE

## 2022-11-10 VITALS
DIASTOLIC BLOOD PRESSURE: 79 MMHG | BODY MASS INDEX: 27.64 KG/M2 | HEIGHT: 66 IN | SYSTOLIC BLOOD PRESSURE: 120 MMHG | HEART RATE: 85 BPM | RESPIRATION RATE: 18 BRPM | TEMPERATURE: 97 F | WEIGHT: 172 LBS | OXYGEN SATURATION: 95 %

## 2022-11-10 DIAGNOSIS — R05.9 COUGH, UNSPECIFIED TYPE: ICD-10-CM

## 2022-11-10 DIAGNOSIS — J01.90 ACUTE SINUSITIS WITH SYMPTOMS > 10 DAYS: Primary | ICD-10-CM

## 2022-11-10 PROCEDURE — 99203 PR OFFICE/OUTPT VISIT, NEW, LEVL III, 30-44 MIN: ICD-10-PCS | Mod: S$GLB,,,

## 2022-11-10 PROCEDURE — 3078F PR MOST RECENT DIASTOLIC BLOOD PRESSURE < 80 MM HG: ICD-10-PCS | Mod: CPTII,S$GLB,,

## 2022-11-10 PROCEDURE — 3044F HG A1C LEVEL LT 7.0%: CPT | Mod: CPTII,S$GLB,,

## 2022-11-10 PROCEDURE — 3044F PR MOST RECENT HEMOGLOBIN A1C LEVEL <7.0%: ICD-10-PCS | Mod: CPTII,S$GLB,,

## 2022-11-10 PROCEDURE — 1159F PR MEDICATION LIST DOCUMENTED IN MEDICAL RECORD: ICD-10-PCS | Mod: CPTII,S$GLB,,

## 2022-11-10 PROCEDURE — 1160F PR REVIEW ALL MEDS BY PRESCRIBER/CLIN PHARMACIST DOCUMENTED: ICD-10-PCS | Mod: CPTII,S$GLB,,

## 2022-11-10 PROCEDURE — 99203 OFFICE O/P NEW LOW 30 MIN: CPT | Mod: S$GLB,,,

## 2022-11-10 PROCEDURE — 3078F DIAST BP <80 MM HG: CPT | Mod: CPTII,S$GLB,,

## 2022-11-10 PROCEDURE — 1160F RVW MEDS BY RX/DR IN RCRD: CPT | Mod: CPTII,S$GLB,,

## 2022-11-10 PROCEDURE — 1159F MED LIST DOCD IN RCRD: CPT | Mod: CPTII,S$GLB,,

## 2022-11-10 PROCEDURE — 3008F BODY MASS INDEX DOCD: CPT | Mod: CPTII,S$GLB,,

## 2022-11-10 PROCEDURE — 3074F SYST BP LT 130 MM HG: CPT | Mod: CPTII,S$GLB,,

## 2022-11-10 PROCEDURE — 3074F PR MOST RECENT SYSTOLIC BLOOD PRESSURE < 130 MM HG: ICD-10-PCS | Mod: CPTII,S$GLB,,

## 2022-11-10 PROCEDURE — 3008F PR BODY MASS INDEX (BMI) DOCUMENTED: ICD-10-PCS | Mod: CPTII,S$GLB,,

## 2022-11-10 RX ORDER — PROMETHAZINE HYDROCHLORIDE AND DEXTROMETHORPHAN HYDROBROMIDE 6.25; 15 MG/5ML; MG/5ML
5 SYRUP ORAL NIGHTLY PRN
Qty: 180 ML | Refills: 0 | Status: SHIPPED | OUTPATIENT
Start: 2022-11-10 | End: 2023-05-20

## 2022-11-10 RX ORDER — BENZONATATE 200 MG/1
200 CAPSULE ORAL 3 TIMES DAILY PRN
Qty: 30 CAPSULE | Refills: 0 | Status: SHIPPED | OUTPATIENT
Start: 2022-11-10 | End: 2022-11-20

## 2022-11-10 RX ORDER — AMOXICILLIN AND CLAVULANATE POTASSIUM 875; 125 MG/1; MG/1
1 TABLET, FILM COATED ORAL EVERY 12 HOURS
Qty: 14 TABLET | Refills: 0 | Status: SHIPPED | OUTPATIENT
Start: 2022-11-10 | End: 2022-11-17

## 2022-11-10 NOTE — PATIENT INSTRUCTIONS
"                                                          Sinusitis   If your condition worsens or fails to improve we recommend that you receive another evaluation at the ER immediately or contact your PCP to discuss your concerns or return here. You must understand that you've received an urgent care treatment only and that you may be released before all your medical problems are known or treated. You the patient will arrange for followup care as instructed.   If we discussed that I think your illness is viral it will not respond to antibiotics and it will last 10-14 days. However, if over the next few days the symptoms worsen start the antibiotics I have given you.   If we discussed that you require antibiotics start them now and take them to completion.   If you are female and on BCP and do take the antibiotics, use additional methods to prevent pregnancy while on the antibiotics and for one cycle after.   Flonase (fluticasone) is a nasal spray which is available over the counter and may help with your symptoms   Zyrtec D, Claritin D or allegra D can also help with symptoms of congestion and drainage.   If you have hypertension avoid using the "D" which is the decongestant   If you just have drainage you can take plain zyrtec, claritin or allegra   If you just have a congested feeling you can take pseudoephedrine (unless you have high blood pressure) which you have to sign for behind the counter. Do not buy the phenylephrine which is on the shelf as it is not effective   Rest and fluids are also important.   Tylenol or ibuprofen can also be used as directed for pain unless you have an allergy to them or medical condition such as stomach ulcers, kidney or liver disease or blood thinners etc for which you should not be taking these type of medications.   If you are flying in the next few days Afrin nose drops for the airplane flight upon take off and landing may help. Other than at those times refrain from using " afrin.   If you were prescribed a narcotic do not drive or operate heavy machinery while taking these medications.

## 2022-11-10 NOTE — LETTER
November 10, 2022      [facility] - Urgent Care  [address] [facility], [address]  [place] [address]  Phone: [phone]  Fax: [phone]       Patient: Yao Alan   YOB: 1975  Date of Visit: 11/10/2022    To Whom It May Concern:    Kimmy Alan  was at [facility] on 11/10/2022. Please excuse him from missing work today. If you have any questions or concerns, or if I can be of further assistance, please do not hesitate to contact me.    Sincerely,    Yasmani Nicole PA-C

## 2022-11-10 NOTE — LETTER
November 10, 2022      [facility] - Urgent Care  [address] [facility], [address]  [place] [address]  Phone: [phone]  Fax: [phone]       Patient: Yao Alan   YOB: 1975  Date of Visit: 11/10/2022    To Whom It May Concern:    Kimmy Alan  was at [facility] on 11/10/2022. Please excuse him from missing work today. If you have any questions or concerns, or if I can be of further assistance, please do not hesitate to contact me.    Sincerely,    Yasmani Nicole PA-C

## 2022-11-10 NOTE — PROGRESS NOTES
"Subjective:       Patient ID: Yao Alan is a 47 y.o. male.    Vitals:  height is 5' 6" (1.676 m) and weight is 78 kg (172 lb). His tympanic temperature is 97.4 °F (36.3 °C). His blood pressure is 120/79 and his pulse is 85. His respiration is 18 and oxygen saturation is 95%.     Chief Complaint: URI    Pt is a 46 y/o M who presents with coughing, nasal congestion, headaches, ear presure x2 weeks. Noticed blood in tissue when blowing his nose this morning. Denies CP, SoB, fever, chills, N/V/D, body aches, abdominal pain, dizziness.    URI   This is a new problem. The current episode started 1 to 4 weeks ago. The problem has been unchanged. There has been no fever. Associated symptoms include congestion, coughing and headaches. Pertinent negatives include no abdominal pain, chest pain, diarrhea, dysuria, ear pain, nausea, neck pain, rash, sneezing, sore throat or vomiting. He has tried nothing for the symptoms.     Constitution: Negative for chills and fever.   HENT:  Positive for congestion, postnasal drip and sinus pressure. Negative for ear pain, ear discharge and sore throat.    Neck: Negative for neck pain and neck stiffness.   Cardiovascular:  Negative for chest pain.   Eyes:  Negative for eye itching.   Respiratory:  Positive for cough and sputum production. Negative for shortness of breath.    Gastrointestinal:  Negative for abdominal pain, nausea, vomiting and diarrhea.   Genitourinary:  Negative for dysuria.   Musculoskeletal:  Negative for muscle ache.   Skin:  Negative for rash.   Allergic/Immunologic: Negative for sneezing.   Neurological:  Positive for headaches. Negative for dizziness.     Objective:      Physical Exam   Constitutional: He is oriented to person, place, and time. He appears well-developed.   HENT:   Head: Normocephalic and atraumatic.   Ears:   Right Ear: Tympanic membrane, external ear and ear canal normal.   Left Ear: Tympanic membrane, external ear and ear canal normal.   Nose: " Rhinorrhea and congestion present. No sinus tenderness. No epistaxis.   Mouth/Throat: Oropharynx is clear and moist. Mucous membranes are moist. Oropharynx is clear.   Eyes: Conjunctivae, EOM and lids are normal. Pupils are equal, round, and reactive to light.   Neck: Trachea normal and phonation normal. Neck supple.   Cardiovascular: Normal rate, regular rhythm, normal heart sounds and normal pulses.   Pulmonary/Chest: Effort normal and breath sounds normal. No respiratory distress.   Musculoskeletal: Normal range of motion.         General: Normal range of motion.   Neurological: no focal deficit. He is alert and oriented to person, place, and time.   Skin: Skin is warm, dry and intact. Capillary refill takes less than 2 seconds.   Psychiatric: His speech is normal and behavior is normal. Judgment and thought content normal.   Nursing note and vitals reviewed.      Assessment:       1. Acute sinusitis with symptoms > 10 days    2. Cough, unspecified type          Plan:         Acute sinusitis with symptoms > 10 days  -     amoxicillin-clavulanate 875-125mg (AUGMENTIN) 875-125 mg per tablet; Take 1 tablet by mouth every 12 (twelve) hours. for 7 days  Dispense: 14 tablet; Refill: 0    Cough, unspecified type  -     benzonatate (TESSALON) 200 MG capsule; Take 1 capsule (200 mg total) by mouth 3 (three) times daily as needed for Cough.  Dispense: 30 capsule; Refill: 0  -     promethazine-dextromethorphan (PROMETHAZINE-DM) 6.25-15 mg/5 mL Syrp; Take 5 mLs by mouth nightly as needed (cough).  Dispense: 180 mL; Refill: 0               Patient Instructions                                                             Sinusitis   If your condition worsens or fails to improve we recommend that you receive another evaluation at the ER immediately or contact your PCP to discuss your concerns or return here. You must understand that you've received an urgent care treatment only and that you may be released before all your  "medical problems are known or treated. You the patient will arrange for followup care as instructed.   If we discussed that I think your illness is viral it will not respond to antibiotics and it will last 10-14 days. However, if over the next few days the symptoms worsen start the antibiotics I have given you.   If we discussed that you require antibiotics start them now and take them to completion.   If you are female and on BCP and do take the antibiotics, use additional methods to prevent pregnancy while on the antibiotics and for one cycle after.   Flonase (fluticasone) is a nasal spray which is available over the counter and may help with your symptoms   Zyrtec D, Claritin D or allegra D can also help with symptoms of congestion and drainage.   If you have hypertension avoid using the "D" which is the decongestant   If you just have drainage you can take plain zyrtec, claritin or allegra   If you just have a congested feeling you can take pseudoephedrine (unless you have high blood pressure) which you have to sign for behind the counter. Do not buy the phenylephrine which is on the shelf as it is not effective   Rest and fluids are also important.   Tylenol or ibuprofen can also be used as directed for pain unless you have an allergy to them or medical condition such as stomach ulcers, kidney or liver disease or blood thinners etc for which you should not be taking these type of medications.   If you are flying in the next few days Afrin nose drops for the airplane flight upon take off and landing may help. Other than at those times refrain from using afrin.   If you were prescribed a narcotic do not drive or operate heavy machinery while taking these medications.         "

## 2022-12-29 ENCOUNTER — PES CALL (OUTPATIENT)
Dept: ADMINISTRATIVE | Facility: CLINIC | Age: 47
End: 2022-12-29
Payer: MEDICARE

## 2023-01-27 ENCOUNTER — TELEPHONE (OUTPATIENT)
Dept: ADMINISTRATIVE | Facility: CLINIC | Age: 48
End: 2023-01-27
Payer: MEDICARE

## 2023-01-27 NOTE — TELEPHONE ENCOUNTER
Called pt, informed pt I was calling to remind pt of his in office EAWV on 1/30/23; clinic location provided to patient; pt confirmed appointment

## 2023-01-30 ENCOUNTER — OFFICE VISIT (OUTPATIENT)
Dept: FAMILY MEDICINE | Facility: CLINIC | Age: 48
End: 2023-01-30
Payer: MEDICARE

## 2023-01-30 VITALS
HEART RATE: 66 BPM | WEIGHT: 176.38 LBS | BODY MASS INDEX: 28.34 KG/M2 | HEIGHT: 66 IN | TEMPERATURE: 98 F | DIASTOLIC BLOOD PRESSURE: 70 MMHG | OXYGEN SATURATION: 96 % | SYSTOLIC BLOOD PRESSURE: 122 MMHG

## 2023-01-30 DIAGNOSIS — J45.20 MILD INTERMITTENT ASTHMA WITHOUT COMPLICATION: ICD-10-CM

## 2023-01-30 DIAGNOSIS — Z00.00 ENCOUNTER FOR PREVENTIVE HEALTH EXAMINATION: Primary | ICD-10-CM

## 2023-01-30 DIAGNOSIS — E03.9 HYPOTHYROIDISM, UNSPECIFIED TYPE: ICD-10-CM

## 2023-01-30 DIAGNOSIS — I70.0 AORTIC ATHEROSCLEROSIS: ICD-10-CM

## 2023-01-30 DIAGNOSIS — I10 ESSENTIAL HYPERTENSION: ICD-10-CM

## 2023-01-30 DIAGNOSIS — Z85.118 HISTORY OF LUNG CANCER: ICD-10-CM

## 2023-01-30 DIAGNOSIS — J43.9 PULMONARY EMPHYSEMA, UNSPECIFIED EMPHYSEMA TYPE: ICD-10-CM

## 2023-01-30 PROCEDURE — G0439 PR MEDICARE ANNUAL WELLNESS SUBSEQUENT VISIT: ICD-10-PCS | Mod: S$GLB,,, | Performed by: NURSE PRACTITIONER

## 2023-01-30 PROCEDURE — 1159F MED LIST DOCD IN RCRD: CPT | Mod: CPTII,S$GLB,, | Performed by: NURSE PRACTITIONER

## 2023-01-30 PROCEDURE — 99999 PR PBB SHADOW E&M-EST. PATIENT-LVL III: CPT | Mod: PBBFAC,,, | Performed by: NURSE PRACTITIONER

## 2023-01-30 PROCEDURE — 3074F SYST BP LT 130 MM HG: CPT | Mod: CPTII,S$GLB,, | Performed by: NURSE PRACTITIONER

## 2023-01-30 PROCEDURE — 3078F PR MOST RECENT DIASTOLIC BLOOD PRESSURE < 80 MM HG: ICD-10-PCS | Mod: CPTII,S$GLB,, | Performed by: NURSE PRACTITIONER

## 2023-01-30 PROCEDURE — G0439 PPPS, SUBSEQ VISIT: HCPCS | Mod: S$GLB,,, | Performed by: NURSE PRACTITIONER

## 2023-01-30 PROCEDURE — 3008F PR BODY MASS INDEX (BMI) DOCUMENTED: ICD-10-PCS | Mod: CPTII,S$GLB,, | Performed by: NURSE PRACTITIONER

## 2023-01-30 PROCEDURE — 3074F PR MOST RECENT SYSTOLIC BLOOD PRESSURE < 130 MM HG: ICD-10-PCS | Mod: CPTII,S$GLB,, | Performed by: NURSE PRACTITIONER

## 2023-01-30 PROCEDURE — 1159F PR MEDICATION LIST DOCUMENTED IN MEDICAL RECORD: ICD-10-PCS | Mod: CPTII,S$GLB,, | Performed by: NURSE PRACTITIONER

## 2023-01-30 PROCEDURE — 1160F PR REVIEW ALL MEDS BY PRESCRIBER/CLIN PHARMACIST DOCUMENTED: ICD-10-PCS | Mod: CPTII,S$GLB,, | Performed by: NURSE PRACTITIONER

## 2023-01-30 PROCEDURE — 3078F DIAST BP <80 MM HG: CPT | Mod: CPTII,S$GLB,, | Performed by: NURSE PRACTITIONER

## 2023-01-30 PROCEDURE — 99999 PR PBB SHADOW E&M-EST. PATIENT-LVL III: ICD-10-PCS | Mod: PBBFAC,,, | Performed by: NURSE PRACTITIONER

## 2023-01-30 PROCEDURE — 3008F BODY MASS INDEX DOCD: CPT | Mod: CPTII,S$GLB,, | Performed by: NURSE PRACTITIONER

## 2023-01-30 PROCEDURE — 1160F RVW MEDS BY RX/DR IN RCRD: CPT | Mod: CPTII,S$GLB,, | Performed by: NURSE PRACTITIONER

## 2023-01-30 NOTE — PATIENT INSTRUCTIONS
Counseling and Referral of Other Preventative  (Italic type indicates deductible and co-insurance are waived)    Patient Name: Yao Alan  Today's Date: 1/30/2023    Health Maintenance       Date Due Completion Date    COVID-19 Vaccine (1) Never done ---    TETANUS VACCINE Never done ---    High Dose Statin Never done ---    Pneumococcal Vaccines (Age 0-64) (2 - PPSV23 if available, else PCV20) 01/16/2020 1/16/2019    Influenza Vaccine (1) 06/30/2023 (Originally 9/1/2022) 1/16/2019    Lipid Panel 05/18/2027 5/18/2022    Colorectal Cancer Screening 03/18/2029 3/18/2019        No orders of the defined types were placed in this encounter.      The following information is provided to all patients.  This information is to help you find resources for any of the problems found today that may be affecting your health:                Living healthy guide: www.[facility].[place].[facility]      Understanding Diabetes: www.diabetes.org      Eating healthy: www.cdc.gov/healthyweight      [facility] home safety checklist: www.cdc.gov/steadi/patient.html      Agency on Aging: www.goea.[place].[facility]      Alcoholics anonymous (AA): www.aa.org      Physical Activity: www.mele.nih.gov/qh4gltd      Tobacco use: www.quitwithusla.org

## 2023-01-30 NOTE — PROGRESS NOTES
"  Yao Alan presented for a  Medicare AWV and comprehensive Health Risk Assessment today. The following components were reviewed and updated:    Medical history  Family History  Social history  Allergies and Current Medications  Health Risk Assessment  Health Maintenance  Care Team       ** See Completed Assessments for Annual Wellness Visit within the encounter summary.**       The following assessments were completed:  Living Situation  CAGE  Depression Screening  Timed Get Up and Go  Whisper Test  Cognitive Function Screening  Nutrition Screening  ADL Screening  PAQ Screening          Vitals:    01/30/23 1116   BP: 122/70   Pulse: 66   Temp: 97.9 °F (36.6 °C)   TempSrc: Oral   SpO2: 96%   Weight: 80 kg (176 lb 5.9 oz)   Height: 5' 6" (1.676 m)     Body mass index is 28.47 kg/m².  Physical Exam  Vitals and nursing note reviewed.   Constitutional:       Appearance: Normal appearance.   Cardiovascular:      Rate and Rhythm: Normal rate.      Pulses: Normal pulses.      Heart sounds: Normal heart sounds.   Pulmonary:      Effort: Pulmonary effort is normal.      Breath sounds: Normal breath sounds.   Musculoskeletal:         General: Normal range of motion.   Neurological:      Mental Status: He is alert and oriented to person, place, and time.   Psychiatric:         Mood and Affect: Mood normal.         Behavior: Behavior normal.             Diagnoses and health risks identified today and associated recommendations/orders:    1. Encounter for preventive health examination  Pt was seen today for an Annual Wellness visit. Healthcare maintenance and screening recommendations were discussed and updated as indicated. Return in one year for AWV.    Review current opioid prescriptions:n/a  Screen for potential Substance Use Disorders:n/a    2. Aortic atherosclerosis  Chest CT 6/28/21. The current medical regimen is effective;  continue present plan and medications.    3. Pulmonary emphysema, unspecified emphysema type  The " current medical regimen is effective;  continue present plan and medications.    4. Mild intermittent asthma without complication  The current medical regimen is effective;  continue present plan and medications.    5. History of lung cancer  The current medical regimen is effective;  continue present plan and medications.    6. Essential hypertension  Stable. The current medical regimen is effective;  continue present plan and medications.    7. Hypothyroidism, unspecified type  The current medical regimen is effective;  continue present plan and medications.        Provided Yao with a 5-10 year written screening schedule and personal prevention plan. Recommendations were developed using the USPSTF age appropriate recommendations. Education, counseling, and referrals were provided as needed. After Visit Summary printed and given to patient which includes a list of additional screenings\tests needed.    Follow up in about 1 year (around 1/30/2024).    FRANCINE Cook  I offered to discuss advanced care planning, including how to pick a person who would make decisions for you if you were unable to make them for yourself, called a health care power of , and what kind of decisions you might make such as use of life sustaining treatments such as ventilators and tube feeding when faced with a life limiting illness recorded on a living will that they will need to know. (How you want to be cared for as you near the end of your natural life)     X Patient is interested in learning more about how to make advanced directives.  I provided them paperwork and offered to discuss this with them.

## 2023-04-26 ENCOUNTER — TELEPHONE (OUTPATIENT)
Dept: HEMATOLOGY/ONCOLOGY | Facility: CLINIC | Age: 48
End: 2023-04-26
Payer: MEDICARE

## 2023-04-26 NOTE — TELEPHONE ENCOUNTER
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 No           Additional Notes:  "Thank you for all that you do for our patients"                    "

## 2023-04-26 NOTE — TELEPHONE ENCOUNTER
Attempted to reach patient to discuss appointment scheduling. Let him know he is currently due for follow up in July but we would like for him to call back if something was going on and he felt he needed to be seen sooner.

## 2023-05-02 DIAGNOSIS — Z85.118 HISTORY OF LUNG CANCER: Primary | ICD-10-CM

## 2023-05-20 ENCOUNTER — OFFICE VISIT (OUTPATIENT)
Dept: URGENT CARE | Facility: CLINIC | Age: 48
End: 2023-05-20
Payer: MEDICARE

## 2023-05-20 ENCOUNTER — TELEPHONE (OUTPATIENT)
Dept: URGENT CARE | Facility: CLINIC | Age: 48
End: 2023-05-20

## 2023-05-20 VITALS
HEART RATE: 66 BPM | SYSTOLIC BLOOD PRESSURE: 104 MMHG | TEMPERATURE: 98 F | WEIGHT: 176 LBS | BODY MASS INDEX: 28.28 KG/M2 | DIASTOLIC BLOOD PRESSURE: 74 MMHG | HEIGHT: 66 IN | RESPIRATION RATE: 18 BRPM | OXYGEN SATURATION: 95 %

## 2023-05-20 DIAGNOSIS — J06.9 VIRAL URI: Primary | ICD-10-CM

## 2023-05-20 LAB
CTP QC/QA: YES
SARS-COV-2 AG RESP QL IA.RAPID: NEGATIVE

## 2023-05-20 PROCEDURE — 99213 OFFICE O/P EST LOW 20 MIN: CPT | Mod: S$GLB,,,

## 2023-05-20 PROCEDURE — 87811 SARS CORONAVIRUS 2 ANTIGEN POCT, MANUAL READ: ICD-10-PCS | Mod: QW,S$GLB,,

## 2023-05-20 PROCEDURE — 87811 SARS-COV-2 COVID19 W/OPTIC: CPT | Mod: QW,S$GLB,,

## 2023-05-20 PROCEDURE — 99213 PR OFFICE/OUTPT VISIT, EST, LEVL III, 20-29 MIN: ICD-10-PCS | Mod: S$GLB,,,

## 2023-05-20 RX ORDER — PROMETHAZINE HYDROCHLORIDE AND DEXTROMETHORPHAN HYDROBROMIDE 6.25; 15 MG/5ML; MG/5ML
5 SYRUP ORAL EVERY 4 HOURS PRN
Qty: 180 ML | Refills: 0 | Status: SHIPPED | OUTPATIENT
Start: 2023-05-20 | End: 2023-05-30

## 2023-05-20 NOTE — PATIENT INSTRUCTIONS

## 2023-05-20 NOTE — TELEPHONE ENCOUNTER
Pt was left a VM regarding his prescription and that we needed  to put a different pharmacy on file . [facility] in [place] was not able to fill the Rx due to the type of Medication( Magic Mouth)

## 2023-05-20 NOTE — LETTER
May 20, 2023      [facility] - Urgent Care  [address], [address]  [place] [address]  Phone: [phone]  Fax: [phone]       Patient: Yao Alan   YOB: 1975  Date of Visit: 05/20/2023    To Whom It May Concern:    Kimmy Alan  was at [facility] on 05/20/2023. Please excuse patient from work today (5/20/23). If you have any questions or concerns, or if I can be of further assistance, please do not hesitate to contact me.    Sincerely,    Yasmani Nicole PA-C

## 2023-05-20 NOTE — PROGRESS NOTES
"Subjective:      Patient ID: aYo Alan is a 48 y.o. male.    Vitals:  height is 5' 6" (1.676 m) and weight is 79.8 kg (176 lb). His tympanic temperature is 97.6 °F (36.4 °C). His blood pressure is 104/74 and his pulse is 66. His respiration is 18 and oxygen saturation is 95%.     Chief Complaint: Sore Throat    Patient is a 48-year-old male with history of asthma, emphysema, lung cancer, hypertension, aortic atherosclerosis presents with coughing, sore throat, subjective fever, headaches that started 2 days ago.  Has been taking Mucinex.  Denies body aches, chills, chest pain, SOB, nausea, vomiting, abdominal pain, wheezing, dizziness.    Sore Throat   This is a new problem. The current episode started in the past 7 days. The problem has been unchanged. There has been no fever. The pain is at a severity of 4/10. The pain is mild. Associated symptoms include coughing and headaches. Pertinent negatives include no abdominal pain, congestion, diarrhea, ear pain, neck pain, shortness of breath or vomiting. Treatments tried: mucinex. The treatment provided no relief.     Constitution: Positive for fever. Negative for chills.   HENT:  Positive for sore throat. Negative for ear pain and congestion.    Neck: Negative for neck pain and neck stiffness.   Cardiovascular:  Negative for chest pain.   Eyes:  Negative for eye discharge, eye itching and eye redness.   Respiratory:  Positive for cough. Negative for shortness of breath and wheezing.    Gastrointestinal:  Negative for abdominal pain, nausea, vomiting and diarrhea.   Musculoskeletal:  Negative for joint pain, joint swelling and muscle ache.   Allergic/Immunologic: Negative for sneezing.   Neurological:  Positive for headaches. Negative for dizziness.    Objective:     Physical Exam   Constitutional: He is oriented to person, place, and time. He appears well-developed.   HENT:   Head: Normocephalic and atraumatic.   Ears:   Right Ear: Tympanic membrane, external " ear and ear canal normal.   Left Ear: Tympanic membrane, external ear and ear canal normal.   Nose: Nose normal.   Mouth/Throat: Oropharynx is clear and moist. Mucous membranes are moist. Oropharynx is clear.   Eyes: Conjunctivae, EOM and lids are normal. Pupils are equal, round, and reactive to light.   Neck: Trachea normal and phonation normal. Neck supple.   Cardiovascular: Normal rate, regular rhythm, normal heart sounds and normal pulses.   Pulmonary/Chest: Effort normal and breath sounds normal.   Musculoskeletal: Normal range of motion.         General: Normal range of motion.   Neurological: no focal deficit. He is alert and oriented to person, place, and time.   Skin: Skin is warm, dry and intact. Capillary refill takes less than 2 seconds.   Psychiatric: His speech is normal and behavior is normal. Judgment and thought content normal.   Nursing note and vitals reviewed.    Results for orders placed or performed in visit on 05/20/23   SARS Coronavirus 2 Antigen, POCT Manual Read   Result Value Ref Range    SARS Coronavirus 2 Antigen Negative Negative     Acceptable Yes      *Note: Due to a large number of results and/or encounters for the requested time period, some results have not been displayed. A complete set of results can be found in Results Review.         Assessment:     1. Viral URI        Plan:     Viral URI  -     SARS Coronavirus 2 Antigen, POCT Manual Read  -     promethazine-dextromethorphan (PROMETHAZINE-DM) 6.25-15 mg/5 mL Syrp; Take 5 mLs by mouth every 4 (four) hours as needed (cough).  Dispense: 180 mL; Refill: 0  -     (Magic mouthwash) 1:1:1 diphenhydrAMINE(Benadryl) 12.5mg/5ml liq, aluminum & magnesium hydroxide-simethicone (Maalox), LIDOcaine viscous 2%; Swish and spit 10 mLs every 4 (four) hours as needed (sore throat).  Dispense: 180 mL; Refill: 0              Patient Instructions   - Rest.    - Drink plenty of fluids.  - Viral upper respiratory infections typically  run their course in 10-14 days.      - You can take over-the-counter claritin, zyrtec, allegra, or xyzal as directed. These are antihistamines that can help with runny nose, nasal congestion, sneezing, and helps to dry up post-nasal drip, which usually causes sore throat and cough.              - If you do NOT have high blood pressure, you may use a decongestant form (D)  of this medication (ie. Claritin- D, zyrtec-D, allegra-D) or if you do not take the D form, you can take sudafed (pseudoephedrine) over the counter, which is a decongestant. Do NOT take two decongestant (D) medications at the same time (such as mucinex-D and claritin-D or plain sudafed and claritin D)    - If you DO have Hypertension, anxiety, or palpitations, it is safe to take Coricidin HBP for relief of sinus symptoms.     - You can use Flonase (fluticasone) nasal spray as directed for sinus congestion and postnasal drip. This is a steroid nasal spray that works locally over time to decrease the inflammation in your nose/sinuses and help with allergic symptoms. This is not an quick- relief spray like afrin, but it works well if used daily.  Discontinue if you develop nose bleed  - use nasal saline prior to Flonase.  - Use Ocean Spray Nasal Saline 1-3 puffs each nostril every 2-3 hours then blow out onto tissue. This is to irrigate the nasal passage way to clear the sinus openings. Use until sinus problem resolved.     - you can take plain Mucinex (guaifenesin) 1200 mg twice a day to help loosen mucous.      -warm salt water gargles can help with sore throat     - warm tea with honey can help with cough. Honey is a natural cough suppressant.     - Dextromethorphan (DM) is a cough suppressant over the counter (ie. mucinex DM, robitussin, delsym; dayquil/nyquil has DM as well.)        - Follow up with your PCP or specialty clinic as directed in the next 1-2 weeks if not improved or as needed.  You can call [phone] to schedule an appointment  with the appropriate provider.       - Go to the ER if you develop new or worsening symptoms.      - You must understand that you have received an Urgent Care treatment only and that you may be released before all of your medical problems are known or treated.   - You, the patient, will arrange for follow up care as instructed.   - If your condition worsens or fails to improve we recommend that you receive another evaluation at the ER immediately or contact your PCP to discuss your concerns or return here.

## 2023-07-14 ENCOUNTER — HOSPITAL ENCOUNTER (OUTPATIENT)
Dept: RADIOLOGY | Facility: HOSPITAL | Age: 48
Discharge: HOME | End: 2023-07-14
Attending: INTERNAL MEDICINE
Payer: MEDICARE

## 2023-07-14 DIAGNOSIS — Z85.118 HISTORY OF LUNG CANCER: ICD-10-CM

## 2023-07-14 PROCEDURE — 74177 CT ABD & PELVIS W/CONTRAST: CPT | Mod: TC

## 2023-07-14 PROCEDURE — 74177 CT CHEST ABDOMEN PELVIS WITH CONTRAST (XPD): ICD-10-PCS | Mod: 26,,, | Performed by: INTERNAL MEDICINE

## 2023-07-14 PROCEDURE — 25500020 PHARM REV CODE 255: Performed by: INTERNAL MEDICINE

## 2023-07-14 PROCEDURE — 74177 CT ABD & PELVIS W/CONTRAST: CPT | Mod: 26,,, | Performed by: INTERNAL MEDICINE

## 2023-07-14 PROCEDURE — 71260 CT CHEST ABDOMEN PELVIS WITH CONTRAST (XPD): ICD-10-PCS | Mod: 26,,, | Performed by: INTERNAL MEDICINE

## 2023-07-14 PROCEDURE — 71260 CT THORAX DX C+: CPT | Mod: 26,,, | Performed by: INTERNAL MEDICINE

## 2023-07-14 PROCEDURE — 71260 CT THORAX DX C+: CPT | Mod: TC

## 2023-07-14 RX ADMIN — IOHEXOL 100 ML: 350 INJECTION, SOLUTION INTRAVENOUS at 10:07

## 2023-07-14 RX ADMIN — IOHEXOL 15 ML: 300 INJECTION, SOLUTION INTRAVENOUS at 10:07

## 2023-08-07 ENCOUNTER — OFFICE VISIT (OUTPATIENT)
Dept: HEMATOLOGY/ONCOLOGY | Facility: CLINIC | Age: 48
End: 2023-08-07
Payer: MEDICARE

## 2023-08-07 VITALS
HEART RATE: 70 BPM | RESPIRATION RATE: 16 BRPM | TEMPERATURE: 98 F | BODY MASS INDEX: 29.62 KG/M2 | DIASTOLIC BLOOD PRESSURE: 79 MMHG | WEIGHT: 184.31 LBS | OXYGEN SATURATION: 97 % | SYSTOLIC BLOOD PRESSURE: 124 MMHG | HEIGHT: 66 IN

## 2023-08-07 DIAGNOSIS — C79.51 SECONDARY MALIGNANT NEOPLASM OF BONE: ICD-10-CM

## 2023-08-07 DIAGNOSIS — Z85.118 HISTORY OF LUNG CANCER: Primary | ICD-10-CM

## 2023-08-07 PROCEDURE — 99214 PR OFFICE/OUTPT VISIT, EST, LEVL IV, 30-39 MIN: ICD-10-PCS | Mod: S$GLB,,, | Performed by: INTERNAL MEDICINE

## 2023-08-07 PROCEDURE — 3074F PR MOST RECENT SYSTOLIC BLOOD PRESSURE < 130 MM HG: ICD-10-PCS | Mod: CPTII,S$GLB,, | Performed by: INTERNAL MEDICINE

## 2023-08-07 PROCEDURE — 99999 PR PBB SHADOW E&M-EST. PATIENT-LVL III: ICD-10-PCS | Mod: PBBFAC,,, | Performed by: INTERNAL MEDICINE

## 2023-08-07 PROCEDURE — 3008F PR BODY MASS INDEX (BMI) DOCUMENTED: ICD-10-PCS | Mod: CPTII,S$GLB,, | Performed by: INTERNAL MEDICINE

## 2023-08-07 PROCEDURE — 3078F PR MOST RECENT DIASTOLIC BLOOD PRESSURE < 80 MM HG: ICD-10-PCS | Mod: CPTII,S$GLB,, | Performed by: INTERNAL MEDICINE

## 2023-08-07 PROCEDURE — 99214 OFFICE O/P EST MOD 30 MIN: CPT | Mod: S$GLB,,, | Performed by: INTERNAL MEDICINE

## 2023-08-07 PROCEDURE — 99999 PR PBB SHADOW E&M-EST. PATIENT-LVL III: CPT | Mod: PBBFAC,,, | Performed by: INTERNAL MEDICINE

## 2023-08-07 PROCEDURE — 3008F BODY MASS INDEX DOCD: CPT | Mod: CPTII,S$GLB,, | Performed by: INTERNAL MEDICINE

## 2023-08-07 PROCEDURE — 3078F DIAST BP <80 MM HG: CPT | Mod: CPTII,S$GLB,, | Performed by: INTERNAL MEDICINE

## 2023-08-07 PROCEDURE — 3074F SYST BP LT 130 MM HG: CPT | Mod: CPTII,S$GLB,, | Performed by: INTERNAL MEDICINE

## 2023-08-07 NOTE — PROGRESS NOTES
"Subjective     Patient ID: Yao Alan is a 48 y.o. male.    Chief Complaint: History of lung cancer  Mr. Yao Alan is a 48-year-old male who has a long history of smoking and was seen in the Pulmonary Clinic by Dr. Valle on 03/05/2015 with abnormal CT scan.    Apparently, he went to the [facility] in February with coughing as well as chest pain. A chest x-ray was done, which revealed a left upper lobe lung mass. Followup CT scan was done on 02/12/2015 and that revealed posterior superior mediastinal mass adjacent and prominent enlarged upper left mediastinal lymph nodes, abutting the aortic arch as well as left subclavian artery. A  CT scan of the abdomen was done on 03/03/2015 without contrast and that revealed nonobstructive nephrolithiasis, but a 2.1 cm lytic lesion involving the left iliac wing concerning for metastatic disease given the presence of emphysema and a mediastinal soft tissue mass on the CT chest from 02/12/2015. He saw Dr. Valle after that on 03/05/2015 and underwent an IR guided biopsy of the bone lesion on 03/17/2015. 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    His EGFR/EML/ALK is negative.    His PET scan on 3/25/15 revealed Left upper lobe lung lesion with an adjacent para-aortic lymph node, right anterior rib metastasis, right iliac metastasis, and pancreatic metastasis. A pancreatic cancer primary neoplasm is less likely.  MRI brain was negative for mets.  He completed 6 cycles of Carboplatin and Alimta and was on maintenance Alimta until end of March 2016.  His bone scan from 3/16 revealed stable disease. His CT scans also from end of March 2016 revealed "Interval enlargement of left upper lobe lung mass measuring 5.9 x 3.9 cm (previously 3.3 x 2.5 cm). This mass appears to invade the mediastinum and abutting the aortic arch and left subclavian artery"     He started Opdivo since March 2016 and his last Opdivo was on 8/27/19                     " "     HPIHe comes in to review hi CT scans from 7/14/2023 which reveal "In this patient with lung cancer, there is no local recurrence or thoracic metastases.Unchanged metastases involving the pancreas and right iliac bone.  No new metastases in the abdomen or pelvis."    He is doing well clinically with no evidence of recurrence    Review of Systems   Constitutional:  Negative for appetite change, fatigue and unexpected weight change.   HENT:  Negative for mouth sores.    Eyes:  Negative for visual disturbance.   Respiratory:  Negative for cough and shortness of breath.    Cardiovascular:  Negative for chest pain.   Gastrointestinal:  Negative for abdominal pain and diarrhea.   Genitourinary:  Negative for frequency.   Musculoskeletal:  Negative for back pain.   Integumentary:  Negative for rash.   Neurological:  Negative for headaches.   Hematological:  Negative for adenopathy.   Psychiatric/Behavioral:  The patient is not nervous/anxious.    All other systems reviewed and are negative.         Objective     Physical Exam         LABS:  WBC   Date Value Ref Range Status   07/14/2023 8.49 3.90 - 12.70 K/uL Final     Hemoglobin   Date Value Ref Range Status   07/14/2023 13.1 (L) 14.0 - 18.0 g/dL Final     POC Hematocrit   Date Value Ref Range Status   06/12/2019 40 36 - 54 %PCV Final     Hematocrit   Date Value Ref Range Status   07/14/2023 41.9 40.0 - 54.0 % Final     Platelets   Date Value Ref Range Status   07/14/2023 257 150 - 450 K/uL Final     Gran # (ANC)   Date Value Ref Range Status   07/14/2023 4.6 1.8 - 7.7 K/uL Final     Comment:     The ANC is based on a white cell differential from an   automated cell counter. It has not been microscopically   reviewed for the presence of abnormal cells. Clinical   correlation is required.         Chemistry        Component Value Date/Time     07/14/2023 0916    K 4.5 07/14/2023 0916     07/14/2023 0916    CO2 25 07/14/2023 0916    BUN 18 07/14/2023 0916    " CREATININE 1.8 (H) 07/14/2023 0916    GLU 94 07/14/2023 0916        Component Value Date/Time    CALCIUM 9.3 07/14/2023 0916    ALKPHOS 70 07/14/2023 0916    AST 26 07/14/2023 0916    ALT 20 07/14/2023 0916    BILITOT 0.3 07/14/2023 0916    ESTGFRAFRICA >60 07/13/2022 0903    EGFRNONAA 55 (A) 07/13/2022 0903          Assessment and Plan     1. History of lung cancer  -     CBC w/ DIFF; Future; Expected date: 08/07/2023  -     CMP; Future; Expected date: 08/07/2023  -     CT Chest Abdomen Pelvis With Contrast (xpd); Future; Expected date: 08/07/2023    2. Secondary malignant neoplasm of bone      Route Chart for Scheduling    Med Onc Chart Routing      Follow up with physician 1 year. Schedule CBC,CMp, CT chest, abdomen/pelvis and see me   Follow up with MARY    Infusion scheduling note    Injection scheduling note    Labs    Imaging    Pharmacy appointment    Other referrals            He is doing well clinically with no evidence of recurrence. Continue to monitor off treatment     He will return in 1 year with labs and scans    Above care plan was discussed with patient and all questions were addressed to his satisfaction

## 2023-08-24 NOTE — TELEPHONE ENCOUNTER
"Spoke with patient.   Informed him dr carl doesn't recall advising him not to have steroid injections.  Patient states, "that is not true. She told me not to do it. I just need a letter for my  stating I was told I couldn't get them."  Patient requests nurse ask dr carl again.    Message routed to dr carl (i don't know what to say to him/his wife)  "
----- Message from Edwige Freedman sent at 5/14/2018  2:02 PM CDT -----  Contact: Pt  Pt calling stating he missed a call from the office        Pt call back number [phone]  
Informed patient dr carl doesn't recall having this conversation with patient, so she can't provide this letter for him.  Patient voiced understanding.  
It's not documented in the chart   
Patient/Caregiver provided printed discharge information.

## 2023-11-23 NOTE — PROGRESS NOTES
"Subjective:       Patient ID: Yao Alan is a 43 y.o. male.    Chief Complaint: Malignant neoplasm of upper lobe of right lung  Oncologic History:  Mr. Yao Alan is a 43-year-old male who has a long history of smoking and was seen in the Pulmonary Clinic by Dr. Valle on 03/05/2015 with abnormal CT scan.    Apparently, he went to the [facility] in February with coughing as well as chest pain. A chest x-ray was done, which revealed a left upper lobe lung mass. Followup CT scan was done on 02/12/2015 and that revealed posterior superior mediastinal mass adjacent and prominent enlarged upper left mediastinal lymph nodes, abutting the aortic arch as well as left subclavian artery. A  CT scan of the abdomen was done on 03/03/2015 without contrast and that revealed nonobstructive nephrolithiasis, but a 2.1 cm lytic lesion involving the left iliac wing concerning for metastatic disease given the presence of emphysema and a mediastinal soft tissue mass on the CT chest from 02/12/2015. He saw Dr. Valle after that on 03/05/2015 and underwent an IR guided biopsy of the bone lesion on 03/17/2015. 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    His EGFR/EML/ALK is negative.    His PET scan on 3/25/15 revealed Left upper lobe lung lesion with an adjacent para-aortic lymph node, right anterior rib metastasis, right iliac metastasis, and pancreatic metastasis. A pancreatic cancer primary neoplasm is less likely.  MRI brain was negative for mets.  He completed 6 cycles of Carboplatin and Alimta and was on maintenance Alimta until end of March 2016.  His bone scan from 3/16 revealed stable disease. His CT scans also from end of March 2016 revealed "Interval enlargement of left upper lobe lung mass measuring 5.9 x 3.9 cm (previously 3.3 x 2.5 cm). This mass appears to invade the mediastinum and abutting the aortic arch and left subclavian artery"     He is now on Opdivo since March " 2016           HPI Mr. Alan returns for follow up and Opdivo. He feels well and denies any new issues    Review of Systems   Constitutional: Negative for appetite change, fatigue and unexpected weight change.   HENT: Negative for mouth sores.    Eyes: Negative for visual disturbance.   Respiratory: Negative for cough and shortness of breath.    Cardiovascular: Negative for chest pain.   Gastrointestinal: Negative for abdominal pain and diarrhea.   Genitourinary: Negative for frequency.   Musculoskeletal: Negative for back pain.   Skin: Negative for rash.   Neurological: Negative for headaches.   Hematological: Negative for adenopathy.   Psychiatric/Behavioral: The patient is not nervous/anxious.    All other systems reviewed and are negative.      Objective:      Physical Exam   Constitutional: He is oriented to person, place, and time. He appears well-developed and well-nourished.   HENT:   Mouth/Throat: No oropharyngeal exudate.   Cardiovascular: Normal rate and normal heart sounds.   Pulmonary/Chest: Effort normal and breath sounds normal. He has no wheezes.   Abdominal: Soft. Bowel sounds are normal. There is no tenderness.   Musculoskeletal: He exhibits no edema or tenderness.   Lymphadenopathy:     He has no cervical adenopathy.   Neurological: He is alert and oriented to person, place, and time. Coordination normal.   Skin: Skin is warm and dry. No rash noted.   Psychiatric: He has a normal mood and affect. Judgment and thought content normal.   Vitals reviewed.      LABS:  WBC   Date Value Ref Range Status   01/16/2019 8.05 3.90 - 12.70 K/uL Final     Hemoglobin   Date Value Ref Range Status   01/16/2019 13.9 (L) 14.0 - 18.0 g/dL Final     Hematocrit   Date Value Ref Range Status   01/16/2019 44.9 40.0 - 54.0 % Final     Platelets   Date Value Ref Range Status   01/16/2019 268 150 - 350 K/uL Final     Gran # (ANC)   Date Value Ref Range Status   01/16/2019 4.3 1.8 - 7.7 K/uL Final     Comment:     The ANC is  based on a white cell differential from an   automated cell counter. It has not been microscopically   reviewed for the presence of abnormal cells. Clinical   correlation is required.         Chemistry        Component Value Date/Time     01/16/2019 0807    K 3.8 01/16/2019 0807     01/16/2019 0807    CO2 27 01/16/2019 0807    BUN 14 01/16/2019 0807    CREATININE 1.4 01/16/2019 0807    GLU 93 01/16/2019 0807        Component Value Date/Time    CALCIUM 9.2 01/16/2019 0807    ALKPHOS 68 01/16/2019 0807    AST 21 01/16/2019 0807    ALT 14 01/16/2019 0807    BILITOT 0.3 01/16/2019 0807    ESTGFRAFRICA >60.0 01/16/2019 0807    EGFRNONAA >60.0 01/16/2019 0807           Assessment:       1. Malignant neoplasm of upper lobe of right lung    2. Secondary malignant neoplasm of bone    3. Pulmonary emphysema, unspecified emphysema type        Plan:        1,2,3. He will proceed with Opdivo and will return in 2 weeks for next cycle    Above care plan was discussed with patient and all questions were addressed to his satisfaction       Additional Progress Note...

## 2024-02-27 DIAGNOSIS — Z00.00 ENCOUNTER FOR MEDICARE ANNUAL WELLNESS EXAM: ICD-10-CM

## 2024-03-01 ENCOUNTER — PATIENT MESSAGE (OUTPATIENT)
Dept: ADMINISTRATIVE | Facility: HOSPITAL | Age: 49
End: 2024-03-01
Payer: MEDICARE

## 2024-03-11 ENCOUNTER — OFFICE VISIT (OUTPATIENT)
Dept: HEMATOLOGY/ONCOLOGY | Facility: CLINIC | Age: 49
End: 2024-03-11
Payer: MEDICARE

## 2024-03-11 ENCOUNTER — LAB VISIT (OUTPATIENT)
Dept: LAB | Facility: HOSPITAL | Age: 49
End: 2024-03-11
Attending: INTERNAL MEDICINE
Payer: MEDICARE

## 2024-03-11 VITALS
WEIGHT: 183 LBS | SYSTOLIC BLOOD PRESSURE: 119 MMHG | RESPIRATION RATE: 14 BRPM | DIASTOLIC BLOOD PRESSURE: 82 MMHG | BODY MASS INDEX: 29.53 KG/M2 | HEART RATE: 71 BPM | OXYGEN SATURATION: 100 % | TEMPERATURE: 98 F

## 2024-03-11 DIAGNOSIS — C34.91 PRIMARY MALIGNANT NEOPLASM OF RIGHT LUNG METASTATIC TO OTHER SITE: Primary | ICD-10-CM

## 2024-03-11 DIAGNOSIS — C34.91 PRIMARY MALIGNANT NEOPLASM OF RIGHT LUNG METASTATIC TO OTHER SITE: ICD-10-CM

## 2024-03-11 DIAGNOSIS — C79.51 SECONDARY MALIGNANT NEOPLASM OF BONE: ICD-10-CM

## 2024-03-11 LAB
ALBUMIN SERPL BCP-MCNC: 4.1 G/DL (ref 3.5–5.2)
ALP SERPL-CCNC: 78 U/L (ref 55–135)
ALT SERPL W/O P-5'-P-CCNC: 18 U/L (ref 10–44)
ANION GAP SERPL CALC-SCNC: 11 MMOL/L (ref 8–16)
AST SERPL-CCNC: 24 U/L (ref 10–40)
BASOPHILS # BLD AUTO: 0.07 K/UL (ref 0–0.2)
BASOPHILS NFR BLD: 1 % (ref 0–1.9)
BILIRUB SERPL-MCNC: 0.3 MG/DL (ref 0.1–1)
BUN SERPL-MCNC: 22 MG/DL (ref 6–20)
CALCIUM SERPL-MCNC: 10.1 MG/DL (ref 8.7–10.5)
CHLORIDE SERPL-SCNC: 107 MMOL/L (ref 95–110)
CO2 SERPL-SCNC: 25 MMOL/L (ref 23–29)
CREAT SERPL-MCNC: 1.6 MG/DL (ref 0.5–1.4)
DIFFERENTIAL METHOD BLD: NORMAL
EOSINOPHIL # BLD AUTO: 0.2 K/UL (ref 0–0.5)
EOSINOPHIL NFR BLD: 2.8 % (ref 0–8)
ERYTHROCYTE [DISTWIDTH] IN BLOOD BY AUTOMATED COUNT: 14.2 % (ref 11.5–14.5)
EST. GFR  (NO RACE VARIABLE): 52.5 ML/MIN/1.73 M^2
GLUCOSE SERPL-MCNC: 95 MG/DL (ref 70–110)
HCT VFR BLD AUTO: 43.7 % (ref 40–54)
HGB BLD-MCNC: 14.1 G/DL (ref 14–18)
IMM GRANULOCYTES # BLD AUTO: 0.03 K/UL (ref 0–0.04)
IMM GRANULOCYTES NFR BLD AUTO: 0.4 % (ref 0–0.5)
LYMPHOCYTES # BLD AUTO: 2.5 K/UL (ref 1–4.8)
LYMPHOCYTES NFR BLD: 35.7 % (ref 18–48)
MCH RBC QN AUTO: 27.1 PG (ref 27–31)
MCHC RBC AUTO-ENTMCNC: 32.3 G/DL (ref 32–36)
MCV RBC AUTO: 84 FL (ref 82–98)
MONOCYTES # BLD AUTO: 0.7 K/UL (ref 0.3–1)
MONOCYTES NFR BLD: 9.8 % (ref 4–15)
NEUTROPHILS # BLD AUTO: 3.5 K/UL (ref 1.8–7.7)
NEUTROPHILS NFR BLD: 50.3 % (ref 38–73)
NRBC BLD-RTO: 0 /100 WBC
PLATELET # BLD AUTO: 288 K/UL (ref 150–450)
PMV BLD AUTO: 10.5 FL (ref 9.2–12.9)
POTASSIUM SERPL-SCNC: 4.6 MMOL/L (ref 3.5–5.1)
PROT SERPL-MCNC: 7.6 G/DL (ref 6–8.4)
RBC # BLD AUTO: 5.2 M/UL (ref 4.6–6.2)
SODIUM SERPL-SCNC: 143 MMOL/L (ref 136–145)
WBC # BLD AUTO: 7.04 K/UL (ref 3.9–12.7)

## 2024-03-11 PROCEDURE — 36415 COLL VENOUS BLD VENIPUNCTURE: CPT | Performed by: INTERNAL MEDICINE

## 2024-03-11 PROCEDURE — 99999 PR PBB SHADOW E&M-EST. PATIENT-LVL III: CPT | Mod: PBBFAC,,, | Performed by: INTERNAL MEDICINE

## 2024-03-11 PROCEDURE — 85025 COMPLETE CBC W/AUTO DIFF WBC: CPT | Performed by: INTERNAL MEDICINE

## 2024-03-11 PROCEDURE — 1159F MED LIST DOCD IN RCRD: CPT | Mod: CPTII,S$GLB,, | Performed by: INTERNAL MEDICINE

## 2024-03-11 PROCEDURE — 80053 COMPREHEN METABOLIC PANEL: CPT | Performed by: INTERNAL MEDICINE

## 2024-03-11 PROCEDURE — 3074F SYST BP LT 130 MM HG: CPT | Mod: CPTII,S$GLB,, | Performed by: INTERNAL MEDICINE

## 2024-03-11 PROCEDURE — 3008F BODY MASS INDEX DOCD: CPT | Mod: CPTII,S$GLB,, | Performed by: INTERNAL MEDICINE

## 2024-03-11 PROCEDURE — 3079F DIAST BP 80-89 MM HG: CPT | Mod: CPTII,S$GLB,, | Performed by: INTERNAL MEDICINE

## 2024-03-11 PROCEDURE — 99214 OFFICE O/P EST MOD 30 MIN: CPT | Mod: S$GLB,,, | Performed by: INTERNAL MEDICINE

## 2024-03-11 NOTE — PROGRESS NOTES
"Subjective     Patient ID: Yao Alan is a 49 y.o. male.    Chief Complaint: History of lung cancer  Oncologic History:  Mr. Yao Alan is a 49-year-old male who has a long history of smoking and was seen in the Pulmonary Clinic by Dr. Valle on 03/05/2015 with abnormal CT scan.    Apparently, he went to the [facility] in February with coughing as well as chest pain. A chest x-ray was done, which revealed a left upper lobe lung mass. Followup CT scan was done on 02/12/2015 and that revealed posterior superior mediastinal mass adjacent and prominent enlarged upper left mediastinal lymph nodes, abutting the aortic arch as well as left subclavian artery. A  CT scan of the abdomen was done on 03/03/2015 without contrast and that revealed nonobstructive nephrolithiasis, but a 2.1 cm lytic lesion involving the left iliac wing concerning for metastatic disease given the presence of emphysema and a mediastinal soft tissue mass on the CT chest from 02/12/2015. He saw Dr. Valle after that on 03/05/2015 and underwent an IR guided biopsy of the bone lesion on 03/17/2015. 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    His EGFR/EML/ALK is negative.    His PET scan on 3/25/15 revealed Left upper lobe lung lesion with an adjacent para-aortic lymph node, right anterior rib metastasis, right iliac metastasis, and pancreatic metastasis. A pancreatic cancer primary neoplasm is less likely.  MRI brain was negative for mets.  He completed 6 cycles of Carboplatin and Alimta and was on maintenance Alimta until end of March 2016.  His bone scan from 3/16 revealed stable disease. His CT scans also from end of March 2016 revealed "Interval enlargement of left upper lobe lung mass measuring 5.9 x 3.9 cm (previously 3.3 x 2.5 cm). This mass appears to invade the mediastinum and abutting the aortic arch and left subclavian artery"     He started Opdivo since March 2016 and his last Opdivo was on " 8/27/19                              HPIHe comes in for  a follow-up, last seen in July of 2022  He denies any nausea, vomiting, diarrhea, constipation, abdominal pain, weight loss or loss of appetite, chest pain, shortness of breath, leg swelling, fatigue, pain, headache, dizziness, or mood changes. His ECOG PS is zero. He is by himslef      Review of Systems   Constitutional:  Negative for appetite change, fatigue and unexpected weight change.   HENT:  Negative for mouth sores.    Eyes:  Negative for visual disturbance.   Respiratory:  Negative for cough and shortness of breath.    Cardiovascular:  Negative for chest pain.   Gastrointestinal:  Negative for abdominal pain and diarrhea.   Genitourinary:  Negative for frequency.   Musculoskeletal:  Negative for back pain.   Integumentary:  Negative for rash.   Neurological:  Negative for headaches.   Hematological:  Negative for adenopathy.   Psychiatric/Behavioral:  The patient is not nervous/anxious.    All other systems reviewed and are negative.         Objective     Physical Exam        Labs  WBC   Date Value Ref Range Status   03/11/2024 7.04 3.90 - 12.70 K/uL Final     Hemoglobin   Date Value Ref Range Status   03/11/2024 14.1 14.0 - 18.0 g/dL Final     POC Hematocrit   Date Value Ref Range Status   06/12/2019 40 36 - 54 %PCV Final     Hematocrit   Date Value Ref Range Status   03/11/2024 43.7 40.0 - 54.0 % Final     Platelets   Date Value Ref Range Status   03/11/2024 288 150 - 450 K/uL Final     Gran # (ANC)   Date Value Ref Range Status   03/11/2024 3.5 1.8 - 7.7 K/uL Final     Gran %   Date Value Ref Range Status   03/11/2024 50.3 38.0 - 73.0 % Final       Chemistry        Component Value Date/Time     03/11/2024 1156    K 4.6 03/11/2024 1156     03/11/2024 1156    CO2 25 03/11/2024 1156    BUN 22 (H) 03/11/2024 1156    CREATININE 1.6 (H) 03/11/2024 1156    GLU 95 03/11/2024 1156        Component Value Date/Time    CALCIUM 10.1 03/11/2024 1156     ALKPHOS 78 03/11/2024 1156    AST 24 03/11/2024 1156    ALT 18 03/11/2024 1156    BILITOT 0.3 03/11/2024 1156    ESTGFRAFRICA >60 07/13/2022 0903    EGFRNONAA 55 (A) 07/13/2022 0903            Assessment and Plan     1. Primary malignant neoplasm of right lung metastatic to other site  -     CBC w/ DIFF; Future; Expected date: 03/11/2024  -     CMP; Future; Expected date: 03/11/2024  -     CT Chest Abdomen Pelvis With IV Contrast (XPD) Routine Oral Contrast; Future; Expected date: 03/11/2024    2. Secondary malignant neoplasm of bone      Route Chart for Scheduling    Med Onc Chart Routing      Follow up with physician . CBC, CMP today, Schedule CT chest, abdomen/pelvis next available. In 1 year schedule CBC,CMP, CT chest, abdomen/pelvis and see me   Follow up with MARY    Infusion scheduling note    Injection scheduling note    Labs    Imaging    Pharmacy appointment    Other referrals              Mr. Bowen is doing well clinically however he is due for restaging scans.  We will obtain CBC CMP today and schedule CT scans next available and call him with the results of the scans.   He will return again in 1 year with repeat restaging CT scans     Above plan was reviewed with the patient and all of his questions were answered to his satisfaction

## 2024-03-13 ENCOUNTER — HOSPITAL ENCOUNTER (OUTPATIENT)
Dept: RADIOLOGY | Facility: HOSPITAL | Age: 49
Discharge: HOME | End: 2024-03-13
Attending: INTERNAL MEDICINE
Payer: MEDICARE

## 2024-03-13 ENCOUNTER — TELEPHONE (OUTPATIENT)
Dept: HEMATOLOGY/ONCOLOGY | Facility: CLINIC | Age: 49
End: 2024-03-13
Payer: MEDICARE

## 2024-03-13 DIAGNOSIS — C34.91 PRIMARY MALIGNANT NEOPLASM OF RIGHT LUNG METASTATIC TO OTHER SITE: ICD-10-CM

## 2024-03-13 PROCEDURE — 71260 CT THORAX DX C+: CPT | Mod: 26,,, | Performed by: RADIOLOGY

## 2024-03-13 PROCEDURE — 74177 CT ABD & PELVIS W/CONTRAST: CPT | Mod: 26,,, | Performed by: RADIOLOGY

## 2024-03-13 PROCEDURE — 74177 CT ABD & PELVIS W/CONTRAST: CPT | Mod: TC

## 2024-03-13 PROCEDURE — 25500020 PHARM REV CODE 255: Performed by: INTERNAL MEDICINE

## 2024-03-13 RX ADMIN — IOHEXOL 75 ML: 350 INJECTION, SOLUTION INTRAVENOUS at 11:03

## 2024-03-13 RX ADMIN — IOHEXOL 15 ML: 300 INJECTION, SOLUTION INTRAVENOUS at 11:03

## 2024-03-13 NOTE — TELEPHONE ENCOUNTER
----- Message from Bel Garay MD sent at 3/13/2024  2:36 PM CDT -----  Please let him know that CT is stable

## 2024-03-13 NOTE — TELEPHONE ENCOUNTER
Informed patient his CT is stable.  He thanked nurse.   Pt remains [place]TON without difficulty. Spouse and son at bs and pt able to converse appropriately. Does not recall year or why she is here.

## 2024-03-28 ENCOUNTER — PATIENT MESSAGE (OUTPATIENT)
Dept: ADMINISTRATIVE | Facility: HOSPITAL | Age: 49
End: 2024-03-28
Payer: MEDICARE

## 2024-04-08 ENCOUNTER — PATIENT OUTREACH (OUTPATIENT)
Dept: ADMINISTRATIVE | Facility: HOSPITAL | Age: 49
End: 2024-04-08
Payer: MEDICARE

## 2024-04-08 NOTE — PROGRESS NOTES
Population Health Chart Review & Patient Outreach Details      Additional Pop Health Notes:      DUE FOR RECENT BP READING, & PHYSICAL.   Pt have upcoming AWV with NP. Will send BP reading through portal or call back.         Updates Requested / Reviewed:      Updated Care Coordination Note, Care Everywhere, and Care Team Updated         Health Maintenance Topics Overdue:      VBHM Score: 0     Patient is not due for any topics at this time.                       Health Maintenance Topic(s) Outreach Outcomes & Actions Taken:    Blood Pressure - Outreach Outcomes & Actions Taken  : Patient Will Call Back or Send Portal Message with Reading    Primary Care Appt - Outreach Outcomes & Actions Taken  : AWV with NP

## 2024-04-13 ENCOUNTER — OFFICE VISIT (OUTPATIENT)
Dept: URGENT CARE | Facility: CLINIC | Age: 49
End: 2024-04-13
Payer: MEDICARE

## 2024-04-13 VITALS
OXYGEN SATURATION: 97 % | HEART RATE: 62 BPM | TEMPERATURE: 98 F | HEIGHT: 66 IN | WEIGHT: 186 LBS | RESPIRATION RATE: 18 BRPM | BODY MASS INDEX: 29.89 KG/M2 | SYSTOLIC BLOOD PRESSURE: 114 MMHG | DIASTOLIC BLOOD PRESSURE: 76 MMHG

## 2024-04-13 DIAGNOSIS — J06.9 VIRAL URI: Primary | ICD-10-CM

## 2024-04-13 DIAGNOSIS — J02.9 SORE THROAT: ICD-10-CM

## 2024-04-13 LAB
CTP QC/QA: YES
MOLECULAR STREP A: NEGATIVE

## 2024-04-13 PROCEDURE — 99213 OFFICE O/P EST LOW 20 MIN: CPT | Mod: S$GLB,,, | Performed by: NURSE PRACTITIONER

## 2024-04-13 PROCEDURE — 87651 STREP A DNA AMP PROBE: CPT | Mod: QW,S$GLB,, | Performed by: NURSE PRACTITIONER

## 2024-04-13 RX ORDER — PROMETHAZINE HYDROCHLORIDE AND DEXTROMETHORPHAN HYDROBROMIDE 6.25; 15 MG/5ML; MG/5ML
5 SYRUP ORAL NIGHTLY PRN
Qty: 118 ML | Refills: 0 | Status: SHIPPED | OUTPATIENT
Start: 2024-04-13

## 2024-04-13 RX ORDER — CETIRIZINE HYDROCHLORIDE 10 MG/1
10 TABLET ORAL DAILY
Qty: 30 TABLET | Refills: 0 | Status: SHIPPED | OUTPATIENT
Start: 2024-04-13

## 2024-04-13 RX ORDER — FLUTICASONE PROPIONATE 50 MCG
1 SPRAY, SUSPENSION (ML) NASAL 2 TIMES DAILY
Qty: 9.9 ML | Refills: 0 | Status: SHIPPED | OUTPATIENT
Start: 2024-04-13

## 2024-04-13 NOTE — PROGRESS NOTES
"Subjective:      Patient ID: Yao Alan is a 49 y.o. male.    Vitals:  height is 5' 6" (1.676 m) and weight is 84.4 kg (186 lb). His oral temperature is 97.8 °F (36.6 °C). His blood pressure is 114/76 and his pulse is 62. His respiration is 18 and oxygen saturation is 97%.     Chief Complaint: Sore Throat    49-year-old male presents to clinic for evaluation of sore throat, postnasal drip, cough that started yesterday.  Denies any recent sick contacts.  Not taking any medications.  He is awake and alert, answers questions appropriately, no acute distress noted on today's visit.    Sore Throat   This is a new problem. The current episode started yesterday. The problem has been gradually worsening. The pain is worse on the right side. There has been no fever. The pain is at a severity of 4/10. The pain is moderate. Associated symptoms include congestion and coughing. Pertinent negatives include no abdominal pain, diarrhea, drooling, ear discharge, ear pain, headaches, hoarse voice, plugged ear sensation, neck pain, shortness of breath, stridor, swollen glands or vomiting. He has had no exposure to strep or mono. He has tried nothing for the symptoms. The treatment provided no relief.       Constitution: Negative for activity change, appetite change, chills, sweating, fatigue and fever.   HENT:  Positive for congestion and sore throat. Negative for ear pain, ear discharge and drooling.    Neck: Negative for neck pain.   Respiratory:  Positive for cough. Negative for shortness of breath and stridor.    Gastrointestinal:  Negative for abdominal pain, vomiting and diarrhea.   Neurological:  Negative for headaches.      Objective:     Physical Exam   Constitutional: He is oriented to person, place, and time. He appears well-developed.  Non-toxic appearance. He does not appear ill.   HENT:   Head: Normocephalic and atraumatic. Head is without abrasion, without contusion and without laceration.   Ears:   Right Ear: " Tympanic membrane and external ear normal.   Left Ear: Tympanic membrane and external ear normal.   Nose: Congestion present.   Mouth/Throat: Mucous membranes are normal. Mucous membranes are moist. Posterior oropharyngeal erythema present. No oropharyngeal exudate. Oropharynx is clear.   Eyes: Conjunctivae, EOM and lids are normal.   Neck: Trachea normal and phonation normal.   Cardiovascular: Normal rate.   Pulmonary/Chest: Effort normal and breath sounds normal. No stridor. No respiratory distress.   Abdominal: Normal appearance.   Musculoskeletal: Normal range of motion.         General: Normal range of motion.   Neurological: He is alert and oriented to person, place, and time.   Skin: Skin is dry, intact and not pale. No abrasion, No burn and No ecchymosis   Psychiatric: His speech is normal and behavior is normal. Judgment and thought content normal.   Nursing note and vitals reviewed.    Results for orders placed or performed in visit on 04/13/24   POCT Strep A, Molecular   Result Value Ref Range    Molecular Strep A, POC Negative Negative     Acceptable Yes      *Note: Due to a large number of results and/or encounters for the requested time period, some results have not been displayed. A complete set of results can be found in Results Review.         Assessment:     1. Viral URI    2. Sore throat        Plan:       Viral URI  -     fluticasone propionate (FLONASE) 50 mcg/actuation nasal spray; 1 spray (50 mcg total) by Each Nostril route 2 (two) times daily.  Dispense: 9.9 mL; Refill: 0  -     cetirizine (ZYRTEC) 10 MG tablet; Take 1 tablet (10 mg total) by mouth once daily.  Dispense: 30 tablet; Refill: 0  -     promethazine-dextromethorphan (PROMETHAZINE-DM) 6.25-15 mg/5 mL Syrp; Take 5 mLs by mouth nightly as needed (cough).  Dispense: 118 mL; Refill: 0    Sore throat  -     POCT Strep A, Molecular      - negative strep, offered COVID and influenza testing, patient declined.  Work note  provided.  Discussed symptomatic care for likely viral etiology.  Follow-up with PCP.  Patient verbalized understanding and is in agreement with plan.    Patient Instructions   - Follow up with your PCP or specialty clinic as directed in the next 1-2 weeks if not improved or as needed.  You can call [phone] to schedule an appointment with the appropriate provider.    - Go to the ER or seek medical attention immediately if you develop new or worsening symptoms.    - You must understand that you have received an Urgent Care treatment only and that you may be released before all of your medical problems are known or treated.   - You, the patient, will arrange for follow up care as instructed.   - If your condition worsens or fails to improve we recommend that you receive another evaluation at the ER immediately or contact your PCP to discuss your concerns or return here.     URI    - Rest.    - Drink plenty of fluids.      - Viral upper respiratory infections typically run their course in 10-14 days.      - Tylenol or Ibuprofen as directed as needed for fever/pain. Avoid tylenol if you have a history of liver disease. Do not take ibuprofen if you have a history of GI bleeding, kidney disease, or if you take blood thinners.   - Take ibuprofen 600-800 mg every 6-8 hours for pain and inflammation.  You can also take Tylenol/acetaminophen 650-1000 mg every 6-8 hours for added pain relief.     - You can take over-the-counter claritin, zyrtec, allegra, or xyzal as directed. These are antihistamines that can help with runny nose, nasal congestion, sneezing, and helps to dry up post-nasal drip, which usually causes sore throat and cough.              - If you do NOT have high blood pressure, you may use a decongestant form (D)  of this medication or if you do not take the D form, you can take sudafed (pseudoephedrine) over the counter, which is a decongestant.     - You can use Flonase (fluticasone) nasal spray as  directed for sinus congestion and postnasal drip. This is a steroid nasal spray that works locally over time to decrease the inflammation in your nose/sinuses and help with allergic symptoms. This is not an quick- relief spray like afrin, but it works well if used daily.  Discontinue if you develop nose bleed  - use nasal saline prior to Flonase.     - Use Ocean Spray Nasal Saline 1-3 puffs each nostril every 2-3 hours then blow out onto tissue. This is to irrigate the nasal passage way to clear the sinus openings. Use until sinus problem resolved.     - you can take plain Mucinex (guaifenesin) 1200 mg twice a day to help loosen mucous     -warm salt water gargles can help with sore throat     - warm tea with honey can help with cough. Honey is a natural cough suppressant.     - Follow up with your PCP or specialty clinic as directed in the next 1-2 weeks if not improved or as needed.  You can call [phone] to schedule an appointment with the appropriate provider.       - Go to the ER if you develop new or worsening symptoms.

## 2024-04-13 NOTE — LETTER
April 13, 2024      [facility]  [address]  [place] [address]  Phone: [phone]  Fax: [phone]       Patient: Yao Alan   YOB: 1975  Date of Visit: 04/13/2024    To Whom It May Concern:    Kimmy Alan  was at [facility] on 04/13/2024. The patient may return to work/school on 4/15/2024. If you have any questions or concerns, or if I can be of further assistance, please do not hesitate to contact me.    Sincerely,    Wicho Hdez NP

## 2024-04-13 NOTE — PATIENT INSTRUCTIONS
- Follow up with your PCP or specialty clinic as directed in the next 1-2 weeks if not improved or as needed.  You can call [phone] to schedule an appointment with the appropriate provider.    - Go to the ER or seek medical attention immediately if you develop new or worsening symptoms.    - You must understand that you have received an Urgent Care treatment only and that you may be released before all of your medical problems are known or treated.   - You, the patient, will arrange for follow up care as instructed.   - If your condition worsens or fails to improve we recommend that you receive another evaluation at the ER immediately or contact your PCP to discuss your concerns or return here.     URI    - Rest.    - Drink plenty of fluids.      - Viral upper respiratory infections typically run their course in 10-14 days.      - Tylenol or Ibuprofen as directed as needed for fever/pain. Avoid tylenol if you have a history of liver disease. Do not take ibuprofen if you have a history of GI bleeding, kidney disease, or if you take blood thinners.   - Take ibuprofen 600-800 mg every 6-8 hours for pain and inflammation.  You can also take Tylenol/acetaminophen 650-1000 mg every 6-8 hours for added pain relief.     - You can take over-the-counter claritin, zyrtec, allegra, or xyzal as directed. These are antihistamines that can help with runny nose, nasal congestion, sneezing, and helps to dry up post-nasal drip, which usually causes sore throat and cough.              - If you do NOT have high blood pressure, you may use a decongestant form (D)  of this medication or if you do not take the D form, you can take sudafed (pseudoephedrine) over the counter, which is a decongestant.     - You can use Flonase (fluticasone) nasal spray as directed for sinus congestion and postnasal drip. This is a steroid nasal spray that works locally over time to decrease the inflammation in your nose/sinuses and help with allergic  symptoms. This is not an quick- relief spray like afrin, but it works well if used daily.  Discontinue if you develop nose bleed  - use nasal saline prior to Flonase.     - Use Ocean Spray Nasal Saline 1-3 puffs each nostril every 2-3 hours then blow out onto tissue. This is to irrigate the nasal passage way to clear the sinus openings. Use until sinus problem resolved.     - you can take plain Mucinex (guaifenesin) 1200 mg twice a day to help loosen mucous     -warm salt water gargles can help with sore throat     - warm tea with honey can help with cough. Honey is a natural cough suppressant.     - Follow up with your PCP or specialty clinic as directed in the next 1-2 weeks if not improved or as needed.  You can call [phone] to schedule an appointment with the appropriate provider.       - Go to the ER if you develop new or worsening symptoms.

## 2024-04-24 ENCOUNTER — OFFICE VISIT (OUTPATIENT)
Dept: FAMILY MEDICINE | Facility: CLINIC | Age: 49
End: 2024-04-24
Payer: MEDICARE

## 2024-04-24 VITALS
SYSTOLIC BLOOD PRESSURE: 108 MMHG | HEIGHT: 66 IN | RESPIRATION RATE: 15 BRPM | HEART RATE: 76 BPM | TEMPERATURE: 98 F | DIASTOLIC BLOOD PRESSURE: 70 MMHG | WEIGHT: 185.19 LBS | OXYGEN SATURATION: 98 % | BODY MASS INDEX: 29.76 KG/M2

## 2024-04-24 DIAGNOSIS — Z00.00 ENCOUNTER FOR PREVENTIVE HEALTH EXAMINATION: Primary | ICD-10-CM

## 2024-04-24 DIAGNOSIS — Z00.00 ENCOUNTER FOR MEDICARE ANNUAL WELLNESS EXAM: ICD-10-CM

## 2024-04-24 DIAGNOSIS — N18.31 CHRONIC KIDNEY DISEASE, STAGE 3A: ICD-10-CM

## 2024-04-24 DIAGNOSIS — I70.0 AORTIC ATHEROSCLEROSIS: ICD-10-CM

## 2024-04-24 DIAGNOSIS — J43.9 PULMONARY EMPHYSEMA, UNSPECIFIED EMPHYSEMA TYPE: ICD-10-CM

## 2024-04-24 PROCEDURE — 3078F DIAST BP <80 MM HG: CPT | Mod: CPTII,S$GLB,, | Performed by: NURSE PRACTITIONER

## 2024-04-24 PROCEDURE — G0439 PPPS, SUBSEQ VISIT: HCPCS | Mod: S$GLB,,, | Performed by: NURSE PRACTITIONER

## 2024-04-24 PROCEDURE — 3074F SYST BP LT 130 MM HG: CPT | Mod: CPTII,S$GLB,, | Performed by: NURSE PRACTITIONER

## 2024-04-24 PROCEDURE — 1159F MED LIST DOCD IN RCRD: CPT | Mod: CPTII,S$GLB,, | Performed by: NURSE PRACTITIONER

## 2024-04-24 PROCEDURE — 99999 PR PBB SHADOW E&M-EST. PATIENT-LVL V: CPT | Mod: PBBFAC,,, | Performed by: NURSE PRACTITIONER

## 2024-04-24 NOTE — PATIENT INSTRUCTIONS
Counseling and Referral of Other Preventative  (Italic type indicates deductible and co-insurance are waived)    Patient Name: Yao Alan  Today's Date: 4/24/2024    Health Maintenance       Date Due Completion Date    COVID-19 Vaccine (1) Never done ---    TETANUS VACCINE Never done ---    High Dose Statin Never done ---    Pneumococcal Vaccines (Age 0-64) (2 of 2 - PPSV23 or PCV20) 03/13/2019 1/16/2019    Influenza Vaccine (1) 09/01/2023 1/16/2019    Hemoglobin A1c (Diabetic Prevention Screening) 05/18/2025 5/18/2022    Lipid Panel 05/18/2027 5/18/2022    Colorectal Cancer Screening 03/18/2029 3/18/2019        No orders of the defined types were placed in this encounter.      The following information is provided to all patients.  This information is to help you find resources for any of the problems found today that may be affecting your health:                  Living healthy guide: www.[facility].[place].[facility]      Understanding Diabetes: www.diabetes.org      Eating healthy: www.cdc.gov/healthyweight      CDC home safety checklist: www.cdc.gov/steadi/patient.html      Agency on Aging: www.goea.[place].[facility]      Alcoholics anonymous (AA): www.aa.org      Physical Activity: www.mele.nih.gov/vd5pcow      Tobacco use: www.quitwithusla.org

## 2024-04-24 NOTE — PROGRESS NOTES
"Yao Alan presented for a  Medicare AWV and comprehensive Health Risk Assessment today. The following components were reviewed and updated:    Medical history  Family History  Social history  Allergies and Current Medications  Health Risk Assessment  Health Maintenance  Care Team         ** See Completed Assessments for Annual Wellness Visit within the encounter summary.**         The following assessments were completed:  Living Situation  CAGE  Depression Screening  Timed Get Up and Go  Whisper Test  Cognitive Function Screening  Nutrition Screening  ADL Screening  PAQ Screening      Opioid documentation:      Patient does not have a current opioid prescription.        Vitals:    04/24/24 1505   BP: 108/70   Pulse: 76   Resp: 15   Temp: 97.8 °F (36.6 °C)   TempSrc: Oral   SpO2: 98%   Weight: 84 kg (185 lb 3 oz)   Height: 5' 6" (1.676 m)     Body mass index is 29.89 kg/m².  Physical Exam  Vitals reviewed.   Constitutional:       General: He is not in acute distress.     Appearance: Normal appearance. He is not ill-appearing, toxic-appearing or diaphoretic.   HENT:      Head: Normocephalic and atraumatic.   Pulmonary:      Effort: Pulmonary effort is normal. No respiratory distress.      Breath sounds: No wheezing.   Skin:     General: Skin is warm and dry.   Neurological:      Mental Status: He is alert and oriented to person, place, and time.   Psychiatric:         Mood and Affect: Mood normal.         Behavior: Behavior normal.               Diagnoses and health risks identified today and associated recommendations/orders:    1. Encounter for Medicare annual wellness exam  The patient was seen today for an annual Medicare wellness exam.  Health maintenance and screening topics were discussed.  Proper diet and exercise recommendations were reviewed.  - Ambulatory Referral/Consult to Enhanced Annual Wellness Visit (eAWV)    2. Encounter for preventive health examination  As above.    3. Pulmonary emphysema, " unspecified emphysema type  Stable without acute symptoms.    4. Aortic atherosclerosis  From CT 2021.  Asymptomatic.  Not on statin.    5. Chronic kidney disease, stage 3a  Patient has had longstanding decreased GFR with elevated creatinine.  Discussed need for decreased salt intake and avoidance of NSAIDs.  Will consult Nephrology for evaluation.  - Ambulatory referral/consult to Nephrology; Future      Provided Yao with a 5-10 year written screening schedule and personal prevention plan. Recommendations were developed using the USPSTF age appropriate recommendations. Education, counseling, and referrals were provided as needed. After Visit Summary printed and given to patient which includes a list of additional screenings\tests needed.    Follow up in about 1 year (around 4/24/2025) for AWV.    Rafael Norman, ARUNA  I offered to discuss advanced care planning, including how to pick a person who would make decisions for you if you were unable to make them for yourself, called a health care power of , and what kind of decisions you might make such as use of life sustaining treatments such as ventilators and tube feeding when faced with a life limiting illness recorded on a living will that they will need to know. (How you want to be cared for as you near the end of your natural life)     X Patient is interested in learning more about how to make advanced directives.  I provided them paperwork and offered to discuss this with them.

## 2024-05-01 ENCOUNTER — OFFICE VISIT (OUTPATIENT)
Dept: NEPHROLOGY | Facility: CLINIC | Age: 49
End: 2024-05-01
Payer: MEDICARE

## 2024-05-01 VITALS
DIASTOLIC BLOOD PRESSURE: 72 MMHG | SYSTOLIC BLOOD PRESSURE: 110 MMHG | HEART RATE: 94 BPM | OXYGEN SATURATION: 98 % | BODY MASS INDEX: 29.18 KG/M2 | WEIGHT: 180.75 LBS

## 2024-05-01 DIAGNOSIS — I10 ESSENTIAL HYPERTENSION: ICD-10-CM

## 2024-05-01 DIAGNOSIS — C34.90 PRIMARY MALIGNANT NEOPLASM OF LUNG METASTATIC TO OTHER SITE, UNSPECIFIED LATERALITY: ICD-10-CM

## 2024-05-01 DIAGNOSIS — R79.9 ABNORMAL FINDING OF BLOOD CHEMISTRY, UNSPECIFIED: ICD-10-CM

## 2024-05-01 DIAGNOSIS — I25.10 CORONARY ARTERY CALCIFICATION: ICD-10-CM

## 2024-05-01 DIAGNOSIS — I70.0 AORTIC ATHEROSCLEROSIS: ICD-10-CM

## 2024-05-01 DIAGNOSIS — I25.84 CORONARY ARTERY CALCIFICATION: ICD-10-CM

## 2024-05-01 DIAGNOSIS — N18.31 CHRONIC KIDNEY DISEASE, STAGE 3A: Primary | ICD-10-CM

## 2024-05-01 DIAGNOSIS — E55.9 VITAMIN D DEFICIENCY, UNSPECIFIED: ICD-10-CM

## 2024-05-01 DIAGNOSIS — N20.0 NEPHROLITHIASIS: ICD-10-CM

## 2024-05-01 PROCEDURE — 3008F BODY MASS INDEX DOCD: CPT | Mod: CPTII,S$GLB,, | Performed by: NURSE PRACTITIONER

## 2024-05-01 PROCEDURE — 99205 OFFICE O/P NEW HI 60 MIN: CPT | Mod: S$GLB,,, | Performed by: NURSE PRACTITIONER

## 2024-05-01 PROCEDURE — 1159F MED LIST DOCD IN RCRD: CPT | Mod: CPTII,S$GLB,, | Performed by: NURSE PRACTITIONER

## 2024-05-01 PROCEDURE — 3074F SYST BP LT 130 MM HG: CPT | Mod: CPTII,S$GLB,, | Performed by: NURSE PRACTITIONER

## 2024-05-01 PROCEDURE — 3078F DIAST BP <80 MM HG: CPT | Mod: CPTII,S$GLB,, | Performed by: NURSE PRACTITIONER

## 2024-05-01 PROCEDURE — 3066F NEPHROPATHY DOC TX: CPT | Mod: CPTII,S$GLB,, | Performed by: NURSE PRACTITIONER

## 2024-05-01 PROCEDURE — 99999 PR PBB SHADOW E&M-EST. PATIENT-LVL III: CPT | Mod: PBBFAC,,, | Performed by: NURSE PRACTITIONER

## 2024-05-01 NOTE — PROGRESS NOTES
Subjective:       Patient ID: Yao Alan is a 49 y.o.  male who presents for new evaluation of chronic kidney disease, stage 3a.      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 Diagnosed with lung cancer in 2015 and completed treatment with 6 cycles of Carboplatin and Alimta and was on maintenance Alimta until the end of March 2016. He started Opdivo in March 2016 and his last Opdivo was on 8/27/19.   Original baseline sCr ~ 0.9-1.1 last in May 2016 with noted rise 1.3-1.4--> 1.6 in August 2016. Has ranged 1.3-1.6 since with more recent baseline consistently 1.5-1.6 since 2020 (brief rise to 1.8 last July).   Noted diagnosis of HTN without medications. BP is within goal range. He infrequently takes NSAIDs (ibuprofen 2 days ago). He drinks about 1.5 L (3 water bottles) per day. No history of gout. No known history of stones. His father was on dialysis (unsure of cause). He takes daily Vit D for his bone health.     Significant family hx includes: Father - ESRD on HD (unsure cause)     CT abdomen with contrast 3/13/24 -  Kidneys: Suspected at least partially duplicated right-sided collecting system, not significantly changed. Otherwise, the kidneys are normal in size, location, morphology and attenuation. Approximately 2 right-sided calyceal-based non-obstructing nephroliths in the right superior pole, not significantly changed. No appreciable suspcious sizable lesion, obstructing nephroliths or hydroureteronephrosis bilaterally. Urinary bladder is well distended and grossly unremarkable without mural thickening or appreciable nodularity.     Review of Systems   Respiratory:  Negative for shortness of breath.    Cardiovascular:  Negative for chest pain and leg swelling.    Gastrointestinal:  Negative for diarrhea, nausea and vomiting.   Genitourinary:  Negative for difficulty urinating, dysuria and hematuria.       Objective:       Blood pressure 110/72, pulse 94, weight 82 kg (180 lb 12.4 oz), SpO2 98%.  Physical Exam  Vitals reviewed.   Constitutional:       General: He is not in acute distress.     Appearance: Normal appearance.   HENT:      Head: Normocephalic and atraumatic.   Eyes:      General: No scleral icterus.  Cardiovascular:      Rate and Rhythm: Normal rate and regular rhythm.   Pulmonary:      Effort: Pulmonary effort is normal. No respiratory distress.      Breath sounds: Rales (mild right base) present. No wheezing.   Musculoskeletal:      Right lower leg: No edema.      Left lower leg: No edema.   Skin:     General: Skin is warm and dry.   Neurological:      Mental Status: He is alert and oriented to person, place, and time.   Psychiatric:         Mood and Affect: Mood normal.         Behavior: Behavior normal.           Lab Results   Component Value Date    CREATININE 1.6 (H) 03/11/2024    URICACID 5.5 04/07/2015     Prot/Creat Ratio, Urine   Date Value Ref Range Status   06/05/2015 0.2 0.0 - 0.2 Final   04/15/2015 0.1 0.0 - 0.2 Final   04/06/2015 0.1 0.0 - 0.2 Final     Lab Results   Component Value Date     03/11/2024    K 4.6 03/11/2024    CO2 25 03/11/2024     03/11/2024     Lab Results   Component Value Date    CALCIUM 10.1 03/11/2024    PHOS 4.3 03/14/2015     Lab Results   Component Value Date    HGB 14.1 03/11/2024    WBC 7.04 03/11/2024    HCT 43.7 03/11/2024      Lab Results   Component Value Date    HGBA1C 5.8 (H) 05/18/2022     03/11/2024    BUN 22 (H) 03/11/2024     Lab Results   Component Value Date    LDLCALC 163.2 (H) 05/18/2022         Assessment:       1. Chronic kidney disease, stage 3a    2. Vitamin D deficiency, unspecified    3. Abnormal finding of blood chemistry, unspecified    4. Essential hypertension    5. Aortic  "atherosclerosis    6. Primary malignant neoplasm of lung metastatic to other site, unspecified laterality    7. Nephrolithiasis    8. Coronary artery calcification        Plan:   CKD stage 3a c eGFR 52.5 mL/min -  - recent baseline since 2020 sCr 1.5-1.6  - CKD multifactorial and clinically related to repeated IV contrast, h/o tobacco use + vascular disease (dx of aortic atherosclerosis, mild-moderate coronary calcifications on recent CT), diagnosis of HTN although is well-controlled without medications, possible chemotherapy related injury  - Also with right-sided stones. Encouraged increased hydration and low sodium diet. Denies urinary symptoms.  - Encouraged to avoid NSAIDs. Continue glycemic and BP control.     UPCR Negative on UA. Monitor UPCR. On no RAASi.    Acid-base WNL   Secondary hyperparathyroidism Ca and phos stable. Check PTH and Vit D. Takes daily Vit D supplement.    Anemia WNL   DM preDM in 2022. Repeat A1c   Lipid Management LDL high in 2022. On no statins. Continue with plan below.    ESRD planning Provided education about the stages of CKD, usual progression, and need to monitor for and treat CKD-related disease in an effort to delay progression of CKD.     No need for advanced planning in stable CKD 3a     HTN - Controlled on no medications    Nephrolithiasis - on recent CT. No issues. Encouraged increased hydration and low Na diet. Plan for further evaluation if worsens/ becomes an issue    Lung cancer w/ mets - No treatment at this time. Monitoring and stable per Oncology.     Coronary calcifications, reported aortic atherosclerosis/ hyperlipidemia - Per CT "Heart: Heart is not enlarged. No significant pericardial thickening. Mild-to-moderate coronary artery calcifications. No colette aneurysmal degeneration of the thoracic aorta."  - Lipid panel last in 2022. On no statins.   - Refer to cardiology.    All questions patient had were answered.  Asked if further questions. None. F/u in clinic 6 " months  with labs and urine prior to next visit or sooner if needed.  ER for emergency concerns.    Summary of Plan:  - Avoid NSAIDs. Increase hydration.  - Refer to cardiology for evaluation  - RTC 6 months with RFP, CBC, UA, UPCR, PTH, Vit D, uric acid

## 2024-12-30 DIAGNOSIS — C34.91 PRIMARY MALIGNANT NEOPLASM OF RIGHT LUNG METASTATIC TO OTHER SITE: Primary | ICD-10-CM

## 2025-01-02 ENCOUNTER — TELEPHONE (OUTPATIENT)
Dept: HEMATOLOGY/ONCOLOGY | Facility: CLINIC | Age: 50
End: 2025-01-02
Payer: MEDICARE

## 2025-01-02 NOTE — TELEPHONE ENCOUNTER
Spoke with pt and scheduled labs. R/S CT scan to March per Dr Garay. Patient agreed to new appt date and time. Appt letters mailed to confirmed mailing address.

## 2025-01-05 ENCOUNTER — HOSPITAL ENCOUNTER (EMERGENCY)
Facility: HOSPITAL | Age: 50
Discharge: HOME | End: 2025-01-05
Attending: EMERGENCY MEDICINE
Payer: MEDICARE

## 2025-01-05 VITALS
RESPIRATION RATE: 18 BRPM | BODY MASS INDEX: 29.57 KG/M2 | HEIGHT: 66 IN | SYSTOLIC BLOOD PRESSURE: 124 MMHG | TEMPERATURE: 98 F | HEART RATE: 86 BPM | WEIGHT: 184 LBS | OXYGEN SATURATION: 98 % | DIASTOLIC BLOOD PRESSURE: 74 MMHG

## 2025-01-05 DIAGNOSIS — K62.89 RECTAL PAIN: ICD-10-CM

## 2025-01-05 DIAGNOSIS — K64.4 EXTERNAL HEMORRHOID: Primary | ICD-10-CM

## 2025-01-05 DIAGNOSIS — R30.0 DYSURIA: ICD-10-CM

## 2025-01-05 LAB
BILIRUBIN, POC UA: NEGATIVE
BLOOD, POC UA: NEGATIVE
CLARITY, UA: CLEAR
COLOR, UA: YELLOW
GLUCOSE, POC UA: NEGATIVE
KETONES, POC UA: NEGATIVE
LEUKOCYTE EST, POC UA: NEGATIVE
NITRITE, POC UA: NEGATIVE
PH UR STRIP: 5.5 [PH] (ref 5–8)
PROTEIN, POC UA: NEGATIVE
SPECIFIC GRAVITY, POC UA: 1.02 (ref 1–1.03)
UROBILINOGEN, POC UA: 0.2 E.U./DL

## 2025-01-05 PROCEDURE — 87086 URINE CULTURE/COLONY COUNT: CPT

## 2025-01-05 PROCEDURE — 99284 EMERGENCY DEPT VISIT MOD MDM: CPT | Mod: ER

## 2025-01-05 PROCEDURE — 87591 N.GONORRHOEAE DNA AMP PROB: CPT

## 2025-01-05 RX ORDER — HYDROCORTISONE 25 MG/G
CREAM TOPICAL 2 TIMES DAILY
Qty: 20 G | Refills: 0 | Status: SHIPPED | OUTPATIENT
Start: 2025-01-05

## 2025-01-05 RX ORDER — CEPHALEXIN 500 MG/1
500 CAPSULE ORAL 4 TIMES DAILY
Qty: 28 CAPSULE | Refills: 0 | Status: SHIPPED | OUTPATIENT
Start: 2025-01-05 | End: 2025-01-12

## 2025-01-05 RX ORDER — CEPHALEXIN 500 MG/1
500 CAPSULE ORAL
Status: DISCONTINUED | OUTPATIENT
Start: 2025-01-05 | End: 2025-01-05 | Stop reason: HOSPADM

## 2025-01-05 RX ORDER — POLYETHYLENE GLYCOL 3350 17 G/17G
17 POWDER, FOR SOLUTION ORAL DAILY
Qty: 10 EACH | Refills: 0 | Status: SHIPPED | OUTPATIENT
Start: 2025-01-05

## 2025-01-05 NOTE — DISCHARGE INSTRUCTIONS

## 2025-01-05 NOTE — ED PROVIDER NOTES
Encounter Date: 1/5/2025    SCRIBE #1 NOTE: I, Alexa Iyer, am scribing for, and in the presence of,  Nabil Castellanos PA-C. I have scribed the following portions of the note - Other sections scribed: HPI, ROS.       History     Chief Complaint   Patient presents with    Rectal Pain     Rectal pain with bowel movements and intermittent blood noted x 1 week.       49 y.o. male, with PMHx of HTN, thyroid disease, and lung cancer, presents to the ED complaining of rectal pain x 1 week. He reports associated bright red rectal bleeding, dysuria, and intermittent nausea.  He denies any nausea at this time.  He reports rectal pain is exacerbated with bowel movements. Last BM was today. Patient is still passing flatulence. He did not attempt treatment PTA.  He denies concern for STDs.  He denies any abdominal pain, vomiting, diarrhea, fever, penile pain/swelling, penile discharge, testicular pain/swelling, or other associated symptoms. NKDA.       The history is provided by the patient. No  was used.     Review of patient's allergies indicates:  No Known Allergies  Past Medical History:   Diagnosis Date    Asthma     COPD (chronic obstructive pulmonary disease)     Emphysema of lung     HTN (hypertension)     Hypertension     Lung cancer     Lung mass     Thyroid disease      Past Surgical History:   Procedure Laterality Date    COLONOSCOPY N/A 3/18/2019    Procedure: COLONOSCOPY;  Surgeon: Indra Bowen MD;  Location: [address]);  Service: Endoscopy;  Laterality: N/A;    ESOPHAGOGASTRODUODENOSCOPY N/A 3/18/2019    Procedure: ESOPHAGOGASTRODUODENOSCOPY (EGD);  Surgeon: Indra Bowen MD;  Location: [address]);  Service: Endoscopy;  Laterality: N/A;  ok to hold Xarelto x 2 days per Dr. Garay-MS    FRACTURE SURGERY      finger    LUNG CANCER SURGERY Right March 2015    lung biopsy     PORTACATH PLACEMENT       Family History   Problem Relation Name Age of Onset    No Known Problems Mother       Hypertension Father      Kidney disease Father      No Known Problems Sister      No Known Problems Brother      No Known Problems Maternal Aunt      No Known Problems Maternal Uncle      No Known Problems Paternal Aunt      No Known Problems Paternal Uncle      No Known Problems Maternal Grandmother      No Known Problems Maternal Grandfather      No Known Problems Paternal Grandmother      No Known Problems Paternal Grandfather      Amblyopia Neg Hx      Blindness Neg Hx      Cancer Neg Hx      Cataracts Neg Hx      Diabetes Neg Hx      Glaucoma Neg Hx      Macular degeneration Neg Hx      Retinal detachment Neg Hx      Strabismus Neg Hx      Stroke Neg Hx      Thyroid disease Neg Hx       Social History     Tobacco Use    Smoking status: Former     Current packs/day: 0.00     Average packs/day: 0.8 packs/day for 25.0 years (18.8 ttl pk-yrs)     Types: Cigarettes     Start date: 12/2/1989     Quit date: 12/2/2014     Years since quitting: 10.1     Passive exposure: Past    Smokeless tobacco: Never    Tobacco comments:     Patient is no longer smoking   Substance Use Topics    Alcohol use: No    Drug use: Yes     Frequency: 7.0 times per week     Types: Marijuana     Comment: daily     Review of Systems   Constitutional:  Negative for chills, diaphoresis, fatigue and fever.   HENT:  Negative for congestion, ear pain, nosebleeds, rhinorrhea and sore throat.    Eyes:  Negative for pain and redness.   Respiratory:  Negative for cough and shortness of breath.    Cardiovascular:  Negative for chest pain and leg swelling.   Gastrointestinal:  Positive for anal bleeding, nausea (currently resolved) and rectal pain. Negative for abdominal pain, diarrhea and vomiting.   Genitourinary:  Positive for dysuria. Negative for decreased urine volume, difficulty urinating, frequency, hematuria, penile discharge, penile pain, penile swelling, scrotal swelling, testicular pain and urgency.   Musculoskeletal:  Negative for back pain,  joint swelling, myalgias and neck pain.   Skin:  Negative for rash and wound.   Neurological:  Negative for seizures, syncope, weakness and headaches.   Psychiatric/Behavioral:  Negative for confusion. The patient is not nervous/anxious.        Physical Exam     Initial Vitals   BP Pulse Resp Temp SpO2   01/05/25 1414 01/05/25 1413 01/05/25 1413 01/05/25 1413 01/05/25 1413   130/78 95 20 97.8 °F (36.6 °C) 98 %      MAP       --                Physical Exam    Nursing note and vitals reviewed.  Constitutional: He appears well-developed and well-nourished. He is not diaphoretic.  Non-toxic appearance. No distress.   HENT:   Head: Normocephalic and atraumatic.   Right Ear: Hearing, tympanic membrane, external ear and ear canal normal. Tympanic membrane is not perforated, not erythematous and not bulging.   Left Ear: Hearing, tympanic membrane, external ear and ear canal normal. Tympanic membrane is not perforated, not erythematous and not bulging.   Nose: Nose normal. Mouth/Throat: Uvula is midline and oropharynx is clear and moist.   Eyes: Conjunctivae and EOM are normal.   Neck: Neck supple.   Normal range of motion.   Full passive range of motion without pain.     Cardiovascular:            Pulses:       Radial pulses are 2+ on the right side and 2+ on the left side.   Pulmonary/Chest: Effort normal and breath sounds normal. No respiratory distress. He has no decreased breath sounds.   Abdominal: Abdomen is soft and flat. There is no abdominal tenderness.   No right CVA tenderness.  No left CVA tenderness. There is no rebound and no guarding.   Genitourinary:    Genitourinary Comments: Rectal exam chaperoned by Alexa baumann.  Non thrombosed external hemorrhoid noted to the 6 o'clock position.  Guaiac negative.  No melena.  No hematochezia.  Normal rectal tone.     Musculoskeletal:      Cervical back: Full passive range of motion without pain, normal range of motion and neck supple. No rigidity.     Neurological:  He is alert. No cranial nerve deficit.   Neuro intact.  Strength and sensation intact to bilateral upper and lower extremities.   Skin: Skin is warm and dry. No rash noted.         ED Course   Procedures  Labs Reviewed   CULTURE, URINE   C. TRACHOMATIS/N. GONORRHOEAE BY AMP DNA   POCT URINALYSIS W/O SCOPE   POCT URINALYSIS W/O SCOPE       Result Value    Glucose, UA Negative      Bilirubin, UA Negative      Ketones, UA Negative      Spec Grav UA 1.025      Blood, UA Negative      PH, UA 5.5      Protein, UA Negative      Urobilinogen, UA 0.2      Nitrite, UA Negative      Leukocytes, UA Negative      Color, UA POC Yellow      Clarity, UA, POC Clear            Imaging Results    None          Medications   cephALEXin capsule 500 mg (500 mg Oral Not Given 1/5/25 1600)     Medical Decision Making  This is a  49 y.o. male, with PMHx of HTN, thyroid disease, and lung cancer, presents to the ED complaining of rectal pain x 1 week. He reports associated bright red rectal bleeding, dysuria, and intermittent nausea.  He denies any nausea at this time.  He reports rectal pain is exacerbated with bowel movements. Last BM was today. Patient is still passing flatulence. He did not attempt treatment PTA.  He denies concern for STDs.  He denies any abdominal pain, vomiting, diarrhea, fever, penile pain/swelling, penile discharge, testicular pain/swelling, or other associated symptoms. NKDA.     On physical exam, patient is well-appearing and in no acute distress.  Nontoxic appearing.  Lungs are clear to auscultation bilaterally.  Abdomen is soft and nontender.  No guarding, rigidity, rebound.  2+ radial pulses bilaterally.  Posterior oropharynx is not erythematous.  No edema or exudate.  Uvula midline.  Bilateral tympanic membrane is normal.  No erythema, bulging, or perforations.  Neuro intact.  Strength and sensation intact bilateral upper and lower extremities.  Rectal exam chaperoned by Alexa baumann.  Non thrombosed external  hemorrhoid noted to the 6 o'clock position.  Guaiac negative.  No melena.  No hematochezia.  Normal rectal tone.  UA unremarkable.  Negative nitrites, negative leukocytes.  Urine culture pending.  GC pending.  Given patient's urinary complaints, Will treat patient for cystitis.  Keflex ordered.  Will discharge patient on Keflex, hydrocortisone cream, and MiraLax.  Ambulatory referral to colorectal surgery ordered.  Urged prompt follow-up with colorectal surgery and PCP for further evaluation.    Strict return precautions given. I discussed with the patient/family the diagnosis, treatment plan, indications for return to the emergency department, and for expected follow-up. The patient/family verbalized an understanding. The patient/family is asked if there are any questions or concerns. We discuss the case, until all issues are addressed to the patient/family's satisfaction. Patient/family understands and is agreeable to the plan. Patient is stable and ready for discharge.      Amount and/or Complexity of Data Reviewed  Labs: ordered. Decision-making details documented in ED Course.    Risk  OTC drugs.  Prescription drug management.            Scribe Attestation:   Scribe #1: I performed the above scribed service and the documentation accurately describes the services I performed. I attest to the accuracy of the note.                             I, Dimplealeksandr Emanuel, personally performed the services described in this documentation. All medical record entries made by the scribe were at my direction and in my presence. I have reviewed the chart and agree that the record reflects my personal performance and is accurate and complete.      DISCLAIMER: This note was prepared with [employer] voice recognition transcription software. Garbled syntax, mangled pronouns, and other bizarre constructions may be attributed to that software system.    Clinical Impression:  Final diagnoses:  [K64.4] External hemorrhoid (Primary)  [K62.89]  Rectal pain  [R30.0] Dysuria          ED Disposition Condition    Discharge Stable          ED Prescriptions       Medication Sig Dispense Start Date End Date Auth. Provider    cephALEXin (KEFLEX) 500 MG capsule Take 1 capsule (500 mg total) by mouth 4 (four) times daily. for 7 days 28 capsule 1/5/2025 1/12/2025 Nabil Castellanos PA-C    hydrocortisone 2.5 % cream Apply topically 2 (two) times daily. 20 g 1/5/2025 -- Nabil Castellanos PA-C    polyethylene glycol (GLYCOLAX) 17 gram PwPk Take 17 g by mouth once daily. 10 each 1/5/2025 -- Nabil Castellanos PA-C          Follow-up Information       Follow up With Specialties Details Why Contact Info    [facility] -  Schedule an appointment as soon as possible for a visit in 2 days for further evaluation [address] [place] [id number] [phone]      [facility] [facility] In 2 days If symptoms worsen [address] [facility] [address] [phone]             Nabil Castellanos PA-C  01/05/25 9194

## 2025-01-07 LAB
BACTERIA UR CULT: NO GROWTH
C TRACH DNA SPEC QL NAA+PROBE: NOT DETECTED
N GONORRHOEA DNA SPEC QL NAA+PROBE: NOT DETECTED

## 2025-03-12 ENCOUNTER — HOSPITAL ENCOUNTER (OUTPATIENT)
Dept: RADIOLOGY | Facility: HOSPITAL | Age: 50
Discharge: HOME | End: 2025-03-12
Attending: INTERNAL MEDICINE
Payer: MEDICARE

## 2025-03-12 DIAGNOSIS — C34.91 PRIMARY MALIGNANT NEOPLASM OF RIGHT LUNG METASTATIC TO OTHER SITE: ICD-10-CM

## 2025-03-12 PROCEDURE — 74177 CT ABD & PELVIS W/CONTRAST: CPT | Mod: 26,,, | Performed by: RADIOLOGY

## 2025-03-12 PROCEDURE — 25500020 PHARM REV CODE 255: Performed by: INTERNAL MEDICINE

## 2025-03-12 PROCEDURE — 74177 CT ABD & PELVIS W/CONTRAST: CPT | Mod: TC

## 2025-03-12 PROCEDURE — A9698 NON-RAD CONTRAST MATERIALNOC: HCPCS | Performed by: INTERNAL MEDICINE

## 2025-03-12 PROCEDURE — 71260 CT THORAX DX C+: CPT | Mod: 26,,, | Performed by: RADIOLOGY

## 2025-03-12 RX ADMIN — IOHEXOL 100 ML: 350 INJECTION, SOLUTION INTRAVENOUS at 02:03

## 2025-03-12 RX ADMIN — BARIUM SULFATE 450 ML: 20 SUSPENSION ORAL at 01:03

## 2025-03-17 ENCOUNTER — LAB VISIT (OUTPATIENT)
Dept: LAB | Facility: HOSPITAL | Age: 50
End: 2025-03-17
Attending: INTERNAL MEDICINE
Payer: MEDICARE

## 2025-03-17 ENCOUNTER — OFFICE VISIT (OUTPATIENT)
Dept: HEMATOLOGY/ONCOLOGY | Facility: CLINIC | Age: 50
End: 2025-03-17
Payer: MEDICARE

## 2025-03-17 VITALS
HEART RATE: 51 BPM | OXYGEN SATURATION: 97 % | BODY MASS INDEX: 29.72 KG/M2 | SYSTOLIC BLOOD PRESSURE: 122 MMHG | WEIGHT: 184.94 LBS | TEMPERATURE: 98 F | DIASTOLIC BLOOD PRESSURE: 76 MMHG | HEIGHT: 66 IN

## 2025-03-17 DIAGNOSIS — C34.91 PRIMARY MALIGNANT NEOPLASM OF RIGHT LUNG METASTATIC TO OTHER SITE: Primary | ICD-10-CM

## 2025-03-17 DIAGNOSIS — C79.51 SECONDARY MALIGNANT NEOPLASM OF BONE: ICD-10-CM

## 2025-03-17 DIAGNOSIS — C34.91 PRIMARY MALIGNANT NEOPLASM OF RIGHT LUNG METASTATIC TO OTHER SITE: ICD-10-CM

## 2025-03-17 LAB
ALBUMIN SERPL BCP-MCNC: 3.9 G/DL (ref 3.5–5.2)
ALP SERPL-CCNC: 79 U/L (ref 40–150)
ALT SERPL W/O P-5'-P-CCNC: 16 U/L (ref 10–44)
ANION GAP SERPL CALC-SCNC: 10 MMOL/L (ref 8–16)
AST SERPL-CCNC: 21 U/L (ref 10–40)
BASOPHILS # BLD AUTO: 0.1 K/UL (ref 0–0.2)
BASOPHILS NFR BLD: 1.2 % (ref 0–1.9)
BILIRUB SERPL-MCNC: 0.3 MG/DL (ref 0.1–1)
BUN SERPL-MCNC: 16 MG/DL (ref 6–20)
CALCIUM SERPL-MCNC: 8.8 MG/DL (ref 8.7–10.5)
CHLORIDE SERPL-SCNC: 107 MMOL/L (ref 95–110)
CO2 SERPL-SCNC: 24 MMOL/L (ref 23–29)
CREAT SERPL-MCNC: 1.4 MG/DL (ref 0.5–1.4)
DIFFERENTIAL METHOD BLD: ABNORMAL
EOSINOPHIL # BLD AUTO: 0.2 K/UL (ref 0–0.5)
EOSINOPHIL NFR BLD: 2.8 % (ref 0–8)
ERYTHROCYTE [DISTWIDTH] IN BLOOD BY AUTOMATED COUNT: 14.4 % (ref 11.5–14.5)
EST. GFR  (NO RACE VARIABLE): >60 ML/MIN/1.73 M^2
GLUCOSE SERPL-MCNC: 81 MG/DL (ref 70–110)
HCT VFR BLD AUTO: 42.2 % (ref 40–54)
HGB BLD-MCNC: 13.3 G/DL (ref 14–18)
IMM GRANULOCYTES # BLD AUTO: 0.03 K/UL (ref 0–0.04)
IMM GRANULOCYTES NFR BLD AUTO: 0.3 % (ref 0–0.5)
LYMPHOCYTES # BLD AUTO: 2.7 K/UL (ref 1–4.8)
LYMPHOCYTES NFR BLD: 30.9 % (ref 18–48)
MCH RBC QN AUTO: 26.1 PG (ref 27–31)
MCHC RBC AUTO-ENTMCNC: 31.5 G/DL (ref 32–36)
MCV RBC AUTO: 83 FL (ref 82–98)
MONOCYTES # BLD AUTO: 0.9 K/UL (ref 0.3–1)
MONOCYTES NFR BLD: 10.1 % (ref 4–15)
NEUTROPHILS # BLD AUTO: 4.7 K/UL (ref 1.8–7.7)
NEUTROPHILS NFR BLD: 54.7 % (ref 38–73)
NRBC BLD-RTO: 0 /100 WBC
PLATELET # BLD AUTO: 267 K/UL (ref 150–450)
PMV BLD AUTO: 10.8 FL (ref 9.2–12.9)
POTASSIUM SERPL-SCNC: 3.9 MMOL/L (ref 3.5–5.1)
PROT SERPL-MCNC: 7.3 G/DL (ref 6–8.4)
RBC # BLD AUTO: 5.09 M/UL (ref 4.6–6.2)
SODIUM SERPL-SCNC: 141 MMOL/L (ref 136–145)
WBC # BLD AUTO: 8.62 K/UL (ref 3.9–12.7)

## 2025-03-17 PROCEDURE — 85025 COMPLETE CBC W/AUTO DIFF WBC: CPT | Performed by: INTERNAL MEDICINE

## 2025-03-17 PROCEDURE — 1159F MED LIST DOCD IN RCRD: CPT | Mod: CPTII,S$GLB,, | Performed by: INTERNAL MEDICINE

## 2025-03-17 PROCEDURE — 36415 COLL VENOUS BLD VENIPUNCTURE: CPT | Performed by: INTERNAL MEDICINE

## 2025-03-17 PROCEDURE — 3074F SYST BP LT 130 MM HG: CPT | Mod: CPTII,S$GLB,, | Performed by: INTERNAL MEDICINE

## 2025-03-17 PROCEDURE — 80053 COMPREHEN METABOLIC PANEL: CPT | Performed by: INTERNAL MEDICINE

## 2025-03-17 PROCEDURE — 99214 OFFICE O/P EST MOD 30 MIN: CPT | Mod: S$GLB,,, | Performed by: INTERNAL MEDICINE

## 2025-03-17 PROCEDURE — 3078F DIAST BP <80 MM HG: CPT | Mod: CPTII,S$GLB,, | Performed by: INTERNAL MEDICINE

## 2025-03-17 PROCEDURE — 3008F BODY MASS INDEX DOCD: CPT | Mod: CPTII,S$GLB,, | Performed by: INTERNAL MEDICINE

## 2025-03-17 PROCEDURE — 99999 PR PBB SHADOW E&M-EST. PATIENT-LVL III: CPT | Mod: PBBFAC,,, | Performed by: INTERNAL MEDICINE

## 2025-03-17 NOTE — PROGRESS NOTES
"Subjective     Patient ID: Yao Alan is a 50 y.o. male.    Chief Complaint: Primary malignant neoplasm of right lung metastatic to othe  Oncologic History:  Mr. Yao Alan is a 50-year-old male who has a long history of smoking and was seen in the Pulmonary Clinic by Dr. Valle on 03/05/2015 with abnormal CT scan.    Apparently, he went to the [facility] in February with coughing as well as chest pain. A chest x-ray was done, which revealed a left upper lobe lung mass. Followup CT scan was done on 02/12/2015 and that revealed posterior superior mediastinal mass adjacent and prominent enlarged upper left mediastinal lymph nodes, abutting the aortic arch as well as left subclavian artery. A  CT scan of the abdomen was done on 03/03/2015 without contrast and that revealed nonobstructive nephrolithiasis, but a 2.1 cm lytic lesion involving the left iliac wing concerning for metastatic disease given the presence of emphysema and a mediastinal soft tissue mass on the CT chest from 02/12/2015. He saw Dr. Valle after that on 03/05/2015 and underwent an IR guided biopsy of the bone lesion on 03/17/2015. 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    His EGFR/EML/ALK is negative.    His PET scan on 3/25/15 revealed Left upper lobe lung lesion with an adjacent para-aortic lymph node, right anterior rib metastasis, right iliac metastasis, and pancreatic metastasis. A pancreatic cancer primary neoplasm is less likely.  MRI brain was negative for mets.  He completed 6 cycles of Carboplatin and Alimta and was on maintenance Alimta until end of March 2016.  His bone scan from 3/16 revealed stable disease. His CT scans also from end of March 2016 revealed "Interval enlargement of left upper lobe lung mass measuring 5.9 x 3.9 cm (previously 3.3 x 2.5 cm). This mass appears to invade the mediastinum and abutting the aortic arch and left subclavian artery"     He started Opdivo since March " "2016 and his last Opdivo was on 8/27/19        HPIHe comes in to review his CT CAP form 3/13/2025  "History of metastatic lung cancer status post chemotherapy.  No suspicious pulmonary nodules.  2. Stable pancreatic and right iliac bone metastases.  No evidence of new metastatic disease.  3. Similar severe bullous emphysema"    He feels well and denies any new issues    Review of Systems   Constitutional:  Negative for appetite change, fatigue and unexpected weight change.   HENT:  Negative for mouth sores.    Eyes:  Negative for visual disturbance.   Respiratory:  Negative for cough and shortness of breath.    Cardiovascular:  Negative for chest pain.   Gastrointestinal:  Negative for abdominal pain and diarrhea.   Genitourinary:  Negative for frequency.   Musculoskeletal:  Negative for back pain.   Integumentary:  Negative for rash.   Neurological:  Negative for headaches.   Hematological:  Negative for adenopathy.   Psychiatric/Behavioral:  The patient is not nervous/anxious.    All other systems reviewed and are negative.         Objective     Physical Exam  Vitals reviewed.   Constitutional:       Appearance: He is well-developed.   HENT:      Mouth/Throat:      Pharynx: No oropharyngeal exudate.   Cardiovascular:      Rate and Rhythm: Normal rate.      Heart sounds: Normal heart sounds.   Pulmonary:      Effort: Pulmonary effort is normal.      Breath sounds: Normal breath sounds. No wheezing.   Abdominal:      General: Bowel sounds are normal.      Palpations: Abdomen is soft.      Tenderness: There is no abdominal tenderness.   Musculoskeletal:         General: No tenderness.   Lymphadenopathy:      Cervical: No cervical adenopathy.   Skin:     General: Skin is warm and dry.      Findings: No rash.   Neurological:      Mental Status: He is alert and oriented to person, place, and time.      Coordination: Coordination normal.   Psychiatric:         Thought Content: Thought content normal.         Judgment: " Judgment normal.           LABS:  WBC   Date Value Ref Range Status   03/11/2024 7.04 3.90 - 12.70 K/uL Final     Hemoglobin   Date Value Ref Range Status   03/11/2024 14.1 14.0 - 18.0 g/dL Final     POC Hematocrit   Date Value Ref Range Status   06/12/2019 40 36 - 54 %PCV Final     Hematocrit   Date Value Ref Range Status   03/11/2024 43.7 40.0 - 54.0 % Final     Platelets   Date Value Ref Range Status   03/11/2024 288 150 - 450 K/uL Final     Gran # (ANC)   Date Value Ref Range Status   03/11/2024 3.5 1.8 - 7.7 K/uL Final     Gran %   Date Value Ref Range Status   03/11/2024 50.3 38.0 - 73.0 % Final       Chemistry        Component Value Date/Time     03/11/2024 1156    K 4.6 03/11/2024 1156     03/11/2024 1156    CO2 25 03/11/2024 1156    BUN 22 (H) 03/11/2024 1156    CREATININE 1.6 (H) 03/11/2024 1156    GLU 95 03/11/2024 1156        Component Value Date/Time    CALCIUM 10.1 03/11/2024 1156    ALKPHOS 78 03/11/2024 1156    AST 24 03/11/2024 1156    ALT 18 03/11/2024 1156    BILITOT 0.3 03/11/2024 1156    ESTGFRAFRICA >60 07/13/2022 0903    EGFRNONAA 55 (A) 07/13/2022 0903      \     Assessment and Plan     1. Primary malignant neoplasm of right lung metastatic to other site  -     CBC w/ DIFF; Future; Expected date: 03/17/2025  -     CMP; Future; Expected date: 03/17/2025  -     CT Chest Abdomen Pelvis With IV Contrast (XPD) Routine Oral Contrast; Future; Expected date: 03/17/2025    2. Secondary malignant neoplasm of bone        Route Chart for Scheduling    Med Onc Chart Routing      Follow up with physician 6 months. Schedule CBC CMP CT chest abdomen pelvis and see me   Follow up with MARY    Infusion scheduling note    Injection scheduling note    Labs    Imaging    Pharmacy appointment    Other referrals                   Mr. Alan is a 50-year-old with stage IV lung cancer off of treatment now for over 4 years, he continues to have stable findings on his CT scans so we will continue to monitor off  of treatment he will return in 6 months with repeat labs and CT scans    Above plan reviewed with the patient and all of his questions were answered to his satisfaction

## 2025-08-23 ENCOUNTER — HOSPITAL ENCOUNTER (EMERGENCY)
Facility: HOSPITAL | Age: 50
Discharge: HOME | End: 2025-08-23
Attending: STUDENT IN AN ORGANIZED HEALTH CARE EDUCATION/TRAINING PROGRAM
Payer: MEDICARE

## 2025-08-23 VITALS
WEIGHT: 175 LBS | BODY MASS INDEX: 28.12 KG/M2 | HEIGHT: 66 IN | OXYGEN SATURATION: 99 % | TEMPERATURE: 98 F | DIASTOLIC BLOOD PRESSURE: 85 MMHG | RESPIRATION RATE: 16 BRPM | SYSTOLIC BLOOD PRESSURE: 141 MMHG | HEART RATE: 76 BPM

## 2025-08-23 DIAGNOSIS — M54.2 NECK PAIN: Primary | ICD-10-CM

## 2025-08-23 DIAGNOSIS — M54.9 BACK PAIN, UNSPECIFIED BACK LOCATION, UNSPECIFIED BACK PAIN LATERALITY, UNSPECIFIED CHRONICITY: ICD-10-CM

## 2025-08-23 PROCEDURE — 96372 THER/PROPH/DIAG INJ SC/IM: CPT | Performed by: STUDENT IN AN ORGANIZED HEALTH CARE EDUCATION/TRAINING PROGRAM

## 2025-08-23 PROCEDURE — 63600175 PHARM REV CODE 636 W HCPCS: Mod: JZ,TB,ER | Performed by: STUDENT IN AN ORGANIZED HEALTH CARE EDUCATION/TRAINING PROGRAM

## 2025-08-23 PROCEDURE — 99284 EMERGENCY DEPT VISIT MOD MDM: CPT | Mod: 25,ER

## 2025-08-23 RX ORDER — KETOROLAC TROMETHAMINE 30 MG/ML
30 INJECTION, SOLUTION INTRAMUSCULAR; INTRAVENOUS
Status: COMPLETED | OUTPATIENT
Start: 2025-08-23 | End: 2025-08-23

## 2025-08-23 RX ADMIN — KETOROLAC TROMETHAMINE 30 MG: 30 INJECTION, SOLUTION INTRAMUSCULAR; INTRAVENOUS at 10:08

## 2025-08-24 LAB
OHS QRS DURATION: 80 MS
OHS QTC CALCULATION: 409 MS

## 2025-08-30 ENCOUNTER — OFFICE VISIT (OUTPATIENT)
Dept: URGENT CARE | Facility: CLINIC | Age: 50
End: 2025-08-30
Payer: MEDICARE